# Patient Record
Sex: FEMALE | Race: WHITE | NOT HISPANIC OR LATINO | Employment: OTHER | URBAN - NONMETROPOLITAN AREA
[De-identification: names, ages, dates, MRNs, and addresses within clinical notes are randomized per-mention and may not be internally consistent; named-entity substitution may affect disease eponyms.]

---

## 2017-02-06 DIAGNOSIS — R52 PAIN: Primary | ICD-10-CM

## 2017-02-06 NOTE — TELEPHONE ENCOUNTER
Called advised pt she would have to make an appointment with Dr. Lange since it's been last April since she was seen. Patient said she would just try to get the RX Voltaren Gel from her PCP-lck

## 2017-06-29 ENCOUNTER — OFFICE VISIT (OUTPATIENT)
Dept: ORTHOPEDIC SURGERY | Facility: CLINIC | Age: 72
End: 2017-06-29

## 2017-06-29 VITALS — HEIGHT: 72 IN | WEIGHT: 180 LBS | BODY MASS INDEX: 24.38 KG/M2

## 2017-06-29 DIAGNOSIS — M65.311 TRIGGER THUMB, RIGHT THUMB: ICD-10-CM

## 2017-06-29 DIAGNOSIS — Z98.890 STATUS POST COMPLETE REPAIR OF ROTATOR CUFF: Primary | ICD-10-CM

## 2017-06-29 PROCEDURE — 99213 OFFICE O/P EST LOW 20 MIN: CPT | Performed by: ORTHOPAEDIC SURGERY

## 2017-10-12 ENCOUNTER — OFFICE VISIT (OUTPATIENT)
Dept: ORTHOPEDIC SURGERY | Facility: CLINIC | Age: 72
End: 2017-10-12

## 2017-10-12 VITALS — TEMPERATURE: 97 F | WEIGHT: 178 LBS | BODY MASS INDEX: 28.61 KG/M2 | HEIGHT: 66 IN

## 2017-10-12 DIAGNOSIS — Z96.651 H/O TOTAL KNEE REPLACEMENT, RIGHT: Primary | ICD-10-CM

## 2017-10-12 DIAGNOSIS — M65.311 TRIGGER THUMB, RIGHT THUMB: ICD-10-CM

## 2017-10-12 DIAGNOSIS — Z96.651 STATUS POST TOTAL KNEE REPLACEMENT, RIGHT: ICD-10-CM

## 2017-10-12 PROCEDURE — 73560 X-RAY EXAM OF KNEE 1 OR 2: CPT | Performed by: ORTHOPAEDIC SURGERY

## 2017-10-12 PROCEDURE — 99214 OFFICE O/P EST MOD 30 MIN: CPT | Performed by: ORTHOPAEDIC SURGERY

## 2017-10-12 RX ORDER — THYROID 30 MG/1
30 TABLET ORAL DAILY
COMMUNITY
Start: 2017-08-24

## 2017-10-12 RX ORDER — MELOXICAM 15 MG/1
15 TABLET ORAL DAILY
Status: ON HOLD | COMMUNITY
Start: 2017-08-09 | End: 2019-09-09

## 2017-10-12 RX ORDER — EPINEPHRINE 0.3 MG/.3ML
INJECTION INTRAMUSCULAR AS NEEDED
COMMUNITY
Start: 2017-10-04 | End: 2022-05-09 | Stop reason: SDUPTHER

## 2017-10-12 RX ORDER — BENZONATATE 100 MG/1
100 CAPSULE ORAL 2 TIMES DAILY PRN
COMMUNITY
Start: 2017-08-31 | End: 2021-12-22

## 2017-10-12 NOTE — PROGRESS NOTES
"Chief Complaint   Patient presents with   • Right Knee - Establish Care             HPI  Pleasant 71 year old female presents to establish orthopedic care for routine follow ups status post right total knee replacement performed Dr. Mack on May 24, 2005.  Dr. Mack is no longer at the [facility] and she reports no problems with her knee.  She states,\"it is doing great\".The total knee arthroplasty improved her quality of life very significantly.  She likes to get outside and walk for exercise and is able to do that in an unrestricted fashion.  She is pleased with her outcome.  I will be glad to follow her total knee arthroplasty on a yearly basis since her original orthopedic surgeon, Dr. Crow Mack is retired.  The patient is complaining of significant pain and discomfort of the right thumb.  She has difficulty with full extension of the thumb.  There is a significant clicking and popping on the base of the thumb.  This causes her to have a lot of discomfort.  It also interrupts her sleep patterns.  The patient would to have her trigger thumb addressed with a surgical correction.  We have discussed this option with the patient in the office at great detail.  Time spent in the office discussing the total knee arthroplasty surveillance and maintenance and making a surgical decision on the thumb is 30 minutes.          Allergies   Allergen Reactions   • Latex Hives   • Codeine Hives   • Contrast Dye Hives   • Cubicin [Daptomycin] Itching   • Darvon [Propoxyphene] Nausea And Vomiting     CONFUSION.    • Demerol [Meperidine] Nausea And Vomiting   • Ditropan [Oxybutynin] Itching   • Duricef [Cefadroxil] Itching   • Erythromycin Hives   • Hydrocodone Nausea And Vomiting   • Iodine Hives   • Minocycline Hives   • Morphine And Related Hives   • Penicillins Hives   • Solu-Medrol [Methylprednisolone Acetate] Other (See Comments)     UNKNOWN REACTION.    • Sulphadimidine [Sulfamethazine] Hives   • Talwin " [Pentazocine] Hives     AND TALACEN   • Ultram [Tramadol] Hives   • Valium [Diazepam] Hives     CONFUSION.    • Adhesive Tape Rash   • Betadine [Povidone Iodine] Rash   • Neosporin Af [Miconazole Nitrate] Rash         Social History     Social History   • Marital status:      Spouse name: N/A   • Number of children: N/A   • Years of education: N/A     Occupational History   • Not on file.     Social History Main Topics   • Smoking status: Never Smoker   • Smokeless tobacco: Not on file   • Alcohol use 1.2 oz/week     2 Shots of liquor per week      Comment: 2 drinks per week   • Drug use: No   • Sexual activity: Defer     Other Topics Concern   • Not on file     Social History Narrative       Family History   Problem Relation Age of Onset   • Hypertension Other    • Cancer Other      breast       Past Surgical History:   Procedure Laterality Date   • ABSCESS DRAINAGE      abscess removed from left jaw x2   • APPENDECTOMY  1965   • AVULSION TOENAIL PLATE      3 toenails removed   • BILATERAL BREAST REDUCTION Left 8/29/2016    Procedure: LT BREAST REDUCTION ;  Surgeon: Luis Zambrano MD;  Location: [facility];  Service:    • CHOLECYSTECTOMY  09/2000   • CYST REMOVAL  03/1984    eye cyst left lower lid   • CYST REMOVAL  01/2007    right upper thigh   • CYSTOSCOPY      x2  02/2013 & 04/2014   • HYSTERECTOMY  1971    LATER BSO   • KNEE ARTHROPLASTY Right 03/2005   • MASTECTOMY Right 03/2015   • MASTECTOMY PARTIAL / LUMPECTOMY Right 03/2009   • NIPPLE RECONSTRUCTION Right 8/29/2016    Procedure: RT NIPPLE RECONSTRUCTION;  Surgeon: Luis Zambrano MD;  Location: [facility];  Service:    • OTHER SURGICAL HISTORY      laparoscopy adhesions right side x2   • OTHER SURGICAL HISTORY      drain mammosite area   • OTHER SURGICAL HISTORY  01/2010    pressure washed prosthesis area left jaw   • OTHER SURGICAL HISTORY      drain mammosite area right breast 09/2011   • RECONSTRUCTION BREAST W/ TRAM FLAP Right 02/2016    • ROTATOR CUFF REPAIR Left 09/2014   • TEMPOROMANDIBULAR JOINT ARTHROPLASTY Bilateral 1993    MULT. REPLACEMENTS LATER   • TONSILLECTOMY  1952       Past Medical History:   Diagnosis Date   • Anemia     CHRONIC IRON DEFICIENT   • Asthma    • Hard to intubate     SEE ANESTHESIA CONSULT 8-24-16   • Headache     or migraines   • History of breast cancer     RIGHT   • Hypercholesteremia    • MRSA (methicillin resistant Staphylococcus aureus)     LEFT JAW @[facility] JOINT, MULT. ,LAST 2013, TREATED AT [facility], infection control notified 8/24/16. 1300   • Osteoarthritis    • PONV (postoperative nausea and vomiting)    • Sleep apnea     C PAP   • Spinal headache    • Vertigo    • Wound dehiscence     HX. STATES CLOSED           Vitals:    10/12/17 0905   Temp: 97 °F (36.1 °C)             Review of Systems   Constitutional: Negative.    HENT: Negative.    Eyes: Negative.    Respiratory: Negative.    Cardiovascular: Negative.    Gastrointestinal: Negative.    Endocrine: Negative.    Genitourinary: Negative.    Musculoskeletal: Negative for back pain and joint swelling.   Skin: Negative.    Allergic/Immunologic: Positive for environmental allergies and food allergies.   Neurological: Negative.    Hematological: Bruises/bleeds easily.   Psychiatric/Behavioral: Negative.            Physical Exam   Constitutional: She is oriented to person, place, and time. She appears well-nourished.   HENT:   Head: Atraumatic.   Eyes: EOM are normal.   Neck: Neck supple.   Cardiovascular: Intact distal pulses.    Pulmonary/Chest: Breath sounds normal.   Abdominal: Bowel sounds are normal.   Musculoskeletal: She exhibits edema and tenderness. She exhibits no deformity.   Neurological: She is alert and oriented to person, place, and time. She has normal reflexes.   Skin: Skin is dry.   Psychiatric: She has a normal mood and affect. Her behavior is normal. Judgment and thought content normal.   Vitals reviewed.              Joint/Body  Part Specific Exam:  right knee.The patient is status post total knee arthroplasty postoperative 12 year(s). Incision is clean. Calf is soft and nontender. Homans sign is negative. There is no clicking, popping or catching. Anterior and posterior drawer signs are negative.  There is no instability of the components. Appropriate amounts of swelling and bruising are noted. Dorsalis pedis and posterior tibial artery pulses are palpable. Common peroneal nerve function is well preserved. Range of motion is from 0- 125 degrees of flexion. Gait is cautious but otherwise fairly normal. There is no evidence of a deep seated joint infection.      right wrist. Trigger trigger thumb. Skin is swollen over volar aspect of MCP joint. Flexor and extensor tendon functions are both well maintained. Tender nodule over the volar aspect of MCP joint. Capillary refill is two seconds with a brisk return. Triggering is passively correctable. Median nerve function is normal. Radial artery pulse is palpable.    X-RAY Report:  right Knee X-Ray  Indication: Evaluation of implant position after total knee arthroplasty  AP, Lateral views  Findings: Well placed implants with a good cement mantle without any subsidence  no bony lesion  Soft tissues within normal limits  within normal limits joint spaces  Hardware appropriately positioned yes      no prior studies available for comparison.    X-RAY was ordered and reviewed by Dejan Lange MD          Diagnostics:            Valery was seen today for establish care.    Diagnoses and all orders for this visit:    H/O total knee replacement, right  -     XR Knee 1 or 2 View Right    Status post total knee replacement, right  -     External [facility] Surgical/Procedural Request  -     clindamycin (CLEOCIN) 900 mg in dextrose (D5W) 5 % 100 mL IVPB; Infuse 900 mg into a venous catheter 1 (One) Time.    Trigger thumb, right thumb    Other orders  -     Follow Anesthesia Guidelines / Standing  Orders; Future  -     Obtain informed consent  -     Follow Anesthesia Guidelines / Standing Orders; Standing            Procedures          I provided this patient with educational materials regarding bone health.        Plan:   Stretching and strengthening exercises of the knee including the quads and hamstrings.    Calcium and vitamin D for bone health.    Tablet ibuprofen 600 mg orally twice a day when necessary pain and discomfort.    , GI and dental procedure prophylaxis with antibiotics to prevent a metastatic infection to the total knee arthroplasty implants.    She has a locking right trigger thumb and would like to have that released.    Risks and benefits of trigger thumb release discussed with the patient in great detail.    Risks of surgical procedure, possibility of infection, DVT, continued pain, limited strength and range of motion, neurological deficit, scar tissue formation, recurrence of triggering and further surgical intervention discussed with the patient. Patient understands that surgery will benefit triggering.          CC To Tino Smith MD

## 2017-10-18 PROBLEM — M65.311 TRIGGER THUMB, RIGHT THUMB: Status: ACTIVE | Noted: 2017-10-18

## 2017-10-18 PROBLEM — Z96.651 H/O TOTAL KNEE REPLACEMENT, RIGHT: Status: ACTIVE | Noted: 2017-10-18

## 2017-10-18 PROBLEM — Z96.651 STATUS POST TOTAL KNEE REPLACEMENT, RIGHT: Status: ACTIVE | Noted: 2017-10-18

## 2017-11-03 ENCOUNTER — OUTSIDE FACILITY SERVICE (OUTPATIENT)
Dept: ORTHOPEDIC SURGERY | Facility: CLINIC | Age: 72
End: 2017-11-03

## 2017-11-03 PROCEDURE — 26055 INCISE FINGER TENDON SHEATH: CPT | Performed by: ORTHOPAEDIC SURGERY

## 2017-11-10 ENCOUNTER — OFFICE VISIT (OUTPATIENT)
Dept: ORTHOPEDIC SURGERY | Facility: CLINIC | Age: 72
End: 2017-11-10

## 2017-11-10 DIAGNOSIS — M65.311 TRIGGER THUMB, RIGHT THUMB: Primary | ICD-10-CM

## 2017-11-10 PROCEDURE — 99024 POSTOP FOLLOW-UP VISIT: CPT | Performed by: ORTHOPAEDIC SURGERY

## 2017-11-10 NOTE — PROGRESS NOTES
Chief Complaint   Patient presents with   • Right Hand - Follow-up   • Wound Check         HPI  The patient is here today for a follow up of her right trigger finger release that was done on 11/3/17.  Her incision remains closed and healing. She is doing extremely well at this point.  There are no fevers, rigors or chills.  There is an appropriate amount of swelling around the vicinity the incision.  She is very pleased with the fact that she does not have the pain from the locking and clicking of the thumb in flexion.  She is neurovascularly intact postoperatively.       There were no vitals filed for this visit.        Joint/Body Part Specific Exam:  right wrist. The patient is 1 week(s) status post trigger finger release of the A1 pulley of the right thumb. Flexor and extensor tendon functions are both well preserved. Neurovascular status is intact. There is more clicking, locking, or catching. Appropriate amounts of swelling and bruising are noted. Mild swelling is noted consistent with post-surgical status. No evidence of neurovascular compromise is noted. The patient is able to make a fist although it is still a little slow and somewhat painful. Mildly tender over the site of the release.      X-RAY REPORT:        Valery was seen today for wound check and follow-up.    Diagnoses and all orders for this visit:    Trigger thumb, right thumb          Procedures        Instructions:      Plan:Incision care.    Gentle active mobilization of the thumb.    Tablet ibuprofen 600 mg orally daily at bedtime prn Pain and discomfort.    Avoid repetitive loading of the thumb.    Follow-up in my office in 4 weeks for reevaluation

## 2017-12-01 ENCOUNTER — OFFICE VISIT (OUTPATIENT)
Dept: ORTHOPEDIC SURGERY | Facility: CLINIC | Age: 72
End: 2017-12-01

## 2017-12-01 DIAGNOSIS — M65.311 TRIGGER THUMB, RIGHT THUMB: Primary | ICD-10-CM

## 2017-12-01 PROCEDURE — 99024 POSTOP FOLLOW-UP VISIT: CPT | Performed by: ORTHOPAEDIC SURGERY

## 2017-12-01 NOTE — PROGRESS NOTES
"Chief Complaint   Patient presents with   • Right Hand - Follow-up         HPI the patient is here today for a follow up of her right trigger finger release that was done on 11/03/2017.  Patient is doing very well postoperatively.  There is no clicking or catching of the thumb tendon in the flexion.  Her pain has settled down nicely.  She has been able to play musical instruments without any issue.  In fact she tells me that she is gone back to playing the flute with her require.  She is pleased with her outcome.  There was one local area where a Vicryl suture was prominent and that is being removed in the office today.  \"Thank you for releasing the catching of my thumb, Dr. Lange\".        There were no vitals filed for this visit.        Joint/Body Part Specific Exam:  right wrist. The patient is 4 week(s) status post trigger finger release of the A1 pulley of the right thumb. Flexor and extensor tendon functions are both well preserved. Neurovascular status is intact. There is more clicking, locking, or catching. Appropriate amounts of swelling and bruising are noted. Mild swelling is noted consistent with post-surgical status. No evidence of neurovascular compromise is noted. The patient is able to make a fist although it is still a little slow and somewhat painful. Mildly tender over the site of the release.      X-RAY REPORT:        Valery was seen today for follow-up.    Diagnoses and all orders for this visit:    Trigger thumb, right thumb          Procedures        Instructions:      Plan:Incision care.    Gentle active mobilization of the thumb to allow tendon glides of the flexor and extensor tendons.    Avoid repetitive loading.    Okay to play the flute and other musical instruments with her require.    Tablet ibuprofen 600 mg orally daily at bedtime for pain and discomfort.    Follow-up in my office in 3 months for reevaluation.  "

## 2017-12-21 ENCOUNTER — TELEPHONE (OUTPATIENT)
Dept: ORTHOPEDIC SURGERY | Facility: CLINIC | Age: 72
End: 2017-12-21

## 2017-12-21 NOTE — TELEPHONE ENCOUNTER
PATIENT LEFT MESSAGE ON [employer] TODAY REGARDING ANOTHER STITCH THAT THE PATIENT SAYS NEEDS TO BE TAKEN OUT.     PATIENT WAS TOLD TO COME TODAY BY 4PM.

## 2018-03-13 ENCOUNTER — OFFICE VISIT (OUTPATIENT)
Dept: ORTHOPEDIC SURGERY | Facility: CLINIC | Age: 73
End: 2018-03-13

## 2018-03-13 DIAGNOSIS — M65.311 TRIGGER THUMB, RIGHT THUMB: Primary | ICD-10-CM

## 2018-03-13 DIAGNOSIS — Z96.651 STATUS POST TOTAL KNEE REPLACEMENT, RIGHT: ICD-10-CM

## 2018-03-13 PROCEDURE — 99213 OFFICE O/P EST LOW 20 MIN: CPT | Performed by: ORTHOPAEDIC SURGERY

## 2018-03-13 RX ORDER — PILOCARPINE HYDROCHLORIDE 5 MG/1
5 TABLET, FILM COATED ORAL 3 TIMES DAILY
COMMUNITY
End: 2021-07-08 | Stop reason: SDUPTHER

## 2018-03-13 RX ORDER — LANSOPRAZOLE 30 MG/1
30 CAPSULE, DELAYED RELEASE ORAL 2 TIMES DAILY
COMMUNITY

## 2018-03-13 RX ORDER — PYRIDOXINE HCL (VITAMIN B6) 100 MG
100 TABLET ORAL
COMMUNITY
End: 2019-04-08

## 2018-03-13 RX ORDER — CEPHALEXIN 500 MG/1
500 CAPSULE ORAL DAILY
COMMUNITY
End: 2021-06-28

## 2018-03-13 RX ORDER — LEVALBUTEROL INHALATION SOLUTION 0.31 MG/3ML
SOLUTION RESPIRATORY (INHALATION)
COMMUNITY
End: 2018-11-20

## 2018-03-13 RX ORDER — MULTIVITAMIN
1 TABLET ORAL
COMMUNITY
End: 2019-04-08

## 2018-03-13 RX ORDER — EPINEPHRINE 0.15 MG/.3ML
INJECTION INTRAMUSCULAR
COMMUNITY
End: 2018-11-20

## 2018-03-13 RX ORDER — ATORVASTATIN CALCIUM 20 MG/1
20 TABLET, FILM COATED ORAL NIGHTLY
COMMUNITY
End: 2022-03-07

## 2018-03-13 RX ORDER — LEVALBUTEROL TARTRATE 45 MCG
2 HFA AEROSOL WITH ADAPTER (GRAM) INHALATION EVERY 4 HOURS PRN
COMMUNITY
Start: 2018-01-09 | End: 2021-08-12

## 2018-03-13 RX ORDER — FEXOFENADINE HCL 180 MG/1
180 TABLET ORAL DAILY
COMMUNITY

## 2018-03-13 RX ORDER — MOMETASONE FUROATE 50 UG/1
2 SPRAY, METERED NASAL DAILY
COMMUNITY
End: 2021-12-22

## 2018-03-13 RX ORDER — ATENOLOL 25 MG/1
25 TABLET ORAL EVERY EVENING
COMMUNITY
End: 2022-03-07

## 2018-03-13 RX ORDER — MONTELUKAST SODIUM 10 MG/1
10 TABLET ORAL NIGHTLY
COMMUNITY

## 2018-03-13 RX ORDER — MELOXICAM 15 MG/1
15 TABLET ORAL
COMMUNITY
End: 2019-04-08

## 2018-03-13 RX ORDER — MECLIZINE HYDROCHLORIDE 25 MG/1
25 TABLET ORAL 3 TIMES DAILY PRN
COMMUNITY
Start: 2018-03-06 | End: 2022-02-28 | Stop reason: SDUPTHER

## 2018-03-13 RX ORDER — METRONIDAZOLE 7.5 MG/G
1 GEL TOPICAL 2 TIMES DAILY PRN
COMMUNITY

## 2018-03-13 NOTE — PROGRESS NOTES
Chief Complaint   Patient presents with   • Right Hand - Follow-up           HPI the patient is here today for a follow up of her right trigger finger release that was done on 11/03/17.  The patient states that her trigger thumb is functioning extremely well.  There is no locking or clicking of the joint at this point.  Her range of motion is improved significantly.  She is able to flex the joint at the IP joint without any inhibition or limitations.  There is one small area where there is possibility of a retained suture and some prominence on the radial side of the incision.  The patient states that she has never done well long-term with any form of sutures that have been placed.  I have explained to her that this is a dissolvable suture that will eventually settle down and be resolved completely.  There is no indication for surgical retrieval of that suture.  I have recommended that she continue to work hard with physical therapy on the total knee arthroplasty to improve the range of motion.  The patient states that her hand function has resorted to baseline at this point and she is gone back to normal day-to-day activities including playing her flute on a regular basis.        There were no vitals filed for this visit.        Review of Systems   Constitutional: Negative.    HENT: Negative.    Eyes: Negative.    Respiratory: Negative.    Cardiovascular: Negative.    Gastrointestinal: Negative.    Endocrine: Negative.    Genitourinary: Negative.    Musculoskeletal: Positive for joint swelling.   Skin: Negative.    Allergic/Immunologic: Negative.    Neurological: Negative.    Hematological: Negative.    Psychiatric/Behavioral: Negative.            Physical Exam   Constitutional: She appears well-nourished.   HENT:   Head: Atraumatic.   Eyes: EOM are normal.   Neck: Neck supple.   Cardiovascular: Regular rhythm and normal heart sounds.    Pulmonary/Chest: Effort normal and breath sounds normal.   Abdominal: Bowel  sounds are normal.   Musculoskeletal: Normal range of motion.   Neurological: She is alert. She has normal reflexes.   Skin: Skin is warm.   Psychiatric: She has a normal mood and affect. Her behavior is normal. Judgment normal.   Nursing note and vitals reviewed.          Joint/Body Part Specific Exam:  right wrist. The patient is 5 month(s) status post trigger finger release of the A1 pulley of the right thumb.  There is a slightly tender spot on the radial side of the incision.  There is no evidence of a localized infection.  There is some underlying scar tissue mostly likely related to a suture which was used to repair the subcutaneous tissues.  Flexor and extensor tendon functions are both well preserved. Neurovascular status is intact. There is more clicking, locking, or catching. Appropriate amounts of swelling and bruising are noted. Mild swelling is noted consistent with post-surgical status. No evidence of neurovascular compromise is noted. The patient is able to make a fist although it is still a little slow and somewhat painful. Mildly tender over the site of the release.    right knee.The patient is status post total knee arthroplasty postoperative 12 year(s).  This surgery was performed by Dr. Crow Mack Incision is clean. Calf is soft and nontender. Homans sign is negative. There is no clicking, popping or catching. Anterior and posterior drawer signs are negative.  There is no instability of the components. Appropriate amounts of swelling and bruising are noted. Dorsalis pedis and posterior tibial artery pulses are palpable. Common peroneal nerve function is well preserved. Range of motion is from 0- 120° degrees of flexion. Gait is cautious but otherwise fairly normal. There is no evidence of a deep seated joint infection.  X-RAY Repo    Diagnostics:        Valery was seen today for follow-up.    Diagnoses and all orders for this visit:    Trigger thumb, right thumb    Status post total knee  replacement, right            Procedures        Plan:  Stretching and strengthening exercises of the total knee arthroplasty.    , GI and dental procedure prophylaxis with antibiotics to prevent a metastatic infection of the knee arthroplasty implants.    Tablet ibuprofen 600 mg orally twice a day for pain swelling and discomfort.    Active range of motion with stretching exercises of the thumb to prevent loss of motion after trigger finger release.    Falls precaution.    Calcium and vitamin D for bone health.    Follow-up in my office in 1 year for yearly surveillance of the total knee arthroplasty.

## 2018-04-30 ENCOUNTER — OFFICE VISIT CONVERTED (OUTPATIENT)
Dept: FAMILY MEDICINE CLINIC | Age: 73
End: 2018-04-30
Attending: FAMILY MEDICINE

## 2018-05-09 ENCOUNTER — OFFICE VISIT (OUTPATIENT)
Dept: ORTHOPEDIC SURGERY | Facility: CLINIC | Age: 73
End: 2018-05-09

## 2018-05-09 DIAGNOSIS — Z96.651 HISTORY OF TOTAL KNEE ARTHROPLASTY, RIGHT: Primary | ICD-10-CM

## 2018-05-09 PROCEDURE — 73560 X-RAY EXAM OF KNEE 1 OR 2: CPT | Performed by: ORTHOPAEDIC SURGERY

## 2018-05-09 PROCEDURE — 99213 OFFICE O/P EST LOW 20 MIN: CPT | Performed by: ORTHOPAEDIC SURGERY

## 2018-05-09 NOTE — PROGRESS NOTES
Chief Complaint   Patient presents with   • Right Knee - Follow-up           HPI the patient is here today for a follow up of her right total knee arthroplasty that was done by Dr. Mack on 05/24/2005.Patient states that the knee replacement is starting to give her some trouble.  She denies any recent history of trauma.  Recently she has developed some episodes of pain.  She describes her pain as a hot poker type of pain over the anterior aspect of the joint.  There is no catching or buckling of the knee.  Since this knee replacement was performed 13 years ago there is definitely the possibility of wear of the polyethylene.  I have discussed that with the patient.  If she continues to have symptoms she might require a CT scan to make sure there is no deterioration of the periprosthetic bone.  Eventually, most likely she will need revision surgery and replacement of the poly-insert.  She does not have any fevers, rigors or chills and there is no evidence of any form of infective pathology        There were no vitals filed for this visit.        Review of Systems   Constitutional: Negative.    HENT: Negative.    Eyes: Negative.    Respiratory: Negative.    Cardiovascular: Negative.    Gastrointestinal: Negative.    Endocrine: Negative.    Genitourinary: Negative.    Musculoskeletal: Positive for gait problem and joint swelling.   Skin: Negative.    Allergic/Immunologic: Negative.    Hematological: Negative.    Psychiatric/Behavioral: Negative.            Physical Exam   Constitutional: She is oriented to person, place, and time. She appears well-nourished.   HENT:   Head: Atraumatic.   Eyes: EOM are normal.   Neck: Neck supple.   Cardiovascular: Normal heart sounds and intact distal pulses.    Pulmonary/Chest: Breath sounds normal.   Abdominal: Bowel sounds are normal.   Musculoskeletal: She exhibits edema and tenderness.   Neurological: She is alert and oriented to person, place, and time.   Skin: Skin is dry.  Capillary refill takes 2 to 3 seconds.   Psychiatric: She has a normal mood and affect. Her behavior is normal. Judgment and thought content normal.   Nursing note and vitals reviewed.          Joint/Body Part Specific Exam:  right knee.The patient is status post total knee arthroplasty postoperative 13 year(s). Incision is clean. Calf is soft and nontender. Homans sign is negative. There is no clicking, popping or catching. Anterior and posterior drawer signs are negative.  There is no instability of the components. Appropriate amounts of swelling and bruising are noted. Dorsalis pedis and posterior tibial artery pulses are palpable. Common peroneal nerve function is well preserved. Range of motion is from 0- 120 degrees of flexion. Gait is cautious but otherwise fairly normal. There is no evidence of a deep seated joint infection.      X-RAY Report:    right Knee X-Ray  Indication: Evaluation of implant position after long-standing total knee arthroplasty  AP, Lateral views  Findings: Good cement mantle without any subsidence of the implants:  no bony lesion  Soft tissues within normal limits  within normal limits joint spaces  Hardware appropriately positioned yes: There is some evidence of narrowing of the polyethylene insert possibly representing poly wear      yes prior studies available for comparison.    X-RAY was ordered and reviewed by Dejan Lange MD    Diagnostics:        Valery was seen today for follow-up.    Diagnoses and all orders for this visit:    History of total knee arthroplasty, right  -     XR Knee 1 or 2 View Right            Procedures        Plan:  Weightbearing as tolerated.    Stretching and strengthening exercises of the quads and the hamstrings.    Use a supportive brace on the knee to prevent it from buckling and giving out.    Tablet Mobic 7.5 mg tab 1 by mouth daily for pain swelling and discomfort.    , GI and dental procedure prophylaxis with antibiotics to prevent metastatic  infection of the knee arthroplasty implants.    She does demonstrate some signs of polyethylene wear and might require revision of the knee and a polyethylene exchange if her symptoms continue to bother her.    Follow-up in my office in 2-3 months for reevaluation.    I will be glad to obtain a CT scan to make sure that she does not have any indications for surgical intervention.

## 2018-05-24 ENCOUNTER — OFFICE VISIT CONVERTED (OUTPATIENT)
Dept: FAMILY MEDICINE CLINIC | Age: 73
End: 2018-05-24
Attending: NURSE PRACTITIONER

## 2018-05-30 ENCOUNTER — OFFICE VISIT CONVERTED (OUTPATIENT)
Dept: FAMILY MEDICINE CLINIC | Age: 73
End: 2018-05-30
Attending: NURSE PRACTITIONER

## 2018-07-19 ENCOUNTER — OFFICE VISIT (OUTPATIENT)
Dept: ORTHOPEDIC SURGERY | Facility: CLINIC | Age: 73
End: 2018-07-19

## 2018-07-19 DIAGNOSIS — M17.12 PRIMARY OSTEOARTHRITIS OF LEFT KNEE: ICD-10-CM

## 2018-07-19 DIAGNOSIS — Z98.890 STATUS POST COMPLETE REPAIR OF ROTATOR CUFF: Primary | ICD-10-CM

## 2018-07-19 PROCEDURE — 99213 OFFICE O/P EST LOW 20 MIN: CPT | Performed by: ORTHOPAEDIC SURGERY

## 2018-07-19 NOTE — PROGRESS NOTES
Chief Complaint   Patient presents with   • Right Knee - Follow-up           HPI the patient is here today for a follow up of her right knee arthroplasty that was done on 5/24/05 by Dr. Mack. She is now 13 years post total knee arthroplasty on the right side.  She's had previous injuries to the knee prior to the replacement of the knee.  She does have some instability of the right knee and states that she overcomes that with the use of a brace.  I have discussed about the possibility of polyethylene wear and the need for revision surgery on the right side with the patient.  She states that her left knee is becoming increasingly symptomatic.  She has difficulty in going up and down the steps.  She tries to exercise and the left knee bothers her because it clicks and pops.  She has tried an injection therapy to the left knee and that seems to be of very little benefit.  She would like to maintain and very active lifestyle as long as possible and the left knee is not allowing her to do so.  She has been thinking about a knee replacement surgery in the near future on the left knee.  She also has issues with chronic low back pain.  The pain radiates from the lumbar spine into the hip and buttock and thigh.  She states that she visits a chiropractor and that seems to be helping her symptoms to some degree.        There were no vitals filed for this visit.        Review of Systems   Constitutional: Negative.    HENT: Negative.    Eyes: Negative.    Respiratory: Negative.    Cardiovascular: Negative.    Gastrointestinal: Negative.    Endocrine: Negative.    Genitourinary: Negative.    Musculoskeletal: Positive for gait problem and joint swelling.   Skin: Negative.    Allergic/Immunologic: Negative.    Hematological: Negative.    Psychiatric/Behavioral: Negative.            Physical Exam   Constitutional: She appears well-nourished.   HENT:   Head: Atraumatic.   Eyes: EOM are normal.   Neck: Neck supple.   Cardiovascular:  Normal heart sounds and intact distal pulses.    Pulmonary/Chest: Effort normal and breath sounds normal.   Abdominal: Soft. Bowel sounds are normal.   Musculoskeletal: She exhibits edema, tenderness and deformity.   Neurological: She is alert.   Skin: Skin is warm. Capillary refill takes 2 to 3 seconds.   Psychiatric: She has a normal mood and affect. Judgment and thought content normal.   Vitals reviewed.          Joint/Body Part Specific Exam:  left knee. Patient has crepitus throughout range of motion. Positive patellar grind test. Mild effusion. Lachman is negative. Pivot shift is negative. Anterior and posterior drawer signs are negative. Significant joint line tenderness is noted on the medial aspect of the knee. Patient has a varus orientation of the knee. There is fullness and tenderness in the Popliteal fossa. Mild distention of a Popliteal cyst is noted in this location. Range of motion in flexion is from 0- 120° degrees. Neurovascular status is intact.  Dorsalis pedis and posterior tibial artery pulses are palpable. Common peroneal nerve function is well preserved. Patient's gait is cautious and antalgic. Skin and soft tissues are mildly swollen, consistent with synovitis and effusion. The patient has a significant limp with the first few steps after starting the gait cycle. Getting out of a chair takes a lot of effort due to pain on knee flexion.    right knee.The patient is status post total knee arthroplasty postoperative 13 year(s). Incision is clean. Calf is soft and nontender. Homans sign is negative. There is no clicking, popping or catching. Anterior and posterior drawer signs are negative.  There is no instability of the components. Appropriate amounts of swelling and bruising are noted. Dorsalis pedis and posterior tibial artery pulses are palpable. Common peroneal nerve function is well preserved. Range of motion is from 0- 120° degrees of flexion. Gait is cautious but otherwise fairly normal.  There is no evidence of a deep seated joint infection.  X-RAY Report:        Diagnostics:        Valery was seen today for follow-up.    Diagnoses and all orders for this visit:    Status post complete repair of rotator cuff    Primary osteoarthritis of left knee            Procedures        Plan:  Use a brace on both the knees to prevent them from buckling and giving out.    We have had an extensive discussion about possible revision surgery on the right side where she most likely has some evidence of polyethylene wear.    She is going to need a knee replacement surgery on her left knee as well.  She states that her symptoms are getting progressively worse and she will let me know when she is ready for the surgical intervention.    Intra-articular injection of steroid and Synvisc Visco supplementation discussed with the patient at length.    Tablet ibuprofen 600 mg orally twice a day for pain swelling and discomfort.    Calcium and vitamin D for bone health.    , GI and dental procedure prophylaxis with antibiotics to prevent metastatic infection of the right knee arthroplasty implants.    Follow-up in my office in 3 months for further decision making and obtaining x-rays of the left knee as a step towards preoperative planning for the left knee replacement surgery.

## 2018-08-28 ENCOUNTER — OFFICE VISIT CONVERTED (OUTPATIENT)
Dept: FAMILY MEDICINE CLINIC | Age: 73
End: 2018-08-28
Attending: FAMILY MEDICINE

## 2018-09-05 ENCOUNTER — OFFICE VISIT (OUTPATIENT)
Dept: ORTHOPEDIC SURGERY | Facility: CLINIC | Age: 73
End: 2018-09-05

## 2018-09-05 DIAGNOSIS — G89.29 CHRONIC PAIN OF LEFT KNEE: Primary | ICD-10-CM

## 2018-09-05 DIAGNOSIS — M25.562 CHRONIC PAIN OF LEFT KNEE: Primary | ICD-10-CM

## 2018-09-05 PROCEDURE — 73560 X-RAY EXAM OF KNEE 1 OR 2: CPT | Performed by: ORTHOPAEDIC SURGERY

## 2018-09-05 PROCEDURE — 99213 OFFICE O/P EST LOW 20 MIN: CPT | Performed by: ORTHOPAEDIC SURGERY

## 2018-09-05 NOTE — PROGRESS NOTES
Chief Complaint   Patient presents with   • Left Knee - Follow-up           HPI the patient is here today for left knee pain. The patient has medial sided pain and swelling. There is no history of trauma to the knee. She has swelling and a sense of tightness especially on knee flexion. She has had a right TKA about 14 years ago and that knee is showing some signs of wear and tear.She is not yet ready for a revision surgery on that side at this point.The left knee pain is a dull ache worse on squatting on the ground.The knee wants to buckle and give out on her.        There were no vitals filed for this visit.        Review of Systems   Constitutional: Negative.    HENT: Negative.    Eyes: Negative.    Respiratory: Negative.    Cardiovascular: Negative.    Gastrointestinal: Negative.    Endocrine: Negative.    Genitourinary: Negative.    Musculoskeletal: Positive for gait problem and joint swelling.   Allergic/Immunologic: Negative.    Hematological: Negative.    Psychiatric/Behavioral: Negative.            Physical Exam   Constitutional: She is oriented to person, place, and time. She appears well-nourished.   HENT:   Head: Atraumatic.   Eyes: EOM are normal.   Neck: Neck supple.   Cardiovascular: Normal heart sounds.    Pulmonary/Chest: Effort normal and breath sounds normal.   Abdominal: Bowel sounds are normal.   Musculoskeletal: She exhibits edema and tenderness.   Neurological: She is alert and oriented to person, place, and time.   Skin: Skin is dry. Capillary refill takes 2 to 3 seconds.   Psychiatric: She has a normal mood and affect. Her behavior is normal. Judgment and thought content normal.   Nursing note and vitals reviewed.          Joint/Body Part Specific Exam:  left knee. Patient has crepitus throughout range of motion. Positive patellar grind test. Mild effusion. Lachman is negative. Pivot shift is negative. Anterior and posterior drawer signs are negative. Significant joint line tenderness is  noted on the medial aspect of the knee. Patient has a varus orientation of the knee. There is fullness and tenderness in the Popliteal fossa. Mild distention of a Popliteal cyst is noted in this location. Range of motion in flexion is from 0- 110 degrees. Neurovascular status is intact.  Dorsalis pedis and posterior tibial artery pulses are palpable. Common peroneal nerve function is well preserved. Patient's gait is cautious and antalgic. Skin and soft tissues are mildly swollen, consistent with synovitis and effusion. The patient has a significant limp with the first few steps after starting the gait cycle. Getting out of a chair takes a lot of effort due to pain on knee flexion.      X-RAY Report:        Diagnostics:        Valery was seen today for follow-up.    Diagnoses and all orders for this visit:    Chronic pain of left knee  -     XR Knee 1 or 2 View Left            Procedures        Plan:Use a supportive brace on the left knee to prevent buckling of the knee.    Tablet Mobic 7.5 mg PO BID PRN pain and swelling.    Steroid injection offered to patient but she declines today.    She is not yet a candidate  For a TKA just yet.    Falls precautions.    ,GI and DEntal procedure prophylaxis with antibiotics to prevent an infection of the right knee implants.    Follow up in 3 months.

## 2018-09-06 PROBLEM — M25.562 CHRONIC PAIN OF LEFT KNEE: Status: ACTIVE | Noted: 2018-09-06

## 2018-09-06 PROBLEM — G89.29 CHRONIC PAIN OF LEFT KNEE: Status: ACTIVE | Noted: 2018-09-06

## 2018-10-18 ENCOUNTER — OFFICE VISIT (OUTPATIENT)
Dept: ORTHOPEDIC SURGERY | Facility: CLINIC | Age: 73
End: 2018-10-18

## 2018-10-18 DIAGNOSIS — M25.562 CHRONIC PAIN OF LEFT KNEE: ICD-10-CM

## 2018-10-18 DIAGNOSIS — G89.29 CHRONIC PAIN OF LEFT KNEE: ICD-10-CM

## 2018-10-18 DIAGNOSIS — Z96.651 HISTORY OF TOTAL KNEE ARTHROPLASTY, RIGHT: ICD-10-CM

## 2018-10-18 DIAGNOSIS — M43.10 SPONDYLOLISTHESIS, UNSPECIFIED SPINAL REGION: ICD-10-CM

## 2018-10-18 DIAGNOSIS — M25.562 PAIN IN BOTH KNEES, UNSPECIFIED CHRONICITY: Primary | ICD-10-CM

## 2018-10-18 DIAGNOSIS — M25.561 PAIN IN BOTH KNEES, UNSPECIFIED CHRONICITY: Primary | ICD-10-CM

## 2018-10-18 PROCEDURE — 73562 X-RAY EXAM OF KNEE 3: CPT | Performed by: ORTHOPAEDIC SURGERY

## 2018-10-18 PROCEDURE — 99213 OFFICE O/P EST LOW 20 MIN: CPT | Performed by: ORTHOPAEDIC SURGERY

## 2018-11-02 PROBLEM — M43.10 SPONDYLOLISTHESIS: Status: ACTIVE | Noted: 2018-11-02

## 2018-11-20 ENCOUNTER — CONSULT (OUTPATIENT)
Dept: ORTHOPEDIC SURGERY | Facility: CLINIC | Age: 73
End: 2018-11-20

## 2018-11-20 VITALS — HEIGHT: 66 IN | WEIGHT: 180 LBS | TEMPERATURE: 97.4 F | BODY MASS INDEX: 28.93 KG/M2

## 2018-11-20 DIAGNOSIS — M43.16 SPONDYLOLISTHESIS OF LUMBAR REGION: ICD-10-CM

## 2018-11-20 DIAGNOSIS — M54.50 SPINE PAIN, LUMBAR: Primary | ICD-10-CM

## 2018-11-20 DIAGNOSIS — M54.50 LUMBAR BACK PAIN: ICD-10-CM

## 2018-11-20 PROCEDURE — 72100 X-RAY EXAM L-S SPINE 2/3 VWS: CPT | Performed by: ORTHOPAEDIC SURGERY

## 2018-11-20 PROCEDURE — 99214 OFFICE O/P EST MOD 30 MIN: CPT | Performed by: ORTHOPAEDIC SURGERY

## 2018-11-20 NOTE — PROGRESS NOTES
New patient or new problem visit    Chief Complaint   Patient presents with   • Lumbar Spine - Pain, Establish Care       HPI: She is seen at request of Dr. Black for chronic low back pain.  States that she injured years ago and then re-broke her tailbone 5 years ago.  This causes sciatic or was associated somewhere at some point with the tailbone she feels like there is water running down her leg where she's known to have knee arthritis.  She's had no specific treatment for this pain is moderate.    PFSH: See chart- reviewed.  Multiple allergies.    Review of Systems   Constitutional: Negative for chills, fever and unexpected weight change.   HENT: Negative for trouble swallowing and voice change.    Eyes: Negative for visual disturbance.   Respiratory: Negative for cough and shortness of breath.    Cardiovascular: Negative for chest pain and leg swelling.   Gastrointestinal: Negative for abdominal pain, nausea and vomiting.   Endocrine: Negative for cold intolerance and heat intolerance.   Genitourinary: Negative for difficulty urinating, frequency and urgency.   Musculoskeletal: Positive for arthralgias, back pain and myalgias.   Skin: Negative for rash and wound.   Allergic/Immunologic: Negative for immunocompromised state.   Neurological: Positive for numbness. Negative for weakness.   Hematological: Does not bruise/bleed easily.   Psychiatric/Behavioral: Negative for dysphoric mood. The patient is not nervous/anxious.        PE: Constitutional: Vital signs above-noted.  Awake, alert and oriented    Psychiatric: Affect and insight do not appear grossly disturbed.    Pulmonary: Breathing is unlabored, color is good.    Skin: Warm, dry and normal turgor    Cardiac:  pedal pulses intact.  No edema.    Eyesight and hearing appear adequate for examination purposes      Musculoskeletal:  There is some tenderness to percussion and palpation of the spine. Motion appears undisturbed.  Posture is unremarkable to coronal  and sagittal inspection.    The skin about the area is intact.  There is no palpable or visible deformity.  There is no local spasm.       Neurologic:   Reflexes are absent in the patellae and achilles.   Motor function is undisturbed in quadriceps, EHL, and gastrocnemius      Sensation appears symmetrically intact to light touch   .  In the bilateral lower extremities there is no evidence of atrophy.   Clonus is absent..  Gait appears undisturbed.  SLR test negative      MEDICAL DECISION MAKING    XRAY: Plain film x-rays of the lumbar spine obtained today show slight anterolisthesis at L3-4 and otherwise are unremarkable and unchanged from 2015    Other: n/a    Impression:  lumbar back pain    Plan: Suggested cardiovascular exercise reassurance.   if pain recurs we can try physical therapy.  I will see her as needed.

## 2018-11-27 ENCOUNTER — OFFICE VISIT CONVERTED (OUTPATIENT)
Dept: FAMILY MEDICINE CLINIC | Age: 73
End: 2018-11-27
Attending: FAMILY MEDICINE

## 2019-01-29 ENCOUNTER — OFFICE VISIT (OUTPATIENT)
Dept: ORTHOPEDIC SURGERY | Facility: CLINIC | Age: 74
End: 2019-01-29

## 2019-01-29 VITALS — TEMPERATURE: 98.1 F | WEIGHT: 172 LBS | BODY MASS INDEX: 27.64 KG/M2 | HEIGHT: 66 IN

## 2019-01-29 DIAGNOSIS — M54.30 SCIATICA, UNSPECIFIED LATERALITY: Primary | ICD-10-CM

## 2019-01-29 PROCEDURE — 99213 OFFICE O/P EST LOW 20 MIN: CPT | Performed by: ORTHOPAEDIC SURGERY

## 2019-01-29 NOTE — PROGRESS NOTES
She has persistent back and lower extremity pain which is failed to improve with physical therapy and medication.  Good strength on exam but I think she has some stenosis.  She's got an L3 4 spondylosis and L5-S1 disc space narrowing.  I'm sending her for MRI for further evaluation I will call her with results, perhaps with an eye toward epidurals or even surgery.  She will be available for surgery until April if at all so we will need to keep that in our decision making.

## 2019-02-07 ENCOUNTER — HOSPITAL ENCOUNTER (OUTPATIENT)
Dept: MRI IMAGING | Facility: HOSPITAL | Age: 74
Discharge: HOME | End: 2019-02-07
Attending: ORTHOPAEDIC SURGERY | Admitting: ORTHOPAEDIC SURGERY

## 2019-02-07 DIAGNOSIS — M54.30 SCIATICA, UNSPECIFIED LATERALITY: ICD-10-CM

## 2019-02-07 PROCEDURE — 72148 MRI LUMBAR SPINE W/O DYE: CPT

## 2019-02-12 ENCOUNTER — TELEPHONE (OUTPATIENT)
Dept: ORTHOPEDIC SURGERY | Facility: CLINIC | Age: 74
End: 2019-02-12

## 2019-02-12 NOTE — TELEPHONE ENCOUNTER
----- Message from Vikas Couch MD sent at 2/11/2019  4:40 PM EST -----  Lumbar MRI shows severe stenosis at L3-4 and L4-5 where she has spondylolisthesis.  L5-S1 shows a disc bulge that may have to be addressed as well.  For now we will try the epidural injections as discussed, but she will eventually need surgery.

## 2019-02-12 NOTE — TELEPHONE ENCOUNTER
Patient informed of results.  She is currently headed out of town until April but will call back if she'd like to be scheduled for epidurals at a later date.

## 2019-03-07 ENCOUNTER — TELEPHONE (OUTPATIENT)
Dept: ORTHOPEDIC SURGERY | Facility: CLINIC | Age: 74
End: 2019-03-07

## 2019-03-07 DIAGNOSIS — M48.061 SPINAL STENOSIS OF LUMBAR REGION, UNSPECIFIED WHETHER NEUROGENIC CLAUDICATION PRESENT: Primary | ICD-10-CM

## 2019-03-08 NOTE — TELEPHONE ENCOUNTER
Call returned to the patient.  She has numerous questions regarding the lumbar epidurals.  She had a saddle block with 1 of her pregnancies years ago and had a hard time with her sensation returning was told never to have any kind of injection in her low back.  I have advised her that the epidural is not an anesthetic type injection and that her legs would not have any numbness or decreased sensation.  The purpose of the epidural was very similar to a cortisone injection with local anesthetic to help with the inflammation and irritation to the nerve causing her pain.  Patient states that her daughter has had them in the past with good relief although when she has had cortisone injections in other joints she has gotten no relief.  I advised her that there is no guarantee that the epidural would give her any relief but was an option to try to help alleviate her pain.  She is agreeable to proceed.  Will go ahead and place the order for the lumbar epidural.  I have also suggested that if she has any other questions specifically regarding the epidural injection of the type of medications use that I would recommend she contact the pain management center and speak with a nurse there for more details

## 2019-04-08 ENCOUNTER — ANESTHESIA (OUTPATIENT)
Dept: PAIN MEDICINE | Facility: HOSPITAL | Age: 74
End: 2019-04-08

## 2019-04-08 ENCOUNTER — HOSPITAL ENCOUNTER (OUTPATIENT)
Dept: GENERAL RADIOLOGY | Facility: HOSPITAL | Age: 74
Discharge: HOME | End: 2019-04-08

## 2019-04-08 ENCOUNTER — HOSPITAL ENCOUNTER (OUTPATIENT)
Dept: PAIN MEDICINE | Facility: HOSPITAL | Age: 74
Discharge: HOME | End: 2019-04-08
Admitting: ORTHOPAEDIC SURGERY

## 2019-04-08 ENCOUNTER — ANESTHESIA EVENT (OUTPATIENT)
Dept: PAIN MEDICINE | Facility: HOSPITAL | Age: 74
End: 2019-04-08

## 2019-04-08 VITALS
DIASTOLIC BLOOD PRESSURE: 62 MMHG | HEIGHT: 66 IN | BODY MASS INDEX: 27.64 KG/M2 | WEIGHT: 172 LBS | SYSTOLIC BLOOD PRESSURE: 117 MMHG | RESPIRATION RATE: 16 BRPM | OXYGEN SATURATION: 98 % | TEMPERATURE: 97.9 F | HEART RATE: 57 BPM

## 2019-04-08 DIAGNOSIS — M48.061 SPINAL STENOSIS OF LUMBAR REGION, UNSPECIFIED WHETHER NEUROGENIC CLAUDICATION PRESENT: ICD-10-CM

## 2019-04-08 DIAGNOSIS — R52 PAIN: ICD-10-CM

## 2019-04-08 PROCEDURE — 25010000002 METHYLPREDNISOLONE PER 80 MG: Performed by: ANESTHESIOLOGY

## 2019-04-08 PROCEDURE — 77003 FLUOROGUIDE FOR SPINE INJECT: CPT

## 2019-04-08 PROCEDURE — C1755 CATHETER, INTRASPINAL: HCPCS

## 2019-04-08 RX ORDER — MIDAZOLAM HYDROCHLORIDE 1 MG/ML
1 INJECTION INTRAMUSCULAR; INTRAVENOUS AS NEEDED
Status: DISCONTINUED | OUTPATIENT
Start: 2019-04-08 | End: 2019-04-09 | Stop reason: HOSPADM

## 2019-04-08 RX ORDER — METHYLPREDNISOLONE ACETATE 80 MG/ML
80 INJECTION, SUSPENSION INTRA-ARTICULAR; INTRALESIONAL; INTRAMUSCULAR; SOFT TISSUE ONCE
Status: COMPLETED | OUTPATIENT
Start: 2019-04-08 | End: 2019-04-08

## 2019-04-08 RX ORDER — LIDOCAINE HYDROCHLORIDE 10 MG/ML
1 INJECTION, SOLUTION INFILTRATION; PERINEURAL ONCE AS NEEDED
Status: DISCONTINUED | OUTPATIENT
Start: 2019-04-08 | End: 2019-04-09 | Stop reason: HOSPADM

## 2019-04-08 RX ORDER — FENTANYL CITRATE 50 UG/ML
50 INJECTION, SOLUTION INTRAMUSCULAR; INTRAVENOUS AS NEEDED
Status: DISCONTINUED | OUTPATIENT
Start: 2019-04-08 | End: 2019-04-09 | Stop reason: HOSPADM

## 2019-04-08 RX ORDER — PHENOL 1.4 %
1 AEROSOL, SPRAY (ML) MUCOUS MEMBRANE NIGHTLY
COMMUNITY

## 2019-04-08 RX ORDER — SODIUM CHLORIDE 0.9 % (FLUSH) 0.9 %
1-10 SYRINGE (ML) INJECTION AS NEEDED
Status: DISCONTINUED | OUTPATIENT
Start: 2019-04-08 | End: 2019-04-09 | Stop reason: HOSPADM

## 2019-04-08 RX ADMIN — METHYLPREDNISOLONE ACETATE 80 MG: 80 INJECTION, SUSPENSION INTRA-ARTICULAR; INTRALESIONAL; INTRAMUSCULAR; SOFT TISSUE at 10:45

## 2019-04-08 NOTE — H&P
[facility]    History and Physical    Patient Name: Valery Baez  :  1945  MRN:  [id number]  Date of Admission: 2019    Subjective     Patient is a 73 y.o. female presents with chief complaint of acute, moderate, severe low back and leg: left pain.  Onset of symptoms was gradual starting a few months ago.  Symptoms are associated/aggravated by activity, exercise, standing, straining or twisting. Symptoms improve with nothing.    She does not have a clear etiology for the symptoms and no prior hx of spinal pathology    The following portions of the patients history were reviewed and updated as appropriate: current medications, allergies, past medical history, past surgical history, past family history, past social history and problem list     MRI - reviewed - shows severe mulitlevel stenosis    Objective     Past Medical History:   Past Medical History:   Diagnosis Date   • Anemia     CHRONIC IRON DEFICIENT   • Asthma    • Cancer (CMS/HCC)     right breast cancer   • Chronic kidney disease    • Disease of thyroid gland    • GERD (gastroesophageal reflux disease)    • Hard to intubate     SEE ANESTHESIA CONSULT 16   • Headache     or migraines   • History of breast cancer     RIGHT   • Hypercholesteremia    • MRSA (methicillin resistant Staphylococcus aureus)     LEFT JAW @[facility] JOINT, MULT. ,LAST , TREATED AT [facility], infection control notified 16. 1300   • Osteoarthritis    • Osteoporosis    • PONV (postoperative nausea and vomiting)    • Sleep apnea     C PAP   • Spinal headache    • Vertigo    • Wound dehiscence     HX. STATES CLOSED     Past Surgical History:   Past Surgical History:   Procedure Laterality Date   • ABSCESS DRAINAGE      abscess removed from left jaw x2   • APPENDECTOMY  1965   • AVULSION TOENAIL PLATE      3 toenails removed   • BILATERAL BREAST REDUCTION Left 2016    Procedure: LT BREAST REDUCTION ;  Surgeon: Luis Zambrano MD;  Location: [facility]  "MAIN OR;  Service:    • CHOLECYSTECTOMY  09/2000   • CYST REMOVAL  03/1984    eye cyst left lower lid   • CYST REMOVAL  01/2007    right upper thigh   • CYSTOSCOPY      x2  02/2013 & 04/2014   • HYSTERECTOMY  1971    LATER BSO   • KNEE ARTHROPLASTY Right 03/2005   • MASTECTOMY Right 03/2015   • MASTECTOMY PARTIAL / LUMPECTOMY Right 03/2009   • NIPPLE RECONSTRUCTION Right 8/29/2016    Procedure: RT NIPPLE RECONSTRUCTION;  Surgeon: Luis Zambrano MD;  Location: [facility] MAIN OR;  Service:    • OTHER SURGICAL HISTORY      laparoscopy adhesions right side x2   • OTHER SURGICAL HISTORY      drain mammosite area   • OTHER SURGICAL HISTORY  01/2010    pressure washed prosthesis area left jaw   • OTHER SURGICAL HISTORY      drain mammosite area right breast 09/2011   • RECONSTRUCTION BREAST W/ TRAM FLAP Right 02/2016   • ROTATOR CUFF REPAIR Left 09/2014   • TEMPOROMANDIBULAR JOINT ARTHROPLASTY Bilateral 1993    MULT. REPLACEMENTS LATER   • TONSILLECTOMY  1952     Family History:   Family History   Problem Relation Age of Onset   • Hypertension Other    • Cancer Other         breast     Social History:   Social History     Tobacco Use   • Smoking status: Never Smoker   • Smokeless tobacco: Never Used   Substance Use Topics   • Alcohol use: Yes     Alcohol/week: 1.2 oz     Types: 2 Shots of liquor per week     Comment: 2 drinks per week   • Drug use: No       Vital Signs Range for the last 24 hours  Temperature: Temp:  [36.6 °C (97.9 °F)] 36.6 °C (97.9 °F)   Temp Source: Temp src: Oral   BP: BP: (103)/(95) 103/95   Pulse: Heart Rate:  [64] 64   Respirations: Resp:  [16] 16   SPO2: SpO2:  [98 %] 98 %   O2 Amount (l/min):     O2 Devices Device (Oxygen Therapy): room air   Weight: Weight:  [78 kg (172 lb)] 78 kg (172 lb)     Flowsheet Rows      First Filed Value   Admission Height  167.6 cm (66\") Documented at 04/08/2019 0952   Admission Weight  78 kg (172 lb) Documented at 04/08/2019 0952    "       --------------------------------------------------------------------------------    Current Outpatient Medications   Medication Sig Dispense Refill   • ARMOUR THYROID 30 MG tablet      • aspirin 81 MG tablet Take 81 mg by mouth.     • atenolol (TENORMIN) 25 MG tablet Take 25 mg by mouth.     • atorvastatin (LIPITOR) 20 MG tablet Take 20 mg by mouth.     • Calcium Citrate-Vitamin D (CALCIUM CITRATE + D PO) Take  by mouth Daily.     • cephalexin (KEFLEX) 500 MG capsule Take 500 mg by mouth.     • cholecalciferol (VITAMIN D3) 1000 UNITS tablet Take 1,000 Units by mouth 2 (two) times a day.     • Denosumab (PROLIA SC) Inject  under the skin into the appropriate area as directed. Every 6 months     • diphenhydrAMINE (BENADRYL ALLERGY CHILDRENS) 12.5 MG chewable tablet Chew 25 mg 4 (four) times a day as needed for allergies.     • EPIPEN 2-TOMMY 0.3 MG/0.3ML solution auto-injector injection      • fexofenadine (ALLEGRA) 180 MG tablet Take 180 mg by mouth.     • fluticasone (FLOVENT HFA) 110 MCG/ACT inhaler Inhale 1 puff 2 (two) times a day.     • lansoprazole (PREVACID) 30 MG capsule Take 30 mg by mouth.     • loteprednol (LOTEMAX) 0.2 % suspension 1 drop.     • Melatonin 10 MG tablet Take  by mouth Every Night.     • meloxicam (MOBIC) 15 MG tablet      • metroNIDAZOLE (METROGEL) 0.75 % gel Apply  topically.     • Mirabegron ER 25 MG tablet sustained-release 24 hour Take 25 mg by mouth daily.     • mometasone (ASMANEX TWISTHALER) inhaler 220 mcg/inhalation Inhale 2 puffs.     • mometasone (NASONEX) 50 MCG/ACT nasal spray 2 sprays into each nostril.     • montelukast (SINGULAIR) 10 MG tablet Take 10 mg by mouth.     • Multiple Vitamins-Minerals (MULTIVITAMIN ADULT PO) Take 1 tablet by mouth every night.     • pilocarpine (SALAGEN) 5 MG tablet Take 5 mg by mouth.     • POTASSIUM CHLORIDE PO Take 99 mg by mouth Daily.     • Probiotic Product (PROBIOTIC DAILY PO) Take 1 capsule by mouth 2 (two) times a day.     • vitamin  B-6 (PYRIDOXINE) 100 MG tablet Take 100 mg by mouth 2 (two) times a day.     • VOLTAREN 1 % gel gel APPLY 2 TO 3 GRAMS TO AFFECTED AREA UP TO TWICE A DAY AS NEEDED 5 g 2   • XOPENEX HFA 45 MCG/ACT inhaler      • benzonatate (TESSALON) 100 MG capsule      • meclizine (ANTIVERT) 25 MG tablet        No current facility-administered medications for this encounter.        --------------------------------------------------------------------------------  Assessment/Plan      Anesthesia Evaluation     Patient summary reviewed and Nursing notes reviewed   history of anesthetic complications: PONV difficult airway        Pain impairs ability to perform ADLs: Ambulation, Sleeping and Exercise/Activity  Modalities previously tried to control pain with limited effectiveness within the last 4-6 weeks: Rest, OTC medications and Physical therapy     Airway   Mallampati: II  TM distance: >3 FB  Neck ROM: limited  Anterior and Difficult intubation highly probable  Dental      Pulmonary     breath sounds clear to auscultation  (+) asthma, sleep apnea,   (-) shortness of breath, decreased breath sounds  Cardiovascular   Exercise tolerance: good (4-7 METS)    Rhythm: regular  Rate: normal    (+) hyperlipidemia,   (-) past MI, angina, CHF, orthopnea, PND, PERLA, PVD      Neuro/Psych  (+) headaches, dizziness/light headedness,   left straight leg raise test,     normal reflexes and symmetric  (-) seizures, neuromuscular disease, TIA, CVA, weakness, numbness, normal gait, right straight leg raise test  GI/Hepatic/Renal/Endo    (+)  GERD,    (-) liver disease, diabetes    Musculoskeletal   normal range of motion    (+) back pain, chronic pain, radiculopathy Left lower extremity  Abdominal     Abdomen: soft.   Substance History - negative use  (-) alcohol use, drug use     OB/GYN negative ob/gyn ROS         Other   (+) arthritis   history of cancer (Breast) remission               Diagnosis and Plan    Treatment Plan  ASA 3      Procedures:  Lumbar Epidural Steroid Injection(LESI), With fluoroscopy,       Anesthetic plan and risks discussed with patient.      Will attempt L4-5 level    Diagnosis     * Lumbar spinal stenosis [M48.061]     * Lumbar neuritis [M54.16]     Plan:  Lumbar epidural steroid injection under fluoroscopic guidance    I have encouraged them to continue:  1.  Physical therapy exercises at home as prescribed by physical therapy or from the pain clinic handout (given to the patient).  Continuation of these exercises every day, or multiple times per week, even when the patient has good pain relief, was stressed to the patient as a preventative measure to decrease the frequency and severity of future pain episodes.  2.  Continue pain medicines as already prescribed.  If patient not currently taking any, it is recommended to begin Acetaminophen 1000 mg po q 8 hours.  If other medicines containing Acetaminophen are currently prescribed, maintain daily dose at 3000mg.    3.  If they can tolerate NSAIDS, it is recommended to take Ibuprofen 600 mg po q 6 hours for 7 days during pain exacerbations.   Alternatively, they may substitute an NSAID of their choice (e.g. Aleve)  4.  Heat and ice to the affected area as tolerated for pain control.  It was discussed that heating pads can cause burns.  5.  Low impact exercise such as walking or water exercise was recommended to maintain overall health and aid in weight control.   6.  Follow up as needed for subsequent injections.  7.  Patient was counseled to abstain from tobacco products.

## 2019-04-08 NOTE — ANESTHESIA PROCEDURE NOTES
PAIN Epidural block    Pre-sedation assessment completed: 4/8/2019 10:39 AM    Patient reassessed immediately prior to procedure    Patient location during procedure: pain clinic  Start Time: 4/8/2019 10:41 AM  Stop Time: 4/8/2019 10:47 AM  Indication:procedure for pain  Performed By  Anesthesiologist: Manny Kearney MD  Preanesthetic Checklist  Completed: patient identified, surgical consent, pre-op evaluation, timeout performed, IV checked, risks and benefits discussed and monitors and equipment checked  Additional Notes  Diagnosis:  Post-Op Diagnosis Codes:     * Lumbar spinal stenosis (M48.061)     * Lumbar neuritis (M54.16)      Prep:  Pt Position:prone  Sterile Tech:gloves, mask and sterile barrier  Prep:chlorhexidine gluconate and isopropyl alcohol  Monitoring:blood pressure monitoring, continuous pulse oximetry and EKG  Procedure:Sedation: no     Approach:left paramedian  Guidance: fluoroscopy  Location:lumbar  Level:4-5  Needle Type:Tuohy  Needle Gauge:20  Aspiration:negative  Test Dose:negative  Medications:  Depomedrol:80 mg  Preservative Free Saline:3mL  Comments:No dye due to allergy  Post Assessment:  Dressing:occlusive dressing applied  Pt Tolerance:patient tolerated the procedure well with no apparent complications  Complications:no

## 2019-05-02 ENCOUNTER — OFFICE VISIT CONVERTED (OUTPATIENT)
Dept: FAMILY MEDICINE CLINIC | Age: 74
End: 2019-05-02
Attending: FAMILY MEDICINE

## 2019-05-29 ENCOUNTER — OFFICE VISIT (OUTPATIENT)
Dept: ORTHOPEDIC SURGERY | Facility: CLINIC | Age: 74
End: 2019-05-29

## 2019-05-29 VITALS — TEMPERATURE: 97.4 F | WEIGHT: 180 LBS | HEIGHT: 66 IN | BODY MASS INDEX: 28.93 KG/M2

## 2019-05-29 DIAGNOSIS — M43.16 SPONDYLOLISTHESIS OF LUMBAR REGION: ICD-10-CM

## 2019-05-29 DIAGNOSIS — M48.062 SPINAL STENOSIS OF LUMBAR REGION WITH NEUROGENIC CLAUDICATION: Primary | ICD-10-CM

## 2019-05-29 PROCEDURE — 99213 OFFICE O/P EST LOW 20 MIN: CPT | Performed by: ORTHOPAEDIC SURGERY

## 2019-05-29 RX ORDER — FLUTICASONE PROPIONATE 50 MCG
2 SPRAY, SUSPENSION (ML) NASAL NIGHTLY
COMMUNITY
Start: 2019-05-12

## 2019-05-29 NOTE — PROGRESS NOTES
She complains of continued back and leg pain predominantly the latter.  Epidural caused an anxiety and flushing reaction which she found rather unpleasant and does not want to repeat.  She is not ready for surgery but wants to this fall.  Reviewing her exam she has absent reflexes good sensation and strength and is wearing ankle fracture walker for heel cord tendinitis.  She saw the podiatrist who recommended this I suspect that this although it is tender it started possibly as a result of sciatica and some contracture.  I think this will improve with time.  I do think she is a candidate for surgery and based on her MRI of believe would be looking at L3-S1 laminectomy and fusion with instrumentation.  I discussed the risks and benefits of posterior spinal fusion, including where applicable, laminectomy.  Risks include adverse anesthetic events such as death, stroke and myocardial infarction.  More specific surgical risks include infection, nonunion, hardware failure, spinal fluid leakage, nerve root injury or paralysis, visceral or vascular injury, persistent pain, deep venous thrombosis, and pulmonary embolism among others. There is a 70 to 90 % chance of success.   Alternatives have been discussed.  After careful consideration she wants to wait until fall and I think this is reasonable.  She will call a month ahead of time to schedule

## 2019-07-23 ENCOUNTER — OFFICE VISIT CONVERTED (OUTPATIENT)
Dept: FAMILY MEDICINE CLINIC | Age: 74
End: 2019-07-23
Attending: FAMILY MEDICINE

## 2019-07-31 ENCOUNTER — OFFICE VISIT CONVERTED (OUTPATIENT)
Dept: FAMILY MEDICINE CLINIC | Age: 74
End: 2019-07-31
Attending: NURSE PRACTITIONER

## 2019-07-31 ENCOUNTER — OFFICE VISIT (OUTPATIENT)
Dept: ORTHOPEDIC SURGERY | Facility: CLINIC | Age: 74
End: 2019-07-31

## 2019-07-31 ENCOUNTER — HOSPITAL ENCOUNTER (OUTPATIENT)
Dept: OTHER | Facility: HOSPITAL | Age: 74
Discharge: HOME | End: 2019-07-31
Attending: NURSE PRACTITIONER

## 2019-07-31 VITALS — BODY MASS INDEX: 28.61 KG/M2 | HEIGHT: 66 IN | TEMPERATURE: 97.5 F | WEIGHT: 178 LBS

## 2019-07-31 DIAGNOSIS — M43.16 SPONDYLOLISTHESIS OF LUMBAR REGION: Primary | ICD-10-CM

## 2019-07-31 DIAGNOSIS — M48.062 SPINAL STENOSIS OF LUMBAR REGION WITH NEUROGENIC CLAUDICATION: ICD-10-CM

## 2019-07-31 PROCEDURE — 99213 OFFICE O/P EST LOW 20 MIN: CPT | Performed by: ORTHOPAEDIC SURGERY

## 2019-08-01 NOTE — PROGRESS NOTES
She is failed to improve with conservative care.  Good strength in the lower extremities on examination.  As per in May,  I am recommending L3-S1 laminectomy and fusion with instrumentation I discussed the risks and benefits of posterior spinal fusion, including where applicable, laminectomy.  Risks include adverse anesthetic events such as death, stroke and myocardial infarction.  More specific surgical risks include infection, nonunion, hardware failure, spinal fluid leakage, nerve root injury or paralysis, visceral or vascular injury, persistent pain, deep venous thrombosis, and pulmonary embolism among others. There is a 70 to 90 % chance of success.   Alternatives have been discussed.  After careful consideration the patient wishes to proceed with surgery.  15 minutes was spent in face-to-face consultation.

## 2019-08-09 PROBLEM — M43.16 SPONDYLOLISTHESIS OF LUMBAR REGION: Status: ACTIVE | Noted: 2019-08-09

## 2019-08-09 PROBLEM — M48.062 SPINAL STENOSIS OF LUMBAR REGION WITH NEUROGENIC CLAUDICATION: Status: ACTIVE | Noted: 2019-08-09

## 2019-08-22 ENCOUNTER — ANESTHESIA EVENT (OUTPATIENT)
Dept: PERIOP | Facility: HOSPITAL | Age: 74
End: 2019-08-22

## 2019-08-22 ENCOUNTER — APPOINTMENT (OUTPATIENT)
Dept: PREADMISSION TESTING | Facility: HOSPITAL | Age: 74
End: 2019-08-22

## 2019-08-22 VITALS
HEIGHT: 66 IN | OXYGEN SATURATION: 97 % | WEIGHT: 178.5 LBS | DIASTOLIC BLOOD PRESSURE: 71 MMHG | TEMPERATURE: 97.5 F | RESPIRATION RATE: 16 BRPM | BODY MASS INDEX: 28.69 KG/M2 | SYSTOLIC BLOOD PRESSURE: 145 MMHG | HEART RATE: 73 BPM

## 2019-08-22 LAB
ANION GAP SERPL CALCULATED.3IONS-SCNC: 11 MMOL/L (ref 5–15)
BUN BLD-MCNC: 17 MG/DL (ref 8–23)
BUN/CREAT SERPL: 17 (ref 7–25)
CALCIUM SPEC-SCNC: 9.7 MG/DL (ref 8.6–10.5)
CHLORIDE SERPL-SCNC: 106 MMOL/L (ref 98–107)
CO2 SERPL-SCNC: 25 MMOL/L (ref 22–29)
CREAT BLD-MCNC: 1 MG/DL (ref 0.57–1)
DEPRECATED RDW RBC AUTO: 48.2 FL (ref 37–54)
ERYTHROCYTE [DISTWIDTH] IN BLOOD BY AUTOMATED COUNT: 13.6 % (ref 12.3–15.4)
GFR SERPL CREATININE-BSD FRML MDRD: 55 ML/MIN/1.73
GLUCOSE BLD-MCNC: 162 MG/DL (ref 65–99)
HCT VFR BLD AUTO: 37.3 % (ref 34–46.6)
HGB BLD-MCNC: 11.5 G/DL (ref 12–15.9)
MCH RBC QN AUTO: 29.6 PG (ref 26.6–33)
MCHC RBC AUTO-ENTMCNC: 30.8 G/DL (ref 31.5–35.7)
MCV RBC AUTO: 96.1 FL (ref 79–97)
PLATELET # BLD AUTO: 228 10*3/MM3 (ref 140–450)
PMV BLD AUTO: 11.5 FL (ref 6–12)
POTASSIUM BLD-SCNC: 4 MMOL/L (ref 3.5–5.2)
RBC # BLD AUTO: 3.88 10*6/MM3 (ref 3.77–5.28)
SODIUM BLD-SCNC: 142 MMOL/L (ref 136–145)
WBC NRBC COR # BLD: 8.34 10*3/MM3 (ref 3.4–10.8)

## 2019-08-22 PROCEDURE — 85027 COMPLETE CBC AUTOMATED: CPT | Performed by: ORTHOPAEDIC SURGERY

## 2019-08-22 PROCEDURE — 93005 ELECTROCARDIOGRAM TRACING: CPT

## 2019-08-22 PROCEDURE — 36415 COLL VENOUS BLD VENIPUNCTURE: CPT

## 2019-08-22 PROCEDURE — 80048 BASIC METABOLIC PNL TOTAL CA: CPT | Performed by: ORTHOPAEDIC SURGERY

## 2019-08-22 PROCEDURE — 93010 ELECTROCARDIOGRAM REPORT: CPT | Performed by: INTERNAL MEDICINE

## 2019-08-22 NOTE — DISCHARGE INSTRUCTIONS
Take the following medications the morning of surgery with a small sip of water: INHALERS, LANSOPRAZOLE    Arrive to hospital on your day of surgery at 9:00 AM.    General Instructions:  • Do not eat solid food after midnight the night before surgery.  • You may drink clear liquids day of surgery but must stop at least one hour before your hospital arrival time.  • It is beneficial for you to have a clear drink that contains carbohydrates the day of surgery.  We suggest a 12 to 20 ounce bottle of Gatorade or Powerade for non-diabetic patients or a 12 to 20 ounce bottle of G2 or Powerade Zero for diabetic patients. (Pediatric patients, are not advised to drink a 12 to 20 ounce carbohydrate drink)    Clear liquids are liquids you can see through.  Nothing red in color.     Plain water                               Sports drinks  Sodas                                   Gelatin (Jell-O)  Fruit juices without pulp such as white grape juice and apple juice  Popsicles that contain no fruit or yogurt  Tea or coffee (no cream or milk added)  Gatorade / Powerade  G2 / Powerade Zero    • Infants may have breast milk up to four hours before surgery.  • Infants drinking formula may drink formula up to six hours before surgery.   • Patients who avoid smoking, chewing tobacco and alcohol for 4 weeks prior to surgery have a reduced risk of post-operative complications.  Quit smoking as many days before surgery as you can.  • Do not smoke, use chewing tobacco or drink alcohol the day of surgery.   • If applicable bring your C-PAP/ BI-PAP machine.  • Bring any papers given to you in the doctor’s office.  • Wear clean comfortable clothes and socks.  • Do not wear contact lenses, false eyelashes or make-up.  Bring a case for your glasses.   • Bring crutches or walker if applicable.  • Remove all piercings.  Leave jewelry and any other valuables at home.  • Hair extensions with metal clips must be removed prior to surgery.  • The  Pre-Admission Testing nurse will instruct you to bring medications if unable to obtain an accurate list in Pre-Admission Testing.            Preventing a Surgical Site Infection:  • For 2 to 3 days before surgery, avoid shaving with a razor because the razor can irritate skin and make it easier to develop an infection.    • Any areas of open skin can increase the risk of a post-operative wound infection by allowing bacteria to enter and travel throughout the body.  Notify your surgeon if you have any skin wounds / rashes even if it is not near the expected surgical site.  The area will need assessed to determine if surgery should be delayed until it is healed.  • The night prior to surgery sleep in a clean bed with clean clothing.  Do not allow pets to sleep with you.  • Shower on the morning of surgery using a fresh bar of anti-bacterial soap (such as Dial) and clean washcloth.  Dry with a clean towel and dress in clean clothing.  • Ask your surgeon if you will be receiving antibiotics prior to surgery.  • Make sure you, your family, and all healthcare providers clean their hands with soap and water or an alcohol based hand  before caring for you or your wound.    Day of surgery:  Upon arrival, a Pre-op nurse and Anesthesiologist will review your health history, obtain vital signs, and answer questions you may have.  The only belongings needed at this time will be a list of your home medications and if applicable your C-PAP/BI-PAP machine.  If you are staying overnight your family can leave the rest of your belongings in the car and bring them to your room later.  A Pre-op nurse will start an IV and you may receive medication in preparation for surgery, including something to help you relax.  Your family will be able to see you in the Pre-op area.  While you are in surgery your family should notify the waiting room  if they leave the waiting room area and provide a contact phone  number.    Please be aware that surgery does come with discomfort.  We want to make every effort to control your discomfort so please discuss any uncontrolled symptoms with your nurse.   Your doctor will most likely have prescribed pain medications.      If you are going home after surgery you will receive individualized written care instructions before being discharged.  A responsible adult must drive you to and from the hospital on the day of your surgery and stay with you for 24 hours.    If you are staying overnight following surgery, you will be transported to your hospital room following the recovery period.  [facility] has all private rooms.    You have received a list of surgical assistants for your reference.  If you have any questions please call Pre-Admission Testing at [phone].  Deductibles and co-payments are collected on the day of service. Please be prepared to pay the required co-pay, deductible or deposit on the day of service as defined by your plan.

## 2019-08-26 ENCOUNTER — APPOINTMENT (OUTPATIENT)
Dept: PREADMISSION TESTING | Facility: HOSPITAL | Age: 74
End: 2019-08-26

## 2019-08-26 DIAGNOSIS — M48.062 SPINAL STENOSIS OF LUMBAR REGION WITH NEUROGENIC CLAUDICATION: ICD-10-CM

## 2019-08-26 DIAGNOSIS — M43.16 SPONDYLOLISTHESIS OF LUMBAR REGION: Primary | ICD-10-CM

## 2019-08-29 ENCOUNTER — TELEPHONE (OUTPATIENT)
Dept: ORTHOPEDIC SURGERY | Facility: CLINIC | Age: 74
End: 2019-08-29

## 2019-08-30 NOTE — TELEPHONE ENCOUNTER
Left message for the patient that we will order her equipment while she is in the hospital however if she does prefer to get it preoperatively she will need to call me and let me know.  Have left it open for her to call if she has any other questions or concerns

## 2019-09-09 ENCOUNTER — ANESTHESIA (OUTPATIENT)
Dept: PERIOP | Facility: HOSPITAL | Age: 74
End: 2019-09-09

## 2019-09-09 ENCOUNTER — APPOINTMENT (OUTPATIENT)
Dept: GENERAL RADIOLOGY | Facility: HOSPITAL | Age: 74
End: 2019-09-09

## 2019-09-09 ENCOUNTER — HOSPITAL ENCOUNTER (INPATIENT)
Facility: HOSPITAL | Age: 74
LOS: 4 days | Discharge: HOME | End: 2019-09-13
Attending: ORTHOPAEDIC SURGERY | Admitting: ORTHOPAEDIC SURGERY

## 2019-09-09 DIAGNOSIS — M48.062 SPINAL STENOSIS OF LUMBAR REGION WITH NEUROGENIC CLAUDICATION: ICD-10-CM

## 2019-09-09 DIAGNOSIS — M43.16 SPONDYLOLISTHESIS OF LUMBAR REGION: ICD-10-CM

## 2019-09-09 LAB
ABO GROUP BLD: NORMAL
BLD GP AB SCN SERPL QL: NEGATIVE
HCT VFR BLD AUTO: 34.6 % (ref 34–46.6)
HGB BLD-MCNC: 10.6 G/DL (ref 12–15.9)
RH BLD: POSITIVE
T&S EXPIRATION DATE: NORMAL

## 2019-09-09 PROCEDURE — 22853 INSJ BIOMECHANICAL DEVICE: CPT | Performed by: ORTHOPAEDIC SURGERY

## 2019-09-09 PROCEDURE — 0SG1071 FUSION OF 2 OR MORE LUMBAR VERTEBRAL JOINTS WITH AUTOLOGOUS TISSUE SUBSTITUTE, POSTERIOR APPROACH, POSTERIOR COLUMN, OPEN APPROACH: ICD-10-PCS | Performed by: ORTHOPAEDIC SURGERY

## 2019-09-09 PROCEDURE — 25010000002 VANCOMYCIN 1 G RECONSTITUTED SOLUTION 1 EACH VIAL: Performed by: ORTHOPAEDIC SURGERY

## 2019-09-09 PROCEDURE — 20939 BONE MARROW ASPIR BONE GRFG: CPT | Performed by: ORTHOPAEDIC SURGERY

## 2019-09-09 PROCEDURE — 22842 INSERT SPINE FIXATION DEVICE: CPT | Performed by: ORTHOPAEDIC SURGERY

## 2019-09-09 PROCEDURE — 25010000002 PROPOFOL 10 MG/ML EMULSION: Performed by: NURSE ANESTHETIST, CERTIFIED REGISTERED

## 2019-09-09 PROCEDURE — 25010000002 VANCOMYCIN 10 G RECONSTITUTED SOLUTION: Performed by: ORTHOPAEDIC SURGERY

## 2019-09-09 PROCEDURE — 76000 FLUOROSCOPY <1 HR PHYS/QHP: CPT

## 2019-09-09 PROCEDURE — 25010000002 LIDOCAINE PER 10 MG: Performed by: NURSE ANESTHETIST, CERTIFIED REGISTERED

## 2019-09-09 PROCEDURE — 85018 HEMOGLOBIN: CPT | Performed by: ORTHOPAEDIC SURGERY

## 2019-09-09 PROCEDURE — 0SG3071 FUSION OF LUMBOSACRAL JOINT WITH AUTOLOGOUS TISSUE SUBSTITUTE, POSTERIOR APPROACH, POSTERIOR COLUMN, OPEN APPROACH: ICD-10-PCS | Performed by: ORTHOPAEDIC SURGERY

## 2019-09-09 PROCEDURE — 25010000002 DEXAMETHASONE PER 1 MG: Performed by: NURSE ANESTHETIST, CERTIFIED REGISTERED

## 2019-09-09 PROCEDURE — C1713 ANCHOR/SCREW BN/BN,TIS/BN: HCPCS | Performed by: ORTHOPAEDIC SURGERY

## 2019-09-09 PROCEDURE — 22630 ARTHRD PST TQ 1NTRSPC LUM: CPT | Performed by: ORTHOPAEDIC SURGERY

## 2019-09-09 PROCEDURE — 25010000002 CEFAZOLIN PER 500 MG: Performed by: ORTHOPAEDIC SURGERY

## 2019-09-09 PROCEDURE — 25010000002 NEOSTIGMINE PER 0.5 MG: Performed by: NURSE ANESTHETIST, CERTIFIED REGISTERED

## 2019-09-09 PROCEDURE — 22614 ARTHRD PST TQ 1NTRSPC EA ADD: CPT | Performed by: ORTHOPAEDIC SURGERY

## 2019-09-09 PROCEDURE — 25010000002 FENTANYL CITRATE (PF) 100 MCG/2ML SOLUTION: Performed by: NURSE ANESTHETIST, CERTIFIED REGISTERED

## 2019-09-09 PROCEDURE — 63048 LAM FACETEC &FORAMOT EA ADDL: CPT | Performed by: ORTHOPAEDIC SURGERY

## 2019-09-09 PROCEDURE — 85014 HEMATOCRIT: CPT | Performed by: ORTHOPAEDIC SURGERY

## 2019-09-09 PROCEDURE — 63047 LAM FACETEC & FORAMOT LUMBAR: CPT | Performed by: ORTHOPAEDIC SURGERY

## 2019-09-09 PROCEDURE — 72100 X-RAY EXAM L-S SPINE 2/3 VWS: CPT

## 2019-09-09 PROCEDURE — 86900 BLOOD TYPING SEROLOGIC ABO: CPT | Performed by: ORTHOPAEDIC SURGERY

## 2019-09-09 PROCEDURE — 86850 RBC ANTIBODY SCREEN: CPT | Performed by: ORTHOPAEDIC SURGERY

## 2019-09-09 PROCEDURE — 86901 BLOOD TYPING SEROLOGIC RH(D): CPT | Performed by: ORTHOPAEDIC SURGERY

## 2019-09-09 PROCEDURE — S0260 H&P FOR SURGERY: HCPCS | Performed by: ORTHOPAEDIC SURGERY

## 2019-09-09 PROCEDURE — 0SG30AJ FUSION OF LUMBOSACRAL JOINT WITH INTERBODY FUSION DEVICE, POSTERIOR APPROACH, ANTERIOR COLUMN, OPEN APPROACH: ICD-10-PCS | Performed by: ORTHOPAEDIC SURGERY

## 2019-09-09 PROCEDURE — 0ST40ZZ RESECTION OF LUMBOSACRAL DISC, OPEN APPROACH: ICD-10-PCS | Performed by: ORTHOPAEDIC SURGERY

## 2019-09-09 PROCEDURE — 25010000002 MAGNESIUM SULFATE PER 500 MG OF MAGNESIUM: Performed by: NURSE ANESTHETIST, CERTIFIED REGISTERED

## 2019-09-09 DEVICE — IMPLANTABLE DEVICE: Type: IMPLANTABLE DEVICE | Site: SPINE LUMBAR | Status: FUNCTIONAL

## 2019-09-09 DEVICE — SCRW EXPEDIUM PA TI 7X45MM: Type: IMPLANTABLE DEVICE | Site: SPINE LUMBAR | Status: FUNCTIONAL

## 2019-09-09 DEVICE — SCRW EXPEDIUM PA TI 7X35MM: Type: IMPLANTABLE DEVICE | Site: SPINE LUMBAR | Status: FUNCTIONAL

## 2019-09-09 DEVICE — SCRW EXPEDIUM PA TI 7X40MM: Type: IMPLANTABLE DEVICE | Site: SPINE LUMBAR | Status: FUNCTIONAL

## 2019-09-09 DEVICE — ROD PREBNT SPINE EXPEDIUM TI 5.5X95MM: Type: IMPLANTABLE DEVICE | Site: SPINE LUMBAR | Status: FUNCTIONAL

## 2019-09-09 DEVICE — SCRW INNR EXPEDIUM: Type: IMPLANTABLE DEVICE | Site: SPINE LUMBAR | Status: FUNCTIONAL

## 2019-09-09 DEVICE — MATRX STRIP PILAFX DBM 50X25X4MM: Type: IMPLANTABLE DEVICE | Site: SPINE LUMBAR | Status: FUNCTIONAL

## 2019-09-09 RX ORDER — FENTANYL CITRATE 50 UG/ML
50 INJECTION, SOLUTION INTRAMUSCULAR; INTRAVENOUS
Status: DISCONTINUED | OUTPATIENT
Start: 2019-09-09 | End: 2019-09-09 | Stop reason: HOSPADM

## 2019-09-09 RX ORDER — CEPHALEXIN 500 MG/1
500 CAPSULE ORAL DAILY
Status: DISCONTINUED | OUTPATIENT
Start: 2019-09-10 | End: 2019-09-13 | Stop reason: HOSPADM

## 2019-09-09 RX ORDER — ROCURONIUM BROMIDE 10 MG/ML
INJECTION, SOLUTION INTRAVENOUS AS NEEDED
Status: DISCONTINUED | OUTPATIENT
Start: 2019-09-09 | End: 2019-09-09 | Stop reason: SURG

## 2019-09-09 RX ORDER — MIDAZOLAM HYDROCHLORIDE 1 MG/ML
2 INJECTION INTRAMUSCULAR; INTRAVENOUS
Status: DISCONTINUED | OUTPATIENT
Start: 2019-09-09 | End: 2019-09-09 | Stop reason: HOSPADM

## 2019-09-09 RX ORDER — MAGNESIUM SULFATE HEPTAHYDRATE 500 MG/ML
INJECTION, SOLUTION INTRAMUSCULAR; INTRAVENOUS AS NEEDED
Status: DISCONTINUED | OUTPATIENT
Start: 2019-09-09 | End: 2019-09-09 | Stop reason: SURG

## 2019-09-09 RX ORDER — PROMETHAZINE HYDROCHLORIDE 25 MG/1
25 TABLET ORAL ONCE AS NEEDED
Status: DISCONTINUED | OUTPATIENT
Start: 2019-09-09 | End: 2019-09-09 | Stop reason: HOSPADM

## 2019-09-09 RX ORDER — FAMOTIDINE 10 MG/ML
20 INJECTION, SOLUTION INTRAVENOUS ONCE
Status: COMPLETED | OUTPATIENT
Start: 2019-09-09 | End: 2019-09-09

## 2019-09-09 RX ORDER — KETAMINE HYDROCHLORIDE 10 MG/ML
INJECTION INTRAMUSCULAR; INTRAVENOUS AS NEEDED
Status: DISCONTINUED | OUTPATIENT
Start: 2019-09-09 | End: 2019-09-09 | Stop reason: SURG

## 2019-09-09 RX ORDER — SODIUM CHLORIDE 0.9 % (FLUSH) 0.9 %
10 SYRINGE (ML) INJECTION AS NEEDED
Status: DISCONTINUED | OUTPATIENT
Start: 2019-09-09 | End: 2019-09-10

## 2019-09-09 RX ORDER — ACETAMINOPHEN 10 MG/ML
1000 INJECTION, SOLUTION INTRAVENOUS ONCE
Status: COMPLETED | OUTPATIENT
Start: 2019-09-09 | End: 2019-09-09

## 2019-09-09 RX ORDER — PROMETHAZINE HYDROCHLORIDE 25 MG/ML
6.25 INJECTION, SOLUTION INTRAMUSCULAR; INTRAVENOUS
Status: DISCONTINUED | OUTPATIENT
Start: 2019-09-09 | End: 2019-09-09 | Stop reason: HOSPADM

## 2019-09-09 RX ORDER — GLYCOPYRROLATE 0.2 MG/ML
INJECTION INTRAMUSCULAR; INTRAVENOUS AS NEEDED
Status: DISCONTINUED | OUTPATIENT
Start: 2019-09-09 | End: 2019-09-09 | Stop reason: SURG

## 2019-09-09 RX ORDER — SODIUM CHLORIDE 0.9 % (FLUSH) 0.9 %
3-10 SYRINGE (ML) INJECTION AS NEEDED
Status: DISCONTINUED | OUTPATIENT
Start: 2019-09-09 | End: 2019-09-09 | Stop reason: HOSPADM

## 2019-09-09 RX ORDER — ATORVASTATIN CALCIUM 20 MG/1
20 TABLET, FILM COATED ORAL NIGHTLY
Status: CANCELLED | OUTPATIENT
Start: 2019-09-09

## 2019-09-09 RX ORDER — ACETAMINOPHEN 325 MG/1
650 TABLET ORAL EVERY 4 HOURS PRN
Status: DISCONTINUED | OUTPATIENT
Start: 2019-09-09 | End: 2019-09-13 | Stop reason: HOSPADM

## 2019-09-09 RX ORDER — LABETALOL HYDROCHLORIDE 5 MG/ML
5 INJECTION, SOLUTION INTRAVENOUS
Status: DISCONTINUED | OUTPATIENT
Start: 2019-09-09 | End: 2019-09-09 | Stop reason: HOSPADM

## 2019-09-09 RX ORDER — SCOLOPAMINE TRANSDERMAL SYSTEM 1 MG/1
1 PATCH, EXTENDED RELEASE TRANSDERMAL
Status: DISCONTINUED | OUTPATIENT
Start: 2019-09-09 | End: 2019-09-13 | Stop reason: HOSPADM

## 2019-09-09 RX ORDER — PROMETHAZINE HYDROCHLORIDE 25 MG/ML
12.5 INJECTION, SOLUTION INTRAMUSCULAR; INTRAVENOUS ONCE AS NEEDED
Status: DISCONTINUED | OUTPATIENT
Start: 2019-09-09 | End: 2019-09-09 | Stop reason: HOSPADM

## 2019-09-09 RX ORDER — LIDOCAINE HYDROCHLORIDE 20 MG/ML
INJECTION, SOLUTION INFILTRATION; PERINEURAL AS NEEDED
Status: DISCONTINUED | OUTPATIENT
Start: 2019-09-09 | End: 2019-09-09 | Stop reason: SURG

## 2019-09-09 RX ORDER — ATENOLOL 25 MG/1
25 TABLET ORAL DAILY
Status: CANCELLED | OUTPATIENT
Start: 2019-09-09

## 2019-09-09 RX ORDER — MIDAZOLAM HYDROCHLORIDE 1 MG/ML
1 INJECTION INTRAMUSCULAR; INTRAVENOUS
Status: DISCONTINUED | OUTPATIENT
Start: 2019-09-09 | End: 2019-09-09 | Stop reason: HOSPADM

## 2019-09-09 RX ORDER — CEFAZOLIN SODIUM 2 G/100ML
2 INJECTION, SOLUTION INTRAVENOUS EVERY 8 HOURS
Status: COMPLETED | OUTPATIENT
Start: 2019-09-09 | End: 2019-09-10

## 2019-09-09 RX ORDER — EPHEDRINE SULFATE 50 MG/ML
INJECTION, SOLUTION INTRAVENOUS AS NEEDED
Status: DISCONTINUED | OUTPATIENT
Start: 2019-09-09 | End: 2019-09-09 | Stop reason: SURG

## 2019-09-09 RX ORDER — FENTANYL CITRATE 50 UG/ML
INJECTION, SOLUTION INTRAMUSCULAR; INTRAVENOUS AS NEEDED
Status: DISCONTINUED | OUTPATIENT
Start: 2019-09-09 | End: 2019-09-09 | Stop reason: SURG

## 2019-09-09 RX ORDER — SODIUM CHLORIDE, SODIUM LACTATE, POTASSIUM CHLORIDE, CALCIUM CHLORIDE 600; 310; 30; 20 MG/100ML; MG/100ML; MG/100ML; MG/100ML
9 INJECTION, SOLUTION INTRAVENOUS CONTINUOUS
Status: DISCONTINUED | OUTPATIENT
Start: 2019-09-09 | End: 2019-09-10

## 2019-09-09 RX ORDER — ONDANSETRON 2 MG/ML
4 INJECTION INTRAMUSCULAR; INTRAVENOUS ONCE AS NEEDED
Status: DISCONTINUED | OUTPATIENT
Start: 2019-09-09 | End: 2019-09-09 | Stop reason: HOSPADM

## 2019-09-09 RX ORDER — BUPIVACAINE HYDROCHLORIDE 5 MG/ML
INJECTION, SOLUTION PERINEURAL AS NEEDED
Status: DISCONTINUED | OUTPATIENT
Start: 2019-09-09 | End: 2019-09-09 | Stop reason: HOSPADM

## 2019-09-09 RX ORDER — PROMETHAZINE HYDROCHLORIDE 25 MG/1
25 SUPPOSITORY RECTAL ONCE AS NEEDED
Status: DISCONTINUED | OUTPATIENT
Start: 2019-09-09 | End: 2019-09-09 | Stop reason: HOSPADM

## 2019-09-09 RX ORDER — CEPHALEXIN 500 MG/1
500 CAPSULE ORAL DAILY
Status: CANCELLED | OUTPATIENT
Start: 2019-09-09

## 2019-09-09 RX ORDER — DEXAMETHASONE SODIUM PHOSPHATE 10 MG/ML
INJECTION INTRAMUSCULAR; INTRAVENOUS AS NEEDED
Status: DISCONTINUED | OUTPATIENT
Start: 2019-09-09 | End: 2019-09-09 | Stop reason: SURG

## 2019-09-09 RX ORDER — LEVOTHYROXINE AND LIOTHYRONINE 19; 4.5 UG/1; UG/1
30 TABLET ORAL
Status: DISCONTINUED | OUTPATIENT
Start: 2019-09-10 | End: 2019-09-13 | Stop reason: HOSPADM

## 2019-09-09 RX ORDER — PROPOFOL 10 MG/ML
VIAL (ML) INTRAVENOUS AS NEEDED
Status: DISCONTINUED | OUTPATIENT
Start: 2019-09-09 | End: 2019-09-09 | Stop reason: SURG

## 2019-09-09 RX ORDER — NALOXONE HCL 0.4 MG/ML
0.2 VIAL (ML) INJECTION AS NEEDED
Status: DISCONTINUED | OUTPATIENT
Start: 2019-09-09 | End: 2019-09-09 | Stop reason: HOSPADM

## 2019-09-09 RX ORDER — SODIUM CHLORIDE 450 MG/100ML
100 INJECTION, SOLUTION INTRAVENOUS CONTINUOUS
Status: DISCONTINUED | OUTPATIENT
Start: 2019-09-09 | End: 2019-09-10

## 2019-09-09 RX ORDER — SODIUM CHLORIDE 0.9 % (FLUSH) 0.9 %
3 SYRINGE (ML) INJECTION EVERY 12 HOURS SCHEDULED
Status: DISCONTINUED | OUTPATIENT
Start: 2019-09-09 | End: 2019-09-10

## 2019-09-09 RX ORDER — LEVOTHYROXINE AND LIOTHYRONINE 19; 4.5 UG/1; UG/1
30 TABLET ORAL
Status: CANCELLED | OUTPATIENT
Start: 2019-09-09

## 2019-09-09 RX ORDER — DIPHENHYDRAMINE HYDROCHLORIDE 50 MG/ML
12.5 INJECTION INTRAMUSCULAR; INTRAVENOUS
Status: DISCONTINUED | OUTPATIENT
Start: 2019-09-09 | End: 2019-09-09 | Stop reason: HOSPADM

## 2019-09-09 RX ORDER — BENZONATATE 100 MG/1
100 CAPSULE ORAL 2 TIMES DAILY PRN
Status: CANCELLED | OUTPATIENT
Start: 2019-09-09

## 2019-09-09 RX ORDER — LIDOCAINE HYDROCHLORIDE ANHYDROUS AND DEXTROSE MONOHYDRATE 5; 400 G/100ML; MG/100ML
INJECTION, SOLUTION INTRAVENOUS CONTINUOUS PRN
Status: DISCONTINUED | OUTPATIENT
Start: 2019-09-09 | End: 2019-09-09 | Stop reason: SURG

## 2019-09-09 RX ORDER — LIDOCAINE HYDROCHLORIDE 10 MG/ML
0.5 INJECTION, SOLUTION EPIDURAL; INFILTRATION; INTRACAUDAL; PERINEURAL ONCE AS NEEDED
Status: DISCONTINUED | OUTPATIENT
Start: 2019-09-09 | End: 2019-09-09 | Stop reason: HOSPADM

## 2019-09-09 RX ORDER — DIPHENHYDRAMINE HCL 25 MG
25 CAPSULE ORAL
Status: DISCONTINUED | OUTPATIENT
Start: 2019-09-09 | End: 2019-09-09 | Stop reason: HOSPADM

## 2019-09-09 RX ORDER — SODIUM CHLORIDE 0.9 % (FLUSH) 0.9 %
3 SYRINGE (ML) INJECTION EVERY 12 HOURS SCHEDULED
Status: DISCONTINUED | OUTPATIENT
Start: 2019-09-09 | End: 2019-09-09 | Stop reason: HOSPADM

## 2019-09-09 RX ORDER — EPHEDRINE SULFATE 50 MG/ML
5 INJECTION, SOLUTION INTRAVENOUS ONCE AS NEEDED
Status: DISCONTINUED | OUTPATIENT
Start: 2019-09-09 | End: 2019-09-09 | Stop reason: HOSPADM

## 2019-09-09 RX ORDER — CYCLOBENZAPRINE HCL 10 MG
10 TABLET ORAL 3 TIMES DAILY PRN
Status: DISCONTINUED | OUTPATIENT
Start: 2019-09-09 | End: 2019-09-13 | Stop reason: HOSPADM

## 2019-09-09 RX ADMIN — KETAMINE HYDROCHLORIDE 10 MG: 10 INJECTION INTRAMUSCULAR; INTRAVENOUS at 13:57

## 2019-09-09 RX ADMIN — LIDOCAINE HYDROCHLORIDE 100 MG: 20 INJECTION, SOLUTION INFILTRATION; PERINEURAL at 12:21

## 2019-09-09 RX ADMIN — MAGNESIUM SULFATE HEPTAHYDRATE 2 G: 500 INJECTION, SOLUTION INTRAMUSCULAR; INTRAVENOUS at 13:00

## 2019-09-09 RX ADMIN — PROPOFOL 150 MG: 10 INJECTION, EMULSION INTRAVENOUS at 12:21

## 2019-09-09 RX ADMIN — FENTANYL CITRATE 50 MCG: 50 INJECTION INTRAMUSCULAR; INTRAVENOUS at 16:31

## 2019-09-09 RX ADMIN — POLYETHYLENE GLYCOL 3350 17 G: 17 POWDER, FOR SOLUTION ORAL at 18:26

## 2019-09-09 RX ADMIN — LIDOCAINE HYDROCHLORIDE ANHYDROUS AND DEXTROSE MONOHYDRATE 2 MG/MIN: .4; 5 INJECTION, SOLUTION INTRAVENOUS at 12:45

## 2019-09-09 RX ADMIN — EPHEDRINE SULFATE 10 MG: 50 INJECTION INTRAMUSCULAR; INTRAVENOUS; SUBCUTANEOUS at 13:41

## 2019-09-09 RX ADMIN — ACETAMINOPHEN 650 MG: 325 TABLET ORAL at 21:18

## 2019-09-09 RX ADMIN — SODIUM CHLORIDE, POTASSIUM CHLORIDE, SODIUM LACTATE AND CALCIUM CHLORIDE: 600; 310; 30; 20 INJECTION, SOLUTION INTRAVENOUS at 14:15

## 2019-09-09 RX ADMIN — CEFAZOLIN SODIUM 2 G: 10 INJECTION, POWDER, FOR SOLUTION INTRAVENOUS at 21:18

## 2019-09-09 RX ADMIN — FENTANYL CITRATE 50 MCG: 50 INJECTION INTRAMUSCULAR; INTRAVENOUS at 12:21

## 2019-09-09 RX ADMIN — EPHEDRINE SULFATE 10 MG: 50 INJECTION INTRAMUSCULAR; INTRAVENOUS; SUBCUTANEOUS at 13:12

## 2019-09-09 RX ADMIN — EPHEDRINE SULFATE 10 MG: 50 INJECTION INTRAMUSCULAR; INTRAVENOUS; SUBCUTANEOUS at 13:13

## 2019-09-09 RX ADMIN — CYCLOBENZAPRINE 10 MG: 10 TABLET, FILM COATED ORAL at 19:04

## 2019-09-09 RX ADMIN — ROCURONIUM BROMIDE 10 MG: 10 INJECTION INTRAVENOUS at 13:58

## 2019-09-09 RX ADMIN — NEOSTIGMINE METHYLSULFATE 3.5 MG: 1 INJECTION INTRAMUSCULAR; INTRAVENOUS; SUBCUTANEOUS at 15:01

## 2019-09-09 RX ADMIN — ROCURONIUM BROMIDE 40 MG: 10 INJECTION INTRAVENOUS at 12:21

## 2019-09-09 RX ADMIN — SODIUM CHLORIDE, POTASSIUM CHLORIDE, SODIUM LACTATE AND CALCIUM CHLORIDE 9 ML/HR: 600; 310; 30; 20 INJECTION, SOLUTION INTRAVENOUS at 11:12

## 2019-09-09 RX ADMIN — SCOPALAMINE 1 PATCH: 1 PATCH, EXTENDED RELEASE TRANSDERMAL at 11:11

## 2019-09-09 RX ADMIN — FENTANYL CITRATE 50 MCG: 50 INJECTION INTRAMUSCULAR; INTRAVENOUS at 15:02

## 2019-09-09 RX ADMIN — DEXAMETHASONE SODIUM PHOSPHATE 8 MG: 10 INJECTION INTRAMUSCULAR; INTRAVENOUS at 12:35

## 2019-09-09 RX ADMIN — GLYCOPYRROLATE 0.5 MG: 0.2 INJECTION INTRAMUSCULAR; INTRAVENOUS at 15:01

## 2019-09-09 RX ADMIN — SODIUM CHLORIDE, PRESERVATIVE FREE 3 ML: 5 INJECTION INTRAVENOUS at 21:19

## 2019-09-09 RX ADMIN — SODIUM CHLORIDE 100 ML/HR: 4.5 INJECTION, SOLUTION INTRAVENOUS at 18:26

## 2019-09-09 RX ADMIN — EPHEDRINE SULFATE 10 MG: 50 INJECTION INTRAMUSCULAR; INTRAVENOUS; SUBCUTANEOUS at 14:26

## 2019-09-09 RX ADMIN — VANCOMYCIN HYDROCHLORIDE 1250 MG: 10 INJECTION, POWDER, LYOPHILIZED, FOR SOLUTION INTRAVENOUS at 11:52

## 2019-09-09 RX ADMIN — FAMOTIDINE 20 MG: 10 INJECTION, SOLUTION INTRAVENOUS at 11:11

## 2019-09-09 RX ADMIN — KETAMINE HYDROCHLORIDE 40 MG: 10 INJECTION INTRAMUSCULAR; INTRAVENOUS at 12:21

## 2019-09-09 RX ADMIN — ACETAMINOPHEN 1000 MG: 10 INJECTION, SOLUTION INTRAVENOUS at 12:30

## 2019-09-09 NOTE — ANESTHESIA PROCEDURE NOTES
Airway  Urgency: elective    Date/Time: 9/9/2019 12:24 PM  Difficult airway    General Information and Staff    Patient location during procedure: OR  CRNA: Francie Engel CRNA    Indications and Patient Condition  Indications for airway management: airway protection    Preoxygenated: yes  Mask difficulty assessment: 1 - vent by mask    Final Airway Details  Final airway type: endotracheal airway      Successful airway: ETT  Cuffed: yes   Successful intubation technique: direct laryngoscopy and video laryngoscopy  Facilitating devices/methods: Bougie and anterior pressure/BURP  Endotracheal tube insertion site: oral  Blade: CMAC  Blade size: D  ETT size (mm): 7.0  Cormack-Lehane Classification: grade III - view of epiglottis only  Placement verified by: chest auscultation and capnometry   Measured from: lips  ETT/EBT  to lips (cm): 21  Number of attempts at approach: 1    Additional Comments  Unable to view tonsilar pilars upon visualizing with patient awake.  Went straight to CMAC D blade, barely able to visualize epiglottis, sniffing position adjusted & BURP used to visualize arytenoids & a small part of the glottis.  eschmann was used with success.. Atraumatic except for a pinched upper right lip.   ett cuff up at MOP

## 2019-09-09 NOTE — OP NOTE
Operative note    Pre-op diagnosis: Lumbar spondylolisthesis lumbar spinal stenosis L3-S1    Postoperative diagnosis: Same    Procedure: L3-4, L4-5, L5-S1 laminectomy medial facetectomy foraminotomy and bilateral posterior lateral fusion with AcroMed expedient segmental instrumentation and right lumbar interbody fusion with Concorde bullet carbon fiber cage with local bone graft, right iliac marrow aspiration, and Pliafix  bone graft substitute.    Surgeon: Vikas Couch Jr, M.D.    Asst.: Nanette Childers    EBL: 300 cc    Anesthetic: Gen.    Condition: Satisfactory      Description of procedure: Patient was anesthetized and positioned prone.  Bony prominences were well padded.  The back was prepped and draped in sterile fashion.  Incision was made down to lumbodorsal fascia.  The muscle was stripped subperiosteally to the tips of transverse processes.  Curettes and rongeurs removed adherent soft tissue.  Packs were placed for hemostasis.  The graft was decorticated.  A Steffee probe was inserted at the intersection of the anatomic landmarks.  A flexible probe was inserted to check the integrity of the pedicle.  Screws were placed at L3, L4, L5 and S1 bilaterally. contoured rods were later added, nuts were tightened and torqued.  Biplanar image intensification showed appropriate screw position and anatomic level and satisfactory posture.  I performed a laminectomy for decompression at L2 3-4, L4-5 and L5-S1.   The interspinous ligament was excised.  The upper lamina was removed completely out to within 8-10 mm of the pars intra-articularis.  A small portion of the cephalad aspect of the caudal lamina was excised with a Kerrison rongeur.  Medial facetectomies were performed bilaterally using Kerrison rongeur and osteotomes.  A high-speed spencer was used as well.  Foraminotomies were accomplished with a foraminotomy rongeur.   The disc was determined to be not impinging and stable and was not excised except at L5-S1 were  large right-sided disc protrusion was present I removed multiple treated and loose fragments and then decided to place a cage..  This process was repeated at the adjacent levels in identical fashion.  Upon completion of the decompression I could pass a ball probe through the affected foramina, and easily retract  the nerve roots  toward the midline.  Bleeding was controlled with bipolar cautery,and Gelfoam with thrombin and/ or FloSeal hemostatic matrix.  No dural trauma was sustained, and no CSF leakage was noted.   A widened annulotomy was accomplished with a knife and pituitary rongeur after facetectomy at L5-S1 on the right.  Sequential  scrapers, along with curettage were used to remove disc debris down to bleeding subchondral and plate bone.  The passing root was protected medially with a bayonet nerve root retractor.  The exiting root was protected above.  A self-retaining distractor was placed and engaged.  Disc debris was adequately removed.  Now the appropriate-sized cage was selected, matching the final scraper size.  The sponge, which had been prepared at the back table, was packed into the Concorde bullet carbon fiber cage.  Additional sponge was placed in the anterior aspect of the disc space.  The cage was then tamped into position and finally seated.  The distraction was removed and the cage fit was excellent.  No neurologic structures were impinged or significantly stretched.  Biplanar images showed satisfactory position of the cage and of course appropriate anatomic level.  Bone marrow was obtained from the right iliac crest.  The marrow was applied to the Pliafix sponges.  Laminectomy bone, and bone from decortication of the graft bed was cleaned at the back table throughout the case and available for bone graft.  The morcellized bone was placed in the decorticated lateral gutter bilaterally.  Pliafix sponges were then placed.  Hemostasis was assured.  The wound was then closed with running and  interrupted #1 Vicryl for the dorsal fascia, 2-0 Vicryl subcutaneously, then 4-0 Monocryl and Dermabond for the skin.  A sterile dressing was applied.  The patient is about to be recovered.

## 2019-09-09 NOTE — H&P
[]Rigoberto for details  New patient or new problem visit         Chief Complaint   Patient presents with   • Lumbar Spine - Pain, Establish Care         HPI: She is seen at request of Dr. Black for chronic low back pain.  States that she injured years ago and then re-broke her tailbone 5 years ago.  This causes sciatic or was associated somewhere at some point with the tailbone she feels like there is water running down her leg where she's known to have knee arthritis.  She's had no specific treatment for this pain is moderate.     PFSH: See chart- reviewed.  Multiple allergies.     Review of Systems   Constitutional: Negative for chills, fever and unexpected weight change.   HENT: Negative for trouble swallowing and voice change.    Eyes: Negative for visual disturbance.   Respiratory: Negative for cough and shortness of breath.    Cardiovascular: Negative for chest pain and leg swelling.   Gastrointestinal: Negative for abdominal pain, nausea and vomiting.   Endocrine: Negative for cold intolerance and heat intolerance.   Genitourinary: Negative for difficulty urinating, frequency and urgency.   Musculoskeletal: Positive for arthralgias, back pain and myalgias.   Skin: Negative for rash and wound.   Allergic/Immunologic: Negative for immunocompromised state.   Neurological: Positive for numbness. Negative for weakness.   Hematological: Does not bruise/bleed easily.   Psychiatric/Behavioral: Negative for dysphoric mood. The patient is not nervous/anxious.          PE: Constitutional: Vital signs above-noted.  Awake, alert and oriented     Psychiatric: Affect and insight do not appear grossly disturbed.     Pulmonary: Breathing is unlabored, color is good.     Skin: Warm, dry and normal turgor     Cardiac:  pedal pulses intact.  No edema.     Eyesight and hearing appear adequate for examination purposes        Musculoskeletal:  There is some tenderness to percussion and palpation of the spine. Motion appears  undisturbed.  Posture is unremarkable to coronal and sagittal inspection.    The skin about the area is intact.  There is no palpable or visible deformity.  There is no local spasm.       Neurologic:   Reflexes are absent in the patellae and achilles.   Motor function is undisturbed in quadriceps, EHL, and gastrocnemius      Sensation appears symmetrically intact to light touch   .  In the bilateral lower extremities there is no evidence of atrophy.   Clonus is absent..  Gait appears undisturbed.  SLR test negative        MEDICAL DECISION MAKING     XRAY: Plain film x-rays of the lumbar spine obtained today show slight anterolisthesis at L3-4 and otherwise are unremarkable and unchanged from 2015IMPRESSION:  1. Multilevel degenerative disease involving the lumbar spine as  described in detail above with severe spinal stenosis at L3-L4 and  moderate-to-severe canal stenosis at L4-L5. This is secondary to facet  and ligamentum flavum hypertrophy and to a broad-based central disc  osteophyte complexes. At L3-L4, grade 1 anterolisthesis accentuates the  stenosis. At L5-S1, there is a central disc protrusion, minimally more  prominent to the left which approaches but does not appear to displace  the S1 nerve roots.  2. A 2 cm hemangioma at T11 and 1.6 cm hemangioma involving the L3  vertebral body to the right.       Other: n/a     Impression:  lumbar back pain     Plan: She is failed to improve with conservative care.  Good strength in the lower extremities on examination.  As per in May,  I am recommending L3-S1 laminectomy and fusion with instrumentation I discussed the risks and benefits of posterior spinal fusion, including where applicable, laminectomy.  Risks include adverse anesthetic events such as death, stroke and myocardial infarction.  More specific surgical risks include infection, nonunion, hardware failure, spinal fluid leakage, nerve root injury or paralysis, visceral or vascular injury, persistent pain,  deep venous thrombosis, and pulmonary embolism among others. There is a 70 to 90 % chance of success.   Alternatives have been discussed.  After careful consideration the patient wishes to proceed with surgery.  15 minutes was spent in face-to-face consultation.

## 2019-09-09 NOTE — ANESTHESIA POSTPROCEDURE EVALUATION
"Patient: Valery Baez    Procedure Summary     Date:  09/09/19 Room / Location:  [facility] OR [address] [place] [address] MAIN OR    Anesthesia Start:  1214 Anesthesia Stop:  1532    Procedure:  Lumbar 3 to sacral 1 laminectomy and fusion with instrumentation (N/A Spine Lumbar) Diagnosis:       Spondylolisthesis of lumbar region      Spinal stenosis of lumbar region with neurogenic claudication      (Spondylolisthesis of lumbar region [M43.16])      (Spinal stenosis of lumbar region with neurogenic claudication [M48.062])    Surgeon:  Vikas Couch MD Provider:  Anders Helton MD    Anesthesia Type:  general ASA Status:  3          Anesthesia Type: general  Last vitals  BP   98/67 (09/09/19 1635)   Temp   36.4 °C (97.6 °F) (09/09/19 1528)   Pulse   69 (09/09/19 1635)   Resp   20 (09/09/19 1635)     SpO2   100 % (09/09/19 1635)     Post Anesthesia Care and Evaluation    Patient location during evaluation: bedside  Patient participation: complete - patient participated  Level of consciousness: awake and alert  Pain management: adequate  Airway patency: patent  Anesthetic complications: No anesthetic complications    Cardiovascular status: acceptable  Respiratory status: acceptable  Hydration status: acceptable    Comments: BP 98/67   Pulse 69   Temp 36.4 °C (97.6 °F) (Oral)   Resp 20   Ht 168.9 cm (66.5\")   Wt 78.9 kg (174 lb)   SpO2 100%   BMI 27.66 kg/m²       "

## 2019-09-10 PROBLEM — D50.9 IRON DEFICIENCY ANEMIA: Status: ACTIVE | Noted: 2019-09-10

## 2019-09-10 PROBLEM — N18.30 STAGE 3 CHRONIC KIDNEY DISEASE: Status: ACTIVE | Noted: 2019-09-10

## 2019-09-10 PROBLEM — G47.30 SLEEP APNEA: Status: ACTIVE | Noted: 2019-09-10

## 2019-09-10 PROBLEM — J45.909 ASTHMA: Status: ACTIVE | Noted: 2019-09-10

## 2019-09-10 PROBLEM — M35.00 SJOGREN'S DISEASE (HCC): Status: ACTIVE | Noted: 2019-09-10

## 2019-09-10 LAB
HCT VFR BLD AUTO: 30.5 % (ref 34–46.6)
HGB BLD-MCNC: 9.8 G/DL (ref 12–15.9)

## 2019-09-10 PROCEDURE — 85014 HEMATOCRIT: CPT | Performed by: ORTHOPAEDIC SURGERY

## 2019-09-10 PROCEDURE — 97162 PT EVAL MOD COMPLEX 30 MIN: CPT

## 2019-09-10 PROCEDURE — 83540 ASSAY OF IRON: CPT | Performed by: NURSE PRACTITIONER

## 2019-09-10 PROCEDURE — 25010000002 ONDANSETRON PER 1 MG: Performed by: INTERNAL MEDICINE

## 2019-09-10 PROCEDURE — 85018 HEMOGLOBIN: CPT | Performed by: ORTHOPAEDIC SURGERY

## 2019-09-10 PROCEDURE — 97110 THERAPEUTIC EXERCISES: CPT

## 2019-09-10 PROCEDURE — 99024 POSTOP FOLLOW-UP VISIT: CPT | Performed by: ORTHOPAEDIC SURGERY

## 2019-09-10 PROCEDURE — 25010000002 CEFAZOLIN PER 500 MG: Performed by: ORTHOPAEDIC SURGERY

## 2019-09-10 PROCEDURE — 84466 ASSAY OF TRANSFERRIN: CPT | Performed by: NURSE PRACTITIONER

## 2019-09-10 RX ORDER — ATORVASTATIN CALCIUM 20 MG/1
20 TABLET, FILM COATED ORAL NIGHTLY
Status: DISCONTINUED | OUTPATIENT
Start: 2019-09-10 | End: 2019-09-13 | Stop reason: HOSPADM

## 2019-09-10 RX ORDER — PILOCARPINE HYDROCHLORIDE 5 MG/1
5 TABLET, FILM COATED ORAL 3 TIMES DAILY
Status: DISCONTINUED | OUTPATIENT
Start: 2019-09-10 | End: 2019-09-13 | Stop reason: HOSPADM

## 2019-09-10 RX ORDER — PANTOPRAZOLE SODIUM 40 MG/1
40 TABLET, DELAYED RELEASE ORAL
Status: DISCONTINUED | OUTPATIENT
Start: 2019-09-10 | End: 2019-09-11

## 2019-09-10 RX ORDER — FLUTICASONE PROPIONATE 110 UG/1
1 AEROSOL, METERED RESPIRATORY (INHALATION)
Status: DISCONTINUED | OUTPATIENT
Start: 2019-09-10 | End: 2019-09-13 | Stop reason: HOSPADM

## 2019-09-10 RX ORDER — IPRATROPIUM BROMIDE AND ALBUTEROL SULFATE 2.5; .5 MG/3ML; MG/3ML
3 SOLUTION RESPIRATORY (INHALATION) EVERY 4 HOURS PRN
Status: DISCONTINUED | OUTPATIENT
Start: 2019-09-10 | End: 2019-09-13 | Stop reason: HOSPADM

## 2019-09-10 RX ORDER — FLUTICASONE PROPIONATE 50 MCG
2 SPRAY, SUSPENSION (ML) NASAL 2 TIMES DAILY
Status: DISCONTINUED | OUTPATIENT
Start: 2019-09-10 | End: 2019-09-13 | Stop reason: HOSPADM

## 2019-09-10 RX ORDER — ALUMINA, MAGNESIA, AND SIMETHICONE 2400; 2400; 240 MG/30ML; MG/30ML; MG/30ML
15 SUSPENSION ORAL EVERY 6 HOURS PRN
Status: DISCONTINUED | OUTPATIENT
Start: 2019-09-10 | End: 2019-09-13 | Stop reason: HOSPADM

## 2019-09-10 RX ORDER — MONTELUKAST SODIUM 10 MG/1
10 TABLET ORAL NIGHTLY
Status: DISCONTINUED | OUTPATIENT
Start: 2019-09-10 | End: 2019-09-13 | Stop reason: HOSPADM

## 2019-09-10 RX ORDER — ONDANSETRON 2 MG/ML
4 INJECTION INTRAMUSCULAR; INTRAVENOUS EVERY 6 HOURS PRN
Status: DISCONTINUED | OUTPATIENT
Start: 2019-09-10 | End: 2019-09-13 | Stop reason: HOSPADM

## 2019-09-10 RX ADMIN — ONDANSETRON 4 MG: 2 INJECTION INTRAMUSCULAR; INTRAVENOUS at 18:17

## 2019-09-10 RX ADMIN — PILOCARPINE HYROCHLORIDE 5 MG: 5 TABLET, FILM COATED ORAL at 20:53

## 2019-09-10 RX ADMIN — PANTOPRAZOLE SODIUM 40 MG: 40 TABLET, DELAYED RELEASE ORAL at 16:31

## 2019-09-10 RX ADMIN — ALUMINUM HYDROXIDE, MAGNESIUM HYDROXIDE, AND DIMETHICONE 15 ML: 400; 400; 40 SUSPENSION ORAL at 14:54

## 2019-09-10 RX ADMIN — PILOCARPINE HYROCHLORIDE 5 MG: 5 TABLET, FILM COATED ORAL at 11:55

## 2019-09-10 RX ADMIN — ATORVASTATIN CALCIUM 20 MG: 20 TABLET, FILM COATED ORAL at 20:53

## 2019-09-10 RX ADMIN — POLYETHYLENE GLYCOL 3350 17 G: 17 POWDER, FOR SOLUTION ORAL at 08:43

## 2019-09-10 RX ADMIN — LEVOTHYROXINE, LIOTHYRONINE 30 MG: 19; 4.5 TABLET ORAL at 06:50

## 2019-09-10 RX ADMIN — CEFAZOLIN SODIUM 2 G: 10 INJECTION, POWDER, FOR SOLUTION INTRAVENOUS at 05:07

## 2019-09-10 RX ADMIN — ACETAMINOPHEN 650 MG: 325 TABLET ORAL at 21:07

## 2019-09-10 RX ADMIN — CYCLOBENZAPRINE 10 MG: 10 TABLET, FILM COATED ORAL at 21:07

## 2019-09-10 RX ADMIN — FLUTICASONE PROPIONATE 2 SPRAY: 50 SPRAY, METERED NASAL at 20:53

## 2019-09-10 RX ADMIN — CYCLOBENZAPRINE 10 MG: 10 TABLET, FILM COATED ORAL at 06:50

## 2019-09-10 RX ADMIN — PILOCARPINE HYROCHLORIDE 5 MG: 5 TABLET, FILM COATED ORAL at 16:16

## 2019-09-10 RX ADMIN — FLUTICASONE PROPIONATE 2 SPRAY: 50 SPRAY, METERED NASAL at 11:56

## 2019-09-10 RX ADMIN — ACETAMINOPHEN 650 MG: 325 TABLET ORAL at 11:55

## 2019-09-10 RX ADMIN — MONTELUKAST SODIUM 10 MG: 10 TABLET, FILM COATED ORAL at 20:53

## 2019-09-10 RX ADMIN — CYCLOBENZAPRINE 10 MG: 10 TABLET, FILM COATED ORAL at 14:54

## 2019-09-10 RX ADMIN — CEPHALEXIN 500 MG: 500 CAPSULE ORAL at 08:43

## 2019-09-10 NOTE — PLAN OF CARE
Problem: Patient Care Overview  Goal: Plan of Care Review  Outcome: Ongoing (interventions implemented as appropriate)      Problem: Pain, Chronic (Adult)  Goal: Identify Related Risk Factors and Signs and Symptoms  Outcome: Ongoing (interventions implemented as appropriate)    Goal: Acceptable Pain/Comfort Level and Functional Ability  Outcome: Ongoing (interventions implemented as appropriate)      Problem: Fall Risk (Adult)  Goal: Identify Related Risk Factors and Signs and Symptoms  Outcome: Ongoing (interventions implemented as appropriate)    Goal: Absence of Fall  Outcome: Ongoing (interventions implemented as appropriate)

## 2019-09-10 NOTE — PROGRESS NOTES
AVSS.  Complains of back pain, not unexpected.  Remarkable pain relief for Tylenol alone.  Leg pain is better.  Moves feet well.  Hemoglobin 9.8 plan PT, mobilize

## 2019-09-10 NOTE — THERAPY EVALUATION
Patient Name: Valery Baez  : 1945    MRN: [id number]                              Today's Date: 9/10/2019       Admit Date: 2019    Visit Dx:     ICD-10-CM ICD-9-CM   1. Spondylolisthesis of lumbar region M43.16 738.4   2. Spinal stenosis of lumbar region with neurogenic claudication M48.062 724.03     Patient Active Problem List   Diagnosis   • Status post complete repair of rotator cuff   • H/O total knee replacement, right   • History of total knee arthroplasty, right   • Trigger thumb, right thumb   • Chronic pain of left knee   • Spondylolisthesis   • Spondylolisthesis of lumbar region   • Spinal stenosis of lumbar region with neurogenic claudication   • Sleep apnea   • Stage 3 chronic kidney disease (CMS/HCC)   • Iron deficiency anemia   • Sjogren's disease (CMS/HCC)     Past Medical History:   Diagnosis Date   • Anemia     CHRONIC IRON DEFICIENT   • Asthma    • Cancer (CMS/HCC)     right breast cancer   • Chronic kidney disease    • DDD (degenerative disc disease), lumbar    • Disease of thyroid gland    • Dry eye    • GERD (gastroesophageal reflux disease)    • Hard to intubate     REQUESTING TO SEE AA 19; PT HAS COMPLETE BILATERAL JAW PROSTESIS   • Headache     or migraines   • Heart palpitations    • History of breast cancer     RIGHT   • History of transfusion    • Hx of renal calculi    • Hypercholesteremia    • MRSA (methicillin resistant Staphylococcus aureus)     LEFT JAW @[facility] JOINT, MULT. ,LAST , TREATED AT [facility], infection control notified 16. 1300   • Osteoarthritis    • Osteoporosis    • PONV (postoperative nausea and vomiting)    • Rosacea    • Sleep apnea     C PAP   • Spinal headache    • Spinal stenosis    • Vertigo    • Wound dehiscence     HX. STATES CLOSED     Past Surgical History:   Procedure Laterality Date   • ABSCESS DRAINAGE      abscess removed from left jaw x2   • APPENDECTOMY  1965   • AVULSION TOENAIL PLATE      6 REMOVED   •  BILATERAL BREAST REDUCTION Left 8/29/2016    Procedure: LT BREAST REDUCTION ;  Surgeon: Luis Zambrano MD;  Location: [facility] OR;  Service:    • CHOLECYSTECTOMY  09/2000   • CYST REMOVAL  03/1984    eye cyst left lower lid   • CYST REMOVAL  01/2007    right upper thigh   • CYSTOSCOPY      x2  02/2013 & 04/2014   • HYSTERECTOMY  1971    LATER BSO   • KNEE ARTHROPLASTY Right 03/2005   • LUMBAR DISCECTOMY FUSION INSTRUMENTATION N/A 9/9/2019    Procedure: Lumbar 3 to sacral 1 laminectomy and fusion with instrumentation;  Surgeon: Vikas Couch MD;  Location: [facility] OR;  Service: Orthopedic Spine   • MASTECTOMY Right 03/2015   • MASTECTOMY PARTIAL / LUMPECTOMY Right 03/2009   • NIPPLE RECONSTRUCTION Right 8/29/2016    Procedure: RT NIPPLE RECONSTRUCTION;  Surgeon: Luis Zambrano MD;  Location: [facility] OR;  Service:    • OTHER SURGICAL HISTORY      laparoscopy adhesions right side x2   • OTHER SURGICAL HISTORY      drain mammosite area   • OTHER SURGICAL HISTORY  01/2010    pressure washed prosthesis area left jaw   • OTHER SURGICAL HISTORY      drain mammosite area right breast 09/2011   • RECONSTRUCTION BREAST W/ TRAM FLAP Right 02/2016   • ROTATOR CUFF REPAIR Left 09/2014   • TEMPOROMANDIBULAR JOINT ARTHROPLASTY Bilateral 1993    MULT. REPLACEMENTS LATER   • TONSILLECTOMY  1952     General Information     Row Name 09/10/19 4189          PT Evaluation Time/Intention    Document Type  evaluation  -AR     Mode of Treatment  physical therapy  -AR     Row Name 09/10/19 0907          General Information    Patient Profile Reviewed?  yes  -AR     Prior Level of Function  independent:  -AR     Existing Precautions/Restrictions  brace worn when out of bed;fall;spinal  -AR     Barriers to Rehab  none identified  -AR     Row Name 09/10/19 0913          Relationship/Environment    Lives With  spouse  -AR     Row Name 09/10/19 0956          Resource/Environmental Concerns    Current Living Arrangements   home/apartment/condo  -AR     Row Name 09/10/19 0950          Home Main Entrance    Number of Stairs, Main Entrance  three  -AR     Stair Railings, Main Entrance  railings safe and in good condition  -AR     Row Name 09/10/19 0950          Stairs Within Home, Primary    Number of Stairs, Within Home, Primary  none  -AR     Row Name 09/10/19 0950          Cognitive Assessment/Intervention- PT/OT    Orientation Status (Cognition)  oriented x 4  -AR       User Key  (r) = Recorded By, (t) = Taken By, (c) = Cosigned By    Initials Name Provider Type    AR Chelsea Bower, PT Physical Therapist        Mobility     Row Name 09/10/19 0950          Bed Mobility Assessment/Treatment    Bed Mobility Assessment/Treatment  supine-sit;sit-supine  -AR     Supine-Sit Cavalier (Bed Mobility)  supervision  -AR     Sit-Supine Cavalier (Bed Mobility)  supervision  -AR     Assistive Device (Bed Mobility)  bed rails  -AR     Comment (Bed Mobility)  log rolling  -AR     Row Name 09/10/19 0950          Sit-Stand Transfer    Sit-Stand Cavalier (Transfers)  contact guard  -AR     Assistive Device (Sit-Stand Transfers)  walker, front-wheeled  -AR     Row Name 09/10/19 0950          Gait/Stairs Assessment/Training    Gait/Stairs Assessment/Training  gait/ambulation independence  -AR     Cavalier Level (Gait)  contact guard  -AR     Assistive Device (Gait)  walker, front-wheeled  -AR     Distance in Feet (Gait)  75  -AR     Pattern (Gait)  step-to  -AR     Deviations/Abnormal Patterns (Gait)  festinating/shuffling;jemal decreased  -AR     Bilateral Gait Deviations  forward flexed posture  -AR     Comment (Gait/Stairs)  limited by fatigue/pain  -AR       User Key  (r) = Recorded By, (t) = Taken By, (c) = Cosigned By    Initials Name Provider Type    AR Chelsea Bower, PT Physical Therapist        Obj/Interventions     Row Name 09/10/19 0951          General ROM    GENERAL ROM COMMENTS  B LE WFL  -AR     Row Name 09/10/19  0951          MMT (Manual Muscle Testing)    General MMT Comments  B LE grossly 4/5  -AR     Row Name 09/10/19 0951          Therapeutic Exercise    Comment (Therapeutic Exercise)  B AP, LAQ 10x   -AR     Row Name 09/10/19 0951          Static Sitting Balance    Level of [place] (Unsupported Sitting, Static Balance)  supervision  -AR     Sitting Position (Unsupported Sitting, Static Balance)  sitting on edge of bed  -AR     Time Able to Maintain Position (Unsupported Sitting, Static Balance)  2 to 3 minutes  -AR       User Key  (r) = Recorded By, (t) = Taken By, (c) = Cosigned By    Initials Name Provider Type    AR Chelsea Bower, PT Physical Therapist        Goals/Plan     Row Name 09/10/19 1059          Bed Mobility Goal 1 (PT)    Activity/Assistive Device (Bed Mobility Goal 1, PT)  bed mobility activities, all  -AR     [place] Level/Cues Needed (Bed Mobility Goal 1, PT)  conditional independence  -AR     Time Frame (Bed Mobility Goal 1, PT)  1 week  -AR     Row Name 09/10/19 1059          Transfer Goal 1 (PT)    Activity/Assistive Device (Transfer Goal 1, PT)  sit-to-stand/stand-to-sit;walker, rolling  -AR     [place] Level/Cues Needed (Transfer Goal 1, PT)  supervision required  -AR     Time Frame (Transfer Goal 1, PT)  1 week  -AR     Row Name 09/10/19 1059          Gait Training Goal 1 (PT)    Activity/Assistive Device (Gait Training Goal 1, PT)  gait (walking locomotion);walker, rolling  -AR     [place] Level (Gait Training Goal 1, PT)  supervision required  -AR     Distance (Gait Goal 1, PT)  150  -AR     Time Frame (Gait Training Goal 1, PT)  1 week  -AR       User Key  (r) = Recorded By, (t) = Taken By, (c) = Cosigned By    Initials Name Provider Type    AR Chelsea Bower, PT Physical Therapist        Clinical Impression     Row Name 09/10/19 0951          Pain Assessment    Additional Documentation  Pain Scale: Numbers Pre/Post-Treatment (Group)  -AR     Row Name 09/10/19 0965           Pain Scale: Numbers Pre/Post-Treatment    Pain Scale: Numbers, Pretreatment  3/10  -AR     Pain Scale: Numbers, Post-Treatment  4/10  -AR     Pain Location  back  -AR     Pain Intervention(s)  Medication (See MAR);Repositioned  -AR     Row Name 09/10/19 0951          Plan of Care Review    Plan of Care Reviewed With  patient  -AR     Row Name 09/10/19 0951          Physical Therapy Clinical Impression    Patient/Family Goals Statement (PT Clinical Impression)  DC home  -AR     Criteria for Skilled Interventions Met (PT Clinical Impression)  yes  -AR     Rehab Potential (PT Clinical Summary)  good, to achieve stated therapy goals  -AR     Row Name 09/10/19 0951          Vital Signs    O2 Delivery Pre Treatment  room air  -AR     Row Name 09/10/19 0951          Positioning and Restraints    Pre-Treatment Position  in bed  -AR     Post Treatment Position  bed  -AR     In Bed  supine;call light within reach;encouraged to call for assist;exit alarm on  -AR       User Key  (r) = Recorded By, (t) = Taken By, (c) = Cosigned By    Initials Name Provider Type    Chelsea Guerrero, PT Physical Therapist        Outcome Measures     Row Name 09/10/19 1059          How much help from another person do you currently need...    Turning from your back to your side while in flat bed without using bedrails?  4  -AR     Moving from lying on back to sitting on the side of a flat bed without bedrails?  3  -AR     Moving to and from a bed to a chair (including a wheelchair)?  3  -AR     Standing up from a chair using your arms (e.g., wheelchair, bedside chair)?  3  -AR     Climbing 3-5 steps with a railing?  2  -AR     To walk in hospital room?  3  -AR     AM-PAC 6 Clicks Score (PT)  18  -AR     Row Name 09/10/19 1059          Functional Assessment    Outcome Measure Options  AM-PAC 6 Clicks Basic Mobility (PT)  -AR       User Key  (r) = Recorded By, (t) = Taken By, (c) = Cosigned By    Initials Name Provider Type    KAVNO Bower  RUBENS Tran Physical Therapist        Physical Therapy Education     Title: PT OT SLP Therapies (In Progress)     Topic: Physical Therapy (In Progress)     Point: Mobility training (In Progress)     Learning Progress Summary           Patient Acceptance, E, NR by AR at 9/10/2019 11:01 AM                   Point: Home exercise program (In Progress)     Learning Progress Summary           Patient Acceptance, E, NR by AR at 9/10/2019 11:01 AM                   Point: Body mechanics (In Progress)     Learning Progress Summary           Patient Acceptance, E, NR by AR at 9/10/2019 11:01 AM                   Point: Precautions (In Progress)     Learning Progress Summary           Patient Acceptance, E, NR by AR at 9/10/2019 11:01 AM                               User Key     Initials Effective Dates Name Provider Type Discipline    AR 04/03/18 -  Chelsea Bower PT Physical Therapist PT              PT Recommendation and Plan  Planned Therapy Interventions (PT Eval): balance training, bed mobility training, gait training, home exercise program, strengthening, stair training, ROM (range of motion), patient/family education  Outcome Summary/Treatment Plan (PT)  Anticipated Discharge Disposition (PT): home with home health, home with assist(anticipating progress)  Plan of Care Reviewed With: patient  Outcome Summary: Pt admitted 9/9 s/p spinal surgery.  Pt reports independence prior to admission, no AD or falls.  Today pt demonstrates generalized weakness, impaired standing balance and gait pattern from baseline but able to ambulate 75' w/ contact assist using RW.  Educated on activity/walking recom, spinal precautions and PT POC.  Anticipate continued progress for DC to home w/ assist as needed.       Time Calculation:   PT Charges     Row Name 09/10/19 1100             Time Calculation    Start Time  0942  -AR      Stop Time  0959  -AR      Time Calculation (min)  17 min  -AR      PT Received On  09/10/19  -AR      PT -  Next Appointment  09/11/19  -AR      PT Goal Re-Cert Due Date  09/17/19  -AR        User Key  (r) = Recorded By, (t) = Taken By, (c) = Cosigned By    Initials Name Provider Type    Chelsea Guerrero PT Physical Therapist        Therapy Charges for Today     Code Description Service Date Service Provider Modifiers Qty    44655040711 HC PT EVAL MOD COMPLEXITY 2 9/10/2019 Chelsea Bower, PT GP 1    71929703147 HC PT THER PROC EA 15 MIN 9/10/2019 Chelsea Bower, PT GP 1          PT G-Codes  Outcome Measure Options: AM-PAC 6 Clicks Basic Mobility (PT)  AM-PAC 6 Clicks Score (PT): 18    Chelsea Bower PT  9/10/2019

## 2019-09-10 NOTE — PLAN OF CARE
Problem: Patient Care Overview  Goal: Plan of Care Review  Outcome: Ongoing (interventions implemented as appropriate)   09/10/19 1651   Coping/Psychosocial   Plan of Care Reviewed With patient   OTHER   Outcome Summary Pt pain controlled with tylenol and flexiril. Ambulated for first time this shift and multiple times in hallway. VSS will continue to monitor    Plan of Care Review   Progress improving       Problem: Pain, Chronic (Adult)  Goal: Identify Related Risk Factors and Signs and Symptoms  Outcome: Ongoing (interventions implemented as appropriate)   09/10/19 1651   Pain, Chronic (Adult)   Related Risk Factors (Chronic Pain) disease process;surgery   Signs and Symptoms (Chronic Pain) verbalization of pain/discomfort for a prolonged time period;verbalization of pain descriptors     Goal: Acceptable Pain/Comfort Level and Functional Ability  Outcome: Ongoing (interventions implemented as appropriate)   09/10/19 1651   Pain, Chronic (Adult)   Acceptable Pain/Comfort Level and Functional Ability making progress toward outcome       Problem: Fall Risk (Adult)  Goal: Identify Related Risk Factors and Signs and Symptoms  Outcome: Ongoing (interventions implemented as appropriate)   09/10/19 1651   Fall Risk (Adult)   Related Risk Factors (Fall Risk) history of falls;gait/mobility problems   Signs and Symptoms (Fall Risk) presence of risk factors     Goal: Absence of Fall  Outcome: Ongoing (interventions implemented as appropriate)   09/10/19 1651   Fall Risk (Adult)   Absence of Fall making progress toward outcome

## 2019-09-10 NOTE — PROGRESS NOTES
Patient Name:  Valery Baez  YOB: 1945  MRN:  [id number]  Date of Admission:  9/9/2019  Date of Consult:  9/10/2019  Patient Care Team:  Tino Smith MD as PCP - General  Tino Smith MD as PCP - Family Medicine  Tom West DPM as PCP - Claims Attributed    Inpatient Hospitalist Consult  Consult performed by: Karmen Finn APRN  Consult ordered by: Vikas Couch MD  Reason for consult: Evaluate status and make recommendations regarding treatment for HTN, sjogrens, and asthma           Subjective   History of Present Illness  Ms. Baez is a 73 y.o. female that has been admitted to [facility] following elective Lumbar 3 to sacral 1 laminectomy and fusion with instrumentation.  She has been admitted to an orthopedic floor following surgery and we were asked to see and assist with her medical problems, specifically relating to her HTN, asthma, iron deficiency anemia, ORAL and Sjogren's.  At the time of my visit she denies any chest pain, SOA, nausea, vomiting or diarrhea.  She has tolerated a diet.  She does complain of expected postoperative discomfort.  She reports being in a normal state of health leading up to surgery.  Patient is compliant with her CPAP and has it at the hospital.  She has asthma that is well controlled on current regimen and denies having to use her left albuterol inhaler within the last 6 months.  Iron deficiency anemia is currently controlled by primary care provider with oral replacement.  She denies overt bleeding. Many of her medications are not on hospital formulary and she will be allowed to use that her prescriptions from home.       Past Medical History:   Diagnosis Date   • Anemia     CHRONIC IRON DEFICIENT   • Asthma    • Cancer (CMS/HCC)     right breast cancer   • Chronic kidney disease    • DDD (degenerative disc disease), lumbar    • Disease of thyroid gland    • Dry eye    • GERD (gastroesophageal  reflux disease)    • Hard to intubate     REQUESTING TO SEE AA 8/22/19; PT HAS COMPLETE BILATERAL JAW PROSTESIS   • Headache     or migraines   • Heart palpitations    • History of breast cancer     RIGHT   • History of transfusion    • Hx of renal calculi    • Hypercholesteremia    • MRSA (methicillin resistant Staphylococcus aureus)     LEFT JAW @[facility] JOINT, MULT. ,LAST 2013, TREATED AT [facility], infection control notified 8/24/16. 1300   • Osteoarthritis    • Osteoporosis    • PONV (postoperative nausea and vomiting)    • Rosacea    • Sleep apnea     C PAP   • Spinal headache    • Spinal stenosis    • Vertigo    • Wound dehiscence     HX. STATES CLOSED     Past Surgical History:   Procedure Laterality Date   • ABSCESS DRAINAGE      abscess removed from left jaw x2   • APPENDECTOMY  1965   • AVULSION TOENAIL PLATE      6 REMOVED   • BILATERAL BREAST REDUCTION Left 8/29/2016    Procedure: LT BREAST REDUCTION ;  Surgeon: Luis Zambrano MD;  Location: [facility];  Service:    • CHOLECYSTECTOMY  09/2000   • CYST REMOVAL  03/1984    eye cyst left lower lid   • CYST REMOVAL  01/2007    right upper thigh   • CYSTOSCOPY      x2  02/2013 & 04/2014   • HYSTERECTOMY  1971    LATER BSO   • KNEE ARTHROPLASTY Right 03/2005   • LUMBAR DISCECTOMY FUSION INSTRUMENTATION N/A 9/9/2019    Procedure: Lumbar 3 to sacral 1 laminectomy and fusion with instrumentation;  Surgeon: Vikas Couch MD;  Location: [facility] OR;  Service: Orthopedic Spine   • MASTECTOMY Right 03/2015   • MASTECTOMY PARTIAL / LUMPECTOMY Right 03/2009   • NIPPLE RECONSTRUCTION Right 8/29/2016    Procedure: RT NIPPLE RECONSTRUCTION;  Surgeon: Luis Zambrano MD;  Location: [facility] OR;  Service:    • OTHER SURGICAL HISTORY      laparoscopy adhesions right side x2   • OTHER SURGICAL HISTORY      drain mammosite area   • OTHER SURGICAL HISTORY  01/2010    pressure washed prosthesis area left jaw   • OTHER SURGICAL HISTORY      drain mammosite  area right breast 09/2011   • RECONSTRUCTION BREAST W/ TRAM FLAP Right 02/2016   • ROTATOR CUFF REPAIR Left 09/2014   • TEMPOROMANDIBULAR JOINT ARTHROPLASTY Bilateral 1993    MULT. REPLACEMENTS LATER   • TONSILLECTOMY  1952     Family History   Problem Relation Age of Onset   • Hypertension Other    • Cancer Other         breast   • Malig Hyperthermia Neg Hx      Social History     Tobacco Use   • Smoking status: Never Smoker   • Smokeless tobacco: Never Used   Substance Use Topics   • Alcohol use: Yes     Alcohol/week: 1.2 oz     Types: 2 Shots of liquor per week     Comment: 2 drinks per week   • Drug use: No     Medications Prior to Admission   Medication Sig Dispense Refill Last Dose   • ARMOUR THYROID 30 MG tablet    9/8/2019 at 0515   • atorvastatin (LIPITOR) 20 MG tablet Take 20 mg by mouth Every Night.   9/8/2019 at 2300   • Calcium Citrate-Vitamin D (CALCIUM CITRATE + D PO) Take 1 tablet by mouth Every Night. ON HOLD FOR SX.   Past Week at Unknown time   • fexofenadine (ALLEGRA) 180 MG tablet Take 180 mg by mouth Daily.   9/8/2019 at 0800   • fluticasone (FLONASE) 50 MCG/ACT nasal spray 2 sprays into the nostril(s) as directed by provider 2 (Two) Times a Day.   9/9/2019 at 0730   • lansoprazole (PREVACID) 30 MG capsule Take 30 mg by mouth 2 (Two) Times a Day.   9/9/2019 at 0730   • loteprednol (LOTEMAX) 0.2 % suspension Administer 1 drop to both eyes 2 (Two) Times a Day.   9/9/2019 at 0730   • meclizine (ANTIVERT) 25 MG tablet Take 25 mg by mouth 3 (Three) Times a Day As Needed.   9/8/2019 at 1500   • Melatonin 10 MG tablet Take 1 tablet by mouth Every Night. ON HOLD FOR SX.   Past Week at Unknown time   • metroNIDAZOLE (METROGEL) 0.75 % gel Apply  topically to the appropriate area as directed 2 (Two) Times a Day As Needed.   9/8/2019 at Unknown time   • mometasone (NASONEX) 50 MCG/ACT nasal spray 2 sprays into the nostril(s) as directed by provider Daily.   9/9/2019 at 0730   • montelukast (SINGULAIR) 10  MG tablet Take 10 mg by mouth Every Night.   9/8/2019 at 0800   • Multiple Vitamins-Minerals (MULTIVITAMIN ADULT PO) Take 1 tablet by mouth Every Night. ON HOLD FOR SX.   Past Week at Unknown time   • pilocarpine (SALAGEN) 5 MG tablet Take 5 mg by mouth 3 (Three) Times a Day.   9/8/2019 at 2000   • POTASSIUM CHLORIDE PO Take 99 mg by mouth Every Night. ON HOLD FOR SX.   Past Week at Unknown time   • Probiotic Product (PROBIOTIC DAILY PO) Take 1 capsule by mouth 2 (Two) Times a Day. ON HOLD FOR SX.   Past Week at Unknown time   • vitamin B-6 (PYRIDOXINE) 100 MG tablet Take 100 mg by mouth 2 (Two) Times a Day. ON HOLD FOR SX.   9/8/2019 at Unknown time   • XOPENEX HFA 45 MCG/ACT inhaler Inhale 2 puffs Every 4 (Four) Hours As Needed.   9/9/2019 at 0730   • aspirin 81 MG tablet Take 81 mg by mouth. PT TO STOP PER MD INSTRUCTION   8/30/2019   • atenolol (TENORMIN) 25 MG tablet Take 25 mg by mouth Daily. TAKES AT 1500   9/8/2019 at 1500   • benzonatate (TESSALON) 100 MG capsule Take 100 mg by mouth 2 (Two) Times a Day As Needed.   More than a month at Unknown time   • cephalexin (KEFLEX) 500 MG capsule Take 500 mg by mouth Daily. MRSA SUPRESSION   9/8/2019 at 0800   • Denosumab (PROLIA SC) Inject  under the skin into the appropriate area as directed See Admin Instructions. Every 6 months   4/5/19 WAS LAST DOSE   More than a month at Unknown time   • diphenhydrAMINE (BENADRYL ALLERGY CHILDRENS) 12.5 MG chewable tablet Chew 25 mg 4 (Four) Times a Day As Needed for Allergies.   8/1/2018   • EPIPEN 2-TOMMY 0.3 MG/0.3ML solution auto-injector injection    Taking   • fluticasone (FLOVENT HFA) 110 MCG/ACT inhaler Inhale 1 puff 2 (two) times a day.   9/9/2019 at 0730   • mometasone (ASMANEX TWISTHALER) inhaler 220 mcg/inhalation Inhale 2 puffs 2 (Two) Times a Day As Needed.   Taking     Allergies:  Bee venom; Latex; Codeine; Cubicin [daptomycin]; Darvon [propoxyphene]; Demerol [meperidine]; Ditropan [oxybutynin]; Duricef  [cefadroxil]; Erythromycin; Hydrocodone; Iodine; Methylprednisolone; Minocycline; Morphine and related; Penicillins; Sulfa antibiotics; Sulphadimidine [sulfamethazine]; Talwin [pentazocine]; Tramadol hcl; Valium [diazepam]; Adhesive tape; Betadine [povidone iodine]; Contrast dye; Neomycin-polymyxin-gramicidin; and Neosporin af [miconazole nitrate]    Review of Systems   Constitutional: Negative for activity change, appetite change, chills, diaphoresis, fatigue, fever and unexpected weight change.   HENT: Negative for trouble swallowing.         Chronic dry eyes and dry mouth   Eyes: Negative for visual disturbance.   Respiratory: Negative for cough, choking, chest tightness, shortness of breath, wheezing and stridor.    Cardiovascular: Negative for chest pain, palpitations and leg swelling.   Gastrointestinal: Negative for abdominal distention, abdominal pain, blood in stool, constipation, diarrhea, nausea and vomiting.   Endocrine: Negative for polydipsia, polyphagia and polyuria.   Genitourinary: Negative for dysuria.   Musculoskeletal: Negative for back pain.        Expected postoperative pain   Skin: Negative for color change, pallor, rash and wound.   Allergic/Immunologic: Negative for immunocompromised state.   Neurological: Negative for dizziness, weakness, light-headedness and headaches.   Hematological: Does not bruise/bleed easily.   Psychiatric/Behavioral: Negative.  Negative for confusion.       Objective      Vital Signs  Temp:  [97.6 °F (36.4 °C)-98.3 °F (36.8 °C)] 97.9 °F (36.6 °C)  Heart Rate:  [] 71  Resp:  [17-20] 18  BP: ()/(55-78) 100/60  Body mass index is 27.66 kg/m².    Physical Exam   Constitutional: She is oriented to person, place, and time. She appears well-developed and well-nourished. No distress.   Well-appearing, no apparent distress   HENT:   Head: Normocephalic and atraumatic.   Dry oral mucosa   Eyes: Pupils are equal, round, and reactive to light. No scleral icterus.    Neck: Normal range of motion. No JVD present. No tracheal deviation present.   Cardiovascular: Normal rate, regular rhythm, normal heart sounds and intact distal pulses.   No murmur heard.  Pulmonary/Chest: Effort normal and breath sounds normal. No respiratory distress. She has no wheezes.   Abdominal: Soft. Bowel sounds are normal. She exhibits no shifting dullness, no distension, no pulsatile liver, no fluid wave, no abdominal bruit, no ascites, no pulsatile midline mass and no mass. There is no hepatosplenomegaly. There is no tenderness. There is no rigidity and no guarding. No hernia.   Musculoskeletal: She exhibits no edema.   Neurovascular status intact.  back pain as expected   Neurological: She is alert and oriented to person, place, and time. No cranial nerve deficit.   Skin: Skin is warm and dry. Capillary refill takes less than 2 seconds. No rash noted. No erythema. No pallor.   Psychiatric: She has a normal mood and affect. Her behavior is normal. Judgment and thought content normal.   Nursing note and vitals reviewed.      Results Review:   I reviewed the patient's new clinical results.  I reviewed the patient's new imaging results and agree with the interpretation.  I reviewed the patient's other test results and agree with the interpretation     Results from last 7 days   Lab Units 09/10/19  0529 09/09/19  1837   HEMOGLOBIN g/dL 9.8* 10.6*   HEMATOCRIT % 30.5* 34.6   Reviewed metabolic panel prior to admission             Assessment/Plan     Active Hospital Problems    Diagnosis POA   • **Spinal stenosis of lumbar region with neurogenic claudication [M48.062] Yes     Priority: Medium     Added automatically from request for surgery [id number]     • Spondylolisthesis of lumbar region [M43.16] Yes     Priority: High     Added automatically from request for surgery [id number]     • Sleep apnea [G47.30] Yes     C PAP     • Stage 3 chronic kidney disease (CMS/HCC) [N18.3] Yes   • Iron deficiency anemia  [D50.9] Yes   • Sjogren's disease (CMS/[facility]) [M35.00] Yes   • Asthma [J45.909] Yes       Ms. Baez is a 73 y.o. female who is s/p Lumbar 3 to sacral 1 laminectomy and fusion with instrumentation.    · She seems to be doing well thus far postoperatively.   · Will hold antihypertensive agents given BP low end of normal.  Anticipate fluctuations in BP due to blood loss, hypovolemia, anesthesia, narcotics etc.   · Continue IVFs as ordered.    · Monitor renal function tomorrow   · Iron deficiency anemia with postoperative decline in hemoglobin.  Monitor daily.  Check iron stores.  Transfuse PRBC if hemoglobin less than 8 or if acutely symptomatic  · PT therapy   · Continue CPAP from home  · Many of her medications for Sjogren's and asthma are not on formulary at hospital.  She will be allowed to continue them from home.  · She does not tolerate albuterol well.  If Xopenex is needed at this admission she is to notify the nurse/ MD  · SCDs have been ordered for DVT prophylaxis per surgery.  · She was encouraged to use incentive spirometer as instructed.      Thank you very much for asking A to be involved in this patient's care. We will follow along with you.      VIOLETTE Lacy  [place] [facility]  09/10/19  11:19 AM     Addendum: I have reviewed the history and plan as obtained by VIOLETTE Chan. I have personally examined the patient. I have reviewed available clinical results, imaging results, EKG/Telemetry results, and prior records. My exam confirms her physical findings and I agree with the plan as listed above, with the addition of the following:    Reporting some reflux symptoms. Will start some prn medication in addition to PPI. On exam heart RRR and lungs CTAB. BP stable currently. Ok to resume home medications as needed.    Hernesto Alvarado MD  [facility]  09/10/19  2:39 PM

## 2019-09-10 NOTE — PLAN OF CARE
Problem: Patient Care Overview  Goal: Plan of Care Review   09/10/19 1101   Coping/Psychosocial   Plan of Care Reviewed With patient   OTHER   Outcome Summary Pt admitted 9/9 s/p spinal surgery. Pt reports independence prior to admission, no AD or falls. Today pt demonstrates generalized weakness, impaired standing balance and gait pattern from baseline but able to ambulate 75' w/ contact assist using RW. Educated on activity/walking recom, spinal precautions and PT POC. Anticipate continued progress for DC to home w/ assist as needed.

## 2019-09-10 NOTE — PROGRESS NOTES
Discharge Planning Assessment  [facility]     Patient Name: Valery Baez  MRN: [id number]  Today's Date: 9/10/2019    Admit Date: 9/9/2019    Discharge Needs Assessment     Row Name 09/10/19 0935       Living Environment    Lives With  spouse    Name(s) of Who Lives With Patient  Jj Baez [phone]     Current Living Arrangements  home/apartment/condo    Potentially Unsafe Housing Conditions  other (see comments) no concerns     Primary Care Provided by  self    Provides Primary Care For  no one    Family Caregiver if Needed  spouse    Quality of Family Relationships  helpful;involved;supportive    Able to Return to Prior Arrangements  yes       Resource/Environmental Concerns    Resource/Environmental Concerns  none    Home Accessibility Concerns  stairs to enter home    Transportation Concerns  car, none       Transition Planning    Patient/Family Anticipates Transition to  home    Patient/Family Anticipated Services at Transition  none    Transportation Anticipated  family or friend will provide       Discharge Needs Assessment    Readmission Within the Last 30 Days  no previous admission in last 30 days    Concerns to be Addressed  no discharge needs identified;denies needs/concerns at this time    Equipment Currently Used at Home  commode;cane, straight;walker, rolling;crutches    Anticipated Changes Related to Illness  none    Equipment Needed After Discharge  none    Offered/Gave Vendor List  yes        Discharge Plan     Row Name 09/10/19 1436       Plan    Plan  Home; follow for PT/OT eval     Patient/Family in Agreement with Plan  yes    Plan Comments  CCP met with patient at bedside. CCP role explained and discharge planning discussed. Face sheet verified and IMM noted. Patient's PCP is DR. Smith. Patient lives with her spouse, with 5 steps to the entrance (handrail present). Patient's has no interior steps. Patient has crutches, cane, walker and raised commode. Patient states she plans on  using her walker when she returns home. Patient states she has ambulated to the bathroom and felt steady on her feet but sore. Patient has used [facility] home [facility] in the past and denies any SNF history. Patient's preferred pharmacy is [facility] for short term medications and [facility] for long term; patient denies having trouble obtaining her medications in the last 3 months. Patient does not anticipate needing HH/PT at discharge. CCP provided HH/SNF list. Patient states her spouse is able to assist her as needed. PT/OT eval ordered. CCP will follow for PT/OT eval recommendations and assist as needed. Claudinejamar Del Toro CSW         Destination      No service coordination in this encounter.      Durable Medical Equipment      No service coordination in this encounter.      Dialysis/Infusion      No service coordination in this encounter.      Home Medical Care      No service coordination in this encounter.      Therapy      No service coordination in this encounter.      Community Resources      No service coordination in this encounter.          Demographic Summary     Row Name 09/10/19 0934       General Information    Admission Type  inpatient    Arrived From  emergency department    Required Notices Provided  Important Message from Medicare    Referral Source  admission list    Reason for Consult  discharge planning    Preferred Language  English     Used During This Interaction  no        Functional Status     Row Name 09/10/19 0934       Functional Status    Usual Activity Tolerance  good    Current Activity Tolerance  good       Functional Status, IADL    Medications  assistive equipment    Meal Preparation  assistive equipment    Housekeeping  assistive equipment    Laundry  assistive equipment    Shopping  assistive equipment       Mental Status    General Appearance WDL  WDL       Mental Status Summary    Recent Changes in Mental Status/Cognitive Functioning  no changes        Psychosocial     No documentation.       Abuse/Neglect    No documentation.       Legal    No documentation.       Substance Abuse    No documentation.       Patient Forms     Row Name 09/10/19 3632       Patient Forms    Provider Choice List  Delivered    Delivered to  Patient    Method of delivery  In person            FAMILIA Henson

## 2019-09-11 PROBLEM — K21.9 GERD (GASTROESOPHAGEAL REFLUX DISEASE): Status: ACTIVE | Noted: 2019-09-11

## 2019-09-11 LAB
ANION GAP SERPL CALCULATED.3IONS-SCNC: 13 MMOL/L (ref 5–15)
BUN BLD-MCNC: 11 MG/DL (ref 8–23)
BUN/CREAT SERPL: 14.5 (ref 7–25)
CALCIUM SPEC-SCNC: 8.3 MG/DL (ref 8.6–10.5)
CHLORIDE SERPL-SCNC: 106 MMOL/L (ref 98–107)
CO2 SERPL-SCNC: 16 MMOL/L (ref 22–29)
CREAT BLD-MCNC: 0.76 MG/DL (ref 0.57–1)
DEPRECATED RDW RBC AUTO: 49.4 FL (ref 37–54)
ERYTHROCYTE [DISTWIDTH] IN BLOOD BY AUTOMATED COUNT: 14 % (ref 12.3–15.4)
FERRITIN SERPL-MCNC: 876 NG/ML (ref 13–150)
GFR SERPL CREATININE-BSD FRML MDRD: 75 ML/MIN/1.73
GLUCOSE BLD-MCNC: 141 MG/DL (ref 65–99)
HCT VFR BLD AUTO: 29.1 % (ref 34–46.6)
HGB BLD-MCNC: 9 G/DL (ref 12–15.9)
IRON 24H UR-MRATE: 18 MCG/DL (ref 37–145)
IRON SATN MFR SERPL: 8 % (ref 20–50)
MCH RBC QN AUTO: 29.7 PG (ref 26.6–33)
MCHC RBC AUTO-ENTMCNC: 30.9 G/DL (ref 31.5–35.7)
MCV RBC AUTO: 96 FL (ref 79–97)
PLATELET # BLD AUTO: 213 10*3/MM3 (ref 140–450)
PMV BLD AUTO: 11.9 FL (ref 6–12)
POTASSIUM BLD-SCNC: 4.4 MMOL/L (ref 3.5–5.2)
RBC # BLD AUTO: 3.03 10*6/MM3 (ref 3.77–5.28)
SODIUM BLD-SCNC: 135 MMOL/L (ref 136–145)
TIBC SERPL-MCNC: 232 MCG/DL (ref 298–536)
TRANSFERRIN SERPL-MCNC: 156 MG/DL (ref 200–360)
WBC NRBC COR # BLD: 17.26 10*3/MM3 (ref 3.4–10.8)

## 2019-09-11 PROCEDURE — 97110 THERAPEUTIC EXERCISES: CPT

## 2019-09-11 PROCEDURE — 25010000002 IRON SUCROSE PER 1 MG: Performed by: NURSE PRACTITIONER

## 2019-09-11 PROCEDURE — 82728 ASSAY OF FERRITIN: CPT | Performed by: NURSE PRACTITIONER

## 2019-09-11 PROCEDURE — 99024 POSTOP FOLLOW-UP VISIT: CPT | Performed by: ORTHOPAEDIC SURGERY

## 2019-09-11 PROCEDURE — 63710000001 DIPHENHYDRAMINE PER 50 MG: Performed by: NURSE PRACTITIONER

## 2019-09-11 PROCEDURE — 85027 COMPLETE CBC AUTOMATED: CPT | Performed by: NURSE PRACTITIONER

## 2019-09-11 PROCEDURE — 80048 BASIC METABOLIC PNL TOTAL CA: CPT | Performed by: NURSE PRACTITIONER

## 2019-09-11 RX ORDER — SENNA AND DOCUSATE SODIUM 50; 8.6 MG/1; MG/1
2 TABLET, FILM COATED ORAL NIGHTLY
Status: DISCONTINUED | OUTPATIENT
Start: 2019-09-11 | End: 2019-09-13 | Stop reason: HOSPADM

## 2019-09-11 RX ORDER — LANSOPRAZOLE 30 MG/1
30 CAPSULE, DELAYED RELEASE ORAL
Status: DISCONTINUED | OUTPATIENT
Start: 2019-09-11 | End: 2019-09-13 | Stop reason: HOSPADM

## 2019-09-11 RX ORDER — SUCRALFATE ORAL 1 G/10ML
1 SUSPENSION ORAL
Status: DISCONTINUED | OUTPATIENT
Start: 2019-09-11 | End: 2019-09-13 | Stop reason: HOSPADM

## 2019-09-11 RX ORDER — DIPHENHYDRAMINE HCL 50 MG
50 CAPSULE ORAL EVERY 24 HOURS
Status: COMPLETED | OUTPATIENT
Start: 2019-09-11 | End: 2019-09-12

## 2019-09-11 RX ORDER — BISACODYL 10 MG
10 SUPPOSITORY, RECTAL RECTAL ONCE
Status: COMPLETED | OUTPATIENT
Start: 2019-09-11 | End: 2019-09-11

## 2019-09-11 RX ORDER — ACETAMINOPHEN 325 MG/1
650 TABLET ORAL EVERY 24 HOURS
Status: DISPENSED | OUTPATIENT
Start: 2019-09-11 | End: 2019-09-13

## 2019-09-11 RX ADMIN — CYCLOBENZAPRINE 10 MG: 10 TABLET, FILM COATED ORAL at 21:06

## 2019-09-11 RX ADMIN — IRON SUCROSE 200 MG: 20 INJECTION, SOLUTION INTRAVENOUS at 21:05

## 2019-09-11 RX ADMIN — PANTOPRAZOLE SODIUM 40 MG: 40 TABLET, DELAYED RELEASE ORAL at 08:18

## 2019-09-11 RX ADMIN — CEPHALEXIN 500 MG: 500 CAPSULE ORAL at 08:18

## 2019-09-11 RX ADMIN — FLUTICASONE PROPIONATE 2 SPRAY: 50 SPRAY, METERED NASAL at 08:19

## 2019-09-11 RX ADMIN — ACETAMINOPHEN 650 MG: 325 TABLET ORAL at 03:00

## 2019-09-11 RX ADMIN — ACETAMINOPHEN 650 MG: 325 TABLET ORAL at 12:40

## 2019-09-11 RX ADMIN — ACETAMINOPHEN 650 MG: 325 TABLET ORAL at 08:19

## 2019-09-11 RX ADMIN — ACETAMINOPHEN 650 MG: 325 TABLET ORAL at 20:23

## 2019-09-11 RX ADMIN — BISACODYL 10 MG: 10 SUPPOSITORY RECTAL at 14:07

## 2019-09-11 RX ADMIN — PILOCARPINE HYROCHLORIDE 5 MG: 5 TABLET, FILM COATED ORAL at 08:18

## 2019-09-11 RX ADMIN — ATORVASTATIN CALCIUM 20 MG: 20 TABLET, FILM COATED ORAL at 21:05

## 2019-09-11 RX ADMIN — PILOCARPINE HYROCHLORIDE 5 MG: 5 TABLET, FILM COATED ORAL at 21:06

## 2019-09-11 RX ADMIN — POLYETHYLENE GLYCOL 3350 17 G: 17 POWDER, FOR SOLUTION ORAL at 08:19

## 2019-09-11 RX ADMIN — SENNOSIDES AND DOCUSATE SODIUM 2 TABLET: 8.6; 5 TABLET ORAL at 21:06

## 2019-09-11 RX ADMIN — CYCLOBENZAPRINE 10 MG: 10 TABLET, FILM COATED ORAL at 08:19

## 2019-09-11 RX ADMIN — DIPHENHYDRAMINE HYDROCHLORIDE 50 MG: 50 CAPSULE ORAL at 20:23

## 2019-09-11 RX ADMIN — FLUTICASONE PROPIONATE 2 SPRAY: 50 SPRAY, METERED NASAL at 21:05

## 2019-09-11 RX ADMIN — SUCRALFATE 1 G: 1 SUSPENSION ORAL at 21:06

## 2019-09-11 RX ADMIN — POLYETHYLENE GLYCOL 3350 17 G: 17 POWDER, FOR SOLUTION ORAL at 21:06

## 2019-09-11 RX ADMIN — LEVOTHYROXINE, LIOTHYRONINE 30 MG: 19; 4.5 TABLET ORAL at 08:18

## 2019-09-11 RX ADMIN — MONTELUKAST SODIUM 10 MG: 10 TABLET, FILM COATED ORAL at 21:06

## 2019-09-11 NOTE — THERAPY TREATMENT NOTE
Patient Name: Valery Baez  : 1945    MRN: [id number]                              Today's Date: 2019       Admit Date: 2019    Visit Dx:     ICD-10-CM ICD-9-CM   1. Spondylolisthesis of lumbar region M43.16 738.4   2. Spinal stenosis of lumbar region with neurogenic claudication M48.062 724.03     Patient Active Problem List   Diagnosis   • Status post complete repair of rotator cuff   • H/O total knee replacement, right   • History of total knee arthroplasty, right   • Trigger thumb, right thumb   • Chronic pain of left knee   • Spondylolisthesis   • Spondylolisthesis of lumbar region   • Spinal stenosis of lumbar region with neurogenic claudication   • Sleep apnea   • Stage 3 chronic kidney disease (CMS/HCC)   • Iron deficiency anemia   • Sjogren's disease (CMS/HCC)   • Asthma     Past Medical History:   Diagnosis Date   • Anemia     CHRONIC IRON DEFICIENT   • Asthma    • Cancer (CMS/HCC)     right breast cancer   • Chronic kidney disease    • DDD (degenerative disc disease), lumbar    • Disease of thyroid gland    • Dry eye    • GERD (gastroesophageal reflux disease)    • Hard to intubate     REQUESTING TO SEE AA 19; PT HAS COMPLETE BILATERAL JAW PROSTESIS   • Headache     or migraines   • Heart palpitations    • History of breast cancer     RIGHT   • History of transfusion    • Hx of renal calculi    • Hypercholesteremia    • MRSA (methicillin resistant Staphylococcus aureus)     LEFT JAW @[facility] JOINT, MULT. ,LAST , TREATED AT [facility], infection control notified 16. 1300   • Osteoarthritis    • Osteoporosis    • PONV (postoperative nausea and vomiting)    • Rosacea    • Sleep apnea     C PAP   • Spinal headache    • Spinal stenosis    • Vertigo    • Wound dehiscence     HX. STATES CLOSED     Past Surgical History:   Procedure Laterality Date   • ABSCESS DRAINAGE      abscess removed from left jaw x2   • APPENDECTOMY  1965   • AVULSION TOENAIL PLATE      6 REMOVED    • BILATERAL BREAST REDUCTION Left 8/29/2016    Procedure: LT BREAST REDUCTION ;  Surgeon: Luis Zambrano MD;  Location: [facility] OR;  Service:    • CHOLECYSTECTOMY  09/2000   • CYST REMOVAL  03/1984    eye cyst left lower lid   • CYST REMOVAL  01/2007    right upper thigh   • CYSTOSCOPY      x2  02/2013 & 04/2014   • HYSTERECTOMY  1971    LATER BSO   • KNEE ARTHROPLASTY Right 03/2005   • LUMBAR DISCECTOMY FUSION INSTRUMENTATION N/A 9/9/2019    Procedure: Lumbar 3 to sacral 1 laminectomy and fusion with instrumentation;  Surgeon: Vikas Couch MD;  Location: [facility] OR;  Service: Orthopedic Spine   • MASTECTOMY Right 03/2015   • MASTECTOMY PARTIAL / LUMPECTOMY Right 03/2009   • NIPPLE RECONSTRUCTION Right 8/29/2016    Procedure: RT NIPPLE RECONSTRUCTION;  Surgeon: Luis Zambrano MD;  Location: [facility] OR;  Service:    • OTHER SURGICAL HISTORY      laparoscopy adhesions right side x2   • OTHER SURGICAL HISTORY      drain mammosite area   • OTHER SURGICAL HISTORY  01/2010    pressure washed prosthesis area left jaw   • OTHER SURGICAL HISTORY      drain mammosite area right breast 09/2011   • RECONSTRUCTION BREAST W/ TRAM FLAP Right 02/2016   • ROTATOR CUFF REPAIR Left 09/2014   • TEMPOROMANDIBULAR JOINT ARTHROPLASTY Bilateral 1993    MULT. REPLACEMENTS LATER   • TONSILLECTOMY  1952     General Information     Row Name 09/11/19 1059          PT Evaluation Time/Intention    Document Type  therapy note (daily note)  -AR     Mode of Treatment  physical therapy  -AR     Row Name 09/11/19 1059          General Information    Patient Profile Reviewed?  yes  -AR     Existing Precautions/Restrictions  fall;spinal;brace worn when out of bed  -AR     Row Name 09/11/19 1053          Cognitive Assessment/Intervention- PT/OT    Orientation Status (Cognition)  oriented x 4  -AR       User Key  (r) = Recorded By, (t) = Taken By, (c) = Cosigned By    Initials Name Provider Type    AR Chelsea Bower PT Physical  Therapist        Mobility     Row Name 09/11/19 1103          Bed Mobility Assessment/Treatment    Supine-Sit Candler (Bed Mobility)  supervision  -AR     Sit-Supine Candler (Bed Mobility)  not tested  -AR     Assistive Device (Bed Mobility)  bed rails  -AR     Row Name 09/11/19 1103          Sit-Stand Transfer    Sit-Stand Candler (Transfers)  contact guard  -AR     Assistive Device (Sit-Stand Transfers)  walker, front-wheeled  -AR     Row Name 09/11/19 1103          Gait/Stairs Assessment/Training    Gait/Stairs Assessment/Training  gait/ambulation independence  -AR     Candler Level (Gait)  contact guard;stand by assist  -AR     Assistive Device (Gait)  walker, front-wheeled  -AR     Distance in Feet (Gait)  100  -AR     Pattern (Gait)  swing-through  -AR     Deviations/Abnormal Patterns (Gait)  jemal decreased;festinating/shuffling;antalgic  -AR     Bilateral Gait Deviations  heel strike decreased  -AR     Comment (Gait/Stairs)  limited by pain/fatigue.  Declined practicing steps  -AR       User Key  (r) = Recorded By, (t) = Taken By, (c) = Cosigned By    Initials Name Provider Type    AR Chelsea Bower, PT Physical Therapist        Obj/Interventions    No documentation.       Goals/Plan    No documentation.       Clinical Impression     Row Name 09/11/19 1104          Pain Assessment    Additional Documentation  Pain Scale: Numbers Pre/Post-Treatment (Group)  -AR     Row Name 09/11/19 1104          Pain Scale: Numbers Pre/Post-Treatment    Pain Scale: Numbers, Pretreatment  4/10  -AR     Pain Scale: Numbers, Post-Treatment  5/10  -AR     Pain Location  back  -AR     Pain Intervention(s)  Medication (See MAR);Repositioned  -AR     Row Name 09/11/19 1104          Plan of Care Review    Plan of Care Reviewed With  patient  -AR     Row Name 09/11/19 1104          Physical Therapy Clinical Impression    Criteria for Skilled Interventions Met (PT Clinical Impression)  yes  -AR     Rehab  Potential (PT Clinical Summary)  good, to achieve stated therapy goals  -AR     Row Name 09/11/19 1104          Vital Signs    O2 Delivery Pre Treatment  room air  -AR     Row Name 09/11/19 1104          Positioning and Restraints    Pre-Treatment Position  in bed  -AR     Post Treatment Position  chair  -AR     In Chair  notified nsg;reclined;sitting;encouraged to call for assist;exit alarm on;call light within reach  -AR       User Key  (r) = Recorded By, (t) = Taken By, (c) = Cosigned By    Initials Name Provider Type    Chelsea Guerrero, PT Physical Therapist        Outcome Measures     Row Name 09/11/19 1104          How much help from another person do you currently need...    Turning from your back to your side while in flat bed without using bedrails?  4  -AR     Moving from lying on back to sitting on the side of a flat bed without bedrails?  3  -AR     Moving to and from a bed to a chair (including a wheelchair)?  3  -AR     Standing up from a chair using your arms (e.g., wheelchair, bedside chair)?  3  -AR     Climbing 3-5 steps with a railing?  3  -AR     To walk in hospital room?  3  -AR     AM-PAC 6 Clicks Score (PT)  19  -AR       User Key  (r) = Recorded By, (t) = Taken By, (c) = Cosigned By    Initials Name Provider Type    Chelsea Guerrero, PT Physical Therapist        Physical Therapy Education     Title: PT OT SLP Therapies (In Progress)     Topic: Physical Therapy (In Progress)     Point: Mobility training (In Progress)     Learning Progress Summary           Patient Acceptance, E, NR by AR at 9/11/2019 11:05 AM    Acceptance, E, NR by AR at 9/10/2019 11:01 AM                   Point: Home exercise program (In Progress)     Learning Progress Summary           Patient Acceptance, E, NR by AR at 9/11/2019 11:05 AM    Acceptance, E, NR by AR at 9/10/2019 11:01 AM                   Point: Body mechanics (In Progress)     Learning Progress Summary           Patient Acceptance, E, NR by AR at  9/11/2019 11:05 AM    Acceptance, E, NR by AR at 9/10/2019 11:01 AM                   Point: Precautions (In Progress)     Learning Progress Summary           Patient Acceptance, E, NR by AR at 9/11/2019 11:05 AM    Acceptance, E, NR by AR at 9/10/2019 11:01 AM                               User Key     Initials Effective Dates Name Provider Type Discipline    AR 04/03/18 -  Chelsea Bower, PT Physical Therapist PT              PT Recommendation and Plan  Planned Therapy Interventions (PT Eval): balance training, bed mobility training, gait training, home exercise program, strengthening, stair training, ROM (range of motion), patient/family education  Outcome Summary/Treatment Plan (PT)  Anticipated Discharge Disposition (PT): home with assist, home with home health  Plan of Care Reviewed With: patient  Outcome Summary: Slowly improved activity tolerance and independence w/ mobility.  Able to ambulate 100' w/ contact assist using RW, declined practicing steps at this time.       Time Calculation:   PT Charges     Row Name 09/11/19 1104             Time Calculation    Start Time  1048  -AR      Stop Time  1105  -AR      Time Calculation (min)  17 min  -AR      PT Received On  09/11/19  -AR      PT - Next Appointment  09/12/19  -AR        User Key  (r) = Recorded By, (t) = Taken By, (c) = Cosigned By    Initials Name Provider Type    AR Chelsea Bower PT Physical Therapist        Therapy Charges for Today     Code Description Service Date Service Provider Modifiers Qty    53800125976 HC PT EVAL MOD COMPLEXITY 2 9/10/2019 Chelsea Bower, PT GP 1    05680914966 HC PT THER PROC EA 15 MIN 9/10/2019 Chelsea Bower PT GP 1    70525070247 HC PT THER PROC EA 15 MIN 9/11/2019 Chelsea Bower, PT GP 1          PT G-Codes  Outcome Measure Options: AM-PAC 6 Clicks Basic Mobility (PT)  AM-PAC 6 Clicks Score (PT): 19    Chelsea Bower PT  9/11/2019

## 2019-09-11 NOTE — PROGRESS NOTES
Orthopedic Progress Note      Patient: Valery Baez    YOB: 1945    Medical Record Number: [id number]    Attending Physician: Vikas Couch MD    Date of Admission: 9/9/2019  8:50 AM    Admitting Dx:  Spondylolisthesis of lumbar region [M43.16]  Spinal stenosis of lumbar region with neurogenic claudication [M48.062]  Spondylolisthesis of lumbar region [M43.16]    Status Post: Lumbar 3 to sacral 1 laminectomy and fusion with instrumentation    Post Operative Day Number: 2    Current Problem List:   Patient Active Problem List   Diagnosis   • Status post complete repair of rotator cuff   • H/O total knee replacement, right   • History of total knee arthroplasty, right   • Trigger thumb, right thumb   • Chronic pain of left knee   • Spondylolisthesis   • Spondylolisthesis of lumbar region   • Spinal stenosis of lumbar region with neurogenic claudication   • Sleep apnea   • Stage 3 chronic kidney disease (CMS/HCC)   • Iron deficiency anemia   • Sjogren's disease (CMS/HCC)   • Asthma         Past Medical History:   Diagnosis Date   • Anemia     CHRONIC IRON DEFICIENT   • Asthma    • Cancer (CMS/HCC)     right breast cancer   • Chronic kidney disease    • DDD (degenerative disc disease), lumbar    • Disease of thyroid gland    • Dry eye    • GERD (gastroesophageal reflux disease)    • Hard to intubate     REQUESTING TO SEE AA 8/22/19; PT HAS COMPLETE BILATERAL JAW PROSTESIS   • Headache     or migraines   • Heart palpitations    • History of breast cancer     RIGHT   • History of transfusion    • Hx of renal calculi    • Hypercholesteremia    • MRSA (methicillin resistant Staphylococcus aureus)     LEFT JAW @[facility] JOINT, MULT. ,LAST 2013, TREATED AT [facility], infection control notified 8/24/16. 1300   • Osteoarthritis    • Osteoporosis    • PONV (postoperative nausea and vomiting)    • Rosacea    • Sleep apnea     C PAP   • Spinal headache    • Spinal stenosis    • Vertigo    • Wound  dehiscence     HX. STATES CLOSED       Current Medications:  Scheduled Meds:  atorvastatin 20 mg Oral Nightly   bisacodyl 10 mg Rectal Once   cephalexin 500 mg Oral Daily   fluticasone 2 spray Nasal BID   fluticasone 1 puff Inhalation BID - RT   loteprednol 1 drop Both Eyes BID   montelukast 10 mg Oral Nightly   pantoprazole 40 mg Oral BID AC   pilocarpine 5 mg Oral TID   polyethylene glycol 17 g Oral Daily   Scopolamine 1 patch Transdermal Q72H   Thyroid 30 mg Oral QAM AC     PRN Meds:.•  acetaminophen  •  aluminum-magnesium hydroxide-simethicone  •  cyclobenzaprine  •  ipratropium-albuterol  •  ondansetron    SUBJECTIVE: 73 y.o.  female.  Awake and alert.  Complaints of incisional pain as expected.      OBJECTIVE:   Vitals:    09/10/19 1745 09/10/19 2014 09/11/19 0556 09/11/19 0912   BP: 120/59 112/66 104/62 118/65   BP Location: Left arm Left arm Left arm Left arm   Patient Position: Lying Lying Lying Lying   Pulse:  88 93 86   Resp:  18 17 18   Temp: 97.5 °F (36.4 °C) 99.5 °F (37.5 °C) 98.7 °F (37.1 °C) 98.7 °F (37.1 °C)   TempSrc:  Oral Oral Oral   SpO2:  94% 95% 97%   Weight:       Height:         I/O last 3 completed shifts:  In: -   Out: 3200 [Urine:3200]    Diagnostic Tests:   Lab Results (last 24 hours)     Procedure Component Value Units Date/Time    Ferritin [[id number]]  (Abnormal) Collected:  09/11/19 0347    Specimen:  Blood Updated:  09/11/19 0503     Ferritin 876.00 ng/mL     Basic Metabolic Panel [[id number]]  (Abnormal) Collected:  09/11/19 0347    Specimen:  Blood Updated:  09/11/19 0459     Glucose 141 mg/dL      BUN 11 mg/dL      Creatinine 0.76 mg/dL      Sodium 135 mmol/L      Potassium 4.4 mmol/L      Chloride 106 mmol/L      CO2 16.0 mmol/L      Calcium 8.3 mg/dL      eGFR Non African Amer 75 mL/min/1.73      BUN/Creatinine Ratio 14.5     Anion Gap 13.0 mmol/L     Narrative:       GFR Normal >60  Chronic Kidney Disease <60  Kidney Failure <15    CBC (No Diff) [[id number]]  (Abnormal)  Collected:  09/11/19 0347    Specimen:  Blood Updated:  09/11/19 0424     WBC 17.26 10*3/mm3      RBC 3.03 10*6/mm3      Hemoglobin 9.0 g/dL      Hematocrit 29.1 %      MCV 96.0 fL      MCH 29.7 pg      MCHC 30.9 g/dL      RDW 14.0 %      RDW-SD 49.4 fl      MPV 11.9 fL      Platelets 213 10*3/mm3     Iron Profile [[id number]]  (Abnormal) Collected:  09/10/19 2341    Specimen:  Blood from Arm, Left Updated:  09/11/19 0043     Iron 18 mcg/dL      Iron Saturation 8 %      Transferrin 156 mg/dL      TIBC 232 mcg/dL           PHYSICAL EXAM:  Back dressing coming loose.  Incision intact without drainage or erythema.  Moving legs well.  Denies leg pain.  Taking Tylenol and Flexeril for pain.  Unable to tolerate narcotics.  ABD soft with BS.  Passing gas, but no BM.  Had 1 episode of nausea last PM, but now resolved.  Voiding well since dubon removed.  Hg 9.0.   VSS.  History of Chronic anemia     ASSESSMENT & PLAN:    Continue to mobilize patient as tolerated.    Dulcolax supp today for complaints of constipation  Continue to monitor CBC.    Acute on chronic postop anemia    Home possibly tomorrow    Date: 9/11/2019    SYEDA Arguelles MD

## 2019-09-11 NOTE — PROGRESS NOTES
Name: Valery Baez ADMIT: 2019   : 1945  PCP: Tino Smith MD    MRN: [id number] LOS: 2 days   AGE/SEX: 73 y.o. female  ROOM: [facility]     Subjective   Subjective   Complains of continued heartburn and indigestion.  No BM.  Denies shortness of breath, chest pain, emesis, fever, chills     Objective   Objective   Vital Signs  Temp:  [97.5 °F (36.4 °C)-99.5 °F (37.5 °C)] 98.2 °F (36.8 °C)  Heart Rate:  [86-96] 96  Resp:  [17-18] 18  BP: (101-120)/(48-66) 101/48  SpO2:  [94 %-99 %] 95 %  on   ;   Device (Oxygen Therapy): room air  Body mass index is 27.66 kg/m².  Physical Exam   Constitutional: She is oriented to person, place, and time. She appears well-developed and well-nourished. No distress.   HENT:   Head: Normocephalic and atraumatic.   Eyes: Conjunctivae and EOM are normal.   Neck: Normal range of motion.   Cardiovascular: Normal rate, regular rhythm, normal heart sounds and intact distal pulses.   Pulmonary/Chest: Effort normal and breath sounds normal. No respiratory distress.   Abdominal: Soft. Bowel sounds are normal. She exhibits no distension. There is no tenderness.   Very mild right lower quadrant tenderness.  Monitor for now   Musculoskeletal: She exhibits no edema.   Neurological: She is alert and oriented to person, place, and time. No cranial nerve deficit.   Skin: Skin is warm and dry.   Psychiatric: She has a normal mood and affect. Her behavior is normal.   Nursing note and vitals reviewed.      Results Review:       I reviewed the patient's new clinical results.  Results from last 7 days   Lab Units 19  0347 09/10/19  0529 19  1837   WBC 10*3/mm3 17.26*  --   --    HEMOGLOBIN g/dL 9.0* 9.8* 10.6*   PLATELETS 10*3/mm3 213  --   --      Results from last 7 days   Lab Units 19  0347   SODIUM mmol/L 135*   POTASSIUM mmol/L 4.4   CHLORIDE mmol/L 106   CO2 mmol/L 16.0*   BUN mg/dL 11   CREATININE mg/dL 0.76   GLUCOSE mg/dL 141*   Estimated Creatinine  Clearance: 68.1 mL/min (by C-G formula based on SCr of 0.76 mg/dL).    Results from last 7 days   Lab Units 09/11/19  0347   CALCIUM mg/dL 8.3*       No results found for: HGBA1C, POCGLU      acetaminophen 650 mg Oral Q24H   And      diphenhydrAMINE 50 mg Oral Q24H   And      iron sucrose 200 mg Intravenous Q24H   atorvastatin 20 mg Oral Nightly   cephalexin 500 mg Oral Daily   fluticasone 2 spray Nasal BID   fluticasone 1 puff Inhalation BID - RT   loteprednol 1 drop Both Eyes BID   montelukast 10 mg Oral Nightly   pantoprazole 40 mg Oral BID AC   PATIENT SUPPLIED MEDICATION 30 mg Oral BID AC   pilocarpine 5 mg Oral TID   polyethylene glycol 17 g Oral Daily   Scopolamine 1 patch Transdermal Q72H   sucralfate 1 g Oral 4x Daily AC & at Bedtime   Thyroid 30 mg Oral QAM AC       Pharmacy Consult    Diet Regular       Assessment/Plan     Active Hospital Problems    Diagnosis  POA   • **Spinal stenosis of lumbar region with neurogenic claudication [M48.062]  Yes     Priority: Medium   • Spondylolisthesis of lumbar region [M43.16]  Yes     Priority: High   • GERD (gastroesophageal reflux disease) [K21.9]  Yes   • Sleep apnea [G47.30]  Yes   • Stage 3 chronic kidney disease (CMS/HCC) [N18.3]  Yes   • Iron deficiency anemia [D50.9]  Yes   • Sjogren's disease (CMS/HCC) [M35.00]  Yes   • Asthma [J45.909]  Yes      Resolved Hospital Problems   No resolved problems to display.     ·  hold antihypertensive agents given BP low end of normal.  Anticipate fluctuations in BP due to blood loss, hypovolemia, anesthesia, narcotics etc.   · Continue IVFs as ordered.    · Monitor renal function tomorrow   · Iron deficiency anemia with postoperative decline in hemoglobin to 9.    Low iron stores with history of IV iron infusion.  Start venofer x2.  Transfuse PRBC if hemoglobin less than 8 or if acutely symptomatic  · GERD despite PPI twice daily.  Added Carafate and okay for her to take her home Prevacid  · Increased bowel regimen given  no BM  · PT therapy   · Continue CPAP from home  · Continue home medications for Sjogren's and asthma are not on formulary at hospital.    · SCDs have been ordered for DVT prophylaxis per surgery.  She was encouraged to use incentive spirometer as instructed.          VIOLETTE Lacy  [facility]  09/11/19  3:43 PM

## 2019-09-11 NOTE — PLAN OF CARE
Problem: Patient Care Overview  Goal: Plan of Care Review   09/11/19 1105   Coping/Psychosocial   Plan of Care Reviewed With patient   OTHER   Outcome Summary Slowly improved activity tolerance and independence w/ mobility. Able to ambulate 100' w/ contact assist using RW, declined practicing steps at this time.

## 2019-09-11 NOTE — THERAPY TREATMENT NOTE
Acute Care - Physical Therapy Treatment Note  [facility]     Patient Name: Valery Baez  : 1945  MRN: [id number]  Today's Date: 2019             Admit Date: 2019    Visit Dx:    ICD-10-CM ICD-9-CM   1. Spondylolisthesis of lumbar region M43.16 738.4   2. Spinal stenosis of lumbar region with neurogenic claudication M48.062 724.03     Patient Active Problem List   Diagnosis   • Status post complete repair of rotator cuff   • H/O total knee replacement, right   • History of total knee arthroplasty, right   • Trigger thumb, right thumb   • Chronic pain of left knee   • Spondylolisthesis   • Spondylolisthesis of lumbar region   • Spinal stenosis of lumbar region with neurogenic claudication   • Sleep apnea   • Stage 3 chronic kidney disease (CMS/HCC)   • Iron deficiency anemia   • Sjogren's disease (CMS/HCC)   • Asthma   • GERD (gastroesophageal reflux disease)       Therapy Treatment    Rehabilitation Treatment Summary     Row Name 19 1600             Treatment Time/Intention    Discipline  physical therapy assistant  -      Document Type  therapy note (daily note)  -      Subjective Information  complains of;fatigue;pain  -      Mode of Treatment  individual therapy;physical therapy  -      Care Plan Review  patient/other agree to care plan  -      Existing Precautions/Restrictions  fall;spinal;brace worn when out of bed  -      Recorded by [CONNIE] Amelia Finn PTA 19 1626      Row Name 19 1600             Bed Mobility Assessment/Treatment    Supine-Sit [place] (Bed Mobility)  minimum assist (75% patient effort);moderate assist (50% patient effort)  -      Bed Mobility, Safety Issues  decreased use of arms for pushing/pulling;decreased use of legs for bridging/pushing;impaired trunk control for bed mobility  -      Assistive Device (Bed Mobility)  bed rails  -      Comment (Bed Mobility)  incr pain limiting-pt reports will not be sleeping in bed-has  recliner she will use  -JM      Recorded by [JM] Amelia Finn, PTA 09/11/19 1626      Row Name 09/11/19 1600             Sit-Stand Transfer    Sit-Stand [place] (Transfers)  minimum assist (75% patient effort);contact guard;verbal cues  -      Assistive Device (Sit-Stand Transfers)  walker, front-wheeled hers  -JM      Recorded by [JM] Amelia Finn, [facility] 09/11/19 1626      Row Name 09/11/19 1600             Toilet Transfer    Type (Toilet Transfer)  stand-sit;sit-stand  -JM      [place] Level (Toilet Transfer)  minimum assist (75% patient effort);contact guard  -      Assistive Device (Toilet Transfer)  walker, front-wheeled  -JM      Recorded by [JM] Amelia Finn, [facility] 09/11/19 1626      Row Name 09/11/19 1600             Gait/Stairs Assessment/Training    [place] Level (Gait)  contact guard  -      Assistive Device (Gait)  walker, front-wheeled  -      Distance in Feet (Gait)  120  -JM      Deviations/Abnormal Patterns (Gait)  antalgic;jemal decreased;stride length decreased  -      Number of Steps (Stairs)  will trial 5 steps as pn allows next session  -JM      Comment (Gait/Stairs)  improved posture w/o cues, cues to roll rwx , not   -JM      Recorded by [JM] Amelia Finn, [facility] 09/11/19 1626      Row Name 09/11/19 1600             Positioning and Restraints    Pre-Treatment Position  in bed  -JM      In Bed  supine;call light within reach;encouraged to call for assist;exit alarm on  -      Recorded by [JM] Amelia Finn PTA 09/11/19 1626      Row Name 09/11/19 1600             Pain Scale: Numbers Pre/Post-Treatment    Pain Scale: Numbers, Post-Treatment  6/10  -JM      Pain Location  back  -      Pain Intervention(s)  Medication (See MAR);Repositioned;Ambulation/increased activity  -      Recorded by [] Amelia Finn, [facility] 09/11/19 1626      Row Name                Wound 09/09/19 1507 Other (See comments) back Incision    Wound - Properties Group Date  first assessed: 09/09/19 [LD] Time first assessed: 1507 [LD] Side: Other (See comments) [LD] Location: back [LD] Primary Wound Type: Incision [LD] Recorded by:  [LD] Zhanna Lockwood RN 09/09/19 1507      User Key  (r) = Recorded By, (t) = Taken By, (c) = Cosigned By    Initials Name Effective Dates Discipline    Amelia Rae PTA 03/07/18 -  PT    LD Zhanna Lockwood RN 06/16/16 -  Nurse          Wound 09/09/19 1507 Other (See comments) back Incision (Active)   Dressing Appearance dry;intact 9/11/2019  8:51 AM   Closure TIFFANY 9/11/2019  8:51 AM   Base dressing in place, unable to visualize 9/11/2019  8:51 AM   Drainage Amount none 9/11/2019  8:51 AM           Physical Therapy Education     Title: PT OT SLP Therapies (In Progress)     Topic: Physical Therapy (In Progress)     Point: Mobility training (In Progress)     Learning Progress Summary           Patient Acceptance, E, NR by AR at 9/11/2019 11:05 AM    Acceptance, E, NR by AR at 9/10/2019 11:01 AM                   Point: Home exercise program (In Progress)     Learning Progress Summary           Patient Acceptance, E, NR by AR at 9/11/2019 11:05 AM    Acceptance, E, NR by AR at 9/10/2019 11:01 AM                   Point: Body mechanics (In Progress)     Learning Progress Summary           Patient Acceptance, E, NR by AR at 9/11/2019 11:05 AM    Acceptance, E, NR by AR at 9/10/2019 11:01 AM                   Point: Precautions (In Progress)     Learning Progress Summary           Patient Acceptance, E, NR by AR at 9/11/2019 11:05 AM    Acceptance, E, NR by AR at 9/10/2019 11:01 AM                               User Key     Initials Effective Dates Name Provider Type Discipline    AR 04/03/18 -  Chelsea Bower, PT Physical Therapist PT                PT Recommendation and Plan     Plan of Care Reviewed With: patient  Progress: improving  Outcome Summary: incr amb dist , plans home at [place], will trial stairs as able     Time Calculation:   PT Charges     Row  Name 09/11/19 1618 09/11/19 1107 09/11/19 1104       Time Calculation    Start Time  1507  -CONNIE  --  1048  -AR    Stop Time  1530  -CONNIE  --  1105  -AR    Time Calculation (min)  23 min  -CONNIE  --  17 min  -AR    PT Received On  09/11/19  -CONNIE  --  09/11/19  -AR    PT - Next Appointment  09/12/19  -CONNIE  09/11/19  -AR  09/12/19  -AR      User Key  (r) = Recorded By, (t) = Taken By, (c) = Cosigned By    Initials Name Provider Type    Amelia Rae PTA Physical Therapy Assistant    Chelsea Guerrero, PT Physical Therapist        Therapy Charges for Today     Code Description Service Date Service Provider Modifiers Qty    31073284771 HC PT THER PROC EA 15 MIN 9/11/2019 Amelia Finn PTA GP 2          PT G-Codes  Outcome Measure Options: AM-PAC 6 Clicks Basic Mobility (PT)  AM-PAC 6 Clicks Score (PT): 19    Amelia Finn PTA  9/11/2019

## 2019-09-11 NOTE — PLAN OF CARE
Problem: Patient Care Overview  Goal: Plan of Care Review  Outcome: Ongoing (interventions implemented as appropriate)   09/11/19 3714   Coping/Psychosocial   Plan of Care Reviewed With patient   OTHER   Outcome Summary VSS. Dressing changed. Pain well controlled. Suppository given. Ambulating well. CTM.   Plan of Care Review   Progress improving       Problem: Pain, Chronic (Adult)  Goal: Acceptable Pain/Comfort Level and Functional Ability  Outcome: Ongoing (interventions implemented as appropriate)      Problem: Fall Risk (Adult)  Goal: Absence of Fall  Outcome: Ongoing (interventions implemented as appropriate)

## 2019-09-12 LAB
ANION GAP SERPL CALCULATED.3IONS-SCNC: 12 MMOL/L (ref 5–15)
BUN BLD-MCNC: 10 MG/DL (ref 8–23)
BUN/CREAT SERPL: 11.9 (ref 7–25)
CALCIUM SPEC-SCNC: 8.7 MG/DL (ref 8.6–10.5)
CHLORIDE SERPL-SCNC: 105 MMOL/L (ref 98–107)
CO2 SERPL-SCNC: 22 MMOL/L (ref 22–29)
CREAT BLD-MCNC: 0.84 MG/DL (ref 0.57–1)
DEPRECATED RDW RBC AUTO: 49.2 FL (ref 37–54)
ERYTHROCYTE [DISTWIDTH] IN BLOOD BY AUTOMATED COUNT: 13.8 % (ref 12.3–15.4)
GFR SERPL CREATININE-BSD FRML MDRD: 67 ML/MIN/1.73
GLUCOSE BLD-MCNC: 128 MG/DL (ref 65–99)
GLUCOSE BLDC GLUCOMTR-MCNC: 130 MG/DL (ref 70–130)
HCT VFR BLD AUTO: 29.4 % (ref 34–46.6)
HGB BLD-MCNC: 9.1 G/DL (ref 12–15.9)
MCH RBC QN AUTO: 29.8 PG (ref 26.6–33)
MCHC RBC AUTO-ENTMCNC: 31 G/DL (ref 31.5–35.7)
MCV RBC AUTO: 96.4 FL (ref 79–97)
PLATELET # BLD AUTO: 199 10*3/MM3 (ref 140–450)
PMV BLD AUTO: 11.4 FL (ref 6–12)
POTASSIUM BLD-SCNC: 3.6 MMOL/L (ref 3.5–5.2)
RBC # BLD AUTO: 3.05 10*6/MM3 (ref 3.77–5.28)
SODIUM BLD-SCNC: 139 MMOL/L (ref 136–145)
WBC NRBC COR # BLD: 14.73 10*3/MM3 (ref 3.4–10.8)

## 2019-09-12 PROCEDURE — 97110 THERAPEUTIC EXERCISES: CPT

## 2019-09-12 PROCEDURE — 63710000001 DIPHENHYDRAMINE PER 50 MG: Performed by: NURSE PRACTITIONER

## 2019-09-12 PROCEDURE — 82962 GLUCOSE BLOOD TEST: CPT

## 2019-09-12 PROCEDURE — 85027 COMPLETE CBC AUTOMATED: CPT | Performed by: NURSE PRACTITIONER

## 2019-09-12 PROCEDURE — 80048 BASIC METABOLIC PNL TOTAL CA: CPT | Performed by: NURSE PRACTITIONER

## 2019-09-12 PROCEDURE — 25010000002 IRON SUCROSE PER 1 MG: Performed by: NURSE PRACTITIONER

## 2019-09-12 RX ORDER — LACTULOSE 10 G/15ML
30 SOLUTION ORAL ONCE
Status: COMPLETED | OUTPATIENT
Start: 2019-09-12 | End: 2019-09-12

## 2019-09-12 RX ORDER — PROMETHAZINE HYDROCHLORIDE 25 MG/ML
12.5 INJECTION, SOLUTION INTRAMUSCULAR; INTRAVENOUS EVERY 6 HOURS PRN
Status: DISCONTINUED | OUTPATIENT
Start: 2019-09-12 | End: 2019-09-13 | Stop reason: HOSPADM

## 2019-09-12 RX ORDER — SALIVA SUBSTITUTE COMBO NO.2
30 SOLUTION, ORAL MUCOUS MEMBRANE AS NEEDED
Status: DISCONTINUED | OUTPATIENT
Start: 2019-09-12 | End: 2019-09-13 | Stop reason: HOSPADM

## 2019-09-12 RX ADMIN — PILOCARPINE HYROCHLORIDE 5 MG: 5 TABLET, FILM COATED ORAL at 20:30

## 2019-09-12 RX ADMIN — ACETAMINOPHEN 650 MG: 325 TABLET ORAL at 01:37

## 2019-09-12 RX ADMIN — FLUTICASONE PROPIONATE 2 SPRAY: 50 SPRAY, METERED NASAL at 09:39

## 2019-09-12 RX ADMIN — CEPHALEXIN 500 MG: 500 CAPSULE ORAL at 09:38

## 2019-09-12 RX ADMIN — CYCLOBENZAPRINE 10 MG: 10 TABLET, FILM COATED ORAL at 05:02

## 2019-09-12 RX ADMIN — DIPHENHYDRAMINE HYDROCHLORIDE 50 MG: 50 CAPSULE ORAL at 15:45

## 2019-09-12 RX ADMIN — POLYETHYLENE GLYCOL 3350 17 G: 17 POWDER, FOR SOLUTION ORAL at 09:36

## 2019-09-12 RX ADMIN — MONTELUKAST SODIUM 10 MG: 10 TABLET, FILM COATED ORAL at 20:30

## 2019-09-12 RX ADMIN — ACETAMINOPHEN 650 MG: 325 TABLET ORAL at 10:00

## 2019-09-12 RX ADMIN — ACETAMINOPHEN 650 MG: 325 TABLET ORAL at 15:25

## 2019-09-12 RX ADMIN — SENNOSIDES AND DOCUSATE SODIUM 2 TABLET: 8.6; 5 TABLET ORAL at 20:30

## 2019-09-12 RX ADMIN — Medication 30 ML: at 12:33

## 2019-09-12 RX ADMIN — ATORVASTATIN CALCIUM 20 MG: 20 TABLET, FILM COATED ORAL at 20:30

## 2019-09-12 RX ADMIN — LACTULOSE 30 G: 10 SOLUTION ORAL at 20:31

## 2019-09-12 RX ADMIN — CYCLOBENZAPRINE 10 MG: 10 TABLET, FILM COATED ORAL at 20:38

## 2019-09-12 RX ADMIN — IRON SUCROSE 200 MG: 20 INJECTION, SOLUTION INTRAVENOUS at 15:45

## 2019-09-12 RX ADMIN — LANSOPRAZOLE 30 MG: 30 CAPSULE, DELAYED RELEASE ORAL at 10:01

## 2019-09-12 RX ADMIN — FLUTICASONE PROPIONATE 2 SPRAY: 50 SPRAY, METERED NASAL at 20:32

## 2019-09-12 RX ADMIN — LANSOPRAZOLE 30 MG: 30 CAPSULE, DELAYED RELEASE ORAL at 20:31

## 2019-09-12 RX ADMIN — ACETAMINOPHEN 650 MG: 325 TABLET ORAL at 20:30

## 2019-09-12 RX ADMIN — SCOPALAMINE 1 PATCH: 1 PATCH, EXTENDED RELEASE TRANSDERMAL at 09:44

## 2019-09-12 RX ADMIN — SUCRALFATE 1 G: 1 SUSPENSION ORAL at 06:37

## 2019-09-12 RX ADMIN — ACETAMINOPHEN 650 MG: 325 TABLET ORAL at 05:09

## 2019-09-12 RX ADMIN — PILOCARPINE HYROCHLORIDE 5 MG: 5 TABLET, FILM COATED ORAL at 09:38

## 2019-09-12 RX ADMIN — PILOCARPINE HYROCHLORIDE 5 MG: 5 TABLET, FILM COATED ORAL at 15:45

## 2019-09-12 RX ADMIN — LEVOTHYROXINE, LIOTHYRONINE 30 MG: 19; 4.5 TABLET ORAL at 06:37

## 2019-09-12 NOTE — PLAN OF CARE
Problem: Patient Care Overview  Goal: Plan of Care Review  Outcome: Ongoing (interventions implemented as appropriate)   09/12/19 1770   Coping/Psychosocial   Plan of Care Reviewed With patient;spouse   OTHER   Outcome Summary Pt AOx4, assist x1 with walker, DC home is the plan. Pain is well managed with tylenol.        Problem: Pain, Chronic (Adult)  Goal: Acceptable Pain/Comfort Level and Functional Ability  Outcome: Ongoing (interventions implemented as appropriate)      Problem: Fall Risk (Adult)  Goal: Absence of Fall  Outcome: Ongoing (interventions implemented as appropriate)

## 2019-09-12 NOTE — PROGRESS NOTES
PPI consult per Karmen OAKES  Ok for patient to take home prevacid when started please discontinue Protonix    Protonix has been dced and home prevacid profiled.     Thanks,   Mark Fiore [facility].

## 2019-09-12 NOTE — PLAN OF CARE
Problem: Patient Care Overview  Goal: Plan of Care Review  Outcome: Ongoing (interventions implemented as appropriate)   09/12/19 8305   Coping/Psychosocial   Plan of Care Reviewed With patient;spouse   OTHER   Outcome Summary incr ashley hooks, lee stair training; encouraged ashley w/chelsy staff; plans home at [place], notified CCP about rwx for home   Plan of Care Review   Progress improving

## 2019-09-12 NOTE — PROGRESS NOTES
Name: Valery Baez ADMIT: 2019   : 1945  PCP: Tino Smith MD    MRN: [id number] LOS: 3 days   AGE/SEX: 73 y.o. female  ROOM: [facility]     Subjective   Subjective   Nausea and GERD slightly better today.  She has scopolamine patch but states Phenergan may help as well.  BM yesterday after suppository still feels constipated.  Denies shortness of breath, chest pain, emesis, fever, chills     Objective   Objective   Vital Signs  Temp:  [98.5 °F (36.9 °C)-100.1 °F (37.8 °C)] 98.6 °F (37 °C)  Heart Rate:  [91-98] 93  Resp:  [16-19] 18  BP: (105-122)/(66-71) 112/71  SpO2:  [93 %-95 %] 95 %  on   ;   Device (Oxygen Therapy): room air  Body mass index is 27.66 kg/m².  Physical Exam   Constitutional: She is oriented to person, place, and time. She appears well-developed and well-nourished. No distress.   HENT:   Head: Normocephalic and atraumatic.   Eyes: Conjunctivae and EOM are normal.   Neck: Normal range of motion.   Cardiovascular: Normal rate, regular rhythm, normal heart sounds and intact distal pulses.   Pulmonary/Chest: Effort normal and breath sounds normal. No respiratory distress.   Abdominal: Soft. Bowel sounds are normal. She exhibits no distension. There is no tenderness.       Musculoskeletal: She exhibits no edema.   Neurological: She is alert and oriented to person, place, and time. No cranial nerve deficit.   Skin: Skin is warm and dry.   Psychiatric: She has a normal mood and affect. Her behavior is normal.   Nursing note and vitals reviewed.      Results Review:       I reviewed the patient's new clinical results.  Results from last 7 days   Lab Units 19  1555 19  0347 09/10/19  0529 19  1837   WBC 10*3/mm3 14.73* 17.26*  --   --    HEMOGLOBIN g/dL 9.1* 9.0* 9.8* 10.6*   PLATELETS 10*3/mm3 199 213  --   --      Results from last 7 days   Lab Units 19  0347   SODIUM mmol/L 135*   POTASSIUM mmol/L 4.4   CHLORIDE mmol/L 106   CO2 mmol/L 16.0*   BUN  mg/dL 11   CREATININE mg/dL 0.76   GLUCOSE mg/dL 141*   Estimated Creatinine Clearance: 68.1 mL/min (by C-G formula based on SCr of 0.76 mg/dL).    Results from last 7 days   Lab Units 09/11/19  0347   CALCIUM mg/dL 8.3*       Glucose   Date/Time Value Ref Range Status   09/12/2019 0726 130 70 - 130 mg/dL Final         acetaminophen 650 mg Oral Q24H   atorvastatin 20 mg Oral Nightly   cephalexin 500 mg Oral Daily   fluticasone 2 spray Nasal BID   fluticasone 1 puff Inhalation BID - RT   lansoprazole 30 mg Oral BID AC   loteprednol 1 drop Both Eyes BID   montelukast 10 mg Oral Nightly   pilocarpine 5 mg Oral TID   polyethylene glycol 17 g Oral BID   Scopolamine 1 patch Transdermal Q72H   sennosides-docusate sodium 2 tablet Oral Nightly   sucralfate 1 g Oral 4x Daily AC & at Bedtime   Thyroid 30 mg Oral QAM AC       Pharmacy Consult    Diet Regular       Assessment/Plan     Active Hospital Problems    Diagnosis  POA   • **Spinal stenosis of lumbar region with neurogenic claudication [M48.062]  Yes     Priority: Medium   • Spondylolisthesis of lumbar region [M43.16]  Yes     Priority: High   • GERD (gastroesophageal reflux disease) [K21.9]  Yes   • Sleep apnea [G47.30]  Yes   • Stage 3 chronic kidney disease (CMS/HCC) [N18.3]  Yes   • Iron deficiency anemia [D50.9]  Yes   • Sjogren's disease (CMS/HCC) [M35.00]  Yes   • Asthma [J45.909]  Yes      Resolved Hospital Problems   No resolved problems to display.     · hold antihypertensive agents given BP low end of normal.  Anticipate fluctuations in BP due to blood loss, hypovolemia, anesthesia, narcotics etc.   · Continue IVFs as ordered.    · Monitor renal function   · Iron deficiency anemia with postoperative decline in hemoglobin to 9.  Low iron stores with history of IV iron infusion.   venofer x2.  Transfuse PRBC if hemoglobin less than 8 or if acutely symptomatic  · GERD despite PPI twice daily.   Carafate   · Continue bowel regimen given no BM  · PT therapy     · Continue CPAP from home  · Continue home medications for Sjogren's and asthma are not on formulary at hospital.    · SCDs have been ordered for DVT prophylaxis per surgery.  She was encouraged to use incentive spirometer as instructed.      VIOLETTE Lacy  [facility]  09/12/19  4:44 PM

## 2019-09-12 NOTE — THERAPY TREATMENT NOTE
Acute Care - Physical Therapy Treatment Note  [facility]     Patient Name: Valery Baez  : 1945  MRN: [id number]  Today's Date: 2019             Admit Date: 2019    Visit Dx:    ICD-10-CM ICD-9-CM   1. Spondylolisthesis of lumbar region M43.16 738.4   2. Spinal stenosis of lumbar region with neurogenic claudication M48.062 724.03     Patient Active Problem List   Diagnosis   • Status post complete repair of rotator cuff   • H/O total knee replacement, right   • History of total knee arthroplasty, right   • Trigger thumb, right thumb   • Chronic pain of left knee   • Spondylolisthesis   • Spondylolisthesis of lumbar region   • Spinal stenosis of lumbar region with neurogenic claudication   • Sleep apnea   • Stage 3 chronic kidney disease (CMS/HCC)   • Iron deficiency anemia   • Sjogren's disease (CMS/HCC)   • Asthma   • GERD (gastroesophageal reflux disease)       Therapy Treatment    Rehabilitation Treatment Summary     Row Name 19 1138             Treatment Time/Intention    Discipline  physical therapy assistant  -      Document Type  therapy note (daily note)  -      Subjective Information  complains of;fatigue;pain;nausea/vomiting nausea improved w/patch  -      Mode of Treatment  individual therapy;physical therapy  -      Care Plan Review  patient/other agree to care plan  -      Care Plan Review, Other Participant(s)  spouse  -      Therapy Frequency (PT Clinical Impression)  daily  -2      Existing Precautions/Restrictions  fall;spinal;brace worn when out of bed  -      Recorded by [JM] Amelia Finn, OZZIE 19 1139  [JM2] Amelia Finn, \[facility] [place] [facility]\"" 19 1341      Row Name 19 1138             Bed Mobility Assessment/Treatment    Supine-Sit [place] (Bed Mobility)  minimum assist (75% patient effort);moderate assist (50% patient effort)  -      Bed Mobility, Safety Issues  decreased use of arms for pushing/pulling;decreased use of legs for  bridging/pushing;impaired trunk control for bed mobility  -      Assistive Device (Bed Mobility)  bed rails  -      Recorded by [] Amelia Finn, [facility] 09/12/19 1139      Row Name 09/12/19 1138             Sit-Stand Transfer    Sit-Stand [place] (Transfers)  minimum assist (75% patient effort);contact guard;verbal cues  -      Assistive Device (Sit-Stand Transfers)  walker, front-wheeled hers  -JM      Recorded by [JM] Amelia Finn, [facility] 09/12/19 1139      Row Name 09/12/19 1138             Toilet Transfer    Type (Toilet Transfer)  stand-sit;sit-stand  -      [place] Level (Toilet Transfer)  minimum assist (75% patient effort);contact guard  -      Assistive Device (Toilet Transfer)  walker, front-wheeled  -JM      Recorded by [] Amelia Finn, [facility] 09/12/19 1139      Row Name 09/12/19 1138             Gait/Stairs Assessment/Training    [place] Level (Gait)  contact guard  -      Assistive Device (Gait)  walker, front-wheeled  -      Distance in Feet (Gait)  150  -JM      Deviations/Abnormal Patterns (Gait)  antalgic;jemal decreased;stride length decreased  -      [place] Level (Stairs)  contact guard;verbal cues  -JM2      Handrail Location (Stairs)  right side (ascending)  -JM2      Number of Steps (Stairs)  5  -JM2      Ascending Technique (Stairs)  step-to-step  -JM2      Descending Technique (Stairs)  step-to-step  -JM2      Comment (Gait/Stairs)   present for educ  -JM2      Recorded by [JM] Amelia Finn, [facility] 09/12/19 1139  [JM2] Amelia Finn, [facility] 09/12/19 1344      Row Name 09/12/19 1138             Positioning and Restraints    Pre-Treatment Position  in bed  -      In Bed  supine;call light within reach;encouraged to call for assist;with family/caregiver no alarm upon entry  -      Recorded by [JM] Amelia Finn, [facility] 09/12/19 1344      Row Name 09/12/19 1138             Pain Scale: Numbers Pre/Post-Treatment    Pain Scale: Numbers,  Pretreatment  4/10  -      Pain Scale: Numbers, Post-Treatment  --  -      Pain Location  back  -JM2      Pain Intervention(s)  Medication (See MAR);Repositioned;Aromatherapy  -      Recorded by [JM] Amelia Finn, OZZIE 09/12/19 1344  [JM2] Amelia Finn, PTA 09/12/19 1139      Row Name                Wound 09/09/19 1507 Other (See comments) back Incision    Wound - Properties Group Date first assessed: 09/09/19 [LD] Time first assessed: 1507 [LD] Side: Other (See comments) [LD] Location: back [LD] Primary Wound Type: Incision [LD] Recorded by:  [LD] Zhanna Lockwood RN 09/09/19 1507      User Key  (r) = Recorded By, (t) = Taken By, (c) = Cosigned By    Initials Name Effective Dates Discipline     Amelia Finn, OZZIE 03/07/18 -  PT    LD Zhanna Lockwood RN 06/16/16 -  Nurse          Wound 09/09/19 1507 Other (See comments) back Incision (Active)   Dressing Appearance dry;intact;no drainage 9/12/2019 10:00 AM   Closure TIFFANY 9/12/2019 10:00 AM   Base dressing in place, unable to visualize 9/12/2019 10:00 AM   Drainage Amount none 9/12/2019 10:00 AM   Dressing Care, Wound low-adherent;border dressing 9/11/2019  8:57 PM           Physical Therapy Education     Title: PT OT SLP Therapies (Done)     Topic: Physical Therapy (Done)     Point: Mobility training (Done)     Learning Progress Summary           Patient Acceptance, E,TB,D, VU by CONNIE at 9/12/2019  1:46 PM    Acceptance, E, NR by AR at 9/11/2019 11:05 AM    Acceptance, E, NR by AR at 9/10/2019 11:01 AM   Family Acceptance, E,TB,D, VU by CONNIE at 9/12/2019  1:46 PM                   Point: Home exercise program (Done)     Learning Progress Summary           Patient Acceptance, E,TB,D, VU by CONNIE at 9/12/2019  1:46 PM    Acceptance, E, NR by AR at 9/11/2019 11:05 AM    Acceptance, E, NR by AR at 9/10/2019 11:01 AM   Family Acceptance, E,TB,D, VU by CONNIE at 9/12/2019  1:46 PM                   Point: Body mechanics (Done)     Learning Progress Summary            Patient Acceptance, E,TB,D, VU by CONNIE at 9/12/2019  1:46 PM    Acceptance, E, NR by AR at 9/11/2019 11:05 AM    Acceptance, E, NR by AR at 9/10/2019 11:01 AM   Family Acceptance, E,TB,D, VU by CONNIE at 9/12/2019  1:46 PM                   Point: Precautions (Done)     Learning Progress Summary           Patient Acceptance, E,TB,D, VU by CONNIE at 9/12/2019  1:46 PM    Acceptance, E, NR by AR at 9/11/2019 11:05 AM    Acceptance, E, NR by AR at 9/10/2019 11:01 AM   Family Acceptance, E,TB,D, VU by CONNIE at 9/12/2019  1:46 PM                               User Key     Initials Effective Dates Name Provider Type Discipline    CONNIE 03/07/18 -  Amelia Finn PTA Physical Therapy Assistant PT    AR 04/03/18 -  Chelsea Bower, PT Physical Therapist PT                PT Recommendation and Plan  Therapy Frequency (PT Clinical Impression): daily  Plan of Care Reviewed With: patient, spouse  Progress: improving  Outcome Summary: incr amb dist, lee stair training; encouraged amb w/nsg staff; plans home at [place], notified CCP about rwx for home  Outcome Measures     Row Name 09/12/19 1300             How much help from another person do you currently need...    Turning from your back to your side while in flat bed without using bedrails?  4  -JM      Moving from lying on back to sitting on the side of a flat bed without bedrails?  3  -JM      Moving to and from a bed to a chair (including a wheelchair)?  3  -JM      Standing up from a chair using your arms (e.g., wheelchair, bedside chair)?  3  -JM      Climbing 3-5 steps with a railing?  3  -JM      To walk in hospital room?  3  -JM      AM-PAC 6 Clicks Score (PT)  19  -JM        User Key  (r) = Recorded By, (t) = Taken By, (c) = Cosigned By    Initials Name Provider Type    Amelia Rae PTA Physical Therapy Assistant         Time Calculation:   PT Charges     Row Name 09/12/19 1137             Time Calculation    Start Time  1125  -      Stop Time  1137  -JM      Time  Calculation (min)  12 min  -CONNIE      PT Received On  09/12/19  -CONNIE      PT - Next Appointment  09/13/19  -CONNIE        User Key  (r) = Recorded By, (t) = Taken By, (c) = Cosigned By    Initials Name Provider Type    Amelia Rae PTA Physical Therapy Assistant        Therapy Charges for Today     Code Description Service Date Service Provider Modifiers Qty    66309861053 HC PT THER PROC EA 15 MIN 9/11/2019 Amelia Fnin PTA GP 2    35612541580 HC PT THER PROC EA 15 MIN 9/12/2019 Amelia Finn PTA GP 1          PT G-Codes  Outcome Measure Options: AM-PAC 6 Clicks Basic Mobility (PT)  AM-PAC 6 Clicks Score (PT): 19    Amelia Finn PTA  9/12/2019

## 2019-09-13 VITALS
SYSTOLIC BLOOD PRESSURE: 117 MMHG | TEMPERATURE: 97.8 F | WEIGHT: 174 LBS | RESPIRATION RATE: 18 BRPM | OXYGEN SATURATION: 97 % | BODY MASS INDEX: 27.31 KG/M2 | HEIGHT: 67 IN | DIASTOLIC BLOOD PRESSURE: 78 MMHG | HEART RATE: 107 BPM

## 2019-09-13 LAB
ANION GAP SERPL CALCULATED.3IONS-SCNC: 13.2 MMOL/L (ref 5–15)
BILIRUB UR QL STRIP: NEGATIVE
BUN BLD-MCNC: 9 MG/DL (ref 8–23)
BUN/CREAT SERPL: 12.3 (ref 7–25)
CALCIUM SPEC-SCNC: 8.7 MG/DL (ref 8.6–10.5)
CHLORIDE SERPL-SCNC: 103 MMOL/L (ref 98–107)
CLARITY UR: CLEAR
CO2 SERPL-SCNC: 20.8 MMOL/L (ref 22–29)
COLOR UR: YELLOW
CREAT BLD-MCNC: 0.73 MG/DL (ref 0.57–1)
DEPRECATED RDW RBC AUTO: 47.7 FL (ref 37–54)
ERYTHROCYTE [DISTWIDTH] IN BLOOD BY AUTOMATED COUNT: 13.6 % (ref 12.3–15.4)
GFR SERPL CREATININE-BSD FRML MDRD: 78 ML/MIN/1.73
GLUCOSE BLD-MCNC: 115 MG/DL (ref 65–99)
GLUCOSE UR STRIP-MCNC: NEGATIVE MG/DL
HCT VFR BLD AUTO: 30.3 % (ref 34–46.6)
HGB BLD-MCNC: 9.5 G/DL (ref 12–15.9)
HGB UR QL STRIP.AUTO: NEGATIVE
KETONES UR QL STRIP: NEGATIVE
LEUKOCYTE ESTERASE UR QL STRIP.AUTO: NEGATIVE
MCH RBC QN AUTO: 30 PG (ref 26.6–33)
MCHC RBC AUTO-ENTMCNC: 31.4 G/DL (ref 31.5–35.7)
MCV RBC AUTO: 95.6 FL (ref 79–97)
NITRITE UR QL STRIP: NEGATIVE
PH UR STRIP.AUTO: 6.5 [PH] (ref 5–8)
PLATELET # BLD AUTO: 217 10*3/MM3 (ref 140–450)
PMV BLD AUTO: 11.6 FL (ref 6–12)
POTASSIUM BLD-SCNC: 3.9 MMOL/L (ref 3.5–5.2)
PROT UR QL STRIP: NEGATIVE
RBC # BLD AUTO: 3.17 10*6/MM3 (ref 3.77–5.28)
SODIUM BLD-SCNC: 137 MMOL/L (ref 136–145)
SP GR UR STRIP: 1.01 (ref 1–1.03)
UROBILINOGEN UR QL STRIP: NORMAL
WBC NRBC COR # BLD: 16.05 10*3/MM3 (ref 3.4–10.8)

## 2019-09-13 PROCEDURE — 97110 THERAPEUTIC EXERCISES: CPT

## 2019-09-13 PROCEDURE — 99024 POSTOP FOLLOW-UP VISIT: CPT | Performed by: ORTHOPAEDIC SURGERY

## 2019-09-13 PROCEDURE — 85027 COMPLETE CBC AUTOMATED: CPT | Performed by: NURSE PRACTITIONER

## 2019-09-13 PROCEDURE — 80048 BASIC METABOLIC PNL TOTAL CA: CPT | Performed by: NURSE PRACTITIONER

## 2019-09-13 PROCEDURE — 81003 URINALYSIS AUTO W/O SCOPE: CPT | Performed by: ORTHOPAEDIC SURGERY

## 2019-09-13 RX ORDER — CYCLOBENZAPRINE HCL 10 MG
10 TABLET ORAL 3 TIMES DAILY PRN
Qty: 30 TABLET | Refills: 0 | Status: SHIPPED | OUTPATIENT
Start: 2019-09-13 | End: 2021-06-08

## 2019-09-13 RX ORDER — ACETAMINOPHEN 325 MG/1
650 TABLET ORAL EVERY 4 HOURS PRN
Start: 2019-09-13

## 2019-09-13 RX ORDER — SENNA AND DOCUSATE SODIUM 50; 8.6 MG/1; MG/1
2 TABLET, FILM COATED ORAL 2 TIMES DAILY
Qty: 60 TABLET | Refills: 0 | Status: SHIPPED | OUTPATIENT
Start: 2019-09-13 | End: 2021-06-08

## 2019-09-13 RX ADMIN — FLUTICASONE PROPIONATE 2 SPRAY: 50 SPRAY, METERED NASAL at 08:18

## 2019-09-13 RX ADMIN — CEPHALEXIN 500 MG: 500 CAPSULE ORAL at 08:18

## 2019-09-13 RX ADMIN — ACETAMINOPHEN 650 MG: 325 TABLET ORAL at 01:59

## 2019-09-13 RX ADMIN — CYCLOBENZAPRINE 10 MG: 10 TABLET, FILM COATED ORAL at 16:00

## 2019-09-13 RX ADMIN — SUCRALFATE 1 G: 1 SUSPENSION ORAL at 08:18

## 2019-09-13 RX ADMIN — LEVOTHYROXINE, LIOTHYRONINE 30 MG: 19; 4.5 TABLET ORAL at 08:18

## 2019-09-13 RX ADMIN — LANSOPRAZOLE 30 MG: 30 CAPSULE, DELAYED RELEASE ORAL at 08:18

## 2019-09-13 RX ADMIN — PILOCARPINE HYROCHLORIDE 5 MG: 5 TABLET, FILM COATED ORAL at 08:18

## 2019-09-13 RX ADMIN — PILOCARPINE HYROCHLORIDE 5 MG: 5 TABLET, FILM COATED ORAL at 16:00

## 2019-09-13 RX ADMIN — SUCRALFATE 1 G: 1 SUSPENSION ORAL at 11:09

## 2019-09-13 RX ADMIN — CYCLOBENZAPRINE 10 MG: 10 TABLET, FILM COATED ORAL at 08:18

## 2019-09-13 NOTE — PROGRESS NOTES
"DAILY PROGRESS NOTE  [facility]    Patient Identification:  Name: Valery Baez  Age: 73 y.o.  Sex: female  :  1945  MRN: [id number]         Primary Care Physician: Tino Smith MD    Subjective:  Interval History:She complains of back pain.    Objective:    Scheduled Meds:  acetaminophen 650 mg Oral Q24H   atorvastatin 20 mg Oral Nightly   cephalexin 500 mg Oral Daily   fluticasone 2 spray Nasal BID   fluticasone 1 puff Inhalation BID - RT   lansoprazole 30 mg Oral BID AC   loteprednol 1 drop Both Eyes BID   montelukast 10 mg Oral Nightly   pilocarpine 5 mg Oral TID   polyethylene glycol 17 g Oral BID   Scopolamine 1 patch Transdermal Q72H   sennosides-docusate sodium 2 tablet Oral Nightly   sucralfate 1 g Oral 4x Daily AC & at Bedtime   Thyroid 30 mg Oral QAM AC     Continuous Infusions:     Vital signs in last 24 hours:  Temp:  [98.5 °F (36.9 °C)-99.4 °F (37.4 °C)] 98.5 °F (36.9 °C)  Heart Rate:  [] 108  Resp:  [16-18] 18  BP: (112-116)/(68-77) 115/77    Intake/Output:    Intake/Output Summary (Last 24 hours) at 2019 1115  Last data filed at 2019 0553  Gross per 24 hour   Intake 360 ml   Output --   Net 360 ml       Exam:  /77 (BP Location: Left arm, Patient Position: Lying)   Pulse 108   Temp 98.5 °F (36.9 °C) (Oral)   Resp 18   Ht 168.9 cm (66.5\")   Wt 78.9 kg (174 lb)   SpO2 96%   BMI 27.66 kg/m²     General Appearance:    Alert, cooperative, no distress   Head:    Normocephalic, without obvious abnormality, atraumatic   Eyes:       Throat:   Lips, tongue, gums normal   Neck:   Supple, symmetrical, trachea midline, no JVD   Lungs:     Clear to auscultation bilaterally, respirations unlabored   Chest Wall:    No tenderness or deformity    Heart:    Regular rate and rhythm, S1 and S2 normal, no murmur,no  Rub or gallop   Abdomen:     Soft, non-tender, bowel sounds active, no masses, no organomegaly    Extremities:   Extremities normal, " atraumatic, no cyanosis or edema   Pulses:      Skin:   Skin is warm and dry,  no rashes or palpable lesions   Neurologic:   no focal deficits noted      Lab Results (last 72 hours)     Procedure Component Value Units Date/Time    Basic Metabolic Panel [[id number]]  (Abnormal) Collected:  09/13/19 0515    Specimen:  Blood Updated:  09/13/19 0606     Glucose 115 mg/dL      BUN 9 mg/dL      Creatinine 0.73 mg/dL      Sodium 137 mmol/L      Potassium 3.9 mmol/L      Chloride 103 mmol/L      CO2 20.8 mmol/L      Calcium 8.7 mg/dL      eGFR Non African Amer 78 mL/min/1.73      BUN/Creatinine Ratio 12.3     Anion Gap 13.2 mmol/L     Narrative:       GFR Normal >60  Chronic Kidney Disease <60  Kidney Failure <15    CBC (No Diff) [[id number]]  (Abnormal) Collected:  09/13/19 0515    Specimen:  Blood Updated:  09/13/19 0543     WBC 16.05 10*3/mm3      RBC 3.17 10*6/mm3      Hemoglobin 9.5 g/dL      Hematocrit 30.3 %      MCV 95.6 fL      MCH 30.0 pg      MCHC 31.4 g/dL      RDW 13.6 %      RDW-SD 47.7 fl      MPV 11.6 fL      Platelets 217 10*3/mm3     Basic Metabolic Panel [[id number]]  (Abnormal) Collected:  09/12/19 1555    Specimen:  Blood Updated:  09/12/19 1648     Glucose 128 mg/dL      BUN 10 mg/dL      Creatinine 0.84 mg/dL      Sodium 139 mmol/L      Potassium 3.6 mmol/L      Chloride 105 mmol/L      CO2 22.0 mmol/L      Calcium 8.7 mg/dL      eGFR Non African Amer 67 mL/min/1.73      BUN/Creatinine Ratio 11.9     Anion Gap 12.0 mmol/L     Narrative:       GFR Normal >60  Chronic Kidney Disease <60  Kidney Failure <15    CBC (No Diff) [[id number]]  (Abnormal) Collected:  09/12/19 1555    Specimen:  Blood Updated:  09/12/19 1627     WBC 14.73 10*3/mm3      RBC 3.05 10*6/mm3      Hemoglobin 9.1 g/dL      Hematocrit 29.4 %      MCV 96.4 fL      MCH 29.8 pg      MCHC 31.0 g/dL      RDW 13.8 %      RDW-SD 49.2 fl      MPV 11.4 fL      Platelets 199 10*3/mm3     POC Glucose Once [[id number]]  (Normal) Collected:  09/12/19  0726    Specimen:  Blood Updated:  09/12/19 0734     Glucose 130 mg/dL     Ferritin [[id number]]  (Abnormal) Collected:  09/11/19 0347    Specimen:  Blood Updated:  09/11/19 0503     Ferritin 876.00 ng/mL     Basic Metabolic Panel [[id number]]  (Abnormal) Collected:  09/11/19 0347    Specimen:  Blood Updated:  09/11/19 0459     Glucose 141 mg/dL      BUN 11 mg/dL      Creatinine 0.76 mg/dL      Sodium 135 mmol/L      Potassium 4.4 mmol/L      Chloride 106 mmol/L      CO2 16.0 mmol/L      Calcium 8.3 mg/dL      eGFR Non African Amer 75 mL/min/1.73      BUN/Creatinine Ratio 14.5     Anion Gap 13.0 mmol/L     Narrative:       GFR Normal >60  Chronic Kidney Disease <60  Kidney Failure <15    CBC (No Diff) [[id number]]  (Abnormal) Collected:  09/11/19 0347    Specimen:  Blood Updated:  09/11/19 0424     WBC 17.26 10*3/mm3      RBC 3.03 10*6/mm3      Hemoglobin 9.0 g/dL      Hematocrit 29.1 %      MCV 96.0 fL      MCH 29.7 pg      MCHC 30.9 g/dL      RDW 14.0 %      RDW-SD 49.4 fl      MPV 11.9 fL      Platelets 213 10*3/mm3     Iron Profile [[id number]]  (Abnormal) Collected:  09/10/19 2341    Specimen:  Blood from Arm, Left Updated:  09/11/19 0043     Iron 18 mcg/dL      Iron Saturation 8 %      Transferrin 156 mg/dL      TIBC 232 mcg/dL         Data Review:  Results from last 7 days   Lab Units 09/13/19  0515 09/12/19  1555 09/11/19 0347   SODIUM mmol/L 137 139 135*   POTASSIUM mmol/L 3.9 3.6 4.4   CHLORIDE mmol/L 103 105 106   CO2 mmol/L 20.8* 22.0 16.0*   BUN mg/dL 9 10 11   CREATININE mg/dL 0.73 0.84 0.76   GLUCOSE mg/dL 115* 128* 141*   CALCIUM mg/dL 8.7 8.7 8.3*     Results from last 7 days   Lab Units 09/13/19  0515 09/12/19  1555 09/11/19 0347   WBC 10*3/mm3 16.05* 14.73* 17.26*   HEMOGLOBIN g/dL 9.5* 9.1* 9.0*   HEMATOCRIT % 30.3* 29.4* 29.1*   PLATELETS 10*3/mm3 217 199 213             No results found for: TROPONINT            Invalid input(s): PROT, LABALBU          Glucose   Date/Time Value Ref Range Status    09/12/2019 0726 130 70 - 130 mg/dL Final           Past Medical History:   Diagnosis Date   • Anemia     CHRONIC IRON DEFICIENT   • Asthma    • Cancer (CMS/HCC)     right breast cancer   • Chronic kidney disease    • DDD (degenerative disc disease), lumbar    • Disease of thyroid gland    • Dry eye    • GERD (gastroesophageal reflux disease)    • Hard to intubate     REQUESTING TO SEE AA 8/22/19; PT HAS COMPLETE BILATERAL JAW PROSTESIS   • Headache     or migraines   • Heart palpitations    • History of breast cancer     RIGHT   • History of transfusion    • Hx of renal calculi    • Hypercholesteremia    • MRSA (methicillin resistant Staphylococcus aureus)     LEFT JAW @[facility] JO[facility], MULT. ,LAST 2013, TREATED AT [facility], infection control notified 8/24/16. 1300   • Osteoarthritis    • Osteoporosis    • PONV (postoperative nausea and vomiting)    • Rosacea    • Sleep apnea     C PAP   • Spinal headache    • Spinal stenosis    • Vertigo    • Wound dehiscence     HX. STATES CLOSED       Assessment:  Active Hospital Problems    Diagnosis  POA   • **Spinal stenosis of lumbar region with neurogenic claudication [M48.062]  Yes   • GERD (gastroesophageal reflux disease) [K21.9]  Yes   • Sleep apnea [G47.30]  Yes   • Stage 3 chronic kidney disease (CMS/HCC) [N18.3]  Yes   • Iron deficiency anemia [D50.9]  Yes   • Sjogren's disease (CMS/HCC) [M35.00]  Yes   • Asthma [J45.909]  Yes   • Spondylolisthesis of lumbar region [M43.16]  Yes      Resolved Hospital Problems   No resolved problems to display.       Plan:  Continue with current RX. Follow up lab if still here. Await UA. Maybe home later today. May need Antibiotics for UTI.    Forrest Baum MD  9/13/2019  11:15 AM

## 2019-09-13 NOTE — DISCHARGE SUMMARY
" Orthopedic Discharge Summary      Patient: Valery Baez  YOB: 1945  Medical Record Number: [id number]    Attending Physician: Vikas Couch MD  Consulting Physician(s):   Consults     Date and Time Order Name Status Description    9/9/2019 1608 Inpatient Hospitalist Consult Completed           Date of Admission: 9/9/2019  8:50 AM  Date of Discharge:9/13/2019    Discharge Diagnosis: Lumbar 3 to sacral 1 laminectomy and fusion with instrumentation,   Acute Blood Loss Anemia      Presenting Problem/History of Present Illness: Spondylolisthesis of lumbar region [M43.16]  Spinal stenosis of lumbar region with neurogenic claudication [M48.062]  Spondylolisthesis of lumbar region [M43.16]      Allergies:   Allergies   Allergen Reactions   • Bee Venom Anaphylaxis   • Latex Hives   • Codeine Hives     And Derivatives   • Cubicin [Daptomycin] Itching   • Darvon [Propoxyphene] Nausea And Vomiting     CONFUSION.    • Demerol [Meperidine] Nausea And Vomiting   • Ditropan [Oxybutynin] Itching   • Duricef [Cefadroxil] Itching and Nausea Only   • Erythromycin Hives     Also allergic to Duricef   • Hydrocodone Nausea And Vomiting     /Apaps    • Iodine Hives     /Iodine scrubbing solutions /IVP Contrast Dyes   • Methylprednisolone Unknown (See Comments)     CAUSES ITCHING AND FACIAL FLUSHING BUT PT \"CAN TAKE LOWER DOSES\"   • Minocycline Hives     Larger dose than 100mg 2X per chart    • Morphine And Related Hives and GI Intolerance     /Derivatives   • Penicillins Hives and Swelling   • Sulfa Antibiotics Hives and Nausea And Vomiting   • Sulphadimidine [Sulfamethazine] Hives   • Talwin [Pentazocine] Hives   • Tramadol Hcl Hives and Nausea And Vomiting   • Valium [Diazepam] Hives and Hallucinations     CONFUSION.    • Adhesive Tape Rash   • Betadine [Povidone Iodine] Rash   • Contrast Dye Hives and Palpitations   • Neomycin-Polymyxin-Gramicidin Rash   • Neosporin Af [Miconazole Nitrate] Rash       Discharge " Medications     Discharge Medications      New Medications      Instructions Start Date   acetaminophen 325 MG tablet  Commonly known as:  TYLENOL   650 mg, Oral, Every 4 Hours PRN      cyclobenzaprine 10 MG tablet  Commonly known as:  FLEXERIL   10 mg, Oral, 3 Times Daily PRN      polyethylene glycol pack packet  Commonly known as:  MIRALAX   17 g, Oral, 2 Times Daily      sennosides-docusate sodium 8.6-50 MG tablet  Commonly known as:  SENOKOT-S   2 tablets, Oral, 2 Times Daily         Continue These Medications      Instructions Start Date   ARMOUR THYROID 30 MG tablet  Generic drug:  Thyroid   No dose, route, or frequency recorded.      aspirin 81 MG tablet   81 mg, Oral, PT TO STOP PER MD INSTRUCTION      atenolol 25 MG tablet  Commonly known as:  TENORMIN   25 mg, Oral, Daily, TAKES AT 1500      atorvastatin 20 MG tablet  Commonly known as:  LIPITOR   20 mg, Oral, Nightly      BENADRYL ALLERGY CHILDRENS 12.5 MG chewable tablet  Generic drug:  diphenhydrAMINE   25 mg, Oral, 4 Times Daily PRN      benzonatate 100 MG capsule  Commonly known as:  TESSALON   100 mg, Oral, 2 Times Daily PRN      CALCIUM CITRATE + D PO   1 tablet, Oral, Nightly, ON HOLD FOR SX.      cephalexin 500 MG capsule  Commonly known as:  KEFLEX   500 mg, Oral, Daily, MRSA SUPRESSION      EPIPEN 2-TOMMY 0.3 MG/0.3ML solution auto-injector injection  Generic drug:  EPINEPHrine   No dose, route, or frequency recorded.      fexofenadine 180 MG tablet  Commonly known as:  ALLEGRA   180 mg, Oral, Daily      fluticasone 110 MCG/ACT inhaler  Commonly known as:  FLOVENT HFA   1 puff, Inhalation, 2 Times Daily - RT      fluticasone 50 MCG/ACT nasal spray  Commonly known as:  FLONASE   2 sprays, Nasal, 2 Times Daily      lansoprazole 30 MG capsule  Commonly known as:  PREVACID   30 mg, Oral, 2 Times Daily      loteprednol 0.2 % suspension  Commonly known as:  LOTEMAX   1 drop, Both Eyes, 2 Times Daily      meclizine 25 MG tablet  Commonly known as:   ANTIVERT   25 mg, Oral, 3 Times Daily PRN      Melatonin 10 MG tablet   1 tablet, Oral, Nightly, ON HOLD FOR SX.      metroNIDAZOLE 0.75 % gel  Commonly known as:  METROGEL   Topical, 2 Times Daily PRN      mometasone 220 MCG/INH inhaler  Commonly known as:  ASMANEX   2 puffs, Inhalation, 2 Times Daily PRN      mometasone 50 MCG/ACT nasal spray  Commonly known as:  NASONEX   2 sprays, Nasal, Daily      montelukast 10 MG tablet  Commonly known as:  SINGULAIR   10 mg, Oral, Nightly      MULTIVITAMIN ADULT PO   1 tablet, Oral, Nightly, ON HOLD FOR SX.      pilocarpine 5 MG tablet  Commonly known as:  SALAGEN   5 mg, Oral, 3 Times Daily      POTASSIUM CHLORIDE PO   99 mg, Oral, Nightly, ON HOLD FOR SX.      PROBIOTIC DAILY PO   1 capsule, Oral, 2 Times Daily, ON HOLD FOR SX.      PROLIA SC   Subcutaneous, See Admin Instructions, Every 6 months   4/5/19 WAS LAST DOSE      vitamin B-6 100 MG tablet  Commonly known as:  PYRIDOXINE   100 mg, Oral, 2 Times Daily, ON HOLD FOR SX.      XOPENEX HFA 45 MCG/ACT inhaler  Generic drug:  levalbuterol   2 puffs, Inhalation, Every 4 Hours PRN               Past Medical History:   Diagnosis Date   • Anemia     CHRONIC IRON DEFICIENT   • Asthma    • Cancer (CMS/HCC)     right breast cancer   • Chronic kidney disease    • DDD (degenerative disc disease), lumbar    • Disease of thyroid gland    • Dry eye    • GERD (gastroesophageal reflux disease)    • Hard to intubate     REQUESTING TO SEE AA 8/22/19; PT HAS COMPLETE BILATERAL JAW PROSTESIS   • Headache     or migraines   • Heart palpitations    • History of breast cancer     RIGHT   • History of transfusion    • Hx of renal calculi    • Hypercholesteremia    • MRSA (methicillin resistant Staphylococcus aureus)     LEFT JAW @[facility], MULT. ,LAST 2013, TREATED AT [facility], infection control notified 8/24/16. 1300   • Osteoarthritis    • Osteoporosis    • PONV (postoperative nausea and vomiting)    • Rosacea    • Sleep apnea      C PAP   • Spinal headache    • Spinal stenosis    • Vertigo    • Wound dehiscence     HX. STATES CLOSED        Past Surgical History:   Procedure Laterality Date   • ABSCESS DRAINAGE      abscess removed from left jaw x2   • APPENDECTOMY  1965   • AVULSION TOENAIL PLATE      6 REMOVED   • BILATERAL BREAST REDUCTION Left 8/29/2016    Procedure: LT BREAST REDUCTION ;  Surgeon: Luis Zambrano MD;  Location: [facility];  Service:    • CHOLECYSTECTOMY  09/2000   • CYST REMOVAL  03/1984    eye cyst left lower lid   • CYST REMOVAL  01/2007    right upper thigh   • CYSTOSCOPY      x2  02/2013 & 04/2014   • HYSTERECTOMY  1971    LATER BSO   • KNEE ARTHROPLASTY Right 03/2005   • LUMBAR DISCECTOMY FUSION INSTRUMENTATION N/A 9/9/2019    Procedure: Lumbar 3 to sacral 1 laminectomy and fusion with instrumentation;  Surgeon: Vikas Couch MD;  Location: [facility];  Service: Orthopedic Spine   • MASTECTOMY Right 03/2015   • MASTECTOMY PARTIAL / LUMPECTOMY Right 03/2009   • NIPPLE RECONSTRUCTION Right 8/29/2016    Procedure: RT NIPPLE RECONSTRUCTION;  Surgeon: Luis Zambrano MD;  Location: [facility];  Service:    • OTHER SURGICAL HISTORY      laparoscopy adhesions right side x2   • OTHER SURGICAL HISTORY      drain mammosite area   • OTHER SURGICAL HISTORY  01/2010    pressure washed prosthesis area left jaw   • OTHER SURGICAL HISTORY      drain mammosite area right breast 09/2011   • RECONSTRUCTION BREAST W/ TRAM FLAP Right 02/2016   • ROTATOR CUFF REPAIR Left 09/2014   • TEMPOROMANDIBULAR JOINT ARTHROPLASTY Bilateral 1993    MULT. REPLACEMENTS LATER   • TONSILLECTOMY  1952        Social History     Occupational History   • Not on file   Tobacco Use   • Smoking status: Never Smoker   • Smokeless tobacco: Never Used   Substance and Sexual Activity   • Alcohol use: Yes     Alcohol/week: 1.2 oz     Types: 2 Shots of liquor per week     Comment: 2 drinks per week   • Drug use: No   • Sexual activity: Defer       Social History     Social History Narrative   • Not on file        Family History   Problem Relation Age of Onset   • Hypertension Other    • Cancer Other         breast   • Malig Hyperthermia Neg Hx          Physical Exam: 73 y.o. female  General Appearance:    Alert, cooperative, in no acute distress                      Vitals:    09/12/19 1937 09/13/19 0443 09/13/19 0904 09/13/19 1255   BP: 114/76 116/72 115/77 117/78   BP Location: Left arm Left arm Left arm Left arm   Patient Position: Lying Lying Lying Lying   Pulse: 95 88 108 107   Resp: 16 18 18 18   Temp: 99.4 °F (37.4 °C) 98.7 °F (37.1 °C) 98.5 °F (36.9 °C) 97.8 °F (36.6 °C)   TempSrc: Oral Oral Oral Oral   SpO2:   96% 97%   Weight:       Height:            DIAGNOSTIC TESTS:   Admission on 09/09/2019   Component Date Value Ref Range Status   • ABO Type 09/09/2019 A   Final   • RH type 09/09/2019 Positive   Final   • Antibody Screen 09/09/2019 Negative   Final   • T&S Expiration Date 09/09/2019 9/12/2019 11:59:59 PM   Final   • Hemoglobin 09/09/2019 10.6* 12.0 - 15.9 g/dL Final   • Hematocrit 09/09/2019 34.6  34.0 - 46.6 % Final   • Hemoglobin 09/10/2019 9.8* 12.0 - 15.9 g/dL Final   • Hematocrit 09/10/2019 30.5* 34.0 - 46.6 % Final   • Iron 09/10/2019 18* 37 - 145 mcg/dL Final   • Iron Saturation 09/10/2019 8* 20 - 50 % Final   • Transferrin 09/10/2019 156* 200 - 360 mg/dL Final   • TIBC 09/10/2019 232* 298 - 536 mcg/dL Final   • Ferritin 09/11/2019 876.00* 13.00 - 150.00 ng/mL Final   • WBC 09/11/2019 17.26* 3.40 - 10.80 10*3/mm3 Final   • RBC 09/11/2019 3.03* 3.77 - 5.28 10*6/mm3 Final   • Hemoglobin 09/11/2019 9.0* 12.0 - 15.9 g/dL Final   • Hematocrit 09/11/2019 29.1* 34.0 - 46.6 % Final   • MCV 09/11/2019 96.0  79.0 - 97.0 fL Final   • MCH 09/11/2019 29.7  26.6 - 33.0 pg Final   • MCHC 09/11/2019 30.9* 31.5 - 35.7 g/dL Final   • RDW 09/11/2019 14.0  12.3 - 15.4 % Final   • RDW-SD 09/11/2019 49.4  37.0 - 54.0 fl Final   • MPV 09/11/2019 11.9  6.0 -  12.0 fL Final   • Platelets 09/11/2019 213  140 - 450 10*3/mm3 Final   • Glucose 09/11/2019 141* 65 - 99 mg/dL Final   • BUN 09/11/2019 11  8 - 23 mg/dL Final   • Creatinine 09/11/2019 0.76  0.57 - 1.00 mg/dL Final   • Sodium 09/11/2019 135* 136 - 145 mmol/L Final   • Potassium 09/11/2019 4.4  3.5 - 5.2 mmol/L Final   • Chloride 09/11/2019 106  98 - 107 mmol/L Final   • CO2 09/11/2019 16.0* 22.0 - 29.0 mmol/L Final   • Calcium 09/11/2019 8.3* 8.6 - 10.5 mg/dL Final   • eGFR Non African Amer 09/11/2019 75  >60 mL/min/1.73 Final   • BUN/Creatinine Ratio 09/11/2019 14.5  7.0 - 25.0 Final   • Anion Gap 09/11/2019 13.0  5.0 - 15.0 mmol/L Final   • Glucose 09/12/2019 130  70 - 130 mg/dL Final   • WBC 09/12/2019 14.73* 3.40 - 10.80 10*3/mm3 Final   • RBC 09/12/2019 3.05* 3.77 - 5.28 10*6/mm3 Final   • Hemoglobin 09/12/2019 9.1* 12.0 - 15.9 g/dL Final   • Hematocrit 09/12/2019 29.4* 34.0 - 46.6 % Final   • MCV 09/12/2019 96.4  79.0 - 97.0 fL Final   • MCH 09/12/2019 29.8  26.6 - 33.0 pg Final   • MCHC 09/12/2019 31.0* 31.5 - 35.7 g/dL Final   • RDW 09/12/2019 13.8  12.3 - 15.4 % Final   • RDW-SD 09/12/2019 49.2  37.0 - 54.0 fl Final   • MPV 09/12/2019 11.4  6.0 - 12.0 fL Final   • Platelets 09/12/2019 199  140 - 450 10*3/mm3 Final   • Glucose 09/12/2019 128* 65 - 99 mg/dL Final   • BUN 09/12/2019 10  8 - 23 mg/dL Final   • Creatinine 09/12/2019 0.84  0.57 - 1.00 mg/dL Final   • Sodium 09/12/2019 139  136 - 145 mmol/L Final   • Potassium 09/12/2019 3.6  3.5 - 5.2 mmol/L Final   • Chloride 09/12/2019 105  98 - 107 mmol/L Final   • CO2 09/12/2019 22.0  22.0 - 29.0 mmol/L Final   • Calcium 09/12/2019 8.7  8.6 - 10.5 mg/dL Final   • eGFR Non  Amer 09/12/2019 67  >60 mL/min/1.73 Final   • BUN/Creatinine Ratio 09/12/2019 11.9  7.0 - 25.0 Final   • Anion Gap 09/12/2019 12.0  5.0 - 15.0 mmol/L Final   • WBC 09/13/2019 16.05* 3.40 - 10.80 10*3/mm3 Final   • RBC 09/13/2019 3.17* 3.77 - 5.28 10*6/mm3 Final   • Hemoglobin  09/13/2019 9.5* 12.0 - 15.9 g/dL Final   • Hematocrit 09/13/2019 30.3* 34.0 - 46.6 % Final   • MCV 09/13/2019 95.6  79.0 - 97.0 fL Final   • MCH 09/13/2019 30.0  26.6 - 33.0 pg Final   • MCHC 09/13/2019 31.4* 31.5 - 35.7 g/dL Final   • RDW 09/13/2019 13.6  12.3 - 15.4 % Final   • RDW-SD 09/13/2019 47.7  37.0 - 54.0 fl Final   • MPV 09/13/2019 11.6  6.0 - 12.0 fL Final   • Platelets 09/13/2019 217  140 - 450 10*3/mm3 Final   • Glucose 09/13/2019 115* 65 - 99 mg/dL Final   • BUN 09/13/2019 9  8 - 23 mg/dL Final   • Creatinine 09/13/2019 0.73  0.57 - 1.00 mg/dL Final   • Sodium 09/13/2019 137  136 - 145 mmol/L Final   • Potassium 09/13/2019 3.9  3.5 - 5.2 mmol/L Final   • Chloride 09/13/2019 103  98 - 107 mmol/L Final   • CO2 09/13/2019 20.8* 22.0 - 29.0 mmol/L Final   • Calcium 09/13/2019 8.7  8.6 - 10.5 mg/dL Final   • eGFR Non African Amer 09/13/2019 78  >60 mL/min/1.73 Final   • BUN/Creatinine Ratio 09/13/2019 12.3  7.0 - 25.0 Final   • Anion Gap 09/13/2019 13.2  5.0 - 15.0 mmol/L Final   • Color, UA 09/13/2019 Yellow  Yellow, Straw Final   • Appearance, UA 09/13/2019 Clear  Clear Final   • pH, UA 09/13/2019 6.5  5.0 - 8.0 Final   • Specific Gravity, UA 09/13/2019 1.007  1.005 - 1.030 Final   • Glucose, UA 09/13/2019 Negative  Negative Final   • Ketones, UA 09/13/2019 Negative  Negative Final   • Bilirubin, UA 09/13/2019 Negative  Negative Final   • Blood, UA 09/13/2019 Negative  Negative Final   • Protein, UA 09/13/2019 Negative  Negative Final   • Leuk Esterase, UA 09/13/2019 Negative  Negative Final   • Nitrite, UA 09/13/2019 Negative  Negative Final   • Urobilinogen, UA 09/13/2019 0.2 E.U./dL  0.2 - 1.0 E.U./dL Final       No results found.    Hospital Course:  73 y.o. female admitted to [facility] to services of Vikas Couch MD with Spondylolisthesis of lumbar region [M43.16]  Spinal stenosis of lumbar region with neurogenic claudication [M48.062]  Spondylolisthesis of lumbar region [M43.16] on  9/9/2019 and underwent Lumbar 3 to sacral 1 laminectomy and fusion with instrumentation  Per Vikas Couch MD. Antibiotic and VTE prophylaxis were per [facility]IP protocols.  The patient was admitted to the floor where IV and/or oral pain medications were administered for postoperative pain.  At discharge the incisional pain was tolerable and preop neurologic function was intact.  The dressing was dry and the wound was clean.    Condition on Discharge:  Stable    Discharge Instructions:   1.  Patient may weight bear as tolerated unless otherwise specified.   2.  May use ice to back incision as needed.   3.  Patient also instructed on incentive spirometer during hospitalization and encouraged to continue to use at home regularly.   4.  Dressing is waterproof and should remain on and intact until seen in the office at 1st PO visit.  Patient has been instructed to contact the office if dressing becomes loose or saturated.   5.  Patient may shower on POD #3 if and when all wound drainage has stopped.    6.  Back brace should be worn when up and about.  It need not be worn to the bathroom and certainly not in the shower.   7.  Patient is to avoid forward bending and twisting at the waist.  No lifting greater than 5 pounds.         A detailed list of instructions specific to the operation was given to the patient at the time of discharge.    Follow up Instructions:  Follow up in the office with Dr. Vikas Couch Jr. in 2-3 weeks - patient to call the office at [phone] to schedule. Prescriptions were given for pain medication.    Follow-up Appointments  Future Appointments   Date Time Provider Department Center   9/25/2019 10:50 AM Vikas Couch MD [facility] [address] None     Additional Instructions for the Follow-ups that You Need to Schedule     Discharge Follow-up with Specialty: Orthopedics; 2 Weeks   As directed      Specialty:  Orthopedics    Follow Up:  2 Weeks    Follow Up Details:  Return to the office to see   Vikas Couch               Discharge Disposition Plan:today to home    Date: 9/13/2019    Ameena Sy RN  09/13/19  2:40 PM    Vikas Couch MD

## 2019-09-13 NOTE — PROGRESS NOTES
Postop day 4 AVSS awake alert oriented.  She is concerned about her temperature of 99.4.  She does have some urinary retention for which repeat catheterization was required.  Back pain is the same and expected especially since she cannot take a significant pain medication.  No leg pain and she moves the legs well.  She wants to go home as opposed to rehab and I think she can still do so later today.  We will check a urinalysis first.

## 2019-09-13 NOTE — THERAPY TREATMENT NOTE
Acute Care - Physical Therapy Treatment Note  [facility]     Patient Name: Valery Baez  : 1945  MRN: [id number]  Today's Date: 2019             Admit Date: 2019    Visit Dx:    ICD-10-CM ICD-9-CM   1. Spondylolisthesis of lumbar region M43.16 738.4   2. Spinal stenosis of lumbar region with neurogenic claudication M48.062 724.03     Patient Active Problem List   Diagnosis   • Status post complete repair of rotator cuff   • H/O total knee replacement, right   • History of total knee arthroplasty, right   • Trigger thumb, right thumb   • Chronic pain of left knee   • Spondylolisthesis   • Spondylolisthesis of lumbar region   • Spinal stenosis of lumbar region with neurogenic claudication   • Sleep apnea   • Stage 3 chronic kidney disease (CMS/HCC)   • Iron deficiency anemia   • Sjogren's disease (CMS/HCC)   • Asthma   • GERD (gastroesophageal reflux disease)       Therapy Treatment    Rehabilitation Treatment Summary     Row Name 19 1300             Treatment Time/Intention    Discipline  physical therapy assistant  -      Document Type  therapy note (daily note);discharge treatment  -      Subjective Information  complains of;fatigue;pain  -      Mode of Treatment  individual therapy;physical therapy  -      Care Plan Review  patient/other agree to care plan  -      Therapy Frequency (PT Clinical Impression)  daily  -      Existing Precautions/Restrictions  fall;spinal;brace worn when out of bed  -      Recorded by [] Amelia Finn PTA 19 [id number]      Row Name 19 1300             Sit-Stand Transfer    Sit-Stand [place] (Transfers)  contact guard;verbal cues  -      Assistive Device (Sit-Stand Transfers)  walker, front-wheeled hers  -      Recorded by [JM] Amelia Finn PTA 19 [id number]      Row Name 19 1300             Toilet Transfer    Type (Toilet Transfer)  stand-sit;sit-stand  -      [place] Level (Toilet Transfer)  minimum  assist (75% patient effort);contact guard  -      Assistive Device (Toilet Transfer)  walker, front-wheeled  -      Recorded by [] Amelia Finn, [facility] 09/13/19 1753      Row Name 09/13/19 1300             Gait/Stairs Assessment/Training    [place] Level (Gait)  contact guard;stand by assist  -      Assistive Device (Gait)  walker, front-wheeled  -      Distance in Feet (Gait)  200  -JM      Deviations/Abnormal Patterns (Gait)  antalgic;jemal decreased;stride length decreased  -      Recorded by [] Amelia Finn, [facility] 09/13/19 1753      Row Name 09/13/19 1300             Positioning and Restraints    Pre-Treatment Position  sitting in chair/recliner  -      In Chair  sitting;call light within reach;encouraged to call for assist;exit alarm on;notified nsg  -      Recorded by [] Amelia Finn, [facility] 09/13/19 1753      Row Name 09/13/19 1300             Pain Scale: Numbers Pre/Post-Treatment    Pain Scale: Numbers, Pretreatment  5/10  -JM      Pain Location  back  -      Pain Intervention(s)  Medication (See MAR);Repositioned;Ambulation/increased activity  -      Recorded by [] Amelia Finn, [facility] 09/13/19 1753      Row Name                [REMOVED] Wound 09/09/19 1507 Other (See comments) back Incision    Wound - Properties Group Date first assessed: 09/09/19 [LD] Time first assessed: 1507 [LD] Side: Other (See comments) [LD] Location: back [LD] Primary Wound Type: Incision [LD] Resolution Date: 09/13/19 [RO] Resolution Time: 1549 [RO] Recorded by:  [LD] Zhanna Lockwood RN 09/09/19 1507 [RO] Deanne Moreland RN 09/13/19 1549      User Key  (r) = Recorded By, (t) = Taken By, (c) = Cosigned By    Initials Name Effective Dates Discipline    Amelia Rae, OZZIE 03/07/18 -  PT    Zhanna North RN 06/16/16 -  Nurse    RO Deanne Moreland RN 11/09/17 -  Nurse                   Physical Therapy Education     Title: PT OT SLP Therapies (Done)     Topic: Physical Therapy  (Done)     Point: Mobility training (Done)     Learning Progress Summary           Patient Acceptance, E,TB,D, VU by CONNIE at 9/12/2019  1:46 PM    Acceptance, E, NR by AR at 9/11/2019 11:05 AM    Acceptance, E, NR by AR at 9/10/2019 11:01 AM   Family Acceptance, E,TB,D, VU by CONNIE at 9/12/2019  1:46 PM                   Point: Home exercise program (Done)     Learning Progress Summary           Patient Acceptance, E,TB,D, VU by CONNIE at 9/12/2019  1:46 PM    Acceptance, E, NR by AR at 9/11/2019 11:05 AM    Acceptance, E, NR by AR at 9/10/2019 11:01 AM   Family Acceptance, E,TB,D, VU by CONNIE at 9/12/2019  1:46 PM                   Point: Body mechanics (Done)     Learning Progress Summary           Patient Acceptance, E,TB,D, VU by CONNIE at 9/12/2019  1:46 PM    Acceptance, E, NR by AR at 9/11/2019 11:05 AM    Acceptance, E, NR by AR at 9/10/2019 11:01 AM   Family Acceptance, E,TB,D, VU by CONNIE at 9/12/2019  1:46 PM                   Point: Precautions (Done)     Learning Progress Summary           Patient Acceptance, E,TB,D, VU by CONNIE at 9/12/2019  1:46 PM    Acceptance, E, NR by AR at 9/11/2019 11:05 AM    Acceptance, E, NR by AR at 9/10/2019 11:01 AM   Family Acceptance, E,TB,D, VU by CONNIE at 9/12/2019  1:46 PM                               User Key     Initials Effective Dates Name Provider Type Discipline     03/07/18 -  Amelia Finn, OZZIE Physical Therapy Assistant PT    AR 04/03/18 -  Chelsea Bower PT Physical Therapist PT                PT Recommendation and Plan  Therapy Frequency (PT Clinical Impression): daily  Plan of Care Reviewed With: patient, spouse  Progress: improving  Outcome Summary: incr amb dist, lee stair training; encouraged amb w/nsg staff; plans home at [place], notified CCP about rwx for home  Outcome Measures     Row Name 09/12/19 1300             How much help from another person do you currently need...    Turning from your back to your side while in flat bed without using bedrails?  4  -CONNIE       Moving from lying on back to sitting on the side of a flat bed without bedrails?  3  -JM      Moving to and from a bed to a chair (including a wheelchair)?  3  -JM      Standing up from a chair using your arms (e.g., wheelchair, bedside chair)?  3  -JM      Climbing 3-5 steps with a railing?  3  -JM      To walk in hospital room?  3  -JM      AM-PAC 6 Clicks Score (PT)  19  -        User Key  (r) = Recorded By, (t) = Taken By, (c) = Cosigned By    Initials Name Provider Type    Amelia Rae PTA Physical Therapy Assistant         Time Calculation:   PT Charges     Row Name 09/13/19 1753             Time Calculation    Start Time  1300  -      Stop Time  1310  -      Time Calculation (min)  10 min  -      PT Received On  09/13/19  -CONNIE        User Key  (r) = Recorded By, (t) = Taken By, (c) = Cosigned By    Initials Name Provider Type    Amelia Rae PTA Physical Therapy Assistant        Therapy Charges for Today     Code Description Service Date Service Provider Modifiers Qty    10533764629 HC PT THER PROC EA 15 MIN 9/12/2019 Amelia Finn PTA GP 1    27865166261 HC PT THER PROC EA 15 MIN 9/13/2019 Amelia Finn PTA GP 1          PT G-Codes  Outcome Measure Options: AM-PAC 6 Clicks Basic Mobility (PT)  AM-PAC 6 Clicks Score (PT): 19    Amelia Finn PTA  9/13/2019

## 2019-09-13 NOTE — PROGRESS NOTES
Continued Stay Note  [facility]     Patient Name: Valery Baez  MRN: [id number]  Today's Date: 9/13/2019    Admit Date: 9/9/2019    Discharge Plan     Row Name 09/13/19 1508       Plan    Plan  Home    Patient/Family in Agreement with Plan  yes    Plan Comments  Walker order noted and faxed to [facility] at 11:45. Martina with [facility] present with walker. Denies any additional need/equipment.        Discharge Codes    No documentation.       Expected Discharge Date and Time     Expected Discharge Date Expected Discharge Time    Sep 13, 2019             Mariann Kingston RN

## 2019-09-14 ENCOUNTER — READMISSION MANAGEMENT (OUTPATIENT)
Dept: CALL CENTER | Facility: HOSPITAL | Age: 74
End: 2019-09-14

## 2019-09-14 NOTE — OUTREACH NOTE
Prep Survey      Responses   Facility patient discharged from?  [facility]   Is patient eligible?  Yes   Discharge diagnosis  spinal stenosis of lumbar region    Does the patient have one of the following disease processes/diagnoses(primary or secondary)?  Other   Does the patient have Home health ordered?  No   Is there a DME ordered?  Yes   What DME was ordered?  [employer] for walker   Prep survey completed?  Yes          Janna Alcala RN

## 2019-09-16 ENCOUNTER — READMISSION MANAGEMENT (OUTPATIENT)
Dept: CALL CENTER | Facility: HOSPITAL | Age: 74
End: 2019-09-16

## 2019-09-16 NOTE — OUTREACH NOTE
Medical Week 1 Survey      Responses   Facility patient discharged from?  [facility]   Does the patient have one of the following disease processes/diagnoses(primary or secondary)?  Other   Is there a successful TCM telephone encounter documented?  No   Week 1 attempt successful?  Yes   Call start time  1415   Call end time  1427   Discharge diagnosis  spinal stenosis of lumbar region    Meds reviewed with patient/caregiver?  Yes   Is the patient having any side effects they believe may be caused by any medication additions or changes?  Yes   Side effects comments   Muscle relaxer causes loss of appetite, excessive fatigue, dry mouth, no energy.    Does the patient have all medications ordered at discharge?  Yes   Is the patient taking all medications as directed (includes completed medication regime)?  No   What is preventing the patient from taking all medications as directed?  Side effects   Nursing Interventions  Nurse provided patient education, Advised patient to call provider   Medication comments  Patient stopped Flexeril due to side effects. She is feeling much better already.    Comments regarding appointments  Has followup with Dr Couch on Sept 25 2019   Does the patient have a primary care provider?   Yes   Does the patient have an appointment with their PCP within 7 days of discharge?  Greater than 7 days   Comments regarding PCP  Dr Smith/ PCP   Nursing Interventions  Verified appointment date/time/provider   Has the patient kept scheduled appointments due by today?  N/A   Has home health visited the patient within 72 hours of discharge?  N/A   What DME was ordered?  [employer] for walker   Has all DME been delivered?  Yes   Psychosocial issues?  No   Did the patient receive a copy of their discharge instructions?  Yes   Nursing interventions  Reviewed instructions with patient   What is the patient's perception of their health status since discharge?  Improving   Is the patient/caregiver able to teach  back signs and symptoms related to disease process for when to call PCP?  Yes   Is the patient/caregiver able to teach back signs and symptoms related to disease process for when to call 911?  Yes   Is the patient/caregiver able to teach back the hierarchy of who to call/visit for symptoms/problems? PCP, Specialist, Home health nurse, Urgent Care, ED, 911  Yes   Week 1 call completed?  Yes          Ean Lin RN

## 2019-09-16 NOTE — DISCHARGE SUMMARY
" Orthopedic Discharge Summary      Patient: Valery Baez  YOB: 1945  Medical Record Number: [id number]    Attending Physician: No att. providers found  Consulting Physician(s):   Consults     Date and Time Order Name Status Description    9/9/2019 1608 Inpatient Hospitalist Consult Completed           Date of Admission: 9/9/2019  8:50 AM  Date of Discharge:9/16/2019    Discharge Diagnosis: Lumbar 3 to sacral 1 laminectomy and fusion with instrumentation,   Acute Blood Loss Anemia      Presenting Problem/History of Present Illness: Spondylolisthesis of lumbar region [M43.16]  Spinal stenosis of lumbar region with neurogenic claudication [M48.062]  Spondylolisthesis of lumbar region [M43.16]      Allergies:   Allergies   Allergen Reactions   • Bee Venom Anaphylaxis   • Latex Hives   • Codeine Hives     And Derivatives   • Cubicin [Daptomycin] Itching   • Darvon [Propoxyphene] Nausea And Vomiting     CONFUSION.    • Demerol [Meperidine] Nausea And Vomiting   • Ditropan [Oxybutynin] Itching   • Duricef [Cefadroxil] Itching and Nausea Only   • Erythromycin Hives     Also allergic to Duricef   • Hydrocodone Nausea And Vomiting     /Apaps    • Iodine Hives     /Iodine scrubbing solutions /IVP Contrast Dyes   • Methylprednisolone Unknown (See Comments)     CAUSES ITCHING AND FACIAL FLUSHING BUT PT \"CAN TAKE LOWER DOSES\"   • Minocycline Hives     Larger dose than 100mg 2X per chart    • Morphine And Related Hives and GI Intolerance     /Derivatives   • Penicillins Hives and Swelling   • Sulfa Antibiotics Hives and Nausea And Vomiting   • Sulphadimidine [Sulfamethazine] Hives   • Talwin [Pentazocine] Hives   • Tramadol Hcl Hives and Nausea And Vomiting   • Valium [Diazepam] Hives and Hallucinations     CONFUSION.    • Adhesive Tape Rash   • Betadine [Povidone Iodine] Rash   • Contrast Dye Hives and Palpitations   • Neomycin-Polymyxin-Gramicidin Rash   • Neosporin Af [Miconazole Nitrate] Rash "       Discharge Medications     Discharge Medications      New Medications      Instructions Start Date   acetaminophen 325 MG tablet  Commonly known as:  TYLENOL   650 mg, Oral, Every 4 Hours PRN      cyclobenzaprine 10 MG tablet  Commonly known as:  FLEXERIL  Notes to patient:  Last @ 4PM   10 mg, Oral, 3 Times Daily PRN      polyethylene glycol pack packet  Commonly known as:  MIRALAX   17 g, Oral, 2 Times Daily      sennosides-docusate sodium 8.6-50 MG tablet  Commonly known as:  SENOKOT-S   2 tablets, Oral, 2 Times Daily         Continue These Medications      Instructions Start Date   ARMOUR THYROID 30 MG tablet  Generic drug:  Thyroid   No dose, route, or frequency recorded.      aspirin 81 MG tablet   81 mg, Oral, PT TO STOP PER MD INSTRUCTION      atenolol 25 MG tablet  Commonly known as:  TENORMIN   25 mg, Oral, Daily, TAKES AT 1500      atorvastatin 20 MG tablet  Commonly known as:  LIPITOR   20 mg, Oral, Nightly      BENADRYL ALLERGY CHILDRENS 12.5 MG chewable tablet  Generic drug:  diphenhydrAMINE   25 mg, Oral, 4 Times Daily PRN      benzonatate 100 MG capsule  Commonly known as:  TESSALON   100 mg, Oral, 2 Times Daily PRN      CALCIUM CITRATE + D PO   1 tablet, Oral, Nightly, ON HOLD FOR SX.      cephalexin 500 MG capsule  Commonly known as:  KEFLEX   500 mg, Oral, Daily, MRSA SUPRESSION      EPIPEN 2-TOMMY 0.3 MG/0.3ML solution auto-injector injection  Generic drug:  EPINEPHrine   No dose, route, or frequency recorded.      fexofenadine 180 MG tablet  Commonly known as:  ALLEGRA   180 mg, Oral, Daily      fluticasone 110 MCG/ACT inhaler  Commonly known as:  FLOVENT HFA   1 puff, Inhalation, 2 Times Daily - RT      fluticasone 50 MCG/ACT nasal spray  Commonly known as:  FLONASE   2 sprays, Nasal, 2 Times Daily      lansoprazole 30 MG capsule  Commonly known as:  PREVACID   30 mg, Oral, 2 Times Daily      loteprednol 0.2 % suspension  Commonly known as:  LOTEMAX   1 drop, Both Eyes, 2 Times Daily       meclizine 25 MG tablet  Commonly known as:  ANTIVERT   25 mg, Oral, 3 Times Daily PRN      Melatonin 10 MG tablet   1 tablet, Oral, Nightly, ON HOLD FOR SX.      metroNIDAZOLE 0.75 % gel  Commonly known as:  METROGEL   Topical, 2 Times Daily PRN      mometasone 220 MCG/INH inhaler  Commonly known as:  ASMANEX   2 puffs, Inhalation, 2 Times Daily PRN      mometasone 50 MCG/ACT nasal spray  Commonly known as:  NASONEX   2 sprays, Nasal, Daily      montelukast 10 MG tablet  Commonly known as:  SINGULAIR   10 mg, Oral, Nightly      MULTIVITAMIN ADULT PO   1 tablet, Oral, Nightly, ON HOLD FOR SX.      pilocarpine 5 MG tablet  Commonly known as:  SALAGEN   5 mg, Oral, 3 Times Daily      POTASSIUM CHLORIDE PO   99 mg, Oral, Nightly, ON HOLD FOR SX.      PROBIOTIC DAILY PO   1 capsule, Oral, 2 Times Daily, ON HOLD FOR SX.      PROLIA SC   Subcutaneous, See Admin Instructions, Every 6 months   4/5/19 WAS LAST DOSE      vitamin B-6 100 MG tablet  Commonly known as:  PYRIDOXINE   100 mg, Oral, 2 Times Daily, ON HOLD FOR SX.      XOPENEX HFA 45 MCG/ACT inhaler  Generic drug:  levalbuterol   2 puffs, Inhalation, Every 4 Hours PRN               Past Medical History:   Diagnosis Date   • Anemia     CHRONIC IRON DEFICIENT   • Asthma    • Cancer (CMS/HCC)     right breast cancer   • Chronic kidney disease    • DDD (degenerative disc disease), lumbar    • Disease of thyroid gland    • Dry eye    • GERD (gastroesophageal reflux disease)    • Hard to intubate     REQUESTING TO SEE [facility] 8/22/19; PT HAS COMPLETE BILATERAL JAW PROSTESIS   • Headache     or migraines   • Heart palpitations    • History of breast cancer     RIGHT   • History of transfusion    • Hx of renal calculi    • Hypercholesteremia    • MRSA (methicillin resistant Staphylococcus aureus)     LEFT JAW @[facility] JOINT, MULT. ,LAST 2013, TREATED AT [facility], infection control notified 8/24/16. 1300   • Osteoarthritis    • Osteoporosis    • PONV (postoperative nausea  and vomiting)    • Rosacea    • Sleep apnea     C PAP   • Spinal headache    • Spinal stenosis    • Vertigo    • Wound dehiscence     HX. STATES CLOSED        Past Surgical History:   Procedure Laterality Date   • ABSCESS DRAINAGE      abscess removed from left jaw x2   • APPENDECTOMY  1965   • AVULSION TOENAIL PLATE      6 REMOVED   • BILATERAL BREAST REDUCTION Left 8/29/2016    Procedure: LT BREAST REDUCTION ;  Surgeon: Luis Zambrano MD;  Location: [facility];  Service:    • CHOLECYSTECTOMY  09/2000   • CYST REMOVAL  03/1984    eye cyst left lower lid   • CYST REMOVAL  01/2007    right upper thigh   • CYSTOSCOPY      x2  02/2013 & 04/2014   • HYSTERECTOMY  1971    LATER BSO   • KNEE ARTHROPLASTY Right 03/2005   • LUMBAR DISCECTOMY FUSION INSTRUMENTATION N/A 9/9/2019    Procedure: Lumbar 3 to sacral 1 laminectomy and fusion with instrumentation;  Surgeon: Vikas Couch MD;  Location: [facility];  Service: Orthopedic Spine   • MASTECTOMY Right 03/2015   • MASTECTOMY PARTIAL / LUMPECTOMY Right 03/2009   • NIPPLE RECONSTRUCTION Right 8/29/2016    Procedure: RT NIPPLE RECONSTRUCTION;  Surgeon: Luis Zambrano MD;  Location: [facility];  Service:    • OTHER SURGICAL HISTORY      laparoscopy adhesions right side x2   • OTHER SURGICAL HISTORY      drain mammosite area   • OTHER SURGICAL HISTORY  01/2010    pressure washed prosthesis area left jaw   • OTHER SURGICAL HISTORY      drain mammosite area right breast 09/2011   • RECONSTRUCTION BREAST W/ TRAM FLAP Right 02/2016   • ROTATOR CUFF REPAIR Left 09/2014   • TEMPOROMANDIBULAR JOINT ARTHROPLASTY Bilateral 1993    MULT. REPLACEMENTS LATER   • TONSILLECTOMY  1952        Social History     Occupational History   • Not on file   Tobacco Use   • Smoking status: Never Smoker   • Smokeless tobacco: Never Used   Substance and Sexual Activity   • Alcohol use: Yes     Alcohol/week: 1.2 oz     Types: 2 Shots of liquor per week     Comment: 2 drinks per week   •  Drug use: No   • Sexual activity: Defer      Social History     Social History Narrative   • Not on file        Family History   Problem Relation Age of Onset   • Hypertension Other    • Cancer Other         breast   • Malig Hyperthermia Neg Hx          Physical Exam: 73 y.o. female  General Appearance:    Alert, cooperative, in no acute distress                      Vitals:    09/12/19 1937 09/13/19 0443 09/13/19 0904 09/13/19 1255   BP: 114/76 116/72 115/77 117/78   BP Location: Left arm Left arm Left arm Left arm   Patient Position: Lying Lying Lying Lying   Pulse: 95 88 108 107   Resp: 16 18 18 18   Temp: 99.4 °F (37.4 °C) 98.7 °F (37.1 °C) 98.5 °F (36.9 °C) 97.8 °F (36.6 °C)   TempSrc: Oral Oral Oral Oral   SpO2:   96% 97%   Weight:       Height:            DIAGNOSTIC TESTS:   Admission on 09/09/2019, Discharged on 09/13/2019   Component Date Value Ref Range Status   • ABO Type 09/09/2019 A   Final   • RH type 09/09/2019 Positive   Final   • Antibody Screen 09/09/2019 Negative   Final   • T&S Expiration Date 09/09/2019 9/12/2019 11:59:59 PM   Final   • Hemoglobin 09/09/2019 10.6* 12.0 - 15.9 g/dL Final   • Hematocrit 09/09/2019 34.6  34.0 - 46.6 % Final   • Hemoglobin 09/10/2019 9.8* 12.0 - 15.9 g/dL Final   • Hematocrit 09/10/2019 30.5* 34.0 - 46.6 % Final   • Iron 09/10/2019 18* 37 - 145 mcg/dL Final   • Iron Saturation 09/10/2019 8* 20 - 50 % Final   • Transferrin 09/10/2019 156* 200 - 360 mg/dL Final   • TIBC 09/10/2019 232* 298 - 536 mcg/dL Final   • Ferritin 09/11/2019 876.00* 13.00 - 150.00 ng/mL Final   • WBC 09/11/2019 17.26* 3.40 - 10.80 10*3/mm3 Final   • RBC 09/11/2019 3.03* 3.77 - 5.28 10*6/mm3 Final   • Hemoglobin 09/11/2019 9.0* 12.0 - 15.9 g/dL Final   • Hematocrit 09/11/2019 29.1* 34.0 - 46.6 % Final   • MCV 09/11/2019 96.0  79.0 - 97.0 fL Final   • MCH 09/11/2019 29.7  26.6 - 33.0 pg Final   • MCHC 09/11/2019 30.9* 31.5 - 35.7 g/dL Final   • RDW 09/11/2019 14.0  12.3 - 15.4 % Final   • RDW-SD  09/11/2019 49.4  37.0 - 54.0 fl Final   • MPV 09/11/2019 11.9  6.0 - 12.0 fL Final   • Platelets 09/11/2019 213  140 - 450 10*3/mm3 Final   • Glucose 09/11/2019 141* 65 - 99 mg/dL Final   • BUN 09/11/2019 11  8 - 23 mg/dL Final   • Creatinine 09/11/2019 0.76  0.57 - 1.00 mg/dL Final   • Sodium 09/11/2019 135* 136 - 145 mmol/L Final   • Potassium 09/11/2019 4.4  3.5 - 5.2 mmol/L Final   • Chloride 09/11/2019 106  98 - 107 mmol/L Final   • CO2 09/11/2019 16.0* 22.0 - 29.0 mmol/L Final   • Calcium 09/11/2019 8.3* 8.6 - 10.5 mg/dL Final   • eGFR Non African Amer 09/11/2019 75  >60 mL/min/1.73 Final   • BUN/Creatinine Ratio 09/11/2019 14.5  7.0 - 25.0 Final   • Anion Gap 09/11/2019 13.0  5.0 - 15.0 mmol/L Final   • Glucose 09/12/2019 130  70 - 130 mg/dL Final   • WBC 09/12/2019 14.73* 3.40 - 10.80 10*3/mm3 Final   • RBC 09/12/2019 3.05* 3.77 - 5.28 10*6/mm3 Final   • Hemoglobin 09/12/2019 9.1* 12.0 - 15.9 g/dL Final   • Hematocrit 09/12/2019 29.4* 34.0 - 46.6 % Final   • MCV 09/12/2019 96.4  79.0 - 97.0 fL Final   • MCH 09/12/2019 29.8  26.6 - 33.0 pg Final   • MCHC 09/12/2019 31.0* 31.5 - 35.7 g/dL Final   • RDW 09/12/2019 13.8  12.3 - 15.4 % Final   • RDW-SD 09/12/2019 49.2  37.0 - 54.0 fl Final   • MPV 09/12/2019 11.4  6.0 - 12.0 fL Final   • Platelets 09/12/2019 199  140 - 450 10*3/mm3 Final   • Glucose 09/12/2019 128* 65 - 99 mg/dL Final   • BUN 09/12/2019 10  8 - 23 mg/dL Final   • Creatinine 09/12/2019 0.84  0.57 - 1.00 mg/dL Final   • Sodium 09/12/2019 139  136 - 145 mmol/L Final   • Potassium 09/12/2019 3.6  3.5 - 5.2 mmol/L Final   • Chloride 09/12/2019 105  98 - 107 mmol/L Final   • CO2 09/12/2019 22.0  22.0 - 29.0 mmol/L Final   • Calcium 09/12/2019 8.7  8.6 - 10.5 mg/dL Final   • eGFR Non  Amer 09/12/2019 67  >60 mL/min/1.73 Final   • BUN/Creatinine Ratio 09/12/2019 11.9  7.0 - 25.0 Final   • Anion Gap 09/12/2019 12.0  5.0 - 15.0 mmol/L Final   • WBC 09/13/2019 16.05* 3.40 - 10.80 10*3/mm3 Final   •  RBC 09/13/2019 3.17* 3.77 - 5.28 10*6/mm3 Final   • Hemoglobin 09/13/2019 9.5* 12.0 - 15.9 g/dL Final   • Hematocrit 09/13/2019 30.3* 34.0 - 46.6 % Final   • MCV 09/13/2019 95.6  79.0 - 97.0 fL Final   • MCH 09/13/2019 30.0  26.6 - 33.0 pg Final   • MCHC 09/13/2019 31.4* 31.5 - 35.7 g/dL Final   • RDW 09/13/2019 13.6  12.3 - 15.4 % Final   • RDW-SD 09/13/2019 47.7  37.0 - 54.0 fl Final   • MPV 09/13/2019 11.6  6.0 - 12.0 fL Final   • Platelets 09/13/2019 217  140 - 450 10*3/mm3 Final   • Glucose 09/13/2019 115* 65 - 99 mg/dL Final   • BUN 09/13/2019 9  8 - 23 mg/dL Final   • Creatinine 09/13/2019 0.73  0.57 - 1.00 mg/dL Final   • Sodium 09/13/2019 137  136 - 145 mmol/L Final   • Potassium 09/13/2019 3.9  3.5 - 5.2 mmol/L Final   • Chloride 09/13/2019 103  98 - 107 mmol/L Final   • CO2 09/13/2019 20.8* 22.0 - 29.0 mmol/L Final   • Calcium 09/13/2019 8.7  8.6 - 10.5 mg/dL Final   • eGFR Non African Amer 09/13/2019 78  >60 mL/min/1.73 Final   • BUN/Creatinine Ratio 09/13/2019 12.3  7.0 - 25.0 Final   • Anion Gap 09/13/2019 13.2  5.0 - 15.0 mmol/L Final   • Color, UA 09/13/2019 Yellow  Yellow, Straw Final   • Appearance, UA 09/13/2019 Clear  Clear Final   • pH, UA 09/13/2019 6.5  5.0 - 8.0 Final   • Specific Gravity, UA 09/13/2019 1.007  1.005 - 1.030 Final   • Glucose, UA 09/13/2019 Negative  Negative Final   • Ketones, UA 09/13/2019 Negative  Negative Final   • Bilirubin, UA 09/13/2019 Negative  Negative Final   • Blood, UA 09/13/2019 Negative  Negative Final   • Protein, UA 09/13/2019 Negative  Negative Final   • Leuk Esterase, UA 09/13/2019 Negative  Negative Final   • Nitrite, UA 09/13/2019 Negative  Negative Final   • Urobilinogen, UA 09/13/2019 0.2 E.U./dL  0.2 - 1.0 E.U./dL Final       No results found.    Hospital Course:  73 y.o. female admitted to [facility] to services of No att. providers found with Spondylolisthesis of lumbar region [M43.16]  Spinal stenosis of lumbar region with neurogenic  claudication [M48.062]  Spondylolisthesis of lumbar region [M43.16] on 9/9/2019 and underwent Lumbar 3 to sacral 1 laminectomy and fusion with instrumentation  Per No att. providers found. Antibiotic and VTE prophylaxis were per SCIP protocols.  The patient was admitted to the floor where IV and/or oral pain medications were administered for postoperative pain.  At discharge the incisional pain was tolerable and preop neurologic function was intact.  The dressing was dry and the wound was clean.    Condition on Discharge:  Stable    Discharge Instructions:   1.  Patient may weight bear as tolerated unless otherwise specified.   2.  May use ice to back incision as needed.   3.  Patient also instructed on incentive spirometer during hospitalization and encouraged to continue to use at home regularly.   4.  Dressing is waterproof and should remain on and intact until seen in the office at 1st PO visit.  Patient has been instructed to contact the office if dressing becomes loose or saturated.   5.  Patient may shower on POD #3 if and when all wound drainage has stopped.    6.  Back brace should be worn when up and about.  It need not be worn to the bathroom and certainly not in the shower.   7.  Patient is to avoid forward bending and twisting at the waist.  No lifting greater than 5 pounds.         A detailed list of instructions specific to the operation was given to the patient at the time of discharge.    Follow up Instructions:  Follow up in the office with Dr. Vikas Couch Jr. in 2-3 weeks - patient to call the office at [phone] to schedule. Prescriptions were given for pain medication.    Follow-up Appointments  Future Appointments   Date Time Provider Department Center   9/25/2019 10:50 AM Vikas Couch MD [facility] [address] None     Additional Instructions for the Follow-ups that You Need to Schedule     Discharge Follow-up with Specialty: Orthopedics; 2 Weeks   As directed      Specialty:  Orthopedics     Follow Up:  2 Weeks    Follow Up Details:  Return to the office to see Dr. Vikas Couch               Discharge Disposition Plan:today to home    Date: 9/16/2019    Vikas Couch MD  09/16/19  9:37 AM

## 2019-09-18 ENCOUNTER — HOSPITAL ENCOUNTER (OUTPATIENT)
Dept: OTHER | Facility: HOSPITAL | Age: 74
Discharge: HOME | End: 2019-09-18
Attending: FAMILY MEDICINE

## 2019-09-18 ENCOUNTER — TELEPHONE (OUTPATIENT)
Dept: ORTHOPEDIC SURGERY | Facility: CLINIC | Age: 74
End: 2019-09-18

## 2019-09-18 ENCOUNTER — OFFICE VISIT CONVERTED (OUTPATIENT)
Dept: FAMILY MEDICINE CLINIC | Age: 74
End: 2019-09-18
Attending: FAMILY MEDICINE

## 2019-09-18 DIAGNOSIS — M48.062 SPINAL STENOSIS OF LUMBAR REGION WITH NEUROGENIC CLAUDICATION: ICD-10-CM

## 2019-09-18 DIAGNOSIS — M43.16 SPONDYLOLISTHESIS OF LUMBAR REGION: Primary | ICD-10-CM

## 2019-09-18 LAB
C DIFF TOX B STL QL CT TISS CULT: NEGATIVE
CONV 027 TOXIN: NEGATIVE

## 2019-09-18 NOTE — DISCHARGE PLACEMENT REQUEST
"Tacos Baez (73 y.o. Female)     Date of Birth Social Security Number Address Home Phone MRN    1945  [address] [place] [address] [phone] [id number]    Restorationist Marital Status          Mormonism        Admission Date Admission Type Admitting Provider Attending Provider Department, Room/Bed    9/9/19 Elective Vikas Couch MD  [address], P597/1    Discharge Date Discharge Disposition Discharge Destination        9/13/2019 Home or Self Care              Attending Provider:  (none)   Allergies:  Bee Venom, Latex, Codeine, Cubicin [Daptomycin], Darvon [Propoxyphene], Demerol [Meperidine], Ditropan [Oxybutynin], Duricef [Cefadroxil], Erythromycin, Hydrocodone, Iodine, Methylprednisolone, Minocycline, Morphine And Related, Penicillins, Sulfa Antibiotics, Sulphadimidine [Sulfamethazine], Talwin [Pentazocine], Tramadol Hcl, Valium [Diazepam], Adhesive Tape, Betadine [Povidone Iodine], Contrast Dye, Neomycin-polymyxin-gramicidin, Neosporin Af [Miconazole Nitrate]    Isolation:  None   Infection:  MRSA/History Only (09/09/19)   Code Status:  Prior    Ht:  168.9 cm (66.5\")   Wt:  78.9 kg (174 lb)    Admission Cmt:  None   Principal Problem:  Spinal stenosis of lumbar region with neurogenic claudication [M48.062] More...                 Active Insurance as of 9/9/2019     Primary Coverage     Payor Plan Insurance Group Employer/Plan Group    MEDICARE MEDICARE A & B      Payor Plan Address Payor Plan Phone Number Payor Plan Fax Number Effective Dates    [address] [phone]  12/1/2011 - None Entered    [facility] [id number]       Subscriber Name Subscriber Birth Date Member ID       TACOS BAEZ 1945 [id number]           Secondary Coverage     Payor Plan Insurance Group Employer/Plan Group     FOR LIFE  FOR LIFE  SUPP      Payor Plan Address Payor Plan Phone Number Payor Plan Fax Number Effective Dates    [address] " [phone]  2/19/2016 - None Entered    [facility] [address]       Subscriber Name Subscriber Birth Date Member ID       VERNELL SANDERS 1945 [id number]                 Emergency Contacts      (Rel.) Home Phone Work Phone Mobile Phone    SashaJj (Spouse) -- -- [phone]

## 2019-09-20 LAB — BACTERIA SPEC AEROBE CULT: NORMAL

## 2019-09-25 ENCOUNTER — READMISSION MANAGEMENT (OUTPATIENT)
Dept: CALL CENTER | Facility: HOSPITAL | Age: 74
End: 2019-09-25

## 2019-09-25 ENCOUNTER — OFFICE VISIT (OUTPATIENT)
Dept: ORTHOPEDIC SURGERY | Facility: CLINIC | Age: 74
End: 2019-09-25

## 2019-09-25 VITALS — BODY MASS INDEX: 27.64 KG/M2 | HEIGHT: 66 IN | WEIGHT: 172 LBS | TEMPERATURE: 97.3 F

## 2019-09-25 DIAGNOSIS — M54.50 LUMBAR BACK PAIN: Primary | ICD-10-CM

## 2019-09-25 PROCEDURE — 99024 POSTOP FOLLOW-UP VISIT: CPT | Performed by: ORTHOPAEDIC SURGERY

## 2019-09-25 PROCEDURE — 72100 X-RAY EXAM L-S SPINE 2/3 VWS: CPT | Performed by: ORTHOPAEDIC SURGERY

## 2019-09-25 RX ORDER — MIRABEGRON 25 MG/1
25 TABLET, FILM COATED, EXTENDED RELEASE ORAL DAILY
COMMUNITY
Start: 2019-09-19 | End: 2022-05-18 | Stop reason: SDUPTHER

## 2019-09-25 RX ORDER — DIPHENOXYLATE HYDROCHLORIDE AND ATROPINE SULFATE 2.5; .025 MG/1; MG/1
1 TABLET ORAL DAILY
Refills: 1 | COMMUNITY
Start: 2019-09-18 | End: 2022-05-18 | Stop reason: SDUPTHER

## 2019-09-25 NOTE — OUTREACH NOTE
Medical Week 2 Survey      Responses   Facility patient discharged from?  [facility]   Does the patient have one of the following disease processes/diagnoses(primary or secondary)?  Other   Week 2 attempt successful?  Yes   Call start time  1203   Discharge diagnosis  spinal stenosis of lumbar region    Call end time  1205   Meds reviewed with patient/caregiver?  Yes   Is the patient taking all medications as directed (includes completed medication regime)?  Yes   Does the patient have a primary care provider?   Yes   Has the patient kept scheduled appointments due by today?  Yes   What is the patient's perception of their health status since discharge?  Improving   Is the patient/caregiver able to teach back the hierarchy of who to call/visit for symptoms/problems? PCP, Specialist, Home health nurse, Urgent Care, ED, 911  Yes   Additional teach back comments  Pt says she is doing really well, she just had her 2 week follow up with Dr. Couch, no questions or concerns at this time.   Week 2 Call Completed?  Yes   Revoked  No further contact(revokes)-requires comment   Graduated/Revoked comments  Pt says she is doing so well and does not think follow up calls are needed.          Rachana Nichole RN

## 2019-10-09 ENCOUNTER — TELEPHONE (OUTPATIENT)
Dept: ORTHOPEDIC SURGERY | Facility: CLINIC | Age: 74
End: 2019-10-09

## 2019-10-09 DIAGNOSIS — Z98.1 S/P LUMBAR SPINAL FUSION: Primary | ICD-10-CM

## 2019-11-04 ENCOUNTER — OFFICE VISIT CONVERTED (OUTPATIENT)
Dept: FAMILY MEDICINE CLINIC | Age: 74
End: 2019-11-04
Attending: FAMILY MEDICINE

## 2019-11-06 ENCOUNTER — OFFICE VISIT (OUTPATIENT)
Dept: ORTHOPEDIC SURGERY | Facility: CLINIC | Age: 74
End: 2019-11-06

## 2019-11-06 VITALS — HEIGHT: 66 IN | BODY MASS INDEX: 27 KG/M2 | TEMPERATURE: 97.4 F | WEIGHT: 168 LBS

## 2019-11-06 DIAGNOSIS — Z98.1 S/P LUMBAR FUSION: Primary | ICD-10-CM

## 2019-11-06 PROCEDURE — 99024 POSTOP FOLLOW-UP VISIT: CPT | Performed by: ORTHOPAEDIC SURGERY

## 2019-11-06 PROCEDURE — 72100 X-RAY EXAM L-S SPINE 2/3 VWS: CPT | Performed by: ORTHOPAEDIC SURGERY

## 2019-11-06 RX ORDER — HYOSCYAMINE SULFATE EXTENDED-RELEASE 0.38 MG/1
TABLET ORAL
Refills: 11 | COMMUNITY
Start: 2019-10-24 | End: 2021-06-08

## 2020-01-07 ENCOUNTER — OFFICE VISIT (OUTPATIENT)
Dept: ORTHOPEDIC SURGERY | Facility: CLINIC | Age: 75
End: 2020-01-07

## 2020-01-07 VITALS — HEIGHT: 66 IN | TEMPERATURE: 97.6 F | BODY MASS INDEX: 26.36 KG/M2 | WEIGHT: 164 LBS

## 2020-01-07 DIAGNOSIS — Z98.1 S/P LUMBAR FUSION: Primary | ICD-10-CM

## 2020-01-07 PROCEDURE — 99213 OFFICE O/P EST LOW 20 MIN: CPT | Performed by: ORTHOPAEDIC SURGERY

## 2020-01-07 PROCEDURE — 72100 X-RAY EXAM L-S SPINE 2/3 VWS: CPT | Performed by: ORTHOPAEDIC SURGERY

## 2020-01-07 RX ORDER — MELOXICAM 15 MG/1
TABLET ORAL
COMMUNITY
Start: 2020-01-06 | End: 2021-06-08 | Stop reason: SDUPTHER

## 2020-01-07 NOTE — PROGRESS NOTES
4 months out and doing very well.  No new complaints no leg pain minimal back pain doing therapy.  Good strength on examination.  Posture is excellent.  2 view x-rays lumbar spine suggest healing fusion compared to prior films.  Doing well instructions given follow-up PRN

## 2020-04-16 ENCOUNTER — OFFICE VISIT CONVERTED (OUTPATIENT)
Dept: FAMILY MEDICINE CLINIC | Age: 75
End: 2020-04-16
Attending: FAMILY MEDICINE

## 2020-05-04 ENCOUNTER — OFFICE VISIT CONVERTED (OUTPATIENT)
Dept: FAMILY MEDICINE CLINIC | Age: 75
End: 2020-05-04
Attending: FAMILY MEDICINE

## 2020-05-19 ENCOUNTER — CONVERSION ENCOUNTER (OUTPATIENT)
Dept: FAMILY MEDICINE CLINIC | Age: 75
End: 2020-05-19

## 2020-05-19 ENCOUNTER — HOSPITAL ENCOUNTER (OUTPATIENT)
Dept: OTHER | Facility: HOSPITAL | Age: 75
Discharge: HOME | End: 2020-05-19
Attending: FAMILY MEDICINE

## 2020-05-22 LAB — SARS-COV-2 RNA SPEC QL NAA+PROBE: NOT DETECTED

## 2020-06-02 ENCOUNTER — OFFICE VISIT CONVERTED (OUTPATIENT)
Dept: FAMILY MEDICINE CLINIC | Age: 75
End: 2020-06-02
Attending: FAMILY MEDICINE

## 2020-06-02 ENCOUNTER — OFFICE VISIT (OUTPATIENT)
Dept: ORTHOPEDIC SURGERY | Facility: CLINIC | Age: 75
End: 2020-06-02

## 2020-06-02 VITALS — HEIGHT: 66 IN | TEMPERATURE: 98.1 F | BODY MASS INDEX: 26.68 KG/M2 | WEIGHT: 166 LBS

## 2020-06-02 DIAGNOSIS — Z98.1 S/P LUMBAR FUSION: Primary | ICD-10-CM

## 2020-06-02 PROCEDURE — 72100 X-RAY EXAM L-S SPINE 2/3 VWS: CPT | Performed by: ORTHOPAEDIC SURGERY

## 2020-06-02 PROCEDURE — 99213 OFFICE O/P EST LOW 20 MIN: CPT | Performed by: ORTHOPAEDIC SURGERY

## 2020-06-02 NOTE — PROGRESS NOTES
She is 9 months out and doing very well.  Some back pain but no leg pain.  Good strength on examination.  Slight stoop to her posture.  2 view x-rays suggest a solid fusion at this time with further right remodeling compared to prior films..  I told her I think she no longer needs to use the stimulator, which she has been using religiously.  She may follow-up as needed.

## 2020-06-03 ENCOUNTER — HOSPITAL ENCOUNTER (OUTPATIENT)
Dept: OTHER | Facility: HOSPITAL | Age: 75
Discharge: HOME | End: 2020-06-03
Attending: FAMILY MEDICINE

## 2020-06-03 LAB
ALBUMIN SERPL-MCNC: 4.1 G/DL (ref 3.5–5)
ALBUMIN/GLOB SERPL: 1.5 {RATIO} (ref 1.4–2.6)
ALP SERPL-CCNC: 71 U/L (ref 43–160)
ALT SERPL-CCNC: 22 U/L (ref 10–40)
ANION GAP SERPL CALC-SCNC: 16 MMOL/L (ref 8–19)
APPEARANCE UR: CLEAR
AST SERPL-CCNC: 23 U/L (ref 15–50)
BACTERIA UR QL AUTO: NORMAL
BASOPHILS # BLD MANUAL: 0.07 10*3/UL (ref 0–0.2)
BASOPHILS NFR BLD MANUAL: 0.7 % (ref 0–3)
BILIRUB SERPL-MCNC: 0.28 MG/DL (ref 0.2–1.3)
BILIRUB UR QL: NEGATIVE
BUN SERPL-MCNC: 19 MG/DL (ref 5–25)
BUN/CREAT SERPL: 19 {RATIO} (ref 6–20)
CALCIUM SERPL-MCNC: 9 MG/DL (ref 8.7–10.4)
CASTS URNS QL MICRO: NORMAL /[LPF]
CHLORIDE SERPL-SCNC: 107 MMOL/L (ref 99–111)
COLOR UR: YELLOW
CONV CO2: 21 MMOL/L (ref 22–32)
CONV LEUKOCYTE ESTERASE: NEGATIVE
CONV TOTAL PROTEIN: 6.8 G/DL (ref 6.3–8.2)
CONV UROBILINOGEN IN URINE BY AUTOMATED TEST STRIP: 0.2 {EHRLICHU}/DL (ref 0.1–1)
CREAT UR-MCNC: 1.02 MG/DL (ref 0.5–0.9)
DEPRECATED RDW RBC AUTO: 44.4 FL
EOSINOPHIL # BLD MANUAL: 0.34 10*3/UL (ref 0–0.7)
EOSINOPHIL NFR BLD MANUAL: 3.6 % (ref 0–7)
EPI CELLS #/AREA URNS HPF: NORMAL /[HPF]
ERYTHROCYTE [DISTWIDTH] IN BLOOD BY AUTOMATED COUNT: 12.8 % (ref 11.5–14.5)
GFR SERPLBLD BASED ON 1.73 SQ M-ARVRAT: 54 ML/MIN/{1.73_M2}
GLOBULIN UR ELPH-MCNC: 2.7 G/DL (ref 2–3.5)
GLUCOSE 24H UR-MCNC: NEGATIVE MG/DL
GLUCOSE SERPL-MCNC: 99 MG/DL (ref 65–99)
GRANS (ABSOLUTE): 5.55 10*3/UL (ref 2–8)
GRANS: 58.2 % (ref 30–85)
HBA1C MFR BLD: 11.4 G/DL (ref 12–16)
HCT VFR BLD AUTO: 35.2 % (ref 37–47)
HGB UR QL STRIP: NEGATIVE
IMM GRANULOCYTES # BLD: 0.03 10*3/UL (ref 0–0.54)
IMM GRANULOCYTES NFR BLD: 0.3 % (ref 0–0.43)
KETONES UR QL STRIP: NEGATIVE MG/DL
LYMPHOCYTES # BLD MANUAL: 2.86 10*3/UL (ref 1–5)
LYMPHOCYTES NFR BLD MANUAL: 7.2 % (ref 3–10)
MCH RBC QN AUTO: 30.6 PG (ref 27–31)
MCHC RBC AUTO-ENTMCNC: 32.4 G/DL (ref 33–37)
MCV RBC AUTO: 94.6 FL (ref 81–99)
MONOCYTES # BLD AUTO: 0.69 10*3/UL (ref 0.2–1.2)
MUCOUS THREADS URNS QL MICRO: NORMAL
NITRITE UR-MCNC: NEGATIVE MG/ML
OSMOLALITY SERPL CALC.SUM OF ELEC: 290 MOSM/KG (ref 273–304)
PH UR STRIP.AUTO: 6 [PH] (ref 5–8)
PLATELET # BLD AUTO: 256 10*3/UL (ref 130–400)
PMV BLD AUTO: 10.6 FL (ref 7.4–10.4)
POTASSIUM SERPL-SCNC: 4.8 MMOL/L (ref 3.5–5.3)
PROT UR-MCNC: NEGATIVE MG/DL
RBC # BLD AUTO: 3.72 10*6/UL (ref 4.2–5.4)
RBC # BLD AUTO: NORMAL /[HPF]
SODIUM SERPL-SCNC: 139 MMOL/L (ref 135–147)
SP GR UR STRIP: 1.01 (ref 1–1.03)
SPECIMEN SOURCE: NORMAL
T4 FREE SERPL-MCNC: 0.9 NG/DL (ref 0.9–1.8)
TSH SERPL-ACNC: 4.66 M[IU]/L (ref 0.27–4.2)
UNIDENT CRYS URNS QL MICRO: NORMAL /[HPF]
VARIANT LYMPHS NFR BLD MANUAL: 30 % (ref 20–45)
WBC # BLD AUTO: 9.54 10*3/UL (ref 4.8–10.8)
WBC #/AREA URNS HPF: NORMAL /[HPF]

## 2020-06-04 LAB
ASO AB SERPL-ACNC: 47 [IU]/ML (ref 0–200)
CONV RHEUMATOID FACTOR IGM: <10 [IU]/ML (ref 0–14)
CRP SERPL-MCNC: 9.1 MG/L (ref 0–5)
DSDNA AB SER-ACNC: NEGATIVE [IU]/ML
ENA AB SER IA-ACNC: NEGATIVE {RATIO}
ERYTHROCYTE [SEDIMENTATION RATE] IN BLOOD: 22 MM/H (ref 0–30)
PHOSPHATE SERPL-MCNC: 3 MG/DL (ref 2.4–4.5)
URATE SERPL-MCNC: 6.1 MG/DL (ref 2.5–7.5)

## 2020-06-08 LAB
BACTERIA BLD CULT: NORMAL
BACTERIA BLD CULT: NORMAL

## 2020-06-23 ENCOUNTER — HOSPITAL ENCOUNTER (OUTPATIENT)
Dept: OTHER | Facility: HOSPITAL | Age: 75
Discharge: HOME | End: 2020-06-23
Attending: FAMILY MEDICINE

## 2020-06-26 LAB
CONV EBV EARLY ANTIGEN: >150 U/ML (ref 0–10.9)
CONV EBV NUCLEAR ANTIGEN: 449 U/ML (ref 0–21.9)
CONV EPSTEIN BARR VIRAL CAPSID IGM: <10 U/ML (ref 0–43.9)
CONV EPSTEIN BARR VIRUS ANTIBODY TO VIRAL CAPSID IGG: 647 U/ML (ref 0–21.9)

## 2020-07-28 ENCOUNTER — OFFICE VISIT CONVERTED (OUTPATIENT)
Dept: FAMILY MEDICINE CLINIC | Age: 75
End: 2020-07-28
Attending: FAMILY MEDICINE

## 2020-07-29 ENCOUNTER — OFFICE VISIT CONVERTED (OUTPATIENT)
Dept: FAMILY MEDICINE CLINIC | Age: 75
End: 2020-07-29
Attending: FAMILY MEDICINE

## 2020-08-26 ENCOUNTER — HOSPITAL ENCOUNTER (OUTPATIENT)
Dept: OTHER | Facility: HOSPITAL | Age: 75
Discharge: HOME | End: 2020-08-26
Attending: FAMILY MEDICINE

## 2020-08-26 ENCOUNTER — OFFICE VISIT CONVERTED (OUTPATIENT)
Dept: FAMILY MEDICINE CLINIC | Age: 75
End: 2020-08-26
Attending: FAMILY MEDICINE

## 2020-09-02 ENCOUNTER — HOSPITAL ENCOUNTER (OUTPATIENT)
Dept: OTHER | Facility: HOSPITAL | Age: 75
Discharge: HOME | End: 2020-09-02
Attending: ORTHOPAEDIC SURGERY

## 2020-09-02 DIAGNOSIS — M25.562 CHRONIC PAIN OF LEFT KNEE: Primary | ICD-10-CM

## 2020-09-02 DIAGNOSIS — G89.29 CHRONIC PAIN OF LEFT KNEE: Primary | ICD-10-CM

## 2020-09-10 ENCOUNTER — OFFICE VISIT (OUTPATIENT)
Dept: ORTHOPEDIC SURGERY | Facility: CLINIC | Age: 75
End: 2020-09-10

## 2020-09-10 VITALS — WEIGHT: 174 LBS | HEIGHT: 66 IN | TEMPERATURE: 99.1 F | BODY MASS INDEX: 27.97 KG/M2

## 2020-09-10 DIAGNOSIS — Z96.651 HISTORY OF TOTAL KNEE ARTHROPLASTY, RIGHT: ICD-10-CM

## 2020-09-10 DIAGNOSIS — M25.562 CHRONIC PAIN OF LEFT KNEE: Primary | ICD-10-CM

## 2020-09-10 DIAGNOSIS — G89.29 CHRONIC PAIN OF LEFT KNEE: Primary | ICD-10-CM

## 2020-09-10 PROCEDURE — 99213 OFFICE O/P EST LOW 20 MIN: CPT | Performed by: ORTHOPAEDIC SURGERY

## 2020-09-10 NOTE — PROGRESS NOTES
FOLLOW UP VISIT    Patient: Valery Baez  ?  YOB: 1945    MRN: [id number]  ?  Chief Complaint   Patient presents with   • Left Knee - Follow-up   • Right Knee - Follow-up, Pain      ?  HPI:   Valery De Oliveira went to my office after she sustained an injury to the anterior aspect of both her knees.  She was walking a large dog on a leash that took off on her.  This brought her down onto both her knees.  She has significant swelling and bruising over the anterior aspect of both the legs.  The center point of this injury was the patellofemoral articulation.  The patient has had a right total knee arthroplasty 15 years ago.  Her left knee has moderate amount of osteoarthritis and she is deferring knee replacement surgery for as long as she possibly can at this point.      Pain Location: BILATERAL knee  Radiation: none  Quality: dull, aching  Intensity/Severity: mild   Duration: 4 weeks  Onset quality: sudden  Timing: intermittent  Aggravating Factors: kneeling, rising after sitting  Alleviating Factors: NSAIDs  Previous Episodes: none mentioned  Associated Symptoms: pain, swelling  ADLs Affected: ambulating, recreational activities/sports  Previous Treatment: Total knee arthroplasty 15 years ago on the right knee.    This patient is an established patient.  This problem is not new to this examiner.      Allergies:   Allergies   Allergen Reactions   • Bee Venom Anaphylaxis   • Latex Hives   • Codeine Hives     And Derivatives   • Cubicin [Daptomycin] Itching   • Darvon [Propoxyphene] Nausea And Vomiting     CONFUSION.    • Demerol [Meperidine] Nausea And Vomiting   • Ditropan [Oxybutynin] Itching   • Duricef [Cefadroxil] Itching and Nausea Only   • Erythromycin Hives     Also allergic to Duricef   • Flexeril [Cyclobenzaprine] Other (See Comments)     Experienced side effects - blurred vision, dry mouth, constipation   • Hydrocodone Nausea And Vomiting     /Apaps    • Iodine Hives     /Iodine  "scrubbing solutions /IVP Contrast Dyes   • Methylprednisolone Unknown (See Comments)     CAUSES ITCHING AND FACIAL FLUSHING BUT PT \"CAN TAKE LOWER DOSES\"   • Minocycline Hives     Larger dose than 100mg 2X per chart    • Morphine And Related Hives and GI Intolerance     /Derivatives   • Penicillins Hives and Swelling   • Sulfa Antibiotics Hives and Nausea And Vomiting   • Sulphadimidine [Sulfamethazine] Hives   • Talwin [Pentazocine] Hives   • Tramadol Hcl Hives and Nausea And Vomiting   • Valium [Diazepam] Hives and Hallucinations     CONFUSION.    • Adhesive Tape Rash   • Betadine [Povidone Iodine] Rash   • Contrast Dye Hives and Palpitations   • Neomycin-Polymyxin-Gramicidin Rash   • Neosporin Af [Miconazole Nitrate] Rash       Medications:   Home Medications:  Current Outpatient Medications on File Prior to Visit   Medication Sig   • acetaminophen (TYLENOL) 325 MG tablet Take 2 tablets by mouth Every 4 (Four) Hours As Needed for Mild Pain .   • ARMOUR THYROID 30 MG tablet    • aspirin 81 MG tablet Take 81 mg by mouth. PT TO STOP PER MD INSTRUCTION   • atenolol (TENORMIN) 25 MG tablet Take 25 mg by mouth Daily. TAKES AT 1500   • atorvastatin (LIPITOR) 20 MG tablet Take 20 mg by mouth Every Night.   • benzonatate (TESSALON) 100 MG capsule Take 100 mg by mouth 2 (Two) Times a Day As Needed.   • Calcium Citrate-Vitamin D (CALCIUM CITRATE + D PO) Take 1 tablet by mouth Every Night. ON HOLD FOR SX.   • cephalexin (KEFLEX) 500 MG capsule Take 500 mg by mouth Daily. MRSA SUPRESSION   • cyclobenzaprine (FLEXERIL) 10 MG tablet Take 1 tablet by mouth 3 (Three) Times a Day As Needed for Muscle Spasms.   • Denosumab (PROLIA SC) Inject  under the skin into the appropriate area as directed See Admin Instructions. Every 6 months   4/5/19 WAS LAST DOSE   • diphenhydrAMINE (BENADRYL ALLERGY CHILDRENS) 12.5 MG chewable tablet Chew 25 mg 4 (Four) Times a Day As Needed for Allergies.   • diphenoxylate-atropine (LOMOTIL) 2.5-0.025 MG " per tablet Take 1 tablet by mouth 4 (Four) Times a Day As Needed.   • EPIPEN 2-TOMMY 0.3 MG/0.3ML solution auto-injector injection    • fexofenadine (ALLEGRA) 180 MG tablet Take 180 mg by mouth Daily.   • fluticasone (FLONASE) 50 MCG/ACT nasal spray 2 sprays into the nostril(s) as directed by provider 2 (Two) Times a Day.   • fluticasone (FLOVENT HFA) 110 MCG/ACT inhaler Inhale 1 puff 2 (two) times a day.   • hyoscyamine (LEVBID) 0.375 MG 12 hr tablet TAKE 1 TABLET TWICE DAILY BY MOUTH   • lansoprazole (PREVACID) 30 MG capsule Take 30 mg by mouth 2 (Two) Times a Day.   • loteprednol (LOTEMAX) 0.2 % suspension Administer 1 drop to both eyes 2 (Two) Times a Day.   • meclizine (ANTIVERT) 25 MG tablet Take 25 mg by mouth 3 (Three) Times a Day As Needed.   • Melatonin 10 MG tablet Take 1 tablet by mouth Every Night. ON HOLD FOR SX.   • meloxicam (MOBIC) 15 MG tablet    • metroNIDAZOLE (METROGEL) 0.75 % gel Apply  topically to the appropriate area as directed 2 (Two) Times a Day As Needed.   • mometasone (ASMANEX TWISTHALER) inhaler 220 mcg/inhalation Inhale 2 puffs 2 (Two) Times a Day As Needed.   • mometasone (NASONEX) 50 MCG/ACT nasal spray 2 sprays into the nostril(s) as directed by provider Daily.   • montelukast (SINGULAIR) 10 MG tablet Take 10 mg by mouth Every Night.   • Multiple Vitamins-Minerals (MULTIVITAMIN ADULT PO) Take 1 tablet by mouth Every Night. ON HOLD FOR SX.   • MYRBETRIQ 25 MG tablet sustained-release 24 hour 24 hr tablet    • pilocarpine (SALAGEN) 5 MG tablet Take 5 mg by mouth 3 (Three) Times a Day.   • polyethylene glycol (MIRALAX) pack packet Take 17 g by mouth 2 (Two) Times a Day.   • POTASSIUM CHLORIDE PO Take 99 mg by mouth Every Night. ON HOLD FOR SX.   • Probiotic Product (PROBIOTIC DAILY PO) Take 1 capsule by mouth 2 (Two) Times a Day. ON HOLD FOR SX.   • sennosides-docusate sodium (SENOKOT-S) 8.6-50 MG tablet Take 2 tablets by mouth 2 (Two) Times a Day.   • vitamin B-6 (PYRIDOXINE) 100 MG  tablet Take 100 mg by mouth 2 (Two) Times a Day. ON HOLD FOR SX.   • XOPENEX HFA 45 MCG/ACT inhaler Inhale 2 puffs Every 4 (Four) Hours As Needed.     No current facility-administered medications on file prior to visit.      Current Medications:  Scheduled Meds:  PRN Meds:.    I have reviewed the patient's medical history in detail and updated the computerized patient record.  Review and summarization of old records include:    Past Medical History:   Diagnosis Date   • Anemia     CHRONIC IRON DEFICIENT   • Asthma    • Cancer (CMS/HCC)     right breast cancer   • Chronic kidney disease    • DDD (degenerative disc disease), lumbar    • Disease of thyroid gland    • Dry eye    • GERD (gastroesophageal reflux disease)    • Hard to intubate     REQUESTING TO SEE [facility] 8/22/19; PT HAS COMPLETE BILATERAL JAW PROSTESIS   • Headache     or migraines   • Heart palpitations    • History of breast cancer     RIGHT   • History of transfusion    • Hx of renal calculi    • Hypercholesteremia    • MRSA (methicillin resistant Staphylococcus aureus)     LEFT JAW @[facility] JOINT, MULT. ,LAST 2013, TREATED AT [facility], infection control notified 8/24/16. 1300   • Osteoarthritis    • Osteoporosis    • PONV (postoperative nausea and vomiting)    • Rosacea    • Sleep apnea     C PAP   • Spinal headache    • Spinal stenosis    • Vertigo    • Wound dehiscence     HX. STATES CLOSED     Past Surgical History:   Procedure Laterality Date   • ABSCESS DRAINAGE      abscess removed from left jaw x2   • APPENDECTOMY  1965   • AVULSION TOENAIL PLATE      6 REMOVED   • BILATERAL BREAST REDUCTION Left 8/29/2016    Procedure: LT BREAST REDUCTION ;  Surgeon: Luis Zambrano MD;  Location: [facility] OR;  Service:    • CHOLECYSTECTOMY  09/2000   • CYST REMOVAL  03/1984    eye cyst left lower lid   • CYST REMOVAL  01/2007    right upper thigh   • CYSTOSCOPY      x2  02/2013 & 04/2014   • HYSTERECTOMY  1971    LATER BSO   • KNEE ARTHROPLASTY Right  03/2005   • LUMBAR DISCECTOMY FUSION INSTRUMENTATION N/A 9/9/2019    Procedure: Lumbar 3 to sacral 1 laminectomy and fusion with instrumentation;  Surgeon: Vikas Couch MD;  Location: [facility];  Service: Orthopedic Spine   • MASTECTOMY Right 03/2015   • MASTECTOMY PARTIAL / LUMPECTOMY Right 03/2009   • NIPPLE RECONSTRUCTION Right 8/29/2016    Procedure: RT NIPPLE RECONSTRUCTION;  Surgeon: Luis Zambrano MD;  Location: [facility];  Service:    • OTHER SURGICAL HISTORY      laparoscopy adhesions right side x2   • OTHER SURGICAL HISTORY      drain mammosite area   • OTHER SURGICAL HISTORY  01/2010    pressure washed prosthesis area left jaw   • OTHER SURGICAL HISTORY      drain mammosite area right breast 09/2011   • RECONSTRUCTION BREAST W/ TRAM FLAP Right 02/2016   • ROTATOR CUFF REPAIR Left 09/2014   • TEMPOROMANDIBULAR JOINT ARTHROPLASTY Bilateral 1993    MULT. REPLACEMENTS LATER   • TONSILLECTOMY  1952     Social History     Occupational History   • Not on file   Tobacco Use   • Smoking status: Never Smoker   • Smokeless tobacco: Never Used   Substance and Sexual Activity   • Alcohol use: Yes     Alcohol/week: 2.0 standard drinks     Types: 2 Shots of liquor per week     Comment: 2 drinks per week   • Drug use: No   • Sexual activity: Defer      Family History   Problem Relation Age of Onset   • Hypertension Other    • Cancer Other         breast   • Malig Hyperthermia Neg Hx          Review of Systems   Constitutional: Negative.  Negative for fever.   Eyes: Negative.    Respiratory: Negative.    Cardiovascular: Negative.    Endocrine: Negative.    Musculoskeletal: Positive for arthralgias, gait problem and joint swelling.   Skin: Negative.  Negative for rash and wound.   Allergic/Immunologic: Negative.    Neurological: Negative for numbness.   Hematological: Negative.    Psychiatric/Behavioral: Negative.           Wt Readings from Last 3 Encounters:   09/10/20 78.9 kg (174 lb)   06/02/20 75.3 kg  "(166 lb)   01/07/20 74.4 kg (164 lb)     Ht Readings from Last 3 Encounters:   09/10/20 167.6 cm (66\")   06/02/20 167.6 cm (66\")   01/07/20 167.6 cm (66\")     Body mass index is 28.08 kg/m².  Facility age limit for growth percentiles is 20 years.  Vitals:    09/10/20 1511   Temp: 99.1 °F (37.3 °C)         Physical Exam  Constitutional: Patient is oriented to person, place, and time. Appears well-developed and well-nourished.   HENT:   Head: Normocephalic and atraumatic.   Eyes: Conjunctivae and EOM are normal. Pupils are equal, round, and reactive to light.   Cardiovascular: Normal rate, regular rhythm, normal heart sounds and intact distal pulses.   Pulmonary/Chest: Effort normal and breath sounds normal.   Musculoskeletal:   See detailed exam below   Neurological: Alert and oriented to person, place, and time. No sensory deficit. Coordination normal.   Skin: Skin is warm and dry. Capillary refill takes less than 2 seconds. No rash noted. No erythema.   Psychiatric: Patient has a normal mood and affect. Her behavior is normal. Judgment and thought content normal.   Nursing note and vitals reviewed.      Ortho Exam:   Left knee (varus). Patient has crepitus throughout range of motion. Positive patellar grind test. Mild effusion. Lachman is negative. Pivot shift is negative. Anterior and posterior drawer signs are negative. Significant joint line tenderness is noted on the medial aspect of the knee. Patient has a varus orientation of the knee. There is fullness and tenderness in the Popliteal fossa. Mild distention of a Popliteal cyst is noted in this location. Range of motion in flexion is from 0-110 degrees. Neurovascular status is intact.  Dorsalis pedis and posterior tibial artery pulses are palpable. Common peroneal nerve function is well preserved. Patient's gait is cautious and antalgic. Skin and soft tissues are mildly swollen, consistent with synovitis and effusion. The patient has a significant limp with " the first few steps after starting the gait cycle. Getting out of a chair takes a lot of effort due to pain on knee flexion.     Right knee.The patient is status post total knee arthroplasty postoperative 14 year(s). Incision is clean. Calf is soft and nontender. Homans sign is negative. There is no clicking, popping or catching. Anterior and posterior drawer signs are negative.  There is no instability of the components. Appropriate amounts of swelling and bruising are noted. Dorsalis pedis and posterior tibial artery pulses are palpable. Common peroneal nerve function is well preserved. Range of motion is from 0-120 degrees of flexion. Gait is cautious but otherwise fairly normal. There is no evidence of a deep seated joint infection.         Diagnostics:  Bilateral knee xrays ap/lateral views were ordered by Dejan Lange MD and performed at [facility]. These images were independently viewed and interpreted by myself, my impression as follows:    bilateral Knee X-Ray  Indication: Pain and discomfort after an injury to the anterior aspect of both knees.  AP, Lateral views  Findings: Well-placed implants on the right knee without any subsidence.  no bony lesion  Soft tissues within normal limits  within normal limits joint spaces  Hardware appropriately positioned yes      yes prior studies available for comparison.    This patient's x-ray report was graded according to the Kellgren and Sharad classification.  This took into account the joint space narrowing, osteophyte formation, sclerosis of the distal femur/proximal tibia along with deformity of those bones.  The findings were indicative of K L grade 2 on the left knee.    X-RAY was ordered and reviewed by Dejan Lange MD      Assessment:  Valery was seen today for follow-up, follow-up and pain.    Diagnoses and all orders for this visit:    Chronic pain of left knee    History of total knee arthroplasty,  right          Procedures  ?    Plan    · Compression/brace to the knee to prevent her from buckling and giving her.  · Calcium and vitamin D for bone health.  · Falls precautions discussed with patient.  · , GI and dental procedure prophylaxis with antibiotics to prevent metastatic infection to the knee arthroplasty implants.  · Rest, ice, compression, and elevation (RICE) therapy  · Stretching and strengthening exercises of the quads and the hamstrings.  · Alternate Ibuprofen and Tylenol as needed  · Follow up as needed.     Date of encounter: 09/10/2020   Dejan Lange MD

## 2021-01-13 ENCOUNTER — HOSPITAL ENCOUNTER (OUTPATIENT)
Dept: OTHER | Facility: HOSPITAL | Age: 76
Discharge: HOME | End: 2021-01-13
Attending: INTERNAL MEDICINE

## 2021-01-29 ENCOUNTER — OFFICE VISIT CONVERTED (OUTPATIENT)
Dept: FAMILY MEDICINE CLINIC | Age: 76
End: 2021-01-29
Attending: FAMILY MEDICINE

## 2021-02-09 ENCOUNTER — HOSPITAL ENCOUNTER (OUTPATIENT)
Dept: VACCINE CLINIC | Facility: HOSPITAL | Age: 76
Discharge: HOME | End: 2021-02-09
Attending: INTERNAL MEDICINE

## 2021-04-27 ENCOUNTER — OFFICE VISIT (OUTPATIENT)
Dept: ORTHOPEDIC SURGERY | Facility: CLINIC | Age: 76
End: 2021-04-27

## 2021-04-27 VITALS — BODY MASS INDEX: 28.93 KG/M2 | WEIGHT: 180 LBS | HEIGHT: 66 IN | TEMPERATURE: 96.9 F

## 2021-04-27 DIAGNOSIS — M54.50 LOW BACK PAIN, UNSPECIFIED BACK PAIN LATERALITY, UNSPECIFIED CHRONICITY, UNSPECIFIED WHETHER SCIATICA PRESENT: ICD-10-CM

## 2021-04-27 DIAGNOSIS — Z98.1 S/P LUMBAR SPINAL FUSION: ICD-10-CM

## 2021-04-27 PROCEDURE — 99213 OFFICE O/P EST LOW 20 MIN: CPT | Performed by: ORTHOPAEDIC SURGERY

## 2021-04-27 PROCEDURE — 72100 X-RAY EXAM L-S SPINE 2/3 VWS: CPT | Performed by: ORTHOPAEDIC SURGERY

## 2021-04-29 NOTE — PROGRESS NOTES
She is a year and a half out from lumbar laminectomy and fusion was doing pretty well until recently.  She was walking the dogs and they pulled on her causing some back pain 9 out of 10 in intensity.  She has some tenderness to palpation but no palpable deformity in the back.  Strength in the legs is good.  Plain film x-rays obtained today suggest that the fusion from L3 to the sacrum is rocksolid.  Still there is just a little bit of lucency at the lumbosacral junction that could conceivably represent nonunion cleft  I am going to CT scan of the lumbar spine for closer evaluation of this possibility, possibility of sacral fracture or simply confirmation of probable solid fusion in which case there will be much else I can do.  We will call her with results and recommendations.

## 2021-05-04 ENCOUNTER — TELEPHONE (OUTPATIENT)
Dept: ORTHOPEDIC SURGERY | Facility: CLINIC | Age: 76
End: 2021-05-04

## 2021-05-04 NOTE — TELEPHONE ENCOUNTER
Caller: TACOS SANDERS    Relationship: SELF    Best call back number: [phone]    What orders are you requesting (i.e. lab or imaging): CT SCAN    In what timeframe would the patient need to come in: NEED REFERRAL SENT IN ASAP    Where will you receive your lab/imaging services: [facility] IN [place]    Additional notes: PT SAID THAT SHE IS SCHEDULED FOR A CT SCAN AT [facility] ON 5/27 BUT CAN GET IN SOONER AT [facility] IN [place], BUT THEY WON'T BE CONNECTED UNTIL June. SHE SAID THAT IF DR THIBODEAUX'S OFFICE CAN FAX THE ORDER TO THEM ASAP THEY COULD GET HER CT SCAN DONE. THEIR FAX NUMBER [phone]. PT ALSO DOES NOT WANT TO CANCEL APPT AT [facility] UNTIL SHE IS SURE OF REFERRAL GOING THROUGH AND HER APPT AT [facility] IS SCHEDULED. PT WOULD LIKE A CALL BACK ONCE REFERRAL IS SENT IN.

## 2021-05-04 NOTE — TELEPHONE ENCOUNTER
Caller: PATIENT    Relationship to patient: SELF    Best call back number: [phone]    Patient is needing: PATIENT WAS CALLING TO CANCEL HER CT SCAN ON 05/25/21 AT 2:00 PM. PATIENT WAS ABLE TO GET HER CT SCAN SCHEDULED FOR A SOONER DATE WITH [facility] IN [place] AND WANTS TO CANCEL HER CT SCAN SCHEDULED AT [facility] FOR 05/25/21. I TRIED TO WARM TRANSFER THE CALL TO THE FRONT OFFICE OR GET THE CORRECT PHONE NUMBER FOR PATIENT TO CANCEL HER CT SCAN BUT RECEIVED NO ANSWER. PLEASE ADVISE.

## 2021-05-07 ENCOUNTER — HOSPITAL ENCOUNTER (OUTPATIENT)
Dept: OTHER | Facility: HOSPITAL | Age: 76
Discharge: HOME | End: 2021-05-07
Attending: ORTHOPAEDIC SURGERY

## 2021-05-12 ENCOUNTER — TELEPHONE (OUTPATIENT)
Dept: ORTHOPEDIC SURGERY | Facility: CLINIC | Age: 76
End: 2021-05-12

## 2021-05-12 NOTE — TELEPHONE ENCOUNTER
Provider: DR. CASSI THIBODEAUX    Caller: TACOS SANDERS  Relationship to Patient: SELF    Phone Number: [phone]    Reason for Call:  PATIENT CALLING FOR LUMBAR CT RESULTS. SCANNED IN MEDIA.

## 2021-05-12 NOTE — TELEPHONE ENCOUNTER
Results were already dictated in a result note and sent out.  Maybe Lyudmila has these I do not know

## 2021-05-17 ENCOUNTER — TELEPHONE (OUTPATIENT)
Dept: ORTHOPEDICS | Facility: OTHER | Age: 76
End: 2021-05-17

## 2021-05-18 NOTE — PROGRESS NOTES
"Valery Baez  1945     Office/Outpatient Visit    Visit Date:  03:28 pm    Provider: Tino Smith MD (Assistant: Spurling, Sarah C, MA)    Location: [facility]        Electronically signed by Tino Smith MD on  2020 08:09:31 PM                             Subjective:        CC: Mrs. Baez is a 74 year old White female.  Per pt she has had a low grade fever for 2-2.5 months. She said that her low grade fever is anything over 97. She has not went over 98.9. [employer] [phone];         HPI:           Complaint of fever, unspecified..  The symptom began 3 months ago.  The severity is of mild intensity.  LOW-GRADE FEVER FOR 2-3 MONTHS.  LITTLE CHANGE WITH ABX.      ROS:     CONSTITUTIONAL:  Positive for fatigue.   Negative for chills.      EYES:  Positive for eye pain ( bilateral; FEELS LIKE EYES ARE \"BURNING\" ).   Negative for eye drainage.      E/N/T:  Negative for ear pain, nasal congestion and sore throat.      RESPIRATORY:  Negative for recent cough and dyspnea.      GASTROINTESTINAL:  Negative for abdominal pain, anorexia, nausea and vomiting.      MUSCULOSKELETAL:  Negative for myalgias.      NEUROLOGICAL:  Negative for headaches.          Past Medical History / Family History / Social History:         Last Reviewed on 2020 06:06 PM by Tino Smith    Past Medical History:                 PAST MEDICAL HISTORY         Breast cancer: dx'd in 3-;  s/p XRT; right breast;         GYNECOLOGICAL HISTORY:             CURRENT MEDICAL PROVIDERS:    Allergist    Dermatologist    E/N/T    Oncologist    Orthopedist    Urologist    GENERAL SURGEON FOR BREAST EXAMS    Infectious Disease specialist;;    Speech pathologist             ADVANCE DIRECTIVES: Living will         PREVENTIVE HEALTH MAINTENANCE             BONE DENSITY: was last done  with the following abnormality noted-- Osteoporosis     COLORECTAL CANCER SCREENING: " Up to date (colonoscopy q10y; sigmoidoscopy q5y; Cologuard q3y) was last done 2019, Results are in chart; colonoscopy with normal results     DENTAL CLEANING: was last done 2019     EYE EXAM: was last done 2019     Hepatitis C Medicare Screening: was last done 2017     MAMMOGRAM: was last done 2018 with normal results     PAP SMEAR: No longer indicated due to age and history         Surgical History:         Biopsy of breast; L/R several     Appendectomy    Cholecystectomy    Hysterectomy    Tonsillectomy/Adenoidectomy    BSO;    right breast lumpectomy 3-2009;    RIGHT MASTECTOMY;;;     Joint Replacement: R knee;     Laminectomy: thoracic region; lumbar region;     L eye surgery;    L hand surgery;    L/R TMJ replacement;    R ankle and L foot surgery; right trigger finger release nov 2017         Family History:     Father: Coronary Artery Disease; Hypertension     Mother: Sudden Cardiac Death ( 83 )     Brother(s): Sudden Cardiac Death ( 52 )     Paternal Grandmother: Breast Cancer     Maternal Grandmother: Breast Cancer         Social History:     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          Substance Abuse History:     Last Reviewed on 5/17/2020 12:19 PM by Tino Smith        Mental Health History:     Last Reviewed on 7/23/2019 06:25 PM by Junaid Amaya        Communicable Diseases (eg STDs):     Last Reviewed on 7/23/2019 06:25 PM by Junaid Amaya        Current Problems:     Last Reviewed on 6/02/2020 06:06 PM by Tino Smith    Rosacea    Sjogren's disease    Pure hypercholesterolemia, unspecified    Gastro-esophageal reflux disease without esophagitis    Allergies    History of breast cancer    Diarrhea (mild, revurrent)    Urge incontinence    MRSA colonization    Other specified hypothyroidism     Benign paroxysmal vertigo, mild-recurrent    Moderate persistent asthma, uncomplicated    Osteoporosis    Basal cell carcinoma of skin, (history of)    Deficiency of vitamin B-12    Iron deficiency    Obstructive sleep apnea (adult) (pediatric)    Fever, unspecified        Immunizations:     Prevnar 13 (Pneumococcal PCV 13) 10/27/2015    zzFluzone pf-quadrivalent 3 and up 10/27/2014    Flulaval 9/30/2010    Fluzone (3 + years dose) 9/24/2009    Fluzone (3 + years dose) 10/25/2011    Fluzone (3 + years dose) 10/25/2012    Fluzone pf (3+ years dose) 9/30/2013    Influenza A (H1N1), IM (3+ years) Monovalent 11/11/2009    Pneomovax 9/23/2008    Fluzone High-Dose pf (>=65 yr) 10/27/2015    Fluzone High-Dose pf (>=65 yr) 10/17/2016    Fluzone High-Dose pf (>=65 yr) 10/2/2017    Fluzone High-Dose pf (>=65 yr) 10/10/2018    Fluzone High-Dose pf (>=65 yr) 10/28/2019    PNEUMOVAX 23 (Pneumococcal PPV23) 0/0/2003    Adacel (Tdap) 11/2/2012    Adacel (Tdap) 8/6/2015    Zostavax (Zoster live) 9/30/2009        Allergies:     Last Reviewed on 6/02/2020 06:06 PM by Tino Smith    Prednisone:   (Adverse Reaction)    tape:      Albuterol: Shakiness  (Adverse Reaction)    Flexeril:      Cyclobenzaprine HCl:      hyoscyamine sulfate:   (Adverse Reaction)    Penicillins:      Iodine:      Morphine Sulfate:      Sulfas:      IVP dye:      Demerol HCl:      Solu-Medrol:      Hydrocodone/Aspirin:      Erythromycin Base:      Darvon-N:      Codeine:      Talwin:      Valium:      Talacen:      Duricef:      Minocycline HCl:      Cubicin:      Ultram:      Latex:          Current Medications:     Last Reviewed on 6/02/2020 06:06 PM by Tino Smith    Multivitamin/Mineral Supplement  Caplet [Take 1 tablet(s) by mouth daily]    Vitamin B-6 100 mg oral tablet [one tablet twice daily]    Prevacid 30 mg oral capsule,delayed release (enteric coated) [BID]    Alrex 0.2 % ophthalmic (eye) Drops, Suspension [INSTILL 1 DROP  BOTH EYES DAILY]    MetroGel 1% Topical Gel [Apply thin film to affected area BID and rub in.]    Keflex 500 mg oral capsule [1 capsule daily]    Mobic 15 mg oral tablet [TAKE 1 TABLET DAILY]    Salagen (pilocarpine) 5 mg oral tablet [Take 1 tablet(s) by mouth QID]    atorvastatin 20 mg oral tablet [TAKE 1 TABLET DAILY]    Meclizine HCl 25mg Tablet [Take 1 tablet(s) by mouth tid PRN DIZZINESS]    Lomotil 2.5mg/0.025mg Tablet [One Po Q 6 hours]    Xopenex HFA 45 mcg/actuation Inhalation HFA Aerosol Inhaler [2 puffs QID PRN]    Flonase 50 mcg/actuation Intranasal Spray, Suspension [2 spray(s) in each nostril daily]    Singulair  10 mg oral tablet [1 TAB DAILY]    atenolol 25 mg oral tablet [TAKE 1 TABLET EVERY AFTERNOON]    probiotic 2 capsules daily      CPAP      Myrbetriq 25 mg oral Tablet, Extended Release 24 hr [Take 1 tablet(s) by mouth daily]    Allegra Allergy 180 mg oral tablet [1 tab daily]    Potassium Gluconate 595 mg (99 mg) oral tablet [1 capsule daily]    melatonin 10 mg at night     [place] Thyroid 30 mg oral tablet [Take 1 tablet(s) by mouth daily]    Epinephrine Auto-Injector 0.15mg Solution For Injection [Inject as needed]    Prolia         Objective:        Exams:     PHYSICAL EXAM:     GENERAL: vital signs recorded - well developed, well nourished;  no apparent distress;     RESPIRATORY: normal respiratory rate and pattern with no distress;     PSYCHIATRIC:  appropriate affect and demeanor; normal speech pattern; grossly normal memory;         Assessment:         R50.9   Fever, unspecified           ORDERS:         Lab Orders:       FUTURE  Future order to be done at patients convenience  (Send-Out)            95079  RAPII - [facility] Arthritis Profile  (Send-Out)            07249  [facility] CBC with 3 part diff  (Send-Out)            91869  COMP - [facility] Comp. Metabolic Panel  (Send-Out)            11768  [facility] Blood Culture Routine  (Send-Out)            21179  TSH - [facility] TSH  (Send-Out)             51200  [facility] Urinalysis, automated, with micro  (Send-Out)                      Plan:         Fever, unspecified    Telehealth: Verbal consent obtained for visit to occur via televideo conferencing     FOLLOW-UP TESTING #1: FOLLOW-UP LABORATORY:  Labs to be scheduled in the future include Arthritis Panel, CBC, CMP,  blood culture, TSH, and urinalysis.      FOLLOW-UP: Schedule follow-up appointments on a p.r.n. basis.  Observe for now Consider further workup           Orders:       FUTURE  Future order to be done at patients convenience  (Send-Out)            74001  [facility] Arthritis Profile  (Send-Out)            53180  [facility] CBC with 3 part diff  (Send-Out)            93727  COMP - [facility] Comp. Metabolic Panel  (Send-Out)            85824  [facility] Blood Culture Routine  (Send-Out)            88733  TSH - [facility] TSH  (Send-Out)            95285  [facility] Urinalysis, automated, with micro  (Send-Out)                  Patient Recommendations:        For  Fever, unspecified:            The following laboratory testing has been ordered: CBC metabolic panel, comprehensive blood culture TSH urinalysis Schedule follow-up appointments as needed.              Charge Capture:         Primary Diagnosis:     R50.9  Fever, unspecified           Orders:      39521  Office/outpatient visit; established patient, level 2  (In-House)

## 2021-05-18 NOTE — PROGRESS NOTES
"Valery Baez  1945     Office/Outpatient Visit    Visit Date:  10:23 am    Provider: Tino Smith MD (Assistant: Mary Watts LPN)    Location: [facility]        Electronically signed by Tino Smith MD on  2020 12:48:02 PM                             Subjective:        CC: Mrs. Baez is a 74 year old White female.  This is a follow-up visit.  Battling low grade fever for 4 months, needs referral;         HPI:           Complaint of fever, unspecified..  The symptom began 4 months ago.  The severity is of mild intensity.  LOW-GRADE FEVER FOR SEVERAL MONTHS.  LITTLE CHANGE WITH ABX.  NEG LABS.  HX MRSA COLONIZATION.  UNABLE TO GET IIN TO SEE HER PREVIIOUS I.D. PROVIDER.      ROS:     CONSTITUTIONAL:  Positive for fatigue.   Negative for chills.      EYES:  Positive for eye pain ( bilateral; FEELS LIKE EYES ARE \"BURNING\" ).   Negative for eye drainage.      E/N/T:  Negative for ear pain, nasal congestion and sore throat.      RESPIRATORY:  Negative for recent cough and dyspnea.      GASTROINTESTINAL:  Negative for abdominal pain, anorexia, nausea and vomiting.      MUSCULOSKELETAL:  Negative for myalgias.      NEUROLOGICAL:  Positive for headaches ( tension ), paresthesia ( LEFT FACE, OFF AND ON. ) and weakness ( generalized ).   Negative for dizziness.          Past Medical History / Family History / Social History:         Last Reviewed on 2020 10:33 AM by Tino Smith    Past Medical History:                 PAST MEDICAL HISTORY         Breast cancer: dx'd in 3-;  s/p XRT; right breast;         GYNECOLOGICAL HISTORY:             CURRENT MEDICAL PROVIDERS:    Allergist    Dermatologist    E/N/T    Oncologist    Orthopedist    Urologist    GENERAL SURGEON FOR BREAST EXAMS    Infectious Disease specialist;;    Speech pathologist             ADVANCE DIRECTIVES: Living will         PREVENTIVE HEALTH MAINTENANCE            "  BONE DENSITY: was last done 2019 with the following abnormality noted-- Osteoporosis     COLORECTAL CANCER SCREENING: Up to date (colonoscopy q10y; sigmoidoscopy q5y; Cologuard q3y) was last done 2019, Results are in chart; colonoscopy with normal results     DENTAL CLEANING: was last done 2019     EYE EXAM: was last done 2019     Hepatitis C Medicare Screening: was last done 2017     MAMMOGRAM: was last done 2018 with normal results     PAP SMEAR: No longer indicated due to age and history         Surgical History:         Biopsy of breast; L/R several     Appendectomy    Cholecystectomy    Hysterectomy    Tonsillectomy/Adenoidectomy    BSO;    right breast lumpectomy 3-2009;    RIGHT MASTECTOMY;;;     Joint Replacement: R knee;     Laminectomy: thoracic region; lumbar region;     L eye surgery;    L hand surgery;    L/R TMJ replacement;    R ankle and L foot surgery; right trigger finger release nov 2017         Family History:     Father: Coronary Artery Disease; Hypertension     Mother: Sudden Cardiac Death ( 83 )     Brother(s): Sudden Cardiac Death ( 52 )     Paternal Grandmother: Breast Cancer     Maternal Grandmother: Breast Cancer         Social History:     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          Substance Abuse History:     Last Reviewed on 7/28/2020 07:09 PM by Junaid Amaya        Mental Health History:     Last Reviewed on 7/28/2020 07:09 PM by Junaid Amaya        Communicable Diseases (eg STDs):     Last Reviewed on 7/28/2020 07:09 PM by Junaid Amaya        Current Problems:     Last Reviewed on 7/29/2020 10:33 AM by Tino Smith    Pure hypercholesterolemia, unspecified    Gastro-esophageal reflux disease without esophagitis    Benign paroxysmal vertigo, mild-recurrent    Other specified  hypothyroidism    Moderate persistent asthma, uncomplicated    Basal cell carcinoma of skin, (history of)    Fever, unspecified    Deficiency of vitamin B-12    Iron deficiency    Obstructive sleep apnea (adult) (pediatric)    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        Immunizations:     Prevnar 13 (Pneumococcal PCV 13) 10/27/2015    zzFluzone pf-quadrivalent 3 and up 10/27/2014    Flulaval 9/30/2010    Fluzone (3 + years dose) 9/24/2009    Fluzone (3 + years dose) 10/25/2011    Fluzone (3 + years dose) 10/25/2012    Fluzone pf (3+ years dose) 9/30/2013    Influenza A (H1N1), IM (3+ years) Monovalent 11/11/2009    Pneomovax 9/23/2008    Fluzone High-Dose pf (>=65 yr) 10/27/2015    Fluzone High-Dose pf (>=65 yr) 10/17/2016    Fluzone High-Dose pf (>=65 yr) 10/2/2017    Fluzone High-Dose pf (>=65 yr) 10/10/2018    Fluzone High-Dose pf (>=65 yr) 10/28/2019    PNEUMOVAX 23 (Pneumococcal PPV23) 0/0/2003    Adacel (Tdap) 11/2/2012    Adacel (Tdap) 8/6/2015    Zostavax (Zoster live) 9/30/2009        Allergies:     Last Reviewed on 7/29/2020 10:33 AM by Tino Smith    Prednisone:   (Adverse Reaction)    tape:      Albuterol: Shakiness  (Adverse Reaction)    Flexeril:      Cyclobenzaprine HCl:      hyoscyamine sulfate:   (Adverse Reaction)    Penicillins:      Iodine:      Morphine Sulfate:      Sulfas:      IVP dye:      Demerol HCl:      Solu-Medrol:      Hydrocodone/Aspirin:      Erythromycin Base:      Darvon-N:      Codeine:      Talwin:      Valium:      Talacen:      Duricef:      Minocycline HCl:      Cubicin:      Ultram:      Latex:          Current Medications:     Last Reviewed on 7/29/2020 10:33 AM by Tino Smith    Multivitamin/Mineral Supplement  Caplet [Take 1 tablet(s) by mouth daily]    Vitamin B-6 100 mg oral tablet [one tablet twice daily]    Prevacid 30 mg oral capsule,delayed release (enteric coated) [BID]    Alrex 0.2 % ophthalmic (eye)  Drops, Suspension [INSTILL 1 DROP BOTH EYES DAILY]    MetroGel 1% Topical Gel [Apply thin film to affected area BID and rub in.]    Mobic 15 mg oral tablet [TAKE 1 TABLET DAILY]    Salagen (pilocarpine) 5 mg oral tablet [Take 1 tablet(s) by mouth QID]    Keflex 500 mg oral capsule [1 capsule daily]    atorvastatin 20 mg oral tablet [TAKE 1 TABLET DAILY]    Meclizine HCl 25mg Tablet [Take 1 tablet(s) by mouth tid PRN DIZZINESS]    Lomotil 2.5mg/0.025mg Tablet [One Po Q 6 hours]    Xopenex HFA 45 mcg/actuation Inhalation HFA Aerosol Inhaler [2 puffs QID PRN]    Flonase 50 mcg/actuation Intranasal Spray, Suspension [2 spray(s) in each nostril daily]    Singulair  10 mg oral tablet [1 TAB DAILY]    atenolol 25 mg oral tablet [TAKE 1 TABLET EVERY AFTERNOON]    probiotic 2 capsules daily      CPAP      Myrbetriq 25 mg oral Tablet, Extended Release 24 hr [Take 1 tablet(s) by mouth daily]    Allegra Allergy 180 mg oral tablet [1 tab daily]    Potassium Gluconate 595 mg (99 mg) oral tablet [1 capsule daily]    melatonin 10 mg at night     [place] Thyroid 30 mg oral tablet [Take 1 tablet(s) by mouth daily]    Epinephrine Auto-Injector 0.15mg Solution For Injection [Inject as needed]    Prolia         Objective:        Vitals:         Current: 7/29/2020 10:33:26 AM    Ht:  5 ft, 5.75 in;  Wt: 176 lbs;  BMI: 28.6T: 97.1 F (oral);  BP: 117/62 mm Hg (left arm, sitting);  P: 76 bpm (left arm (BP Cuff), sitting);  sCr: 1.02 mg/dL;  GFR: 53.57        Exams:     PHYSICAL EXAM:     GENERAL: vital signs recorded - well developed, well nourished;  no apparent distress;     RESPIRATORY: normal respiratory rate and pattern with no distress;     PSYCHIATRIC:  appropriate affect and demeanor; normal speech pattern; grossly normal memory;         Assessment:         R50.9   Fever, unspecified           Plan:         Fever, unspecified        FOLLOW-UP: Schedule follow-up appointments on a p.r.n. basis.      WILL TRY A DIFF I.D. SPECIALIST.   SHE WILL ALSO SEE HER ORAL SURGEON AND HER DERMATOLOGIST.              Patient Recommendations:        For  Fever, unspecified:    Schedule follow-up appointments as needed.  I also recommend WILL TRY A DIFF I.D. SPECIALIST.  SHE WILL ALSO SEE HER ORAL SURGEON AND HER DERMATOLOGIST..              Charge Capture:         Primary Diagnosis:     R50.9  Fever, unspecified           Orders:      [id number]  Office/outpatient visit; established patient, level 2  (In-House)

## 2021-05-18 NOTE — PROGRESS NOTES
Valery Baez 1945     Office/Outpatient Visit    Visit Date: Wed, Sep 18, 2019 03:49 pm    Provider: Tino Smith MD (Assistant: Lola Myers MA)    Location: [facility]        Electronically signed by Tino Smith MD on  09/18/2019 05:17:38 PM                             SUBJECTIVE:        CC:     Mrs. Baez is a 73 year old White female.  She is here today following a transition of care from an inpatient hospital: [facility]. The patient was admitted on 9/9/19 - 9/13/19 for Lumbar 3 to sacral 1 laminectomy with fusion. Acute blood loss anemia. Our office called the patient within 48 hours of discharge and scheduled the follow-up appointment.. During the patient's hospital stay the patient was treated by Dr. Couch.  She presents with MUCOUS DIARRHEA.          HPI:         Mrs. Baez presents in follow up from hospital admission She was diagnosed with SPINAL STENOSIS.  The following radiology tests were done: lumbar spine MRI.  The following procedures were done: LAMINECTOMY The patient received IV medications of which the patient cannot recall.  The patient's course has improved.  It is of severe intensity.  DOING WELL POST-OP, HOME HEALTH WILL BE FOLLOWING.  NOT TAKING ANY PAIN MEDS.          Concerning diarrhea-recurrent, this has been a problem for the past 5 days.  She estimates the stool frequency at SEVERAL per day.  Stools are described as mucoid and watery.  She denies associated symptoms, including abdominal bloating, abdominal cramping, nausea and vomiting.  She has recently had a course of antibiotic therapy and recent laxative use.  Prior work has included [employer] IN 2013.  LONG HISTORY OF INTERMITTENT DIARRHEA BUT NOW HAS HAD DIARRHEA FOR THE PAST FIVE DAYS.  GOT I.V. ABX DURING THE PERIOPERATIVE PERIOD AND WAS ALSO STARTED ON A STOOL SOFTNER WHICH SHE HAS STOPPED TAKING.      ROS:     CONSTITUTIONAL:  Negative for chills and fever.      E/N/T:  Negative for ear  pain, nasal congestion and sore throat.      CARDIOVASCULAR:  Negative for chest pain, palpitations and pedal edema.      RESPIRATORY:  Negative for recent cough and dyspnea.      GASTROINTESTINAL:  Negative for acid reflux symptoms, anorexia and hematochezia.      GENITOURINARY:  Negative for dysuria and hematuria.      MUSCULOSKELETAL:  Negative for limb pain and myalgias.      NEUROLOGICAL:  Positive for dizziness ( OFF AND ON , BEG W/U, PRN MECLIZINE HELPS ).   Negative for headaches, paresthesias or weakness.          PMH/FMH/SH:     Last Reviewed on 2019 04:03 PM by Tino Smith    Past Medical History:                 PAST MEDICAL HISTORY         Breast cancer: dx'd in 3-2009;  s/p XRT; right breast;     chronic osteomyelitis on supressive therapy (keflex)         GYNECOLOGICAL HISTORY:             CURRENT MEDICAL PROVIDERS:    Allergist    Dermatologist    E/N/T    Oncologist    Orthopedist    Urologist    GENERAL SURGEON FOR BREAST EXAMS    Infectious Disease specialist;;    speech pathologist             ADVANCE DIRECTIVES: Living will         PREVENTIVE HEALTH MAINTENANCE             BONE DENSITY: was last done  with the following abnormality noted-- Osteoporosis     COLORECTAL CANCER SCREENING: Up to date (colonoscopy q10y; sigmoidoscopy q5y; Cologuard q3y) was last done 2013, Results are in chart; colonoscopy with normal results     DENTAL CLEANING: was last done      EYE EXAM: was last done      Hepatitis C Medicare Screening: was last done      MAMMOGRAM: was last done 2018 with normal results     PAP SMEAR: No longer indicated due to age and history         Surgical History:         Biopsy of breast; L/R several      Appendectomy    Cholecystectomy    Hysterectomy    Tonsillectomy/Adenoidectomy    BSO;    right breast lumpectomy 3-2009;    RIGHT MASTECTOMY;;;      Joint Replacement: R knee;     Laminectomy: thoracic region; lumbar region;     L eye surgery;     L hand surgery;    L/R TMJ replacement;    R ankle and L foot surgery; right trigger finger release nov 2017         Family History:     Father: Coronary Artery Disease; Hypertension     Mother: Sudden Cardiac Death ( 83 )     Brother(s): Sudden Cardiac Death ( 52 )     Paternal Grandmother: Breast Cancer     Maternal Grandmother: Breast Cancer         Social History:     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          Substance Abuse History:     Last Reviewed on 7/23/2019 06:25 PM by Junaid Amaya        Mental Health History:     Last Reviewed on 7/23/2019 06:25 PM by Junaid Amaya        Communicable Diseases (eg STDs):     Last Reviewed on 7/23/2019 06:25 PM by Junaid Amaya            Current Problems:     Last Reviewed on 9/18/2019 04:06 PM by Tino Smith    Osteoporosis     Asthma     Benign positional vertigo-mild, recurrent     Acquired hypothyroidism     Diarrhea-recurrent     MRSA colonization     Iron deficiency anemia     Urge incontinence     Pernicious anemia     Palpitations (occ, neg w/u)     Obstructive sleep apnea     History of breast cancer     High cholesterol     Surgical menopause     GERD     Decreased hearing     Sjogren's disease     Osteoarthritis of knee     Rosacea     Allergies     Follow-up examination         Immunizations:     Prevnar 13 (Pneumococcal PCV 13) 10/27/2015     zzFluzone pf-quadrivalent 3 and up 10/27/2014     Flulaval 9/30/2010     Fluzone (3 + years dose) 9/24/2009     Fluzone (3 + years dose) 10/25/2011     Fluzone (3 + years dose) 10/25/2012     Fluzone pf (3+ years dose) 9/30/2013     Influenza A (H1N1), IM (3+ years) Monovalent 11/11/2009     Pneomovax 9/23/2008     Fluzone High-Dose pf (>=65 yr) 10/27/2015     Fluzone High-Dose pf (>=65 yr) 10/17/2016     Fluzone  High-Dose pf (>=65 yr) 10/2/2017     Fluzone High-Dose pf (>=65 yr) 10/10/2018     PNEUMOVAX 23 (Pneumococcal PPV23) 0/0/2003     Adacel (Tdap) 11/2/2012     Adacel (Tdap) 8/6/2015     Zostavax (Zoster live) 9/30/2009         Allergies:     Last Reviewed on 9/18/2019 04:00 PM by Tino Smith    Prednisone: (Adverse Reaction)    Albuterol: Shakiness (Adverse Reaction)    Penicillins:    Iodine:    Morphine Sulfate:    Sulfas:    Demerol HCl:    Solu-Medrol:    Hydrocodone/Aspirin:    Erythromycin Base:    Darvon-N:    Codeine:    Talwin:    Valium:    Talacen:    Duricef:    Minocycline HCl:    Cubicin:    Ultram:    Flexeril:    Cyclobenzaprine HCl:    tape:    IVP dye:    Latex:        Current Medications:     Last Reviewed on 9/18/2019 04:13 PM by Tino Smith    Tessalon Perles 100mg Capsules One PO Q 8 hours PRN cough     Atenolol 25mg Tablet 1 every afternoon.     Lipitor 20mg Tablet Take 1 tablet(s) by mouth daily     Mobic 15mg Tablet Take 1 tablet(s) by mouth daily     Epinephrine Auto-Injector 0.15mg Solution For Injection Inject as needed     Keflex 500mg Capsules 1 capsule daily     Salagen 5mg Tablet Take 1 tablet(s) by mouth QID     MetroGel 1% Topical Gel Apply thin film to affected area BID and rub in.     Meclizine HCl 25mg Tablet Take 1 tablet(s) by mouth tid PRN DIZZINESS     Lomotil 2.5mg/0.025mg Tablet One Po Q 6 hours     Prolia     Singulair  10mg Tablet 1 TAB DAILY     Allegra Allergy 24 Hour 180mg Tablet 1 tab daily     [place] Thyroid 30mg Tablet Take 1 tablet(s) by mouth daily     Aspirin (ASA) 81mg Tablets, Enteric Coated 1 tab daily     Flovent HFA 110mcg/1actuation Oral Inhaler 2 puffs bid     Myrbetriq 25mg Tablets, Extended Release Take 1 tablet(s) by mouth daily     CPAP     Alrex 0.2% Ophthalmic Suspension INSTILL 1 DROP BOTH EYES DAILY     Flonase 50mcg/1actuation Nasal Spray 2 spray(s) in each nostril daily     Xopenex HFA 45mcg/1actuation Oral Inhaler 2 puffs  QID PRN     Prevacid 30mg Capsules, Delayed Release BID     melatonin 10 mg at night     Potassium Gluconate 595mg Tablet 1 capsule daily     probiotic 2 capsules daily     Multivitamin/Mineral Supplement Caplet Take 1 tablet(s) by mouth daily     Vitamin B6 100mg Tablet one tablet twice daily         OBJECTIVE:        Vitals:         Current: 9/18/2019 4:03:48 PM    Ht:  5 ft, 5.75 in;  Wt: 174.8 lbs;  BMI: 28.4    T: 97.7 F (oral);  BP: 101/67 mm Hg (left arm, sitting);  P: 95 bpm (left arm (BP Cuff), sitting);  sCr: 0.97 mg/dL;  GFR: 56.98        Exams:     PHYSICAL EXAM:     GENERAL: vital signs recorded - well developed, well nourished;  no apparent distress;     EYES: conjunctiva and cornea are normal;     NECK: trachea is midline; thyroid is non-palpable;     RESPIRATORY: normal respiratory rate and pattern with no distress; normal breath sounds with no rales, rhonchi, wheezes or rubs;     CARDIOVASCULAR: normal rate; rhythm is regular;  no systolic murmur; no edema;     GASTROINTESTINAL: nontender; normal bowel sounds; no organomegaly;     LYMPHATIC: no enlargement of cervical or facial nodes; no supraclavicular nodes;     MUSCULOSKELETAL: gait: uses a walker;  normal overall tone     NEUROLOGIC: GROSSLY INTACT     PSYCHIATRIC:  appropriate affect and demeanor; normal speech pattern; grossly normal memory;         ASSESSMENT           724.2   M54.5  Low back pain (Improving)              DDx:     Diarrhea    787.91   K52.89  Diarrhea-recurrent POSS RELATED TO MEDS, VIRAL ISSUE OR PHYSICAL STRESS.               DDx:     V67.9   Z09  Follow-up examination              DDx:         ORDERS:         Meds Prescribed:       Lomotil (Diphenoxylate/Atropine) Tablet ONE QID PRN  #20 (Twenty) tablet(s) Refills: 1         Lab Orders:       [id number]  Clostridium difficile toxin(s) antigen detection by enyzme immunoassay  (Send-Out)         [id number]  Culture for bacteria, feces w/ isolation and exam, Salmonella and Shigella  species  (Send-Out)           Procedures Ordered:       REFER  Referral to Specialist or Other Facility  (Send-Out)                   PLAN:          Low back pain         MEDICATIONS: (no change to current medication regimen)     RECOMMENDATIONS given include: F/U WITH SURGEON AS PLANNED.      FOLLOW-UP: Schedule follow-up appointments on a p.r.n. basis.      HOME HEALTH TO FOLLOW          Diarrhea-recurrent         LABORATORY:  Lab studies ordered today include stool sample for C. difficile toxin and culture.      MEDICATIONS: I have prescribed an antidiarrheal agent.      RECOMMENDATIONS given include: begin oral fluid replacement with a glucose and electrolyte containing solution, GO TO ER IF WORSE, and Rest.      REFERRALS:  Referral initiated to a general surgeon ( Dr. Heri Travis ) and PATIENT PREFERENCE.            Prescriptions:       Lomotil (Diphenoxylate/Atropine) Tablet ONE QID PRN  #20 (Twenty) tablet(s) Refills: 1           Orders:       [id number]  Clostridium difficile toxin(s) antigen detection by enyzme immunoassay  (Send-Out)         [id number]  Culture for bacteria, feces w/ isolation and exam, Salmonella and Shigella species  (Send-Out)         REFER  Referral to Specialist or Other Facility  (Send-Out)            Follow-up examination     HOSPITAL RECORDS REVIEWED             Patient Recommendations:        For  Low back pain:     I also recommend F/U WITH SURGEON AS PLANNED.  Schedule follow-up appointments as needed.          For  Diarrhea-recurrent:     You should replace fluid losses by drinking frequent, small amounts of a glucose and electrolyte containing solution. There are several commercially available products.              CHARGE CAPTURE           **Please note: ICD descriptions below are intended for billing purposes only and may not represent clinical diagnoses**        Primary Diagnosis:         724.2 Low back pain            M54.5    Low back pain              Orders:          [id number]    Transitional care manage service 14 day discharge  (In-House)           Diarrhea    787.91 Diarrhea-recurrent            K52.89    Other specified noninfective gastroenteritis and colitis    V67.9 Follow-up examination            Z09    Encounter for follow-up examination after completed treatment for conditions other than malignant neoplasm        ADDENDUMS:      ____________________________________    Addendum: 09/24/2019 03:34 PM - Tino Smith        ADD [id number]; REMOVE [id number]

## 2021-05-18 NOTE — PROGRESS NOTES
Valery Baez  1945     Office/Outpatient Visit    Visit Date: Fri, Jan 29, 2021 10:24 am    Provider: Tino Smith MD (Assistant: Mary Watts LPN)    Location: [facility]        Electronically signed by Tino Smith MD on  01/29/2021 01:26:36 PM                             Subjective:        CC: Mrs. Baez is a 75 year old White female.  This is a follow-up visit.  6 months;         HPI:           Patient presents with pure hypercholesterolemia, unspecified.  Current treatment includes a lipid lowering agent.  Compliance with treatment has been good.  Most recent lab tests include Total Cholesterol:  155 (mg/dL) (04/30/2018), HDL:  57 (mg/dL) (04/30/2018), Triglycerides:  155 (mg/dL) (04/30/2018), LDL:  67 (mg/dL) (04/30/2018).            Additionally, she presents with history of gastro-esophageal reflux disease without esophagitis.  the location of the discomfort is primarily substernal and epigastric.  She describes the pain as burning.  Symptoms are improved with a proton-pump inhibitor.            Additionally, she presents with history of benign paroxysmal vertigo, mild-recurrent.  this first began several years ago.  She describes the sensation as spinning.  This is fairly mild in severity.  Pertinent medical history includes recent ear infection.  STILL HAS IT OCCASIONALLY           Moderate persistent asthma, uncomplicated details; the frequency of daytime attacks averages once per month.  She is currently at baseline, and not having an exacerbation.  Her current asthma medication includes albuterol and a steroid MDI.  Currently, no asthma symptoms are reported.  STABLE ON CURRENT MEDS     ROS:     CONSTITUTIONAL:  Positive for fatigue.   Negative for chills.      E/N/T:  Negative for ear pain, nasal congestion and sore throat.      CARDIOVASCULAR:  Positive for chest pain ( A FEW DAYS AGO, WILL HAVE A STRESS TEST SOON ).   Negative for palpitations or  pedal edema.      RESPIRATORY:  Negative for recent cough and dyspnea.      GASTROINTESTINAL:  Positive for dysphagia ( RECENTLY, WILL HAVE EGD SOON ).   Negative for abdominal pain, anorexia, nausea or vomiting.      MUSCULOSKELETAL:  Negative for myalgias.      NEUROLOGICAL:  Positive for HAS FALLEN THREE TIMES IN THE PAST SIX MONTHS.   Negative for headaches.          Past Medical History / Family History / Social History:         Last Reviewed on 2021 10:44 AM by Tino Smith    Past Medical History:                 PAST MEDICAL HISTORY         Breast cancer: dx'd in 3-2009;  s/p XRT; right breast;         GYNECOLOGICAL HISTORY:             CURRENT MEDICAL PROVIDERS:    Allergist    Dermatologist    E/N/T    Oncologist    Orthopedist    Urologist    GENERAL SURGEON FOR BREAST EXAMS    Infectious Disease specialist;;    Speech pathologist             ADVANCE DIRECTIVES: Living will         PREVENTIVE HEALTH MAINTENANCE             BONE DENSITY: was last done  with the following abnormality noted-- Osteoporosis     COLORECTAL CANCER SCREENING: Up to date (colonoscopy q10y; sigmoidoscopy q5y; Cologuard q3y) was last done , Results are in chart; colonoscopy with normal results     DENTAL CLEANING: was last done      EYE EXAM: was last done      Hepatitis C Medicare Screening: was last done      MAMMOGRAM: was last done 2018 with normal results     PAP SMEAR: No longer indicated due to age and history         Surgical History:         Biopsy of breast; L/R several     Appendectomy    Cholecystectomy    Hysterectomy    Tonsillectomy/Adenoidectomy    BSO;    right breast lumpectomy 3-2009;    RIGHT MASTECTOMY;;;     Joint Replacement: R knee;     Laminectomy: thoracic region; lumbar region;     L eye surgery;    L hand surgery;    L/R TMJ replacement;    R ankle and L foot surgery; right trigger finger release 2017         Family History:     Father: Coronary Artery Disease;  Hypertension     Mother: Sudden Cardiac Death ( 83 )     Brother(s): Sudden Cardiac Death ( 52 )     Paternal Grandmother: Breast Cancer     Maternal Grandmother: Breast Cancer         Social History:     Occupation: Print shop owner     Marital Status:      Children: 2 children         Tobacco/Alcohol/Supplements:     Last Reviewed on 1/29/2021 10:44 AM by Tino Smith    Tobacco: She has never smoked.          Substance Abuse History:     Last Reviewed on 7/28/2020 07:09 PM by Junaid Amaya        Mental Health History:     Last Reviewed on 7/28/2020 07:09 PM by Junaid Amaya        Communicable Diseases (eg STDs):     Last Reviewed on 7/28/2020 07:09 PM by Junaid Amaya        Current Problems:     Last Reviewed on 1/29/2021 10:44 AM by Tino Smith    Pure hypercholesterolemia, unspecified    Gastro-esophageal reflux disease without esophagitis    Osteoarthritis of knee    Diarrhea, mild-recurrent    Other specified hypothyroidism    Benign paroxysmal vertigo, mild-recurrent    Moderate persistent asthma, uncomplicated    Basal cell carcinoma of skin, (history of)    Deficiency of vitamin B-12    Iron deficiency    Obstructive sleep apnea (adult) (pediatric)    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        Immunizations:     influenza, high-dose, quadrivalent (FLUZONE HIGH-DOSE QUAD 2020-21) 9/28/2020    Prevnar 13 (Pneumococcal PCV 13) 10/27/2015    zzFluzone pf-quadrivalent 3 and up 10/27/2014    Flulaval 9/30/2010    Fluzone (3 + years dose) 9/24/2009    Fluzone (3 + years dose) 10/25/2011    Fluzone (3 + years dose) 10/25/2012    Fluzone pf (3+ years dose) 9/30/2013    Influenza A (H1N1), IM (3+ years) Monovalent 11/11/2009    Pneomovax 9/23/2008    Fluzone High-Dose pf (>=65 yr) 10/27/2015    Fluzone High-Dose pf (>=65 yr) 10/17/2016    Fluzone High-Dose pf (>=65 yr) 10/2/2017    Fluzone High-Dose pf (>=65 yr) 10/10/2018    Fluzone  High-Dose pf (>=65 yr) 10/28/2019    PNEUMOVAX 23 (Pneumococcal PPV23) 0/0/2003    Adacel (Tdap) 11/2/2012    Adacel (Tdap) 8/6/2015    Zostavax (Zoster live) 9/30/2009    COVID-19, mRNA, LNP-S, PF, 100 mcg/0.5 mL dose 1/13/2021        Allergies:     Last Reviewed on 1/29/2021 10:44 AM by Tino Smith    Prednisone:   (Adverse Reaction)    tape:      Albuterol: Shakiness  (Adverse Reaction)    Flexeril:      Cyclobenzaprine HCl:      hyoscyamine sulfate:   (Adverse Reaction)    Penicillins:      Iodine:      Morphine Sulfate:      Sulfas:      IVP dye:      Demerol HCl:      Solu-Medrol:      Hydrocodone/Aspirin:      Erythromycin Base:      Darvon-N:      Codeine:      Talwin:      Valium:      Talacen:      Duricef:      Minocycline HCl:      Cubicin:      Ultram:      Latex:          Current Medications:     Last Reviewed on 1/29/2021 10:44 AM by Tino Smith    Alrex 0.2 % ophthalmic (eye) Drops, Suspension [INSTILL 1 DROP BOTH EYES DAILY]    Multivitamin/Mineral Supplement  Caplet [Take 1 tablet(s) by mouth daily]    Vitamin B-6 100 mg oral tablet [one tablet twice daily]    Prevacid 30 mg oral capsule,delayed release (enteric coated) [BID]    MetroGel 1% Topical Gel [Apply thin film to affected area BID and rub in.]    Salagen (pilocarpine) 5 mg oral tablet [Take 1 tablet(s) by mouth QID]    Mobic 15 mg oral tablet [TAKE 1 TABLET DAILY]    Keflex 500 mg oral capsule [TAKE 1 CAPSULE DAILY]    atorvastatin 20 mg oral tablet [TAKE 1 TABLET DAILY]    Lomotil 2.5-0.025 mg oral tablet [One Po Q 6 hours]    Meclizine HCl 25mg Tablet [Take 1 tablet(s) by mouth tid PRN DIZZINESS]    Xopenex HFA 45 mcg/actuation Inhalation HFA Aerosol Inhaler [2 puffs QID PRN]    Flonase 50 mcg/actuation Intranasal Spray, Suspension [2 spray(s) in each nostril daily]    Singulair  10 mg oral tablet [1 TAB DAILY]    atenolol 25 mg oral tablet [TAKE 1 TABLET EVERY AFTERNOON]    probiotic 2 capsules daily      CPAP       Myrbetriq 25 mg oral Tablet, Extended Release 24 hr [Take 1 tablet(s) by mouth daily]    Allegra Allergy 180 mg oral tablet [1 tab daily]    Potassium Gluconate 595 mg (99 mg) oral tablet [1 capsule daily]    melatonin 10 mg at night     [place] Thyroid 30 mg oral tablet [Take 1 tablet(s) by mouth daily]    Epinephrine Auto-Injector 0.15mg Solution For Injection [Inject as needed]    Prolia         Objective:        Vitals:         Current: 1/29/2021 10:33:01 AM    Ht:  5 ft, 5.75 in;  Wt: 189.2 lbs;  BMI: 30.8T: 96 F (temporal);  BP: 123/70 mm Hg (left arm, sitting);  P: 69 bpm (left arm (BP Cuff), sitting);  sCr: 1.02 mg/dL;  GFR: 54.45        Exams:     PHYSICAL EXAM:     GENERAL: vital signs recorded - well developed, well nourished;  no apparent distress;     NECK: trachea is midline; thyroid is non-palpable;     RESPIRATORY: normal respiratory rate and pattern with no distress; normal breath sounds with no rales, rhonchi, wheezes or rubs;     CARDIOVASCULAR: normal rate; rhythm is regular;  no systolic murmur; no edema;     LYMPHATIC: no enlargement of cervical or facial nodes;     NEUROLOGIC: GROSSLY INTACT     PSYCHIATRIC:  appropriate affect and demeanor; normal speech pattern; grossly normal memory;         Assessment:         R07.89   Other chest pain       R13.19   Other dysphagia       E78.00   Pure hypercholesterolemia, unspecified       K21.9   Gastro-esophageal reflux disease without esophagitis       H81.13   Benign paroxysmal vertigo, mild-recurrent       J45.40   Moderate persistent asthma, uncomplicated           ORDERS:         Meds Prescribed:       [Refilled] EPINEPHrine 0.15 mg/0.15 mL injection Auto-Injector [Inject as needed], #1 (one) kit, Refills: 1 (one)       [Refilled] meclizine 25 mg oral tablet [Take 1 tablet(s) by mouth tid PRN DIZZINESS], #90 (ninety) tablets, Refills: 1 (one)         Radiology/Test Orders:       3017F  Colorectal CA screen results documented and reviewed (PV)   (In-House)              Other Orders:       1100F  Pt screen for fall risk; document 2+ falls in the past yr or any fall w/injury in past year (KELLI)  (In-House)                      Plan:         Other chest pain        RECOMMENDATIONS given include: PROCEED WITH STRESS TEST AS PLANNED.      FOLLOW-UP: Schedule a follow-up visit in 6 months. MCW Observe for now Go to ER if worse.  Old records reviewed MIPS Has fallen 2+ times in the past year or had one fall with an injury in the past year Vaccines Flu and Pneumonia updated in Shot record Colorectal Cancer Screening is up to date and the results are in the chart           Orders:       1100F  Pt screen for fall risk; document 2+ falls in the past yr or any fall w/injury in past year (KELLI)  (In-House)            3017F  Colorectal CA screen results documented and reviewed (PV)  (In-House)              Other dysphagia        RECOMMENDATIONS given include: PROCEED WITH EGD AS PLANNED.          Pure hypercholesterolemia, unspecified        MEDICATIONS: (no change to current medication regimen)         Gastro-esophageal reflux disease without esophagitis        MEDICATIONS: (no change to current medication regimen)         Benign paroxysmal vertigo, mild-recurrent          Prescriptions:       [Refilled] meclizine 25 mg oral tablet [Take 1 tablet(s) by mouth tid PRN DIZZINESS], #90 (ninety) tablets, Refills: 1 (one)         Moderate persistent asthma, uncomplicated        MEDICATIONS: (no change to current medication regimen)           Prescriptions:       [Refilled] EPINEPHrine 0.15 mg/0.15 mL injection Auto-Injector [Inject as needed], #1 (one) kit, Refills: 1 (one)             Patient Recommendations:        For  Other chest pain:    Schedule a follow-up visit in 6 months.              Charge Capture:         Primary Diagnosis:     R07.89  Other chest pain           Orders:      10280  Office/outpatient visit; established patient, level 4  (In-House)            1100F   Pt screen for fall risk; document 2+ falls in the past yr or any fall w/injury in past year (KELLI)  (In-House)            3017F  Colorectal CA screen results documented and reviewed (PV)  (In-House)              R13.19  Other dysphagia     E78.00  Pure hypercholesterolemia, unspecified     K21.9  Gastro-esophageal reflux disease without esophagitis     H81.13  Benign paroxysmal vertigo, mild-recurrent     J45.40  Moderate persistent asthma, uncomplicated

## 2021-05-18 NOTE — PROGRESS NOTES
Valery Baez 1945     Office/Outpatient Visit    Visit Date: Wed, May 30, 2018 02:13 pm    Provider: Tracey Jiménez N.P. (Assistant: Afia Fregoso MA)    Location: [facility]        Electronically signed by Tracey Jiménez N.P. on  2018 02:41:34 PM                             SUBJECTIVE:        CC:     Ms. Baez is a 72 year old White female.  patient is here for foloow up on labs and congestion;         HPI: Valery is her for follow up on upper respiratory illness including sore throat, hoarseness, shortness of breath, subjective fever. She has already tried to relieve the symptoms with Levaquin 500 mg once daily for 10 days, Prednisone 50 mg X 4 days, prescribed by allergist and Also taking Xoponex, Tessalon perles. Notes that she is unable to tolerate Albuterol due to heart racing. Throat culture was positive for yeast. She has not started treatment yet for this. History of allergies and asthma. Sees Dr. Garcia.         ROS:     CONSTITUTIONAL:  Negative for chills and fever.      EYES:  Negative for eye drainage and eye pain.      E/N/T:  Positive for sore throat.   Negative for ear pain.      CARDIOVASCULAR:  Negative for chest pain and palpitations.      RESPIRATORY:  Positive for recent cough and dyspnea.          [facility]/:     Last Reviewed on 2018 10:10 AM by Tino Smith    Past Medical History:                 PAST MEDICAL HISTORY         Breast cancer: dx'd in 3-;  s/p XRT; right breast;     chronic osteomyelitis on supressive therapy (keflex)         GYNECOLOGICAL HISTORY:        Last Pap was          CURRENT MEDICAL PROVIDERS:    Allergist    Dermatologist    E/N/T    Oncologist    Orthopedist    Urologist    GENERAL SURGEON FOR BREAST EXAMS    Infectious Disease specialist;;             ADVANCE DIRECTIVES: Living will         Surgical History:         Biopsy of breast; L/R several      Appendectomy    Cholecystectomy    Hysterectomy     Tonsillectomy/Adenoidectomy    BSO;    right breast lumpectomy 3-2009;      Joint Replacement: R knee;     L eye surgery;    L hand surgery;    L/R TMJ replacement;    R ankle and L foot surgery; Procedures: colonoscopy 2003, 2013 NEG;; Treadmill stress test 2007, NEG ECGO 2001 Holter 2001 right trigger finger release nov 2017         Family History:     Father: Coronary Artery Disease; Hypertension     Mother: Sudden Cardiac Death ( 83 )     Brother(s): Sudden Cardiac Death ( 52 )     Paternal Grandmother: Breast Cancer     Maternal Grandmother: Breast Cancer         Social History:     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          Substance Abuse History:     Last Reviewed on 4/18/2017 04:29 PM by Krystal Tavares        Mental Health History:     Last Reviewed on 4/18/2017 04:29 PM by Krystal Tavares        Communicable Diseases (eg STDs):     Last Reviewed on 4/18/2017 04:29 PM by Krystal Tavares            Current Problems:     Last Reviewed on 4/30/2018 10:16 AM by Tino Smith    Benign positional vertigo-mild, recurrent     Acquired hypothyroidism     Diarrhea-recurrent     MRSA colonization     Iron deficiency anemia     Urge incontinence     Pernicious anemia     Palpitations (occ, neg w/u)     Obstructive sleep apnea     History of breast cancer     High cholesterol     Osteopenia     Surgical menopause     GERD     Asthma     Decreased hearing     Sjogren's disease     Osteoarthritis of knee     Rosacea     Allergies     Acute bronchitis     Screening for depression         Immunizations:     Prevnar 13 (Pneumococcal PCV 13) 10/27/2015     zzFluzone pf-quadrivalent 3 and up 10/27/2014     Flulaval 9/30/2010     Fluzone (3 + years dose) 9/24/2009     Fluzone (3 + years dose) 10/25/2011     Fluzone (3 + years dose)  10/25/2012     Fluzone pf (3+ years dose) 9/30/2013     Influenza A (H1N1), IM (3+ years) Monovalent 11/11/2009     Pneomovax 9/23/2008     Fluzone High-Dose pf (>=65 yr) 10/27/2015     Fluzone High-Dose pf (>=65 yr) 10/17/2016     Fluzone High-Dose pf (>=65 yr) 10/2/2017     PNEUMOVAX 23 (Pneumococcal PPV23) 0/0/2003     Adacel (Tdap) 11/2/2012     Adacel (Tdap) 8/6/2015     Zostavax (Zoster) 9/30/2009         Allergies:     Last Reviewed on 5/24/2018 03:44 PM by Anju Lemus    Prednisone: (Adverse Reaction)    Albuterol: Shakiness (Adverse Reaction)    Penicillins:    Iodine:    Morphine Sulfate:    Sulfas:    Demerol HCl:    Solu-Medrol:    Hydrocodone/Aspirin:    Erythromycin Base:    Darvon-N:    Codeine:    Talwin:    Valium:    Talacen:    Duricef:    Minocycline HCl:    Cubicin:    Ultram:    tape:    IVP dye:    Latex:        Current Medications:     Last Reviewed on 5/24/2018 03:46 PM by Anju Lemus    Atenolol 25mg Tablet 1 every afternoon.     Lipitor 20mg Tablet Take 1 tablet(s) by mouth daily     Mobic 15mg Tablet Take 1 tablet(s) by mouth daily     Salagen 5mg Tablet Take 1 tablet(s) by mouth QID     Keflex 500mg Capsules 1 capsule daily     Lomotil 2.5mg/0.025mg Tablet One Po Q 6 hours     MetroGel 1% Topical Gel Apply thin film to affected area BID and rub in.     EpiPen Auto-Injector 1:1,000 Solution For Injection As Directed     Singulair  10mg Tablet 1 TAB DAILY     Allegra Allergy 24 Hour 180mg Tablet 1 tab daily     [place] Thyroid 30mg Tablet Take 1 tablet(s) by mouth daily     Aspirin (ASA) 81mg Tablets, Enteric Coated 1 tab daily     Flovent HFA 110mcg/1actuation Oral Inhaler 2 puffs bid     Myrbetriq 25mg Tablets, Extended Release Take 1 tablet(s) by mouth daily     CPAP     Alrex 0.2% Ophthalmic Suspension INSTILL 1 DROP BOTH EYES DAILY     Flonase 50mcg/1actuation Nasal Spray 2 spray(s) in each nostril daily     Xopenex HFA 45mcg/1actuation Oral Inhaler 2 puffs QID PRN     Prevacid  30mg Capsules, Delayed Release Take 1 capsule(s) by mouth daily     Diflucan 150mg Tablet take 1 tab PO now, may repeat in 1 week if symptoms persist     Prednisone 10mg Tablet take 6 pills today, 5 pills tomorrow, 4 pills day 3, 3 pills day 4, 2 pills day 5 and 1 pill day 6     Tessalon Perles 100mg Capsules One PO Q 8 hours PRN cough     Meclizine HCl 25mg Tablet Take 1 tablet(s) by mouth tid PRN DIZZINESS     Tessalon Perles     melatonin 10 mg at night     Potassium Gluconate 595mg Tablet 1 capsule daily     probiotic 2 capsules daily     iron     Multivitamin/Mineral Supplement Caplet Take 1 tablet(s) by mouth daily     Vitamin B6 100mg Tablet one tablet twice daily         OBJECTIVE:        Vitals:         Current: 5/30/2018 2:15:59 PM    Ht:  5 ft, 5.75 in;  Wt: 179.9 lbs;  BMI: 29.3    T: 98.6 F (oral);  BP: 133/75 mm Hg (left arm, sitting);  P: 66 bpm (finger clip, sitting);  sCr: 0.97 mg/dL;  GFR: 58.50    O2 Sat: 96 % (room air)        Exams:     PHYSICAL EXAM:     GENERAL: well developed, well nourished;  no apparent distress;     EYES: PERRL, EOMI     E/N/T: EARS: external auditory canal normal;  both TMs are dull;  NOSE: normal turbinates; no sinus tenderness; OROPHARYNX: oral mucosa reveals thrush;  tongue coated with thrush;  posterior pharynx shows no exudate;     NECK: range of motion is normal; trachea is midline;     RESPIRATORY: normal respiratory rate and pattern with no distress; normal breath sounds with no rales, rhonchi, wheezes or rubs;     CARDIOVASCULAR: normal rate; rhythm is regular;     NEUROLOGIC: mental status: alert and oriented x 3; GROSSLY INTACT     PSYCHIATRIC: appropriate affect and demeanor;         ASSESSMENT           493.00   J45.40  Asthma              DDx:     112.0   B37.0  Thrush              DDx:         ORDERS:         Meds Prescribed:       Nystatin 100,000U/1ml Oral Suspension 1 teaspoon QID, swish and swallow for 14 days  #480 ([place]) ml Refills:  0                 PLAN:          Asthma Continue allergy and asthma medications. Advised that she may do Flovent 4 puffs twice daily instead of 2 puffs twice daily. Discussed no further antibiotics or steroids at this time, due to thrush. Will treat thrush as below. Continue follow up with allergist. Advised staying indoors due air quality alerts and avoiding allergens.         FOLLOW-UP: Schedule follow-up appointments on a p.r.n. basis. Chronic visit follow up          Thrush           Prescriptions:       Nystatin 100,000U/1ml Oral Suspension 1 teaspoon QID, swish and swallow for 14 days  #480 ([place] [address] [place]) ml Refills: 0           Patient Education Handouts:       Thrush     PTC              Patient Recommendations:        For  Asthma:     Schedule follow-up appointments as needed.              CHARGE CAPTURE           **Please note: ICD descriptions below are intended for billing purposes only and may not represent clinical diagnoses**        Primary Diagnosis:         493.00 Asthma            J45.40    Moderate persistent asthma, uncomplicated              Orders:          01702   Office/outpatient visit; established patient, level 3  (In-House)           112.0 Thrush            B37.0    Candidal stomatitis

## 2021-05-18 NOTE — PROGRESS NOTES
Valery Baez 1945     Office/Outpatient Visit    Visit Date: Mon, Apr 30, 2018 08:56 am    Provider: Tino Smith MD (Assistant: Anju Lemus MA)    Location: [facility]        Electronically signed by Tino Smith MD on  04/30/2018 01:23:45 PM                             SUBJECTIVE:        CC:     Ms. Baez is a 72 year old White female.  Med refill;         HPI:         Ms. Baez is here for a Medicare wellness visit.  Individual and family medical history was reviewed and updated A list of current providers and suppliers reviewed and updated A current height, weight, BMI, blood pressure, and pulse were recorded in the vitals section of the note and have been reviewed A review of cognitive impairment was performed and was negative.  A review of functional ability and level of safety was performed and was negative In regard to hearing, she reports having trouble hearing the TV/radio when others do not, having to strain to hear or understand conversations and wearing hearing aid(s).  Concerning home safety, She denies her home having throw rugs, poor lighting and a slippery bath or shower.   She has grab bars in the bath, handrails on stairs and functioning smoke alarms.  Has not had any falls or only one fall without injury in the past year.  Advance Care Directive updated today in [facility] Tobacco: She has never smoked.          Concerning high cholesterol, current treatment includes a lipid lowering agent.  Compliance with treatment has been good.  Most recent lab tests include Total Cholesterol:  161 (mg/dL) (05/23/2017), HDL:  66 (mg/dL) (05/23/2017), Triglycerides:  109 (mg/dL) (05/23/2017), LDL:  73 (mg/dL) (05/23/2017).          In regard to the acquired hypothyroidism, she is currently taking Synthroid.  TSH was last checked more than 6 months ago.  The result was reported as normal.          She denies dissatisfaction with her life, getting bored often, feeling helpless,  preferring to stay at home rather than going out and doing new things and feeling worthless the way she is now.  Total score is 0     ROS:     CONSTITUTIONAL:  Negative for chills and fever.      EYES:  Negative for blurred vision and eye drainage.      E/N/T:  Negative for ear pain, nasal congestion and sore throat.      CARDIOVASCULAR:  Negative for chest pain, palpitations and pedal edema.      RESPIRATORY:  Negative for recent cough and dyspnea.      GASTROINTESTINAL:  Negative for abdominal pain, anorexia, nausea and vomiting.      GENITOURINARY:  Negative for dysuria and hematuria.      MUSCULOSKELETAL:  Negative for myalgias.      NEUROLOGICAL:  Positive for dizziness ( OFF AND ON , BEG W/U, PRN MECLIZINE HELPS ).   Negative for headaches.          PMH/FMH/SH:     Last Reviewed on 2018 10:10 AM by Tino Smith    Past Medical History:                 PAST MEDICAL HISTORY         Breast cancer: dx'd in 3-2009;  s/p XRT; right breast;     chronic osteomyelitis on supressive therapy (keflex)         GYNECOLOGICAL HISTORY:        Last Pap was          CURRENT MEDICAL PROVIDERS:    Allergist    Dermatologist    E/N/T    Oncologist    Orthopedist    Urologist    GENERAL SURGEON FOR BREAST EXAMS    Infectious Disease specialist;;             ADVANCE DIRECTIVES: Living will         Surgical History:         Biopsy of breast; L/R several      Appendectomy    Cholecystectomy    Hysterectomy    Tonsillectomy/Adenoidectomy    BSO;    right breast lumpectomy 3-2009;      Joint Replacement: R knee;     L eye surgery;    L hand surgery;    L/R TMJ replacement;    R ankle and L foot surgery; Procedures: colonoscopy ,  NEG;; Treadmill stress test , NEG ECGO  Holter  right trigger finger release 2017         Family History:     Father: Coronary Artery Disease; Hypertension     Mother: Sudden Cardiac Death ( 83 )     Brother(s): Sudden Cardiac Death ( 52 )     Paternal  Grandmother: Breast Cancer     Maternal Grandmother: Breast Cancer         Social History:     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          Substance Abuse History:     Last Reviewed on 4/18/2017 04:29 PM by Krystal Tavares        Mental Health History:     Last Reviewed on 4/18/2017 04:29 PM by Krystal Tavares        Communicable Diseases (eg STDs):     Last Reviewed on 4/18/2017 04:29 PM by Krystal Tavares            Current Problems:     Last Reviewed on 4/30/2018 10:16 AM by Tino Smith    Benign positional vertigo-mild, recurrent     Acquired hypothyroidism     Diarrhea-recurrent     MRSA colonization     Iron deficiency anemia     Urge incontinence     Pernicious anemia     Palpitations (occ, neg w/u)     Obstructive sleep apnea     History of breast cancer     High cholesterol     Osteopenia     Surgical menopause     GERD     Asthma     Decreased hearing     Sjogren's disease     Osteoarthritis of knee     Rosacea     Allergies         Immunizations:     Prevnar 13 (Pneumococcal PCV 13) 10/27/2015     zzFluzone pf-quadrivalent 3 and up 10/27/2014     Flulaval 9/30/2010     Fluzone (3 + years dose) 9/24/2009     Fluzone (3 + years dose) 10/25/2011     Fluzone (3 + years dose) 10/25/2012     Fluzone pf (3+ years dose) 9/30/2013     Influenza A (H1N1), IM (3+ years) Monovalent 11/11/2009     Pneomovax 9/23/2008     Fluzone High-Dose pf (>=65 yr) 10/27/2015     Fluzone High-Dose pf (>=65 yr) 10/17/2016     Fluzone High-Dose pf (>=65 yr) 10/2/2017     PNEUMOVAX 23 (Pneumococcal PPV23) 0/0/2003     Adacel (Tdap) 11/2/2012     Adacel (Tdap) 8/6/2015     Zostavax (Zoster) 9/30/2009         Allergies:     Last Reviewed on 4/30/2018 10:09 AM by Tino Smith    Prednisone: (Adverse Reaction)    Albuterol:  Shakiness (Adverse Reaction)    Penicillins:    Iodine:    Morphine Sulfate:    Sulfas:    Demerol HCl:    Solu-Medrol:    Hydrocodone/Aspirin:    Erythromycin Base:    Darvon-N:    Codeine:    Talwin:    Valium:    Talacen:    Duricef:    Minocycline HCl:    Cubicin:    Ultram:    tape:    IVP dye:    Latex:        Current Medications:     Last Reviewed on 12/22/2017 01:16 PM by Emelina Lazo    Atenolol 25mg Tablet 1 every afternoon.     Lipitor 20mg Tablet Take 1 tablet(s) by mouth daily     Mobic 15mg Tablet Take 1 tablet(s) by mouth daily     Salagen 5mg Tablet Take 1 tablet(s) by mouth QID     Keflex 500mg Capsules 1 capsule daily     Lomotil 2.5mg/0.025mg Tablet One Po Q 6 hours     MetroGel 1% Topical Gel Apply thin film to affected area BID and rub in.     EpiPen Auto-Injector 1:1,000 Solution For Injection As Directed     Allegra Allergy 24 Hour 180mg Tablet 1 tab daily     [place] Thyroid 30mg Tablet Take 1 tablet(s) by mouth daily     Aspirin (ASA) 81mg Tablets, Enteric Coated 1 tab daily     Flovent HFA 110mcg/1actuation Oral Inhaler 2 puffs bid     Myrbetriq 25mg Tablets, Extended Release Take 1 tablet(s) by mouth daily     CPAP     Alrex 0.2% Ophthalmic Suspension INSTILL 1 DROP BOTH EYES DAILY     Flonase 50mcg/1actuation Nasal Spray 2 spray(s) in each nostril daily     Xopenex HFA 45mcg/1actuation Oral Inhaler 2 puffs QID PRN     Prevacid 30mg Capsules, Delayed Release Take 1 capsule(s) by mouth daily     Tessalon Perles     Singulair  10mg Tablet 1 TAB DAILY     melatonin 10 mg at night     Potassium Gluconate 595mg Tablet 1 capsule daily     probiotic 2 capsules daily     iron     Multivitamin/Mineral Supplement Caplet Take 1 tablet(s) by mouth daily     Vitamin B6 100mg Tablet one tablet twice daily         OBJECTIVE:        Vitals:         Current: 4/30/2018 9:00:28 AM    Ht:  5 ft, 5.75 in;  Wt: 180.9 lbs;  BMI: 29.4    T: 97.7 F (oral);  BP: 108/68 mm Hg (left arm, sitting);  P: 78 bpm (finger  clip, sitting);  sCr: 0.97 mg/dL;  GFR: 58.64        Exams:     PHYSICAL EXAM:     GENERAL: vital signs recorded - well developed, well nourished;  no apparent distress;     EYES: conjunctiva and cornea are normal;     E/N/T:  normal EACs, TMs, nasal/oral mucosa, teeth, gingiva, and oropharynx;     NECK: trachea is midline; thyroid is non-palpable; carotid exam reveals no bruits;     RESPIRATORY: normal respiratory rate and pattern with no distress; normal breath sounds with no rales, rhonchi, wheezes or rubs;     CARDIOVASCULAR: normal rate; rhythm is regular;  no systolic murmur; no edema;     GASTROINTESTINAL: nontender;     LYMPHATIC: no enlargement of cervical or facial nodes;     MUSCULOSKELETAL: normal gait; normal overall tone     NEUROLOGIC: GROSSLY INTACT     PSYCHIATRIC:  appropriate affect and demeanor; normal speech pattern; grossly normal memory;         ASSESSMENT           V70.0   Z00.00  Annual exam              DDx:     272.0   E78.00  High cholesterol              DDx:     244.8   E03.8  Acquired hypothyroidism              DDx:     386.11   H81.13  Benign positional vertigo-mild, recurrent              DDx:     V79.0   Z13.89  Screening for depression              DDx:         ORDERS:         Meds Prescribed:       Meclizine HCl 25mg Tablet Take 1 tablet(s) by mouth tid PRN DIZZINESS  #90 (Ninety) tablet(s) Refills: 1         Lab Orders:       [id number]  LPDP - [facility] Lipid Panel  (Send-Out)         [id number]  TSH - [facility] TSH  (Send-Out)           Procedures Ordered:         Annual wellness visit, includes a PPPS, subsequent visit  (In-House)           Other Orders:         Depression screen negative  (In-House)                   PLAN:          Annual exam         COUNSELING provided on: fall prevention, healthy eating habits, Medicare Preventative Services Guide given to patient., regular exercise, use of seat belts, and ADVISED TO SEE AN EYE DOCTOR AND DENTIST REGULARLY.      FOLLOW-UP: Schedule a  follow-up visit in 6 months.  MIPS Negative Depression Screen           Orders:         Annual wellness visit, includes a PPPS, subsequent visit  (In-House)           Depression screen negative  (In-House)            High cholesterol     LABORATORY:  Labs ordered to be performed today include lipid panel.            Orders:       [id number]  DP - [facility] Lipid Panel  (Send-Out)            Acquired hypothyroidism     LABORATORY:  Labs ordered to be performed today include TSH.            Orders:       [id number]  TSH - [facility] TSH  (Send-Out)            Benign positional vertigo-mild, recurrent           Prescriptions:       Meclizine HCl 25mg Tablet Take 1 tablet(s) by mouth tid PRN DIZZINESS  #90 (Ninety) tablet(s) Refills: 1           Patient Education Handouts:       Benign Paroxysmal Positional Vertigo (Bppv)              Patient Recommendations:        For  Annual exam:         Regularly exercise within recommended guidelines, especially to maintain balance. Remove obstacles in walkways at home.  Use non-skid material for bathtub safety.  Remove loose throw rugs from floor. Use nightlights in bedrooms, hallways, and bathrooms.    Limit dietary intake of fat (especially saturated fat) and cholesterol.  Eat a variety of foods, including plenty of fruits, vegetables, and grain containg fiber, limit fat intake to 30% of total calories. Balance caloric intake with energy expended.    Maintaining regular physical activity is advised to help prevent heart disease, hypertension, diabetes, and obesity.    Always use shoulder/lap restraints when driving or riding in a vehicle, even those equipped with air bags.  Schedule a follow-up visit in 6 months.              CHARGE CAPTURE           **Please note: ICD descriptions below are intended for billing purposes only and may not represent clinical diagnoses**        Primary Diagnosis:         V70.0 Annual exam            Z00.00    Encounter for general adult medical examination  without abnormal findings              Orders:             Annual wellness visit, includes a PPPS, subsequent visit  (In-House)                Depression screen negative  (In-House)           272.0 High cholesterol            E78.00    Pure hypercholesterolemia, unspecified              Orders:          [id number] -25  Office/outpatient visit; established patient, level 4  (In-House)           244.8 Acquired hypothyroidism            E03.8    Other specified hypothyroidism    386.11 Benign positional vertigo-mild, recurrent            H81.13    Benign paroxysmal vertigo, bilateral    V79.0 Screening for depression            Z13.89    Encounter for screening for other disorder
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
## 2021-05-18 NOTE — PROGRESS NOTES
Valery Baez 1945     Office/Outpatient Visit    Visit Date: Thu, May 24, 2018 03:38 pm    Provider: Tracey Jiménez N.P. (Assistant: Anju Lemus MA)    Location: [facility]        Electronically signed by Tracey Jiménez N.P. on  2018 05:01:25 PM                             SUBJECTIVE:        CC:     Ms. Baez is a 72 year old White female.  Upper respiratory illness isnt getting any better;         HPI:         Patient to be evaluated for upper respiratory illness.  These have been present for the past 10 weeks.  The symptoms include subjective fever,  hoarseness and sore throat.  She has already tried to relieve the symptoms with Levaquin 500 mg once daily for 10 days, Prednisone 50 mg X 4 days, prescribed by allergist and Also taking Xoponex, Tessalon perles.      ROS:     CONSTITUTIONAL:  Positive for fever ( subjective ).   Negative for chills.      EYES:  Negative for eye drainage and eye pain.      E/N/T:  Positive for hoarseness and sore throat.      CARDIOVASCULAR:  Negative for chest pain and palpitations.      RESPIRATORY:  Negative for dyspnea and frequent wheezing.      GASTROINTESTINAL:  Negative for abdominal pain and vomiting.          [facility]/SH:     Last Reviewed on 2018 10:10 AM by Tino Smith    Past Medical History:                 PAST MEDICAL HISTORY         Breast cancer: dx'd in 3-2009;  s/p XRT; right breast;     chronic osteomyelitis on supressive therapy (keflex)         GYNECOLOGICAL HISTORY:        Last Pap was          CURRENT MEDICAL PROVIDERS:    Allergist    Dermatologist    E/N/T    Oncologist    Orthopedist    Urologist    GENERAL SURGEON FOR BREAST EXAMS    Infectious Disease specialist;;             ADVANCE DIRECTIVES: Living will         Surgical History:         Biopsy of breast; L/R several      Appendectomy    Cholecystectomy    Hysterectomy    Tonsillectomy/Adenoidectomy    BSO;    right breast lumpectomy 3-;       Joint Replacement: R knee;     L eye surgery;    L hand surgery;    L/R TMJ replacement;    R ankle and L foot surgery; Procedures: colonoscopy 2003, 2013 NEG;; Treadmill stress test 2007, NEG ECGO 2001 Holter 2001 right trigger finger release nov 2017         Family History:     Father: Coronary Artery Disease; Hypertension     Mother: Sudden Cardiac Death ( 83 )     Brother(s): Sudden Cardiac Death ( 52 )     Paternal Grandmother: Breast Cancer     Maternal Grandmother: Breast Cancer         Social History:     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          Substance Abuse History:     Last Reviewed on 4/18/2017 04:29 PM by Krystal Tavares        Mental Health History:     Last Reviewed on 4/18/2017 04:29 PM by Krystal Tavares        Communicable Diseases (eg STDs):     Last Reviewed on 4/18/2017 04:29 PM by Krystal Tavares            Current Problems:     Last Reviewed on 4/30/2018 10:16 AM by Tino Smith    Benign positional vertigo-mild, recurrent     Acquired hypothyroidism     Diarrhea-recurrent     MRSA colonization     Iron deficiency anemia     Urge incontinence     Pernicious anemia     Palpitations (occ, neg w/u)     Obstructive sleep apnea     History of breast cancer     High cholesterol     Osteopenia     Surgical menopause     GERD     Asthma     Decreased hearing     Sjogren's disease     Osteoarthritis of knee     Rosacea     Allergies     Screening for depression         Immunizations:     Prevnar 13 (Pneumococcal PCV 13) 10/27/2015     zzFluzone pf-quadrivalent 3 and up 10/27/2014     Flulaval 9/30/2010     Fluzone (3 + years dose) 9/24/2009     Fluzone (3 + years dose) 10/25/2011     Fluzone (3 + years dose) 10/25/2012     Fluzone pf (3+ years dose) 9/30/2013     Influenza A (H1N1), IM (3+  years) Monovalent 11/11/2009     Pneomovax 9/23/2008     Fluzone High-Dose pf (>=65 yr) 10/27/2015     Fluzone High-Dose pf (>=65 yr) 10/17/2016     Fluzone High-Dose pf (>=65 yr) 10/2/2017     PNEUMOVAX 23 (Pneumococcal PPV23) 0/0/2003     Adacel (Tdap) 11/2/2012     Adacel (Tdap) 8/6/2015     Zostavax (Zoster) 9/30/2009         Allergies:     Last Reviewed on 4/30/2018 10:09 AM by Tino mSith    Prednisone: (Adverse Reaction)    Albuterol: Shakiness (Adverse Reaction)    Penicillins:    Iodine:    Morphine Sulfate:    Sulfas:    Demerol HCl:    Solu-Medrol:    Hydrocodone/Aspirin:    Erythromycin Base:    Darvon-N:    Codeine:    Talwin:    Valium:    Talacen:    Duricef:    Minocycline HCl:    Cubicin:    Ultram:    tape:    IVP dye:    Latex:        Current Medications:     Last Reviewed on 4/30/2018 10:14 AM by Tino Smith    Atenolol 25mg Tablet 1 every afternoon.     Lipitor 20mg Tablet Take 1 tablet(s) by mouth daily     Mobic 15mg Tablet Take 1 tablet(s) by mouth daily     Salagen 5mg Tablet Take 1 tablet(s) by mouth QID     Keflex 500mg Capsules 1 capsule daily     Lomotil 2.5mg/0.025mg Tablet One Po Q 6 hours     MetroGel 1% Topical Gel Apply thin film to affected area BID and rub in.     EpiPen Auto-Injector 1:1,000 Solution For Injection As Directed     Singulair  10mg Tablet 1 TAB DAILY     Allegra Allergy 24 Hour 180mg Tablet 1 tab daily     [place] Thyroid 30mg Tablet Take 1 tablet(s) by mouth daily     Aspirin (ASA) 81mg Tablets, Enteric Coated 1 tab daily     Flovent HFA 110mcg/1actuation Oral Inhaler 2 puffs bid     Myrbetriq 25mg Tablets, Extended Release Take 1 tablet(s) by mouth daily     CPAP     Alrex 0.2% Ophthalmic Suspension INSTILL 1 DROP BOTH EYES DAILY     Flonase 50mcg/1actuation Nasal Spray 2 spray(s) in each nostril daily     Xopenex HFA 45mcg/1actuation Oral Inhaler 2 puffs QID PRN     Prevacid 30mg Capsules, Delayed Release Take 1 capsule(s) by mouth daily      Meclizine HCl 25mg Tablet Take 1 tablet(s) by mouth tid PRN DIZZINESS     Tessalon Perles     melatonin 10 mg at night     Potassium Gluconate 595mg Tablet 1 capsule daily     probiotic 2 capsules daily     iron     Multivitamin/Mineral Supplement Caplet Take 1 tablet(s) by mouth daily     Vitamin B6 100mg Tablet one tablet twice daily         OBJECTIVE:        Vitals:         Current: 5/24/2018 3:44:16 PM    Ht:  5 ft, 5.75 in;  Wt: 182.3 lbs;  BMI: 29.6    T: 98.6 F (oral);  BP: 102/60 mm Hg (left arm, sitting);  P: 65 bpm (finger clip, sitting);  sCr: 0.97 mg/dL;  GFR: 58.83        Exams:     PHYSICAL EXAM:     GENERAL: well developed, well nourished;  no apparent distress;     EYES: PERRL, EOMI     E/N/T: EARS: external auditory canal normal;  both TMs are dull;  NOSE: normal turbinates; no sinus tenderness; OROPHARYNX: oral mucosa is normal; posterior pharynx shows a posterior pharyngeal exudate and erythema;     NECK: range of motion is normal; trachea is midline;     RESPIRATORY: normal respiratory rate and pattern with no distress; normal breath sounds with no rales, rhonchi, wheezes or rubs;     CARDIOVASCULAR: normal rate; rhythm is regular;     GASTROINTESTINAL: nontender; normal bowel sounds; no organomegaly;     MUSCULOSKELETAL: normal gait;     NEUROLOGIC: mental status: alert and oriented x 3; GROSSLY INTACT     PSYCHIATRIC: appropriate affect and demeanor;         Lab/Test Results:             Rapid Strep Screen:  Negative (05/24/2018),     Performed by::  LIU (05/24/2018),             ASSESSMENT           466.0   J20.9  Acute bronchitis              DDx:         ORDERS:         Meds Prescribed:       Prednisone 10mg Tablet take 6 pills today, 5 pills tomorrow, 4 pills day 3, 3 pills day 4, 2 pills day 5 and 1 pill day 6  #21 (Twenty One) tablet(s) Refills: 0       Tessalon Perles (Benzonatate) 100mg Capsules One PO Q 8 hours PRN cough  #30 (Thirty) capsule(s) Refills: 0         Lab Orders:        91064  [facility] - [facility] Rapid strep A  (In-House)         [id number]  [facility] Throat culture, strep  (Send-Out)                   PLAN:          Acute bronchitis     LABORATORY:  Labs ordered to be performed today include rapid strep test.      RECOMMENDATIONS given include: Push Fluids, Rest, Follow up if no improvement or worsening symptoms like high fevers, vomiting, weakness, or increasing shortness of air.     and Warm salt water gargles. Voice rest..      FOLLOW-UP: Schedule follow-up appointments on a p.r.n. basis. Chronic visit follow up           Prescriptions:       Prednisone 10mg Tablet take 6 pills today, 5 pills tomorrow, 4 pills day 3, 3 pills day 4, 2 pills day 5 and 1 pill day 6  #21 (Twenty One) tablet(s) Refills: 0       Tessalon Perles (Benzonatate) 100mg Capsules One PO Q 8 hours PRN cough  #30 (Thirty) capsule(s) Refills: 0           Orders:       [id number]  [facility] - [facility] Rapid strep A  (In-House)         53764  [facility] Throat culture, strep  (Send-Out)               Patient Recommendations:        For  Acute bronchitis:     Schedule follow-up appointments as needed.              CHARGE CAPTURE           **Please note: ICD descriptions below are intended for billing purposes only and may not represent clinical diagnoses**        Primary Diagnosis:         466.0 Acute bronchitis            J20.9    Acute bronchitis, unspecified              Orders:          90314   Office/outpatient visit; established patient, level 3  (In-House)             88101   [facility] - [facility] Rapid strep A  (In-House)

## 2021-05-18 NOTE — PROGRESS NOTES
Tacos Sanders  1945     Office/Outpatient Visit    Visit Date: Thu, Apr 16, 2020 09:06 am    Provider: Tino Smith MD (Assistant: Joann Jauregui MA)    Location: [facility]        Electronically signed by Tino Smith MD on  04/16/2020 10:06:07 AM                             Subjective:        CC: Mrs. Sanders is a 74 year old White female.  This is a follow-up visit.  check up; pt complains of low grade fever up to 99.9.  PRESENT:  TACOS SANDERS, TINO SMITH MD; [employer] [id number]        HPI:       (Improving)     Mrs. Sanders presents with fever, unspecified.  Patient notes 'influenza' type symptoms for the past one to two days.  The symptoms include fever to 99.9 degrees.  She denies exposure to ill contacts.  FEELS S/W BETTER TODAY           Pure hypercholesterolemia, unspecified details; current treatment includes a lipid lowering agent.  Compliance with treatment has been good.  Most recent lab tests include Total Cholesterol:  155 (mg/dL) (04/30/2018), HDL:  57 (mg/dL) (04/30/2018), Triglycerides:  155 (mg/dL) (04/30/2018), LDL:  67 (mg/dL) (04/30/2018).            With regard to the gastro-esophageal reflux disease without esophagitis, the location of the discomfort is primarily substernal and epigastric.  She describes the pain as burning.  Symptoms are improved with a proton-pump inhibitor.            With regard to the other specified hypothyroidism, she is currently taking Synthroid.  TSH was last checked more than 6 months ago.  The result was reported as normal.            Complaint of moderate persistent asthma, uncomplicated..  The symptom began years ago.  The severity is of moderate intensity.  STABLE ON CURRENT MEDS     ROS:     CONSTITUTIONAL:  Negative for chills, fatigue and fever.      EYES:  Negative for eye drainage.      E/N/T:  Negative for ear pain, nasal congestion and sore throat.      CARDIOVASCULAR:  Negative for chest pain,  palpitations and pedal edema.      RESPIRATORY:  Negative for recent cough and dyspnea.      GASTROINTESTINAL:  Negative for abdominal pain, anorexia, nausea and vomiting.      MUSCULOSKELETAL:  Negative for myalgias.      NEUROLOGICAL:  Negative for headaches.          Past Medical History / Family History / Social History:         Last Reviewed on 2020 09:27 AM by Tino Smith    Past Medical History:                 PAST MEDICAL HISTORY         Breast cancer: dx'd in 3-2009;  s/p XRT; right breast;         GYNECOLOGICAL HISTORY:             CURRENT MEDICAL PROVIDERS:    Allergist    Dermatologist    E/N/T    Oncologist    Orthopedist    Urologist    GENERAL SURGEON FOR BREAST EXAMS    Infectious Disease specialist;;    Speech pathologist             ADVANCE DIRECTIVES: Living will         PREVENTIVE HEALTH MAINTENANCE             BONE DENSITY: was last done  with the following abnormality noted-- Osteoporosis     COLORECTAL CANCER SCREENING: Up to date (colonoscopy q10y; sigmoidoscopy q5y; Cologuard q3y) was last done , Results are in chart; colonoscopy with normal results     DENTAL CLEANING: was last done      EYE EXAM: was last done      Hepatitis C Medicare Screening: was last done      MAMMOGRAM: was last done  with normal results     PAP SMEAR: No longer indicated due to age and history         Surgical History:         Biopsy of breast; L/R several     Appendectomy    Cholecystectomy    Hysterectomy    Tonsillectomy/Adenoidectomy    BSO;    right breast lumpectomy 3-2009;    RIGHT MASTECTOMY;;;     Joint Replacement: R knee;     Laminectomy: thoracic region; lumbar region;     L eye surgery;    L hand surgery;    L/R TMJ replacement;    R ankle and L foot surgery; right trigger finger release 2017         Family History:     Father: Coronary Artery Disease; Hypertension     Mother: Sudden Cardiac Death ( 83 )     Brother(s): Sudden Cardiac Death ( 52 )      Paternal Grandmother: Breast Cancer     Maternal Grandmother: Breast Cancer         Social History:     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          Substance Abuse History:     Last Reviewed on 7/23/2019 06:25 PM by Junaid Amaya        Mental Health History:     Last Reviewed on 7/23/2019 06:25 PM by Junaid Amaya        Communicable Diseases (eg STDs):     Last Reviewed on 7/23/2019 06:25 PM by Junaid Amaya        Current Problems:     Last Reviewed on 4/16/2020 09:27 AM by Tino Smith    Rosacea    Sjogren's disease    Pure hypercholesterolemia, unspecified    Gastro-esophageal reflux disease without esophagitis    Decreased hearing    Osteoarthritis of knee    Allergies    History of breast cancer    Obstructive sleep apnea    Palpitations (occ, neg w/u)    Pernicious anemia    Urge incontinence    Iron deficiency anemia    MRSA colonization    Diarrhea-recurrent    Other specified hypothyroidism    Benign paroxysmal vertigo, mild-recurrent    Moderate persistent asthma, uncomplicated    Osteoporosis    Basal cell carcinoma of skin, (history of)        Immunizations:     Prevnar 13 (Pneumococcal PCV 13) 10/27/2015    zzFluzone pf-quadrivalent 3 and up 10/27/2014    Flulaval 9/30/2010    Fluzone (3 + years dose) 9/24/2009    Fluzone (3 + years dose) 10/25/2011    Fluzone (3 + years dose) 10/25/2012    Fluzone pf (3+ years dose) 9/30/2013    Influenza A (H1N1), IM (3+ years) Monovalent 11/11/2009    Pneomovax 9/23/2008    Fluzone High-Dose pf (>=65 yr) 10/27/2015    Fluzone High-Dose pf (>=65 yr) 10/17/2016    Fluzone High-Dose pf (>=65 yr) 10/2/2017    Fluzone High-Dose pf (>=65 yr) 10/10/2018    Fluzone High-Dose pf (>=65 yr) 10/28/2019    PNEUMOVAX 23 (Pneumococcal PPV23) 0/0/2003    Adacel (Tdap)  11/2/2012    Adacel (Tdap) 8/6/2015    Zostavax (Zoster live) 9/30/2009        Allergies:     Last Reviewed on 4/16/2020 09:27 AM by Tino Smith    Prednisone:   (Adverse Reaction)    tape:      Albuterol: Shakiness  (Adverse Reaction)    Flexeril:      Cyclobenzaprine HCl:      hyoscyamine sulfate:   (Adverse Reaction)    Penicillins:      Iodine:      Morphine Sulfate:      Sulfas:      IVP dye:      Demerol HCl:      Solu-Medrol:      Hydrocodone/Aspirin:      Erythromycin Base:      Darvon-N:      Codeine:      Talwin:      Valium:      Talacen:      Duricef:      Minocycline HCl:      Cubicin:      Ultram:      Latex:          Current Medications:     Last Reviewed on 4/16/2020 09:27 AM by Tino Smith    Alrex 0.2 % ophthalmic (eye) Drops, Suspension [INSTILL 1 DROP BOTH EYES DAILY]    Multivitamin/Mineral Supplement  Caplet [Take 1 tablet(s) by mouth daily]    Vitamin B-6 100 mg oral tablet [one tablet twice daily]    Prevacid 30 mg oral capsule,delayed release (enteric coated) [BID]    MetroGel 1% Topical Gel [Apply thin film to affected area BID and rub in.]    Salagen (pilocarpine) 5 mg oral tablet [Take 1 tablet(s) by mouth QID]    atorvastatin 20 mg oral tablet [TAKE 1 TABLET DAILY]    Mobic 15 mg oral tablet [TAKE 1 TABLET DAILY]    Keflex 500 mg oral capsule [1 capsule daily]    Meclizine HCl 25mg Tablet [Take 1 tablet(s) by mouth tid PRN DIZZINESS]    Xopenex HFA 45 mcg/actuation Inhalation HFA Aerosol Inhaler [2 puffs QID PRN]    Lomotil 2.5mg/0.025mg Tablet [One Po Q 6 hours]    Singulair  10 mg oral tablet [1 TAB DAILY]    Flonase 50 mcg/actuation Intranasal Spray, Suspension [2 spray(s) in each nostril daily]    atenolol 25 mg oral tablet [TAKE 1 TABLET EVERY AFTERNOON]    probiotic 2 capsules daily      CPAP      Myrbetriq 25 mg oral Tablet, Extended Release 24 hr [Take 1 tablet(s) by mouth daily]    benzonatate 100 mg oral capsule [TAKE 1 CAPSULE EVERY 8 HOURS AS NEEDED  FOR COUGH]    Allegra Allergy 180 mg oral tablet [1 tab daily]    Potassium Gluconate 595 mg (99 mg) oral tablet [1 capsule daily]    melatonin 10 mg at night     [place] Thyroid 30 mg oral tablet [Take 1 tablet(s) by mouth daily]    Epinephrine Auto-Injector 0.15mg Solution For Injection [Inject as needed]    Prolia         Objective:        Exams:     PHYSICAL EXAM:     GENERAL: vital signs recorded - well developed, well nourished;  no apparent distress;     RESPIRATORY: normal respiratory rate and pattern with no distress;     PSYCHIATRIC:  appropriate affect and demeanor; normal speech pattern; grossly normal memory;         Assessment:         R50.9   Fever, unspecified   (Improving)     E78.00   Pure hypercholesterolemia, unspecified       K21.9   Gastro-esophageal reflux disease without esophagitis       E03.8   Other specified hypothyroidism       J45.40   Moderate persistent asthma, uncomplicated           ORDERS:         Radiology/Test Orders:       3017F  Colorectal CA screen results documented and reviewed (PV)  (In-House)                      Plan:         Fever, unspecified        FOLLOW-UP: Schedule a follow-up visit in 6 months.  Observe for now Consider further workup Go to ER if worse.  OTC meds PRN Consider antibiotics MIPS Vaccines Flu and Pneumonia updated in Shot record Colorectal Cancer Screening is up to date and the results are in the chart Telehealth: Verbal consent obtained for visit to occur via televideo conferencing           Orders:       3017F  Colorectal CA screen results documented and reviewed (PV)  (In-House)              Pure hypercholesterolemia, unspecified        MEDICATIONS: (no change to current medication regimen)         Gastro-esophageal reflux disease without esophagitis        MEDICATIONS: (no change to current medication regimen)         Other specified hypothyroidism        MEDICATIONS: (no change to current medication regimen)         Moderate persistent asthma,  uncomplicated        MEDICATIONS: (no change to current medication regimen)             Patient Recommendations:        For  Fever, unspecified:    Schedule a follow-up visit in 6 months.              Charge Capture:         Primary Diagnosis:     R50.9  Fever, unspecified           Orders:      38345  Office/outpatient visit; established patient, level 3  (In-House)            3017F  Colorectal CA screen results documented and reviewed (PV)  (In-House)              E78.00  Pure hypercholesterolemia, unspecified     K21.9  Gastro-esophageal reflux disease without esophagitis     E03.8  Other specified hypothyroidism     J45.40  Moderate persistent asthma, uncomplicated

## 2021-05-18 NOTE — PROGRESS NOTES
Valery Baez 1945     Office/Outpatient Visit    Visit Date: Tue, Nov 27, 2018 10:04 am    Provider: Tino Smith MD (Assistant: Joann Jauregui MA)    Location: [facility]        Electronically signed by Tino Smith MD on  11/27/2018 01:11:57 PM                             SUBJECTIVE:        CC:     Ms. Baez is a 72 year old White female.  This is a follow-up visit.  med refills;         HPI:         Patient complains of gERD.  The location of the discomfort is primarily substernal and epigastric.  She describes the pain as burning.  Symptoms are improved with a proton-pump inhibitor.          With regard to the acquired hypothyroidism, she is currently taking Synthroid.  TSH was last checked more than 6 months ago.  The result was reported as normal.          Additionally, she presents with history of asthma.  the frequency of daytime attacks averages once per month.  She is currently at baseline, and not having an exacerbation.  Her current asthma medication includes albuterol and a steroid MDI.  Currently, no asthma symptoms are reported.  STABLE ON CURRENT MEDS     ROS:     CONSTITUTIONAL:  Negative for chills and fever.      E/N/T:  Negative for ear pain, nasal congestion and sore throat.      CARDIOVASCULAR:  Negative for chest pain, palpitations and pedal edema.      RESPIRATORY:  Negative for recent cough and dyspnea.      GASTROINTESTINAL:  Negative for abdominal pain, nausea and vomiting.      MUSCULOSKELETAL:  Positive for arthralgias (KNEES, CHRONIC).      NEUROLOGICAL:  Positive for dizziness ( OFF AND ON , BEG W/U, PRN MECLIZINE HELPS ).   Negative for headaches.          PMH/FMH/SH:     Last Reviewed on 11/27/2018 10:20 AM by Tino Smith    Past Medical History:                 PAST MEDICAL HISTORY         Breast cancer: dx'd in 3-2009;  s/p XRT; right breast;     chronic osteomyelitis on supressive therapy (keflex)         GYNECOLOGICAL HISTORY:    G2  P2    Last Pap was 2005         CURRENT MEDICAL PROVIDERS:    Allergist    Dermatologist    E/N/T    Oncologist    Orthopedist    Urologist    GENERAL SURGEON FOR BREAST EXAMS    Infectious Disease specialist;;    speech pathologist             ADVANCE DIRECTIVES: Living will         PREVENTIVE HEALTH MAINTENANCE             COLORECTAL CANCER SCREENING: Up to date (colonoscopy q10y; sigmoidoscopy q5y; Cologuard q3y) was last done 05/2013, Results are in chart; colonoscopy with normal results         Surgical History:         Biopsy of breast; L/R several      Appendectomy    Cholecystectomy    Hysterectomy    Tonsillectomy/Adenoidectomy    BSO;    right breast lumpectomy 3-2009;      Joint Replacement: R knee;     L eye surgery;    L hand surgery;    L/R TMJ replacement;    R ankle and L foot surgery; Procedures: colonoscopy 2003, 2013 NEG;; Treadmill stress test 2007, NEG ECGO 2001 Holter 2001 right trigger finger release nov 2017         Family History:     Father: Coronary Artery Disease; Hypertension     Mother: Sudden Cardiac Death ( 83 )     Brother(s): Sudden Cardiac Death ( 52 )     Paternal Grandmother: Breast Cancer     Maternal Grandmother: Breast Cancer         Social History:     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          Substance Abuse History:     Last Reviewed on 8/28/2018 01:16 PM by Cricket Gan        Mental Health History:     Last Reviewed on 8/28/2018 01:16 PM by Cricket Gan        Communicable Diseases (eg STDs):     Last Reviewed on 8/28/2018 01:16 PM by Cricket Gan            Current Problems:     Last Reviewed on 11/27/2018 10:26 AM by Tino Smith    Asthma     Benign positional vertigo-mild, recurrent     Acquired hypothyroidism     Diarrhea-recurrent     MRSA  colonization     Iron deficiency anemia     Urge incontinence     Pernicious anemia     Palpitations (occ, neg w/u)     Obstructive sleep apnea     History of breast cancer     High cholesterol     Osteopenia     Surgical menopause     GERD     Decreased hearing     Sjogren's disease     Osteoarthritis of knee     Rosacea     Allergies         Immunizations:     Prevnar 13 (Pneumococcal PCV 13) 10/27/2015     zzFluzone pf-quadrivalent 3 and up 10/27/2014     Flulaval 9/30/2010     Fluzone (3 + years dose) 9/24/2009     Fluzone (3 + years dose) 10/25/2011     Fluzone (3 + years dose) 10/25/2012     Fluzone pf (3+ years dose) 9/30/2013     Influenza A (H1N1), IM (3+ years) Monovalent 11/11/2009     Pneomovax 9/23/2008     Fluzone High-Dose pf (>=65 yr) 10/27/2015     Fluzone High-Dose pf (>=65 yr) 10/17/2016     Fluzone High-Dose pf (>=65 yr) 10/2/2017     Fluzone High-Dose pf (>=65 yr) 10/10/2018     PNEUMOVAX 23 (Pneumococcal PPV23) 0/0/2003     Adacel (Tdap) 11/2/2012     Adacel (Tdap) 8/6/2015     Zostavax (Zoster live) 9/30/2009         Allergies:     Last Reviewed on 11/27/2018 10:19 AM by Tino Smith    Prednisone: (Adverse Reaction)    Albuterol: Shakiness (Adverse Reaction)    Penicillins:    Iodine:    Morphine Sulfate:    Sulfas:    Demerol HCl:    Solu-Medrol:    Hydrocodone/Aspirin:    Erythromycin Base:    Darvon-N:    Codeine:    Talwin:    Valium:    Talacen:    Duricef:    Minocycline HCl:    Cubicin:    Ultram:    tape:    IVP dye:    Latex:        Current Medications:     Last Reviewed on 11/27/2018 10:25 AM by Tino Smith    Atenolol 25mg Tablet 1 every afternoon.     Lipitor 20mg Tablet Take 1 tablet(s) by mouth daily     Meclizine HCl 25mg Tablet Take 1 tablet(s) by mouth tid PRN DIZZINESS     Mobic 15mg Tablet Take 1 tablet(s) by mouth daily     Salagen 5mg Tablet Take 1 tablet(s) by mouth QID     Keflex 500mg Capsules 1 capsule daily     Lomotil 2.5mg/0.025mg Tablet One  Po Q 6 hours     MetroGel 1% Topical Gel Apply thin film to affected area BID and rub in.     EpiPen Auto-Injector 1:1,000 Solution For Injection As Directed     Singulair  10mg Tablet 1 TAB DAILY     Allegra Allergy 24 Hour 180mg Tablet 1 tab daily     [place] Thyroid 30mg Tablet Take 1 tablet(s) by mouth daily     Aspirin (ASA) 81mg Tablets, Enteric Coated 1 tab daily     Flovent HFA 110mcg/1actuation Oral Inhaler 2 puffs bid     Myrbetriq 25mg Tablets, Extended Release Take 1 tablet(s) by mouth daily     CPAP     Alrex 0.2% Ophthalmic Suspension INSTILL 1 DROP BOTH EYES DAILY     Flonase 50mcg/1actuation Nasal Spray 2 spray(s) in each nostril daily     Xopenex HFA 45mcg/1actuation Oral Inhaler 2 puffs QID PRN     Prevacid 30mg Capsules, Delayed Release BID     Tessalon Perles     melatonin 10 mg at night     Potassium Gluconate 595mg Tablet 1 capsule daily     probiotic 2 capsules daily     Multivitamin/Mineral Supplement Caplet Take 1 tablet(s) by mouth daily     Vitamin B6 100mg Tablet one tablet twice daily         OBJECTIVE:        Vitals:         Current: 11/27/2018 10:10:51 AM    Ht:  5 ft, 5.75 in;  Wt: 179.6 lbs;  BMI: 29.2    T: 98.2 F (oral);  BP: 119/59 mm Hg (left arm, sitting);  P: 77 bpm (left arm (BP Cuff), sitting);  sCr: 0.97 mg/dL;  GFR: 58.46        Exams:     PHYSICAL EXAM:     GENERAL: vital signs recorded - well developed, well nourished;  no apparent distress;     NECK: trachea is midline; thyroid is non-palpable;     RESPIRATORY: normal respiratory rate and pattern with no distress; normal breath sounds with no rales, rhonchi, wheezes or rubs;     CARDIOVASCULAR: normal rate; rhythm is regular;  no systolic murmur; no edema;     LYMPHATIC: no enlargement of cervical or facial nodes;     NEUROLOGIC: GROSSLY INTACT     PSYCHIATRIC:  appropriate affect and demeanor; normal speech pattern; grossly normal memory;         ASSESSMENT           530.81   K21.9  GERD              DDx:     244.8    E03.8  Acquired hypothyroidism              DDx:     493.00   J45.40  Asthma              DDx:         PLAN:          GERD         MEDICATIONS: (no change to current medication regimen)     FOLLOW-UP: Schedule a follow-up visit in 6 months. LABS IN THE SPRING          Acquired hypothyroidism     AS ABOVE          Asthma         MEDICATIONS: (no change to current medication regimen)             Patient Recommendations:        For  GERD:     Schedule a follow-up visit in 6 months.              CHARGE CAPTURE           **Please note: ICD descriptions below are intended for billing purposes only and may not represent clinical diagnoses**        Primary Diagnosis:         530.81 GERD            K21.9    Gastro-esophageal reflux disease without esophagitis              Orders:          02977   Office/outpatient visit; established patient, level 4  (In-House)           244.8 Acquired hypothyroidism            E03.8    Other specified hypothyroidism    493.00 Asthma            J45.40    Moderate persistent asthma, uncomplicated

## 2021-05-18 NOTE — PROGRESS NOTES
Valery Baez  1945     Office/Outpatient Visit    Visit Date: Wed, Aug 26, 2020 10:33 am    Provider: Tino Smith MD (Assistant: Jovana Munroe MA)    Location: [facility]        Electronically signed by Tino Smith MD on  08/26/2020 11:38:58 AM                             Subjective:        CC: Mrs. Baez is a 74 year old White female.  Patient states last thursday, she had her dog on a leash, dog seen a squrirrel and dog took off with her holding the leash, pt states she went down to the ground, she is hurting on her right side rib cage and has some bruising;         HPI:           Patient complains of other chest pain.  The discomfort is located primarily in the right parasternal, right submammary area, and right axilla.  It radiates to the back.  The pain initially began one week ago.  She characterizes the pain as mild to moderate in intensity and sharp.  It seems to be worse with coughing and deep breathing.  BEGAN AFTER A FALL ONTO HER RIGHT SIDE           Osteoarthritis of knee details; this involves both knees.  for knee pain.  Pain began several years ago.  She describes the intensity of pain as moderate.  The pattern of joint symptoms has been progressive worsening.  Pertinent medical history is remarkable for osteoarthritis and S/P RIGHT TKR.  Other details: BOTH KNEES ARE GETTIBNG WORSE, WANTS TO SEE ORTHO AGAIN..      ROS:     CONSTITUTIONAL:  Positive for fatigue ( mild ) and fever ( low grade; NOW BEING SEEN BY I.D. AGAIN ).   Negative for chills.      CARDIOVASCULAR:  Negative for palpitations and pedal edema.      RESPIRATORY:  Negative for recent cough and dyspnea.      GASTROINTESTINAL:  Positive for diarrhea ( CHRONIC, INTERMITTENT ).   Negative for abdominal pain, constipation, nausea or vomiting.      NEUROLOGICAL:  Positive for paresthesia ( LEFT FACE, OFF AND ON. ).   Negative for dizziness, fainting or headaches.          Past Medical  History / Family History / Social History:         Last Reviewed on 2020 10:37 AM by Tino Smith    Past Medical History:                 PAST MEDICAL HISTORY         Breast cancer: dx'd in 3-2009;  s/p XRT; right breast;         GYNECOLOGICAL HISTORY:             CURRENT MEDICAL PROVIDERS:    Allergist    Dermatologist    E/N/T    Oncologist    Orthopedist    Urologist    GENERAL SURGEON FOR BREAST EXAMS    Infectious Disease specialist;;    Speech pathologist             ADVANCE DIRECTIVES: Living will         PREVENTIVE HEALTH MAINTENANCE             BONE DENSITY: was last done  with the following abnormality noted-- Osteoporosis     COLORECTAL CANCER SCREENING: Up to date (colonoscopy q10y; sigmoidoscopy q5y; Cologuard q3y) was last done , Results are in chart; colonoscopy with normal results     DENTAL CLEANING: was last done      EYE EXAM: was last done      Hepatitis C Medicare Screening: was last done      MAMMOGRAM: was last done  with normal results     PAP SMEAR: No longer indicated due to age and history         Surgical History:         Biopsy of breast; L/R several     Appendectomy    Cholecystectomy    Hysterectomy    Tonsillectomy/Adenoidectomy    BSO;    right breast lumpectomy 3-2009;    RIGHT MASTECTOMY;;;     Joint Replacement: R knee;     Laminectomy: thoracic region; lumbar region;     L eye surgery;    L hand surgery;    L/R TMJ replacement;    R ankle and L foot surgery; right trigger finger release 2017         Family History:     Father: Coronary Artery Disease; Hypertension     Mother: Sudden Cardiac Death ( 83 )     Brother(s): Sudden Cardiac Death ( 52 )     Paternal Grandmother: Breast Cancer     Maternal Grandmother: Breast Cancer         Social History:     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          Substance Abuse History:     Last Reviewed on 7/28/2020 07:09 PM by Junaid Amaya        Mental Health History:     Last Reviewed on 7/28/2020 07:09 PM by Junaid Amaya        Communicable Diseases (eg STDs):     Last Reviewed on 7/28/2020 07:09 PM by Junaid Amaya        Current Problems:     Last Reviewed on 8/26/2020 10:37 AM by Tino Smith    Pure hypercholesterolemia, unspecified    Gastro-esophageal reflux disease without esophagitis    Benign paroxysmal vertigo, mild-recurrent    Other specified hypothyroidism    Moderate persistent asthma, uncomplicated    Basal cell carcinoma of skin, (history of)    Fever, unspecified    Deficiency of vitamin B-12    Iron deficiency    Obstructive sleep apnea (adult) (pediatric)    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        Immunizations:     Prevnar 13 (Pneumococcal PCV 13) 10/27/2015    zzFluzone pf-quadrivalent 3 and up 10/27/2014    Flulaval 9/30/2010    Fluzone (3 + years dose) 9/24/2009    Fluzone (3 + years dose) 10/25/2011    Fluzone (3 + years dose) 10/25/2012    Fluzone pf (3+ years dose) 9/30/2013    Influenza A (H1N1), IM (3+ years) Monovalent 11/11/2009    Pneomovax 9/23/2008    Fluzone High-Dose pf (>=65 yr) 10/27/2015    Fluzone High-Dose pf (>=65 yr) 10/17/2016    Fluzone High-Dose pf (>=65 yr) 10/2/2017    Fluzone High-Dose pf (>=65 yr) 10/10/2018    Fluzone High-Dose pf (>=65 yr) 10/28/2019    PNEUMOVAX 23 (Pneumococcal PPV23) 0/0/2003    Adacel (Tdap) 11/2/2012    Adacel (Tdap) 8/6/2015    Zostavax (Zoster live) 9/30/2009        Allergies:     Last Reviewed on 8/26/2020 10:37 AM by Tino Smith    Prednisone:   (Adverse Reaction)    tape:      Albuterol: Shakiness  (Adverse Reaction)    Flexeril:      Cyclobenzaprine HCl:      hyoscyamine sulfate:   (Adverse Reaction)    Penicillins:      Iodine:      Morphine Sulfate:      Sulfas:       IVP dye:      Demerol HCl:      Solu-Medrol:      Hydrocodone/Aspirin:      Erythromycin Base:      Darvon-N:      Codeine:      Talwin:      Valium:      Talacen:      Duricef:      Minocycline HCl:      Cubicin:      Ultram:      Latex:          Current Medications:     Last Reviewed on 8/26/2020 10:37 AM by Tino Smith    Multivitamin/Mineral Supplement  Caplet [Take 1 tablet(s) by mouth daily]    Vitamin B-6 100 mg oral tablet [one tablet twice daily]    Prevacid 30 mg oral capsule,delayed release (enteric coated) [BID]    Alrex 0.2 % ophthalmic (eye) Drops, Suspension [INSTILL 1 DROP BOTH EYES DAILY]    MetroGel 1% Topical Gel [Apply thin film to affected area BID and rub in.]    atorvastatin 20 mg oral tablet [TAKE 1 TABLET DAILY]    Salagen (pilocarpine) 5 mg oral tablet [Take 1 tablet(s) by mouth QID]    Mobic 15 mg oral tablet [TAKE 1 TABLET DAILY]    Keflex 500 mg oral capsule [TAKE 1 CAPSULE DAILY]    Meclizine HCl 25mg Tablet [Take 1 tablet(s) by mouth tid PRN DIZZINESS]    Lomotil 2.5mg/0.025mg Tablet [One Po Q 6 hours]    Xopenex HFA 45 mcg/actuation Inhalation HFA Aerosol Inhaler [2 puffs QID PRN]    Flonase 50 mcg/actuation Intranasal Spray, Suspension [2 spray(s) in each nostril daily]    Singulair  10 mg oral tablet [1 TAB DAILY]    atenolol 25 mg oral tablet [TAKE 1 TABLET EVERY AFTERNOON]    probiotic 2 capsules daily      CPAP      Myrbetriq 25 mg oral Tablet, Extended Release 24 hr [Take 1 tablet(s) by mouth daily]    Allegra Allergy 180 mg oral tablet [1 tab daily]    Potassium Gluconate 595 mg (99 mg) oral tablet [1 capsule daily]    melatonin 10 mg at night     [place] Thyroid 30 mg oral tablet [Take 1 tablet(s) by mouth daily]    Epinephrine Auto-Injector 0.15mg Solution For Injection [Inject as needed]    Prolia         Objective:        Vitals:         Current: 8/26/2020 10:35:29 AM    Ht:  5 ft, 5.75 in;  Wt: 177 lbs;  BMI: 28.8T: 97.1 F (temporal);  BP: 113/65 mm Hg (left  arm, sitting);  P: 66 bpm (left arm (BP Cuff), sitting);  sCr: 1.02 mg/dL;  GFR: 53.70        Exams:     PHYSICAL EXAM:     GENERAL: vital signs recorded - well developed, well nourished;  no apparent distress;     NECK: range of motion is normal; trachea is midline; thyroid is non-palpable;     RESPIRATORY: normal respiratory rate and pattern with no distress; normal breath sounds with no rales, rhonchi, wheezes or rubs; TENDER IN RIBS UNDER RIGHT BREAST, RIGHT AXILLA AND UNDER RIGHT SCAPULA;     CARDIOVASCULAR: normal rate; rhythm is regular;  no systolic murmur; no edema;     GASTROINTESTINAL: nontender;     LYMPHATIC: no enlargement of cervical or facial nodes; no supraclavicular nodes;     MUSCULOSKELETAL: decreased range of motion noted in: right shoulder;  pain with range of motion in: right shoulder;  TENDER IN THE ABOVE-MENTIONED RIB AREAS;     NEUROLOGIC: GROSSLY INTACT     PSYCHIATRIC:  appropriate affect and demeanor; normal speech pattern; grossly normal memory;         Assessment:         R07.89   Other chest pain       715.16   Bilateral Osteoarthritis of knee       K52.89   Diarrhea, mild-recurrent       R50.9   Fever, unspecified           ORDERS:         Meds Prescribed:       [Refilled] Lomotil 2.5-0.025 mg oral tablet [One Po Q 6 hours], #45 (forty five) tablets, Refills: 1 (one)         Radiology/Test Orders:       [id number]  RT Radiologic examination, ribs, unilateral inc posteroanterior chest min. 3 views  (Send-Out)              Procedures Ordered:       REFER  Referral to Specialist or Other Facility  (Send-Out)                      Plan:         Other chest pain        RADIOLOGY:  I have ordered Rib Xray Rib Xray Unilateral, Right, 2 views to be done today.      FOLLOW-UP: Schedule follow-up appointments on a p.r.n. basis.:.            Orders:       [id number]  RT Radiologic examination, ribs, unilateral inc posteroanterior chest min. 3 views  (Send-Out)              Bilateral Osteoarthritis of  knee        REFERRALS:  Referral initiated to an orthopedist ( Dr. Dejan Lange ).            Orders:       REFER  Referral to Specialist or Other Facility  (Send-Out)              Diarrhea, mild-recurrent          Prescriptions:       [Refilled] Lomotil 2.5-0.025 mg oral tablet [One Po Q 6 hours], #45 (forty five) tablets, Refills: 1 (one)         Fever, unspecified        PLANS PER I.D.              Patient Recommendations:        For  Other chest pain:    Schedule follow-up appointments as needed.                APPOINTMENT INFORMATION:        Monday Tuesday Wednesday Thursday Friday Saturday Sunday            Time:___________________AM  PM   Date:_____________________         For  Fever, unspecified:    I also recommend PLANS PER I.D..              Charge Capture:         Primary Diagnosis:     R07.89  Other chest pain           Orders:      [id number]  Office/outpatient visit; established patient, level 4  (In-House)              715.16  Bilateral Osteoarthritis of knee     K52.89  Diarrhea, mild-recurrent     R50.9  Fever, unspecified

## 2021-05-18 NOTE — PROGRESS NOTES
Valery Baez 1945     Office/Outpatient Visit    Visit Date: Thu, May 2, 2019 09:54 am    Provider: Tino Smith MD (Assistant: Jovana Munroe MA)    Location: [facility]        Electronically signed by Tino Smith MD on  05/02/2019 12:57:11 PM                             SUBJECTIVE:        CC:     Ms. Baez is a 73 year old White female.  This is a follow-up visit.  medicare wellness;         HPI:         Ms. Baez is here for a Medicare wellness visit.  The required HRA questions are integrated within this visit note. Family medical history and individual medical/surgical history were reviewed and updated.  A current height, weight, BMI, blood pressure, and pulse were recorded in the vitals section of the note and have been reviewed. Patient's medications, including supplements, were recorded in the chart and reviewed.  Current providers and suppliers were reviewed and updated.          Self-Assessment of Health: She rates her health as fair. She rates her confidence of being able to control/manage most of her health problems as very confident. Her physical/emotional health has limited her social activites slightly.  A review of possible cognitive impairment was performed and the following was noted: Memory Impairment Screen is normal.  A review of functional ability, including bathing, dressing, walking, and urine/bowel continence as well as level of safety was performed and was found to be negative.  Falls Risk: Has not had any falls or only one fall without injury in the past year.  In regard to hearing, she reports having trouble hearing the TV/radio when others do not, having to strain to hear or understand conversations and wearing hearing aid(s).  Concerning home safety, she reports that at home she DOES have adequate lighting, a skid resistant shower/tub, grab bars in the bath, handrails on stairs, functioning smoke alarms and absence of throw rugs.           Immunization Status: Up to date; Physical Activity: She exercises but less than 20 minutes 3 days per week; Type of diet patient normally eats is described as well-balanced with fruits and vegetables Tobacco: She has never smoked.  Preventative Health updated today         PHQ-9 Depression Screening: Completed form scanned and in chart; Total Score 0 Alcohol Consumption Screening: Completed form scanned and in chart; Total Score 3     ROS:     CONSTITUTIONAL:  Negative for chills and fever.      EYES:  Negative for blurred vision and eye drainage.      E/N/T:  Negative for ear pain, nasal congestion and sore throat.      CARDIOVASCULAR:  Negative for chest pain, palpitations and pedal edema.      RESPIRATORY:  Negative for recent cough and dyspnea.      GASTROINTESTINAL:  Negative for abdominal pain, anorexia, nausea and vomiting.      GENITOURINARY:  Negative for dysuria and hematuria.      MUSCULOSKELETAL:  Negative for myalgias.      NEUROLOGICAL:  Positive for dizziness ( OFF AND ON , BEG W/U, PRN MECLIZINE HELPS ).   Negative for headaches.          PMH/FMH/SH:     Last Reviewed on 2019 12:53 PM by Tino Smith    Past Medical History:                 PAST MEDICAL HISTORY         Breast cancer: dx'd in 3-;  s/p XRT; right breast;     chronic osteomyelitis on supressive therapy (keflex)         GYNECOLOGICAL HISTORY:             CURRENT MEDICAL PROVIDERS:    Allergist    Dermatologist    E/N/T    Oncologist    Orthopedist    Urologist    GENERAL SURGEON FOR BREAST EXAMS    Infectious Disease specialist;;    speech pathologist             ADVANCE DIRECTIVES: Living will         PREVENTIVE HEALTH MAINTENANCE             BONE DENSITY: was last done  with the following abnormality noted-- Osteoporosis     COLORECTAL CANCER SCREENING: Up to date (colonoscopy q10y; sigmoidoscopy q5y; Cologuard q3y) was last done 2013, Results are in chart; colonoscopy with normal results     DENTAL  CLEANING: was last done 2019     EYE EXAM: was last done 2019     Hepatitis C Medicare Screening: was last done 2017     MAMMOGRAM: was last done 2018 with normal results     PAP SMEAR: No longer indicated due to age and history         Surgical History:         Biopsy of breast; L/R several      Appendectomy    Cholecystectomy    Hysterectomy    Tonsillectomy/Adenoidectomy    BSO;    right breast lumpectomy 3-2009;    RIGHT MASTECTOMY;;;      Joint Replacement: R knee;     L eye surgery;    L hand surgery;    L/R TMJ replacement;    R ankle and L foot surgery; right trigger finger release nov 2017         Family History:     Father: Coronary Artery Disease; Hypertension     Mother: Sudden Cardiac Death ( 83 )     Brother(s): Sudden Cardiac Death ( 52 )     Paternal Grandmother: Breast Cancer     Maternal Grandmother: Breast Cancer         Social History:     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          Substance Abuse History:     Last Reviewed on 8/28/2018 01:16 PM by Cricket Gan        Mental Health History:     Last Reviewed on 8/28/2018 01:16 PM by Cricket Gan        Communicable Diseases (eg STDs):     Last Reviewed on 8/28/2018 01:16 PM by Cricket Gan            Current Problems:     Last Reviewed on 5/02/2019 12:53 PM by Tino Smith    Osteoporosis     Asthma     Benign positional vertigo-mild, recurrent     Acquired hypothyroidism     Diarrhea-recurrent     MRSA colonization     Iron deficiency anemia     Urge incontinence     Pernicious anemia     Palpitations (occ, neg w/u)     Obstructive sleep apnea     History of breast cancer     High cholesterol     Surgical menopause     GERD     Decreased hearing     Sjogren's disease     Osteoarthritis of knee     Rosacea     Allergies      Screening for depression         Immunizations:     Prevnar 13 (Pneumococcal PCV 13) 10/27/2015     zzFluzone pf-quadrivalent 3 and up 10/27/2014     Flulaval 9/30/2010     Fluzone (3 + years dose) 9/24/2009     Fluzone (3 + years dose) 10/25/2011     Fluzone (3 + years dose) 10/25/2012     Fluzone pf (3+ years dose) 9/30/2013     Influenza A (H1N1), IM (3+ years) Monovalent 11/11/2009     Pneomovax 9/23/2008     Fluzone High-Dose pf (>=65 yr) 10/27/2015     Fluzone High-Dose pf (>=65 yr) 10/17/2016     Fluzone High-Dose pf (>=65 yr) 10/2/2017     Fluzone High-Dose pf (>=65 yr) 10/10/2018     PNEUMOVAX 23 (Pneumococcal PPV23) 0/0/2003     Adacel (Tdap) 11/2/2012     Adacel (Tdap) 8/6/2015     Zostavax (Zoster live) 9/30/2009         Allergies:     Last Reviewed on 5/02/2019 12:46 PM by Tino Smith    Prednisone: (Adverse Reaction)    Albuterol: Shakiness (Adverse Reaction)    Penicillins:    Iodine:    Morphine Sulfate:    Sulfas:    Demerol HCl:    Solu-Medrol:    Hydrocodone/Aspirin:    Erythromycin Base:    Darvon-N:    Codeine:    Talwin:    Valium:    Talacen:    Duricef:    Minocycline HCl:    Cubicin:    Ultram:    tape:    IVP dye:    Latex:        Current Medications:     Last Reviewed on 5/02/2019 10:04 AM by Jovana Munroe    Atenolol 25mg Tablet 1 every afternoon.     Keflex 500mg Capsules 1 capsule daily     Salagen 5mg Tablet Take 1 tablet(s) by mouth QID     Lipitor 20mg Tablet Take 1 tablet(s) by mouth daily     Mobic 15mg Tablet Take 1 tablet(s) by mouth daily     MetroGel 1% Topical Gel Apply thin film to affected area BID and rub in.     Meclizine HCl 25mg Tablet Take 1 tablet(s) by mouth tid PRN DIZZINESS     Lomotil 2.5mg/0.025mg Tablet One Po Q 6 hours     Tessalon Perles     Singulair  10mg Tablet 1 TAB DAILY     Allegra Allergy 24 Hour 180mg Tablet 1 tab daily     [place] Thyroid 30mg Tablet Take 1 tablet(s) by mouth daily     Aspirin (ASA) 81mg Tablets, Enteric Coated 1 tab  daily     Flovent HFA 110mcg/1actuation Oral Inhaler 2 puffs bid     Myrbetriq 25mg Tablets, Extended Release Take 1 tablet(s) by mouth daily     CPAP     Alrex 0.2% Ophthalmic Suspension INSTILL 1 DROP BOTH EYES DAILY     Flonase 50mcg/1actuation Nasal Spray 2 spray(s) in each nostril daily     Xopenex HFA 45mcg/1actuation Oral Inhaler 2 puffs QID PRN     Prevacid 30mg Capsules, Delayed Release BID     melatonin 10 mg at night     Potassium Gluconate 595mg Tablet 1 capsule daily     probiotic 2 capsules daily     Multivitamin/Mineral Supplement Caplet Take 1 tablet(s) by mouth daily     Vitamin B6 100mg Tablet one tablet twice daily     Epinephrine Auto-Injector 0.15mg Solution For Injection Inject as needed     Prolia         OBJECTIVE:        Vitals:         Current: 5/2/2019 10:04:46 AM    Ht:  5 ft, 5.75 in;  Wt: 181.4 lbs;  BMI: 29.5    T: 97.8 F (oral);  BP: 102/68 mm Hg (left arm, sitting);  P: 79 bpm (left arm (BP Cuff), sitting);  sCr: 0.97 mg/dL;  GFR: 57.88    VA: 20/30 OD, 20/30 OS (with correction)        Exams:     PHYSICAL EXAM:     GENERAL: vital signs recorded - well developed, well nourished;  no apparent distress;     EYES: conjunctiva and cornea are normal;     E/N/T:  normal EACs, TMs, nasal/oral mucosa, teeth, gingiva, and oropharynx;     NECK: trachea is midline; thyroid is non-palpable; carotid exam reveals no bruits;     RESPIRATORY: normal respiratory rate and pattern with no distress; normal breath sounds with no rales, rhonchi, wheezes or rubs;     CARDIOVASCULAR: normal rate; rhythm is regular;  no systolic murmur; no edema;     GASTROINTESTINAL: nontender, nondistended; no hepatosplenomegaly or masses; no bruits;     LYMPHATIC: no enlargement of cervical or facial nodes; no supraclavicular nodes;     MUSCULOSKELETAL: normal gait; normal overall tone     NEUROLOGIC: GROSSLY INTACT     PSYCHIATRIC:  appropriate affect and demeanor; normal speech pattern; grossly normal memory;          ASSESSMENT           V70.0   Z00.00  Health checkup              DDx:     V79.0   Z13.89  Screening for depression              DDx:         ORDERS:         Radiology/Test Orders:       3017F  Colorectal CA screen results documented and reviewed (PV)  (In-House)           Procedures Ordered:         Annual wellness visit, includes a PPPS, subsequent visit  (In-House)           Other Orders:         Depression screen negative  (In-House)         1101F  Pt screen for fall risk; document no falls in past year or only 1 fall w/o injury in past year (KELLI)  (In-House)           Negative EtOH screen  (In-House)           Calculated BMI above the upper parameter and a follow-up plan was documented in the medical record  (In-House)                   PLAN:          Health checkup         COUNSELING provided on: fall prevention, healthy eating habits, Medicare Preventative Services Guide given to patient., regular exercise, use of seat belts, and ADVISED TO SEE AN EYE DOCTOR AND DENTIST REGULARLY.      FOLLOW-UP: Schedule a follow-up visit in 6 months.  MIPS Negative Depression Screen Negative alcohol screen     COLORECTAL CANCER SCREENING: Results are in chart     BMI Elevated - Follow-Up Plan: She was provided education on weight loss strategies           Orders:         Depression screen negative  (In-House)         1101F  Pt screen for fall risk; document no falls in past year or only 1 fall w/o injury in past year (KELLI)  (In-House)           Negative EtOH screen  (In-House)         3017F  Colorectal CA screen results documented and reviewed (PV)  (In-House)           Calculated BMI above the upper parameter and a follow-up plan was documented in the medical record  (In-House)           Annual wellness visit, includes a PPPS, subsequent visit  (In-House)               Patient Recommendations:        For  Health checkup:         Regularly exercise within recommended guidelines,  especially to maintain balance. Remove obstacles in walkways at home.  Use non-skid material for bathtub safety.  Remove loose throw rugs from floor. Use nightlights in bedrooms, hallways, and bathrooms.    Limit dietary intake of fat (especially saturated fat) and cholesterol.  Eat a variety of foods, including plenty of fruits, vegetables, and grain containg fiber, limit fat intake to 30% of total calories. Balance caloric intake with energy expended.    Maintaining regular physical activity is advised to help prevent heart disease, hypertension, diabetes, and obesity.    Always use shoulder/lap restraints when driving or riding in a vehicle, even those equipped with air bags.  Schedule a follow-up visit in 6 months.              CHARGE CAPTURE           **Please note: ICD descriptions below are intended for billing purposes only and may not represent clinical diagnoses**        Primary Diagnosis:         V70.0 Health checkup            Z00.00    Encounter for general adult medical examination without abnormal findings              Orders:             Depression screen negative  (In-House)             1101F   Pt screen for fall risk; document no falls in past year or only 1 fall w/o injury in past year (KELLI)  (In-House)                Negative EtOH screen  (In-House)             3017F   Colorectal CA screen results documented and reviewed (PV)  (In-House)                Calculated BMI above the upper parameter and a follow-up plan was documented in the medical record  (In-House)                Annual wellness visit, includes a PPPS, subsequent visit  (In-House)           V79.0 Screening for depression            Z13.89    Encounter for screening for other disorder

## 2021-05-18 NOTE — PROGRESS NOTES
Valery Baez 1945     Office/Outpatient Visit    Visit Date:  08:56 am    Provider: Tino Smith MD (Assistant: Haven Dove MA)    Location: [facility]        Electronically signed by Tino Smith MD on  2019 09:36:38 AM                             SUBJECTIVE:        CC:     Mrs. Baez is a 73 year old White female.  This is a follow-up visit.  med refill; PT STATES SHE ISNT TAKING MOBIC OR ASPIRIN         HPI:         GERD noted.  The location of the discomfort is primarily substernal and epigastric.  She describes the pain as burning.  Symptoms are improved with a proton-pump inhibitor.          In regard to the asthma, the frequency of daytime attacks averages once per month.  She is currently at baseline, and not having an exacerbation.  Her current asthma medication includes albuterol and a steroid MDI.  Currently, no asthma symptoms are reported.  STABLE ON CURRENT MEDS     ROS:     CONSTITUTIONAL:  Negative for chills and fever.      E/N/T:  Negative for ear pain, nasal congestion and sore throat.      CARDIOVASCULAR:  Negative for chest pain, palpitations and pedal edema.      RESPIRATORY:  Negative for recent cough and dyspnea.      GASTROINTESTINAL:  Negative for abdominal pain, nausea and vomiting.      MUSCULOSKELETAL:  Positive for arthralgias (KNEES, CHRONIC).      INTEGUMENTARY/BREAST:  Positive for RECENTLY HAD MOH'S SURGERY FOR A BASAL CELL CARCINOMA OF THE FACE.      NEUROLOGICAL:  Negative for headaches.          PMH/FMH/SH:     Last Reviewed on 2019 09:10 AM by Tino Smith    Past Medical History:                 PAST MEDICAL HISTORY         Breast cancer: dx'd in 3-;  s/p XRT; right breast;         GYNECOLOGICAL HISTORY:             CURRENT MEDICAL PROVIDERS:    Allergist    Dermatologist    E/N/T    Oncologist    Orthopedist    Urologist    GENERAL SURGEON FOR BREAST EXAMS    Infectious Disease specialist;;     speech pathologist             ADVANCE DIRECTIVES: Living will         PREVENTIVE HEALTH MAINTENANCE             BONE DENSITY: was last done 2019 with the following abnormality noted-- Osteoporosis     COLORECTAL CANCER SCREENING: Up to date (colonoscopy q10y; sigmoidoscopy q5y; Cologuard q3y) was last done 05/2013, Results are in chart; colonoscopy with normal results     DENTAL CLEANING: was last done 2019     EYE EXAM: was last done 2019     Hepatitis C Medicare Screening: was last done 2017     MAMMOGRAM: was last done 2018 with normal results     PAP SMEAR: No longer indicated due to age and history         Surgical History:         Biopsy of breast; L/R several      Appendectomy    Cholecystectomy    Hysterectomy    Tonsillectomy/Adenoidectomy    BSO;    right breast lumpectomy 3-2009;    RIGHT MASTECTOMY;;;      Joint Replacement: R knee;     Laminectomy: thoracic region; lumbar region;     L eye surgery;    L hand surgery;    L/R TMJ replacement;    R ankle and L foot surgery; right trigger finger release nov 2017         Family History:     Father: Coronary Artery Disease; Hypertension     Mother: Sudden Cardiac Death ( 83 )     Brother(s): Sudden Cardiac Death ( 52 )     Paternal Grandmother: Breast Cancer     Maternal Grandmother: Breast Cancer         Social History:     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          Substance Abuse History:     Last Reviewed on 7/23/2019 06:25 PM by Junaid Amaya        Mental Health History:     Last Reviewed on 7/23/2019 06:25 PM by Junaid Amaya        Communicable Diseases (eg STDs):     Last Reviewed on 7/23/2019 06:25 PM by Junaid Amaya            Current Problems:     Last Reviewed on 11/04/2019 09:15 AM by Tino mSith    Osteoporosis     Asthma     Benign  positional vertigo-mild, recurrent     Acquired hypothyroidism     Diarrhea-recurrent     MRSA colonization     Iron deficiency anemia     Urge incontinence     Pernicious anemia     Palpitations (occ, neg w/u)     Obstructive sleep apnea     History of breast cancer     High cholesterol     Surgical menopause     GERD     Decreased hearing     Sjogren's disease     Osteoarthritis of knee     Rosacea     Allergies         Immunizations:     Prevnar 13 (Pneumococcal PCV 13) 10/27/2015     zzFluzone pf-quadrivalent 3 and up 10/27/2014     Flulaval 9/30/2010     Fluzone (3 + years dose) 9/24/2009     Fluzone (3 + years dose) 10/25/2011     Fluzone (3 + years dose) 10/25/2012     Fluzone pf (3+ years dose) 9/30/2013     Influenza A (H1N1), IM (3+ years) Monovalent 11/11/2009     Pneomovax 9/23/2008     Fluzone High-Dose pf (>=65 yr) 10/27/2015     Fluzone High-Dose pf (>=65 yr) 10/17/2016     Fluzone High-Dose pf (>=65 yr) 10/2/2017     Fluzone High-Dose pf (>=65 yr) 10/10/2018     Fluzone High-Dose pf (>=65 yr) 10/28/2019     PNEUMOVAX 23 (Pneumococcal PPV23) 0/0/2003     Adacel (Tdap) 11/2/2012     Adacel (Tdap) 8/6/2015     Zostavax (Zoster live) 9/30/2009         Allergies:     Last Reviewed on 11/04/2019 09:09 AM by Tino Smith    Prednisone: (Adverse Reaction)    Albuterol: Shakiness (Adverse Reaction)    Flexeril:    Cyclobenzaprine HCl:    Penicillins:    Iodine:    Morphine Sulfate:    Sulfas:    Demerol HCl:    Solu-Medrol:    Hydrocodone/Aspirin:    Erythromycin Base:    Darvon-N:    Codeine:    Talwin:    Valium:    Talacen:    Duricef:    Minocycline HCl:    Cubicin:    Ultram:    tape:    IVP dye:    Latex:        Current Medications:     Last Reviewed on 11/04/2019 09:13 AM by Tino Smith    Atenolol 25mg Tablet 1 every afternoon.     Tessalon Perles 100mg Capsules One PO Q 8 hours PRN cough     Lipitor 20mg Tablet Take 1 tablet(s) by mouth daily     Epinephrine Auto-Injector  0.15mg Solution For Injection Inject as needed     Keflex 500mg Capsules 1 capsule daily     Salagen 5mg Tablet Take 1 tablet(s) by mouth QID     MetroGel 1% Topical Gel Apply thin film to affected area BID and rub in.     Meclizine HCl 25mg Tablet Take 1 tablet(s) by mouth tid PRN DIZZINESS     Lomotil 2.5mg/0.025mg Tablet One Po Q 6 hours     Prolia     Singulair  10mg Tablet 1 TAB DAILY     Allegra Allergy 24 Hour 180mg Tablet 1 tab daily     [place] Thyroid 30mg Tablet Take 1 tablet(s) by mouth daily     Flovent HFA 110mcg/1actuation Oral Inhaler 2 puffs bid     Myrbetriq 25mg Tablets, Extended Release Take 1 tablet(s) by mouth daily     CPAP     Alrex 0.2% Ophthalmic Suspension INSTILL 1 DROP BOTH EYES DAILY     Flonase 50mcg/1actuation Nasal Spray 2 spray(s) in each nostril daily     Xopenex HFA 45mcg/1actuation Oral Inhaler 2 puffs QID PRN     Prevacid 30mg Capsules, Delayed Release BID     melatonin 10 mg at night     Potassium Gluconate 595mg Tablet 1 capsule daily     probiotic 2 capsules daily     Multivitamin/Mineral Supplement Caplet Take 1 tablet(s) by mouth daily     Vitamin B6 100mg Tablet one tablet twice daily     Hyoscyamine Sulfate 0.375mg Tablets, Extended Release Take 1 tablet(s) by mouth bid         OBJECTIVE:        Vitals:         Current: 11/4/2019 9:02:43 AM    Ht:  5 ft, 5.75 in;  Wt: 169 lbs;  BMI: 27.5    T: 97.5 F (oral);  BP: 105/74 mm Hg (left arm, sitting);  P: 91 bpm (left arm (BP Cuff), sitting);  sCr: 0.97 mg/dL;  GFR: 56.17        Exams:     PHYSICAL EXAM:     GENERAL: vital signs recorded - well developed, well nourished;  no apparent distress;     NECK: trachea is midline; thyroid is non-palpable;     RESPIRATORY: normal respiratory rate and pattern with no distress; normal breath sounds with no rales, rhonchi, wheezes or rubs;     CARDIOVASCULAR: normal rate; rhythm is regular;  no systolic murmur; no edema;     LYMPHATIC: no enlargement of cervical or facial nodes;      NEUROLOGIC: GROSSLY INTACT     PSYCHIATRIC:  appropriate affect and demeanor; normal speech pattern; grossly normal memory;         ASSESSMENT           530.81   K21.9  GERD              DDx:     493.00   J45.40  Asthma              DDx:     173.91   C44.91  History of basal cell carcinoma              DDx:         PLAN:          GERD         MEDICATIONS: (no change to current medication regimen)     FOLLOW-UP: Schedule a follow-up visit in 5 months. LABS IN THE SPRING          Asthma         MEDICATIONS: (no change to current medication regimen)          History of basal cell carcinoma         PLANS PER DERMATILOGY             Patient Recommendations:        For  GERD:     Schedule a follow-up visit in 5 months.          For  History of basal cell carcinoma:     I also recommend PLANS PER DERMATILOGY.              CHARGE CAPTURE           **Please note: ICD descriptions below are intended for billing purposes only and may not represent clinical diagnoses**        Primary Diagnosis:         530.81 GERD            K21.9    Gastro-esophageal reflux disease without esophagitis              Orders:          48709   Office/outpatient visit; established patient, level 4  (In-House)           493.00 Asthma            J45.40    Moderate persistent asthma, uncomplicated    173.91 History of basal cell carcinoma            C44.91    Basal cell carcinoma of skin, unspecified

## 2021-05-18 NOTE — PROGRESS NOTES
Valery Baez 1945     Office/Outpatient Visit    Visit Date:  09:01 am    Provider: Junaid Amaya MD (Assistant: Jovana Munroe MA)    Location: [facility]        Electronically signed by Junaid Amaya MD on  2019 06:26:32 PM                             SUBJECTIVE:        CC:     Ms. Baez is a 73 year old White female.  She presents with patient states she went camping this past weekend, started having sxs coughing, sob, fever..          HPI:     Pt went camping this past weekend. She has been coughing. Acid reflux Thursday night (5 days ago) and she aspirated some. Saturday tickle in throat.  had temp of 101. Coughing started  as well. No nasal or sinus congestion, coughs up a little phlegm. She has a little headache on top of head (a little forward). She used a vaporizer last night.     ROS:     CONSTITUTIONAL:  Positive for fatigue and fever.   Negative for chills.      EYES:  Negative for blurred vision and eye drainage.      E/N/T:  Positive for sore throat.   Negative for ear pain or nasal congestion.      CARDIOVASCULAR:  Negative for chest pain, palpitations and pedal edema.      RESPIRATORY:  Positive for recent cough ( with scant amounts of purulent sputum ).   Negative for dyspnea.      GASTROINTESTINAL:  Negative for abdominal pain, anorexia, nausea and vomiting.      GENITOURINARY:  Negative for dysuria and hematuria.      MUSCULOSKELETAL:  Negative for myalgias.      NEUROLOGICAL:  Negative for headaches.          PMH/FMH/SH:     Last Reviewed on 2019 06:25 PM by Junaid Amaya    Past Medical History:                 PAST MEDICAL HISTORY         Breast cancer: dx'd in 3-;  s/p XRT; right breast;     chronic osteomyelitis on supressive therapy (keflex)         GYNECOLOGICAL HISTORY:             CURRENT MEDICAL PROVIDERS:    Allergist    Dermatologist    E/N/T    Oncologist    Orthopedist    Urologist    GENERAL  SURGEON FOR BREAST EXAMS    Infectious Disease specialist;;    speech pathologist             ADVANCE DIRECTIVES: Living will         PREVENTIVE HEALTH MAINTENANCE             BONE DENSITY: was last done 2019 with the following abnormality noted-- Osteoporosis     COLORECTAL CANCER SCREENING: Up to date (colonoscopy q10y; sigmoidoscopy q5y; Cologuard q3y) was last done 05/2013, Results are in chart; colonoscopy with normal results     DENTAL CLEANING: was last done 2019     EYE EXAM: was last done 2019     Hepatitis C Medicare Screening: was last done 2017     MAMMOGRAM: was last done 2018 with normal results     PAP SMEAR: No longer indicated due to age and history         Surgical History:         Biopsy of breast; L/R several      Appendectomy    Cholecystectomy    Hysterectomy    Tonsillectomy/Adenoidectomy    BSO;    right breast lumpectomy 3-2009;    RIGHT MASTECTOMY;;;      Joint Replacement: R knee;     L eye surgery;    L hand surgery;    L/R TMJ replacement;    R ankle and L foot surgery; right trigger finger release nov 2017         Family History:     Father: Coronary Artery Disease; Hypertension     Mother: Sudden Cardiac Death ( 83 )     Brother(s): Sudden Cardiac Death ( 52 )     Paternal Grandmother: Breast Cancer     Maternal Grandmother: Breast Cancer         Social History:     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          Substance Abuse History:     Last Reviewed on 7/23/2019 06:25 PM by Junaid Amaya        Mental Health History:     Last Reviewed on 7/23/2019 06:25 PM by Junaid Amaya        Communicable Diseases (eg STDs):     Last Reviewed on 7/23/2019 06:25 PM by Junaid Amaya            Current Problems:     Last Reviewed on 7/23/2019 06:25 PM by Junaid Amaya    Osteoporosis     Asthma     Benign  positional vertigo-mild, recurrent     Acquired hypothyroidism     Diarrhea-recurrent     MRSA colonization     Iron deficiency anemia     Urge incontinence     Pernicious anemia     Palpitations (occ, neg w/u)     Obstructive sleep apnea     History of breast cancer     High cholesterol     Surgical menopause     GERD     Decreased hearing     Sjogren's disease     Osteoarthritis of knee     Rosacea     Allergies     Aspiration pneumonitis, due to inhalation of food or vomitus         Immunizations:     Prevnar 13 (Pneumococcal PCV 13) 10/27/2015     zzFluzone pf-quadrivalent 3 and up 10/27/2014     Flulaval 9/30/2010     Fluzone (3 + years dose) 9/24/2009     Fluzone (3 + years dose) 10/25/2011     Fluzone (3 + years dose) 10/25/2012     Fluzone pf (3+ years dose) 9/30/2013     Influenza A (H1N1), IM (3+ years) Monovalent 11/11/2009     Pneomovax 9/23/2008     Fluzone High-Dose pf (>=65 yr) 10/27/2015     Fluzone High-Dose pf (>=65 yr) 10/17/2016     Fluzone High-Dose pf (>=65 yr) 10/2/2017     Fluzone High-Dose pf (>=65 yr) 10/10/2018     PNEUMOVAX 23 (Pneumococcal PPV23) 0/0/2003     Adacel (Tdap) 11/2/2012     Adacel (Tdap) 8/6/2015     Zostavax (Zoster live) 9/30/2009         Allergies:     Last Reviewed on 7/23/2019 06:25 PM by Junaid Amaya    Prednisone: (Adverse Reaction)    Albuterol: Shakiness (Adverse Reaction)    Penicillins:    Iodine:    Morphine Sulfate:    Sulfas:    Demerol HCl:    Solu-Medrol:    Hydrocodone/Aspirin:    Erythromycin Base:    Darvon-N:    Codeine:    Talwin:    Valium:    Talacen:    Duricef:    Minocycline HCl:    Cubicin:    Ultram:    tape:    IVP dye:    Latex:        Current Medications:     Last Reviewed on 7/23/2019 06:25 PM by Junaid Amaya    Atenolol 25mg Tablet 1 every afternoon.     Lipitor 20mg Tablet Take 1 tablet(s) by mouth daily     Mobic 15mg Tablet Take 1 tablet(s) by mouth daily     Epinephrine Auto-Injector 0.15mg Solution For Injection Inject as needed      Keflex 500mg Capsules 1 capsule daily     Salagen 5mg Tablet Take 1 tablet(s) by mouth QID     MetroGel 1% Topical Gel Apply thin film to affected area BID and rub in.     Meclizine HCl 25mg Tablet Take 1 tablet(s) by mouth tid PRN DIZZINESS     Lomotil 2.5mg/0.025mg Tablet One Po Q 6 hours     Prolia     Tessalon Perles     Singulair  10mg Tablet 1 TAB DAILY     Allegra Allergy 24 Hour 180mg Tablet 1 tab daily     Havana Thyroid 30mg Tablet Take 1 tablet(s) by mouth daily     Aspirin (ASA) 81mg Tablets, Enteric Coated 1 tab daily     Flovent HFA 110mcg/1actuation Oral Inhaler 2 puffs bid     Myrbetriq 25mg Tablets, Extended Release Take 1 tablet(s) by mouth daily     CPAP     Alrex 0.2% Ophthalmic Suspension INSTILL 1 DROP BOTH EYES DAILY     Flonase 50mcg/1actuation Nasal Spray 2 spray(s) in each nostril daily     Xopenex HFA 45mcg/1actuation Oral Inhaler 2 puffs QID PRN     Prevacid 30mg Capsules, Delayed Release BID     melatonin 10 mg at night     Potassium Gluconate 595mg Tablet 1 capsule daily     probiotic 2 capsules daily     Multivitamin/Mineral Supplement Caplet Take 1 tablet(s) by mouth daily     Vitamin B6 100mg Tablet one tablet twice daily         OBJECTIVE:        Vitals:         Current: 7/23/2019 9:08:48 AM    Ht:  5 ft, 5.75 in;  Wt: 178.6 lbs;  BMI: 29.0    T: 97.8 F (oral);  BP: 107/69 mm Hg (left arm, sitting);  P: 77 bpm (left arm (BP Cuff), sitting);  sCr: 0.97 mg/dL;  GFR: 57.50    O2 Sat: 98 % (room air)        Exams:     PHYSICAL EXAM:     GENERAL: vital signs recorded - well developed, well nourished;  well groomed;  no apparent distress;     EYES: extraocular movements intact; conjunctiva and cornea are normal; PERRLA;     E/N/T: OROPHARYNX:  normal mucosa, dentition, gingiva, and posterior pharynx;     RESPIRATORY: normal respiratory rate and pattern with no distress; coarse breath sounds in the bases;     CARDIOVASCULAR: normal rate; rhythm is regular;  no systolic murmur; no  edema;     GASTROINTESTINAL: nontender; normal bowel sounds;     MUSCULOSKELETAL: normal gait; normal overall tone     NEUROLOGIC: mental status: alert and oriented x 3;     PSYCHIATRIC:  appropriate affect and demeanor; normal speech pattern; grossly normal memory;         ASSESSMENT           507.0   J69.0  Aspiration pneumonitis, due to inhalation of food or vomitus              DDx:         ORDERS:         Meds Prescribed:       Azithromycin 500mg Tablet 1 po daily QS for 3 day(s) Refills: 0         Other Orders:       [id number]  Noninvasive ear or pulse oximetry for oxygen saturation; single determination  (In-House)                   PLAN:          Aspiration pneumonitis, due to inhalation of food or vomitusPresentation c/w aspiration pneumonia and azithromycin called in. She has an allergy to erythromycin base but has tried azithromycin several times without adverse effect. She says she typically needs a second round of a typical z-pack. If Sx worsen will order CXR.           Prescriptions:       Azithromycin 500mg Tablet 1 po daily QS for 3 day(s) Refills: 0           Orders:       [id number]  Noninvasive ear or pulse oximetry for oxygen saturation; single determination  (In-House)               CHARGE CAPTURE           **Please note: ICD descriptions below are intended for billing purposes only and may not represent clinical diagnoses**        Primary Diagnosis:         507.0 Aspiration pneumonitis, due to inhalation of food or vomitus            J69.0    Pneumonitis due to inhalation of food and vomit              Orders:          [id number]   Office/outpatient visit; established patient, level 3  (In-House)             02691   Noninvasive ear or pulse oximetry for oxygen saturation; single determination  (In-House)

## 2021-05-18 NOTE — PROGRESS NOTES
Valery Baez  1945     Office/Outpatient Visit    Visit Date: Mon, May 4, 2020 02:54 pm    Provider: Rosemarie Smith MD (Assistant: Angelina Nevarez RN)    Location: [facility]        Electronically signed by Rosemarie Smith MD on  2020 12:31:32 PM                             Subjective:        CC: Mrs. Baez is a 74 year old White female.  left earache--[phone] DOXIMITY VIDEO; Today's encounter is being done with a telehealth visit. She has consented verbally with two witnesses for todays treatment. Todays visit is being conducted by audio and video. Individuals present during the telemedicine consultation include ROSEMARIE FLOREZ MD         HPI:           Patient to be evaluated for otalgia, left ear.      Mrs. Baez complains of left ear pain.  Associated symptoms include PND.  She denies diminished hearing, ear drainage, external ear swelling or external ear pain.  The symptoms began one week ago.      ROS:     CONSTITUTIONAL:  Positive for fever ( low grade; moderate ).   Negative for chills.      EYES:  Negative for eye drainage.      E/N/T:  Positive for nasal congestion.   Negative for sore throat.      RESPIRATORY:  Negative for recent cough and dyspnea.      GASTROINTESTINAL:  Negative for abdominal pain, anorexia, nausea and vomiting.      MUSCULOSKELETAL:  Negative for myalgias.      NEUROLOGICAL:  Negative for headaches.          Past Medical History / Family History / Social History:         Last Reviewed on 2020 12:19 PM by Rosemarie Smith    Past Medical History:                 PAST MEDICAL HISTORY         Breast cancer: dx'd in 3-;  s/p XRT; right breast;         GYNECOLOGICAL HISTORY:             CURRENT MEDICAL PROVIDERS:    Allergist    Dermatologist    E/N/T    Oncologist    Orthopedist    Urologist    GENERAL SURGEON FOR BREAST EXAMS    Infectious Disease specialist;;    Speech pathologist              ADVANCE DIRECTIVES: Living will         PREVENTIVE HEALTH MAINTENANCE             BONE DENSITY: was last done 2019 with the following abnormality noted-- Osteoporosis     COLORECTAL CANCER SCREENING: Up to date (colonoscopy q10y; sigmoidoscopy q5y; Cologuard q3y) was last done 2019, Results are in chart; colonoscopy with normal results     DENTAL CLEANING: was last done 2019     EYE EXAM: was last done 2019     Hepatitis C Medicare Screening: was last done 2017     MAMMOGRAM: was last done 2018 with normal results     PAP SMEAR: No longer indicated due to age and history         Surgical History:         Biopsy of breast; L/R several     Appendectomy    Cholecystectomy    Hysterectomy    Tonsillectomy/Adenoidectomy    BSO;    right breast lumpectomy 3-2009;    RIGHT MASTECTOMY;;;     Joint Replacement: R knee;     Laminectomy: thoracic region; lumbar region;     L eye surgery;    L hand surgery;    L/R TMJ replacement;    R ankle and L foot surgery; right trigger finger release nov 2017         Family History:     Father: Coronary Artery Disease; Hypertension     Mother: Sudden Cardiac Death ( 83 )     Brother(s): Sudden Cardiac Death ( 52 )     Paternal Grandmother: Breast Cancer     Maternal Grandmother: Breast Cancer         Social History:     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          Substance Abuse History:     Last Reviewed on 5/17/2020 12:19 PM by Tino Smith        Mental Health History:     Last Reviewed on 7/23/2019 06:25 PM by Junaid Amaya        Communicable Diseases (eg STDs):     Last Reviewed on 7/23/2019 06:25 PM by Junaid Amaya        Current Problems:     Last Reviewed on 5/17/2020 12:19 PM by Tino Smith    Rosacea    Sjogren's disease    Pure hypercholesterolemia, unspecified     Decreased hearing    Osteoarthritis of knee    Allergies    Gastro-esophageal reflux disease without esophagitis    History of breast cancer    Obstructive sleep apnea    Palpitations (occ, neg w/u)    Pernicious anemia    Urge incontinence    Iron deficiency anemia    MRSA colonization    Diarrhea-recurrent    Other specified hypothyroidism    Benign paroxysmal vertigo, mild-recurrent    Moderate persistent asthma, uncomplicated    Osteoporosis    Basal cell carcinoma of skin, (history of)        Immunizations:     Prevnar 13 (Pneumococcal PCV 13) 10/27/2015    zzFluzone pf-quadrivalent 3 and up 10/27/2014    Flulaval 9/30/2010    Fluzone (3 + years dose) 9/24/2009    Fluzone (3 + years dose) 10/25/2011    Fluzone (3 + years dose) 10/25/2012    Fluzone pf (3+ years dose) 9/30/2013    Influenza A (H1N1), IM (3+ years) Monovalent 11/11/2009    Pneomovax 9/23/2008    Fluzone High-Dose pf (>=65 yr) 10/27/2015    Fluzone High-Dose pf (>=65 yr) 10/17/2016    Fluzone High-Dose pf (>=65 yr) 10/2/2017    Fluzone High-Dose pf (>=65 yr) 10/10/2018    Fluzone High-Dose pf (>=65 yr) 10/28/2019    PNEUMOVAX 23 (Pneumococcal PPV23) 0/0/2003    Adacel (Tdap) 11/2/2012    Adacel (Tdap) 8/6/2015    Zostavax (Zoster live) 9/30/2009        Allergies:     Last Reviewed on 5/17/2020 12:19 PM by Tino Smith    Prednisone:   (Adverse Reaction)    tape:      Albuterol: Shakiness  (Adverse Reaction)    Flexeril:      Cyclobenzaprine HCl:      hyoscyamine sulfate:   (Adverse Reaction)    Penicillins:      Iodine:      Morphine Sulfate:      Sulfas:      IVP dye:      Demerol HCl:      Solu-Medrol:      Hydrocodone/Aspirin:      Erythromycin Base:      Darvon-N:      Codeine:      Talwin:      Valium:      Talacen:      Duricef:      Minocycline HCl:      Cubicin:      Ultram:      Latex:          Current Medications:     Last Reviewed on 5/17/2020 12:19 PM by Tino Smith    Alrex 0.2 % ophthalmic (eye) Drops,  Suspension [INSTILL 1 DROP BOTH EYES DAILY]    Multivitamin/Mineral Supplement  Caplet [Take 1 tablet(s) by mouth daily]    Vitamin B-6 100 mg oral tablet [one tablet twice daily]    Prevacid 30 mg oral capsule,delayed release (enteric coated) [BID]    MetroGel 1% Topical Gel [Apply thin film to affected area BID and rub in.]    atorvastatin 20 mg oral tablet [TAKE 1 TABLET DAILY]    Keflex 500 mg oral capsule [1 capsule daily]    Mobic 15 mg oral tablet [TAKE 1 TABLET DAILY]    Salagen (pilocarpine) 5 mg oral tablet [Take 1 tablet(s) by mouth QID]    Meclizine HCl 25mg Tablet [Take 1 tablet(s) by mouth tid PRN DIZZINESS]    Lomotil 2.5mg/0.025mg Tablet [One Po Q 6 hours]    Xopenex HFA 45 mcg/actuation Inhalation HFA Aerosol Inhaler [2 puffs QID PRN]    Flonase 50 mcg/actuation Intranasal Spray, Suspension [2 spray(s) in each nostril daily]    Singulair  10 mg oral tablet [1 TAB DAILY]    atenolol 25 mg oral tablet [TAKE 1 TABLET EVERY AFTERNOON]    probiotic 2 capsules daily      CPAP      Myrbetriq 25 mg oral Tablet, Extended Release 24 hr [Take 1 tablet(s) by mouth daily]    benzonatate 100 mg oral capsule [TAKE 1 CAPSULE EVERY 8 HOURS AS NEEDED FOR COUGH]    Allegra Allergy 180 mg oral tablet [1 tab daily]    Potassium Gluconate 595 mg (99 mg) oral tablet [1 capsule daily]    melatonin 10 mg at night     [place]ena Thyroid 30 mg oral tablet [Take 1 tablet(s) by mouth daily]    Epinephrine Auto-Injector 0.15mg Solution For Injection [Inject as needed]    Prolia         Objective:        Vitals:         Current: 5/4/2020 2:56:50 PM    Ht:  5 ft, 5.75 inT: 99.1 F (oral);  sCr: 0.97 mg/dL;  GFR: 51.59        Exams:     PHYSICAL EXAM:     GENERAL: vital signs recorded - well developed, well nourished;  no apparent distress;     RESPIRATORY: normal respiratory rate and pattern with no distress; normal breath sounds with no rales, rhonchi, wheezes or rubs;     PSYCHIATRIC:  appropriate affect and demeanor; normal speech  pattern; grossly normal memory;         Assessment:         H92.02   Otalgia, left ear   POSSIBLE O.M.         Plan:         Otalgia, left ear        FOLLOW-UP: Schedule follow-up appointments on a p.r.n. basis.      RX SENT FOR TWO Z-PACKS Telehealth: Verbal consent obtained for visit to occur via televideo conferencing             Patient Recommendations:        For  Otalgia, left ear:    Schedule follow-up appointments as needed.  I also recommend RX SENT FOR TWO Z-PACKS.              Charge Capture:         Primary Diagnosis:     H92.02  Otalgia, left ear           Orders:      [id number]  Office/outpatient visit; established patient, level 3  (In-House)

## 2021-05-18 NOTE — PROGRESS NOTES
Valery Baez 1945     Office/Outpatient Visit    Visit Date:  12:58 pm    Provider: Sweetie Rubio N.P. (Assistant: Lola Myers MA)    Location: [facility]        Electronically signed by Sweetie Rubio N.P. on  2019 05:43:04 PM                             SUBJECTIVE:        CC: (PT HAS BEEN OFF MYBETRIQ FOR 3 WEEKS, DUE TO NO REFILL) (PT NEEDS MORE LOMOTIL DUE TO DIARRHEA FROM ABX)     Mrs. Baez is a 73 year old White female.  PT WAS DX WITH ASPIRATIONAL PNEUMONIA HERE LAST WEEK & HAS HAD 2 ROUNDS OF ABX;         HPI:         has completed 2 courses of azithromycin since last visit.  feels shortness of breath increasing again the last 1-2 days.   98 is low grade temp for her.   occasional productive cough with clear to yellow sputum.  tessalon helps at night.  using xopenex and flovent.      ROS:     CONSTITUTIONAL:  Positive for fatigue.   Negative for chills.      E/N/T:  Negative for frequent rhinorrhea and sore throat.      CARDIOVASCULAR:  Negative for chest pain, palpitations, tachycardia, orthopnea, and edema.      RESPIRATORY:  Positive for chronic cough.   Negative for frequent wheezing.      GASTROINTESTINAL:  Positive for acid reflux symptoms ( stable ) and diarrhea ( intermittent and improved since azithromycin completed. ).      MUSCULOSKELETAL:  Negative for arthralgias, back pain, and myalgias.      NEUROLOGICAL:  Negative for dizziness, headaches, paresthesias, and weakness.          PMH/FMH/SH:     Last Reviewed on 2019 06:25 PM by Junaid Amaya    Past Medical History:                 PAST MEDICAL HISTORY         Breast cancer: dx'd in 3-2009;  s/p XRT; right breast;     chronic osteomyelitis on supressive therapy (keflex)         GYNECOLOGICAL HISTORY:             CURRENT MEDICAL PROVIDERS:    Allergist    Dermatologist    E/N/T    Oncologist    Orthopedist    Urologist    GENERAL SURGEON FOR BREAST EXAMS    Infectious  Disease specialist;;    speech pathologist             ADVANCE DIRECTIVES: Living will         PREVENTIVE HEALTH MAINTENANCE             BONE DENSITY: was last done 2019 with the following abnormality noted-- Osteoporosis     COLORECTAL CANCER SCREENING: Up to date (colonoscopy q10y; sigmoidoscopy q5y; Cologuard q3y) was last done 05/2013, Results are in chart; colonoscopy with normal results     DENTAL CLEANING: was last done 2019     EYE EXAM: was last done 2019     Hepatitis C Medicare Screening: was last done 2017     MAMMOGRAM: was last done 2018 with normal results     PAP SMEAR: No longer indicated due to age and history         Surgical History:         Biopsy of breast; L/R several      Appendectomy    Cholecystectomy    Hysterectomy    Tonsillectomy/Adenoidectomy    BSO;    right breast lumpectomy 3-2009;    RIGHT MASTECTOMY;;;      Joint Replacement: R knee;     L eye surgery;    L hand surgery;    L/R TMJ replacement;    R ankle and L foot surgery; right trigger finger release nov 2017         Family History:     Father: Coronary Artery Disease; Hypertension     Mother: Sudden Cardiac Death ( 83 )     Brother(s): Sudden Cardiac Death ( 52 )     Paternal Grandmother: Breast Cancer     Maternal Grandmother: Breast Cancer         Social History:     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          Substance Abuse History:     Last Reviewed on 7/23/2019 06:25 PM by Junaid Amaya        Mental Health History:     Last Reviewed on 7/23/2019 06:25 PM by Junaid Amaya        Communicable Diseases (eg STDs):     Last Reviewed on 7/23/2019 06:25 PM by Junaid Amaya            Current Problems:     Last Reviewed on 7/23/2019 06:25 PM by Junaid Amaya    Osteoporosis     Asthma     Benign positional vertigo-mild, recurrent      Acquired hypothyroidism     Diarrhea-recurrent     MRSA colonization     Iron deficiency anemia     Urge incontinence     Pernicious anemia     Palpitations (occ, neg w/u)     Obstructive sleep apnea     History of breast cancer     High cholesterol     Surgical menopause     GERD     Decreased hearing     Sjogren's disease     Osteoarthritis of knee     Rosacea     Allergies     Aspiration pneumonitis, due to inhalation of food or vomitus         Immunizations:     Prevnar 13 (Pneumococcal PCV 13) 10/27/2015     zzFluzone pf-quadrivalent 3 and up 10/27/2014     Flulaval 9/30/2010     Fluzone (3 + years dose) 9/24/2009     Fluzone (3 + years dose) 10/25/2011     Fluzone (3 + years dose) 10/25/2012     Fluzone pf (3+ years dose) 9/30/2013     Influenza A (H1N1), IM (3+ years) Monovalent 11/11/2009     Pneomovax 9/23/2008     Fluzone High-Dose pf (>=65 yr) 10/27/2015     Fluzone High-Dose pf (>=65 yr) 10/17/2016     Fluzone High-Dose pf (>=65 yr) 10/2/2017     Fluzone High-Dose pf (>=65 yr) 10/10/2018     PNEUMOVAX 23 (Pneumococcal PPV23) 0/0/2003     Adacel (Tdap) 11/2/2012     Adacel (Tdap) 8/6/2015     Zostavax (Zoster live) 9/30/2009         Allergies:     Last Reviewed on 7/23/2019 06:25 PM by Junaid Amaya    Prednisone: (Adverse Reaction)    Albuterol: Shakiness (Adverse Reaction)    Penicillins:    Iodine:    Morphine Sulfate:    Sulfas:    Demerol HCl:    Solu-Medrol:    Hydrocodone/Aspirin:    Erythromycin Base:    Darvon-N:    Codeine:    Talwin:    Valium:    Talacen:    Duricef:    Minocycline HCl:    Cubicin:    Ultram:    tape:    IVP dye:    Latex:        Current Medications:     Last Reviewed on 7/31/2019 01:18 PM by Lola Myers    Atenolol 25mg Tablet 1 every afternoon.     Lipitor 20mg Tablet Take 1 tablet(s) by mouth daily     Mobic 15mg Tablet Take 1 tablet(s) by mouth daily     Epinephrine Auto-Injector 0.15mg Solution For Injection Inject as needed     Keflex 500mg Capsules 1 capsule  daily     Salagen 5mg Tablet Take 1 tablet(s) by mouth QID     MetroGel 1% Topical Gel Apply thin film to affected area BID and rub in.     Meclizine HCl 25mg Tablet Take 1 tablet(s) by mouth tid PRN DIZZINESS     Lomotil 2.5mg/0.025mg Tablet One Po Q 6 hours     Prolia     Tessalon Perles     Singulair  10mg Tablet 1 TAB DAILY     Allegra Allergy 24 Hour 180mg Tablet 1 tab daily     [place]ner Thyroid 30mg Tablet Take 1 tablet(s) by mouth daily     Aspirin (ASA) 81mg Tablets, Enteric Coated 1 tab daily     Flovent HFA 110mcg/1actuation Oral Inhaler 2 puffs bid     Myrbetriq 25mg Tablets, Extended Release Take 1 tablet(s) by mouth daily     CPAP     Alrex 0.2% Ophthalmic Suspension INSTILL 1 DROP BOTH EYES DAILY     Flonase 50mcg/1actuation Nasal Spray 2 spray(s) in each nostril daily     Xopenex HFA 45mcg/1actuation Oral Inhaler 2 puffs QID PRN     Prevacid 30mg Capsules, Delayed Release BID     melatonin 10 mg at night     Potassium Gluconate 595mg Tablet 1 capsule daily     probiotic 2 capsules daily     Multivitamin/Mineral Supplement Caplet Take 1 tablet(s) by mouth daily     Vitamin B6 100mg Tablet one tablet twice daily         OBJECTIVE:        Vitals:         Historical:     07/23/2019  BP:   107/69 mm Hg ( (left arm, , sitting, );)     07/23/2019  Wt:   178.6lbs        Current: 7/31/2019 1:22:29 PM    Ht:  5 ft, 5.75 in;  Wt: 178.6 lbs;  BMI: 29.0    T: 97.5 F (oral);  BP: 99/62 mm Hg (left arm, sitting);  P: 75 bpm (left arm (BP Cuff), sitting);  sCr: 0.97 mg/dL;  GFR: 57.50    O2 Sat: 97 % (room air)        Exams:     PHYSICAL EXAM:     GENERAL:  well developed and nourished; appropriately groomed; in no apparent distress;     E/N/T: EARS: both TMs are have fluid behind them;  OROPHARYNX: posterior pharynx, including tonsils, tongue, and uvula are normal;     RESPIRATORY: normal respiratory rate and pattern with no distress; normal breath sounds with no rales, rhonchi, wheezes or rubs;     CARDIOVASCULAR:  normal rate; rhythm is regular;     MUSCULOSKELETAL:  Normal range of motion, strength and tone;     NEUROLOGIC: mental status: alert and oriented x 3; GROSSLY INTACT     PSYCHIATRIC:  appropriate affect and demeanor; normal speech pattern; grossly normal memory;         ASSESSMENT           507.0   R06.02   J69.0  Aspiration pneumonitis, due to inhalation of food or vomitus              DDx:         ORDERS:         Radiology/Test Orders:       [id number]  Radiologic exam chest 2 views  (Send-Out)           Other Orders:       1101F  Pt screen for fall risk; document no falls in past year or only 1 fall w/o injury in past year (KELLI)  (In-House)                   PLAN:          Aspiration pneumonitis, due to inhalation of food or vomitus         RADIOLOGY:  I have ordered a chest x-ray (PA and lateral) to be done today.  MIPS Has had no falls in the past year           Orders:       [id number]  Radiologic exam chest 2 views  (Send-Out)         1101F  Pt screen for fall risk; document no falls in past year or only 1 fall w/o injury in past year (KELLI)  (In-House)               CHARGE CAPTURE           **Please note: ICD descriptions below are intended for billing purposes only and may not represent clinical diagnoses**        Primary Diagnosis:         507.0 Aspiration pneumonitis, due to inhalation of food or vomitus            R06.02    Shortness of breath           J69.0    Pneumonitis due to inhalation of food and vomit              Orders:          [id number]   Office/outpatient visit; established patient, level 3  (In-House)             1101F   Pt screen for fall risk; document no falls in past year or only 1 fall w/o injury in past year (KELLI)  (In-House)

## 2021-05-18 NOTE — PROGRESS NOTES
Valery Baez 1945     Office/Outpatient Visit    Visit Date: Tue, Aug 28, 2018 12:51 pm    Provider: Cricket Gan MD (Assistant: Nayla Garcia)    Location: [facility]        Electronically signed by Cricket Gan MD on  2018 07:07:27 AM                             SUBJECTIVE:        CC: possible thrush         HPI:     Shaista is in today for possible thrush.  She says that she would like to get something for this if possible.  She also has had a deer tick bite a couple of weeks ago and wanted to get that looked at.      ROS:     CONSTITUTIONAL:  Negative for chills and fever.      CARDIOVASCULAR:  Negative for chest pain and palpitations.      RESPIRATORY:  Negative for recent cough and dyspnea.      GASTROINTESTINAL:  Negative for abdominal pain, nausea and vomiting.          PM/[facility]/:     Last Reviewed on 2018 01:15 PM by Cricket Gan    Past Medical History:                 PAST MEDICAL HISTORY         Breast cancer: dx'd in 3-2009;  s/p XRT; right breast;     chronic osteomyelitis on supressive therapy (keflex)         GYNECOLOGICAL HISTORY:        Last Pap was          CURRENT MEDICAL PROVIDERS:    Allergist    Dermatologist    E/N/T    Oncologist    Orthopedist    Urologist    GENERAL SURGEON FOR BREAST EXAMS    Infectious Disease specialist;;             ADVANCE DIRECTIVES: Living will         PREVENTIVE HEALTH MAINTENANCE             COLORECTAL CANCER SCREENING: Up to date (colonoscopy q10y; sigmoidoscopy q5y; Cologuard q3y) was last done 2013, Results are in chart; colonoscopy with normal results         Surgical History:         Biopsy of breast; L/R several      Appendectomy    Cholecystectomy    Hysterectomy    Tonsillectomy/Adenoidectomy    BSO;    right breast lumpectomy 3-2009;      Joint Replacement: R knee;     L eye surgery;    L hand surgery;    L/R TMJ replacement;    R ankle and L foot surgery; Procedures: colonoscopy ,  NEG;;  Treadmill stress test 2007, NEG ECGO 2001 Holter 2001 right trigger finger release nov 2017         Family History:     Father: Coronary Artery Disease; Hypertension     Mother: Sudden Cardiac Death ( 83 )     Brother(s): Sudden Cardiac Death ( 52 )     Paternal Grandmother: Breast Cancer     Maternal Grandmother: Breast Cancer         Social History:     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          Substance Abuse History:     Last Reviewed on 8/28/2018 01:15 PM by Cricket Gan        Mental Health History:     Last Reviewed on 8/28/2018 01:15 PM by Cricket Gan        Communicable Diseases (eg STDs):     Last Reviewed on 8/28/2018 01:15 PM by Cricket Gan            Current Problems:     Last Reviewed on 8/28/2018 01:15 PM by Cricket Gan    Asthma     Benign positional vertigo-mild, recurrent     Acquired hypothyroidism     Diarrhea-recurrent     MRSA colonization     Iron deficiency anemia     Urge incontinence     Pernicious anemia     Palpitations (occ, neg w/u)     Obstructive sleep apnea     History of breast cancer     High cholesterol     Osteopenia     Surgical menopause     GERD     Decreased hearing     Sjogren's disease     Osteoarthritis of knee     Rosacea     Allergies         Immunizations:     Prevnar 13 (Pneumococcal PCV 13) 10/27/2015     zzFluzone pf-quadrivalent 3 and up 10/27/2014     Flulaval 9/30/2010     Fluzone (3 + years dose) 9/24/2009     Fluzone (3 + years dose) 10/25/2011     Fluzone (3 + years dose) 10/25/2012     Fluzone pf (3+ years dose) 9/30/2013     Influenza A (H1N1), IM (3+ years) Monovalent 11/11/2009     Pneomovax 9/23/2008     Fluzone High-Dose pf (>=65 yr) 10/27/2015     Fluzone High-Dose pf (>=65 yr) 10/17/2016     Fluzone High-Dose pf (>=65 yr)  10/2/2017     PNEUMOVAX 23 (Pneumococcal PPV23) 0/0/2003     Adacel (Tdap) 11/2/2012     Adacel (Tdap) 8/6/2015     Zostavax (Zoster) 9/30/2009         Allergies:     Last Reviewed on 8/28/2018 01:15 PM by Cricket Gan    Prednisone: (Adverse Reaction)    Albuterol: Shakiness (Adverse Reaction)    Penicillins:    Iodine:    Morphine Sulfate:    Sulfas:    Demerol HCl:    Solu-Medrol:    Hydrocodone/Aspirin:    Erythromycin Base:    Darvon-N:    Codeine:    Talwin:    Valium:    Talacen:    Duricef:    Minocycline HCl:    Cubicin:    Ultram:        Current Medications:     Last Reviewed on 8/28/2018 01:15 PM by Cricket Gan    Lipitor 20mg Tablet Take 1 tablet(s) by mouth daily     Meclizine HCl 25mg Tablet Take 1 tablet(s) by mouth tid PRN DIZZINESS     Mobic 15mg Tablet Take 1 tablet(s) by mouth daily     Atenolol 25mg Tablet 1 every afternoon.     Salagen 5mg Tablet Take 1 tablet(s) by mouth QID     Keflex 500mg Capsules 1 capsule daily     Lomotil 2.5mg/0.025mg Tablet One Po Q 6 hours     MetroGel 1% Topical Gel Apply thin film to affected area BID and rub in.     EpiPen Auto-Injector 1:1,000 Solution For Injection As Directed     Singulair  10mg Tablet 1 TAB DAILY     Allegra Allergy 24 Hour 180mg Tablet 1 tab daily     [place] Thyroid 30mg Tablet Take 1 tablet(s) by mouth daily     Aspirin (ASA) 81mg Tablets, Enteric Coated 1 tab daily     Flovent HFA 110mcg/1actuation Oral Inhaler 2 puffs bid     Myrbetriq 25mg Tablets, Extended Release Take 1 tablet(s) by mouth daily     CPAP     Alrex 0.2% Ophthalmic Suspension INSTILL 1 DROP BOTH EYES DAILY     Flonase 50mcg/1actuation Nasal Spray 2 spray(s) in each nostril daily     Xopenex HFA 45mcg/1actuation Oral Inhaler 2 puffs QID PRN     Prevacid 30mg Capsules, Delayed Release Take 1 capsule(s) by mouth daily     Tessalon Perles     melatonin 10 mg at night     Potassium Gluconate 595mg Tablet 1 capsule daily     probiotic 2 capsules daily      Multivitamin/Mineral Supplement Caplet Take 1 tablet(s) by mouth daily     Vitamin B6 100mg Tablet one tablet twice daily         OBJECTIVE:        Vitals:         Current: 8/28/2018 12:55:36 PM    Ht:  5 ft, 5.75 in;  Wt: 178.6 lbs;  BMI: 29.0    T: 98.1 F (oral);  BP: 134/76 mm Hg (left arm, sitting);  P: 86 bpm (left arm (BP Cuff), sitting);  sCr: 0.97 mg/dL;  GFR: 58.32        Exams:     PHYSICAL EXAM:     GENERAL: vital signs recorded - well developed, well nourished;  no apparent distress;     E/N/T: EARS:  normal external auditory canals and tympanic membranes;  grossly normal hearing; OROPHARYNX: there is some degree of redness in the oropharynx - there is not other acute finding;     NECK: range of motion is normal; thyroid is non-palpable;     RESPIRATORY: normal respiratory rate and pattern with no distress; normal breath sounds with no rales, rhonchi, wheezes or rubs;     CARDIOVASCULAR: normal rate; rhythm is regular;  no systolic murmur; no edema;     GASTROINTESTINAL: nontender; normal bowel sounds; no organomegaly;     BREAST/INTEGUMENT: SKIN: no significant rashes or lesions; no suspicious moles; mild redness at site of bite on L upper abdomen         ASSESSMENT           112.0   B37.0  Thrush              DDx:         ORDERS:         Meds Prescribed:       Diflucan (Fluconazole) 150mg Tablet Take 1 tablet(s) by mouth once and repeat in 1 week  #2 (Two) tablet(s) Refills: 0                 PLAN:          Thrush         RECOMMENDATIONS given include: We will cover her for this as noted below.  She has an unusual history, and I think use of Diflucan is reasonable.  We will follow..            Prescriptions:       Diflucan (Fluconazole) 150mg Tablet Take 1 tablet(s) by mouth once and repeat in 1 week  #2 (Two) tablet(s) Refills: 0           Patient Education Handouts:       [facility] [place] Medication Compliance              CHARGE CAPTURE           **Please note: ICD descriptions below are intended for billing  purposes only and may not represent clinical diagnoses**        Primary Diagnosis:         112.0 Thrush            B37.0    Candidal stomatitis              Orders:          [id number]   Office/outpatient visit; established patient, level 3  (In-House)

## 2021-05-25 ENCOUNTER — APPOINTMENT (OUTPATIENT)
Dept: CT IMAGING | Facility: HOSPITAL | Age: 76
End: 2021-05-25

## 2021-06-08 ENCOUNTER — OFFICE VISIT (OUTPATIENT)
Dept: FAMILY MEDICINE CLINIC | Age: 76
End: 2021-06-08

## 2021-06-08 VITALS
HEART RATE: 73 BPM | BODY MASS INDEX: 29.83 KG/M2 | WEIGHT: 185.6 LBS | SYSTOLIC BLOOD PRESSURE: 122 MMHG | TEMPERATURE: 97.9 F | HEIGHT: 66 IN | DIASTOLIC BLOOD PRESSURE: 79 MMHG

## 2021-06-08 DIAGNOSIS — J30.2 SEASONAL ALLERGIES: Primary | ICD-10-CM

## 2021-06-08 DIAGNOSIS — J45.20 MILD INTERMITTENT ASTHMA WITHOUT COMPLICATION: ICD-10-CM

## 2021-06-08 PROCEDURE — 99213 OFFICE O/P EST LOW 20 MIN: CPT | Performed by: FAMILY MEDICINE

## 2021-06-08 RX ORDER — FLUTICASONE PROPIONATE 110 UG/1
1 AEROSOL, METERED RESPIRATORY (INHALATION) DAILY
COMMUNITY
End: 2021-06-08 | Stop reason: SDUPTHER

## 2021-06-08 RX ORDER — ATORVASTATIN CALCIUM 20 MG/1
1 TABLET, FILM COATED ORAL
COMMUNITY
Start: 2020-12-12 | End: 2021-06-08 | Stop reason: SDUPTHER

## 2021-06-08 RX ORDER — MELOXICAM 15 MG/1
1 TABLET ORAL DAILY
COMMUNITY
Start: 2021-01-09 | End: 2021-07-06

## 2021-06-08 RX ORDER — ASPIRIN 81 MG/1
1 TABLET ORAL DAILY
COMMUNITY
End: 2021-12-23 | Stop reason: SDUPTHER

## 2021-06-08 RX ORDER — ATENOLOL 25 MG/1
1 TABLET ORAL DAILY
COMMUNITY
Start: 2020-12-12 | End: 2021-06-08 | Stop reason: SDUPTHER

## 2021-06-08 RX ORDER — PHENOL 1.4 %
1 AEROSOL, SPRAY (ML) MUCOUS MEMBRANE DAILY
COMMUNITY
End: 2021-06-08 | Stop reason: SDUPTHER

## 2021-06-08 RX ORDER — METRONIDAZOLE 7.5 MG/G
GEL TOPICAL
COMMUNITY
End: 2021-08-12 | Stop reason: SDUPTHER

## 2021-06-08 RX ORDER — PILOCARPINE HYDROCHLORIDE 5 MG/1
1 TABLET, FILM COATED ORAL 4 TIMES DAILY
COMMUNITY
End: 2021-06-08 | Stop reason: SDUPTHER

## 2021-06-08 RX ORDER — EPINEPHRINE 0.15 MG/.15ML
INJECTION SUBCUTANEOUS
COMMUNITY
Start: 2021-05-08 | End: 2021-06-08 | Stop reason: SDUPTHER

## 2021-06-08 RX ORDER — LANSOPRAZOLE 30 MG/1
1 CAPSULE, DELAYED RELEASE ORAL
COMMUNITY
End: 2021-06-08 | Stop reason: DRUGHIGH

## 2021-06-08 RX ORDER — DIPHENOXYLATE HYDROCHLORIDE AND ATROPINE SULFATE 2.5; .025 MG/1; MG/1
1 TABLET ORAL DAILY
COMMUNITY
Start: 2021-01-18 | End: 2021-06-08 | Stop reason: SDUPTHER

## 2021-06-08 RX ORDER — DIPHENHYDRAMINE HYDROCHLORIDE 12.5 MG/1
25 BAR, CHEWABLE ORAL
COMMUNITY
End: 2021-06-08 | Stop reason: SDUPTHER

## 2021-06-08 RX ORDER — FLUTICASONE PROPIONATE 50 MCG
2 SPRAY, SUSPENSION (ML) NASAL DAILY
COMMUNITY
End: 2021-06-08 | Stop reason: SDUPTHER

## 2021-06-08 RX ORDER — LEVALBUTEROL TARTRATE 45 UG/1
1-2 AEROSOL, METERED ORAL EVERY 4 HOURS PRN
COMMUNITY

## 2021-06-08 NOTE — PROGRESS NOTES
"Chief Complaint  Allergies and Sinusitis    Subjective    History of Present Illness {CC  Problem List  Visit  Diagnosis   Encounters  Notes  Medications  Labs  Result Review Imaging  Media :23}     Valery Baez presents to [facility] for   Allergies    Sinusitis     patient presents stating that she woke up this morning sneezing multiple times.  She has sleep apnea and was wearing her CPAP so said it felt like she was going to explode.  She has not had any fever.  No colored sputum.  Has a long history of allergies and asthma.  Takes multiple allergy medications as well as inhaled pulmonary medications.  She takes Keflex on a chronic basis as as suppression for staph infections.      Objective     Vital Signs:   Vitals:    06/08/21 1114   BP: 122/79   BP Location: Left arm   Patient Position: Sitting   Cuff Size: Adult   Pulse: 73   Temp: 97.9 °F (36.6 °C)   TempSrc: Oral   Weight: 84.2 kg (185 lb 9.6 oz)   Height: 167.6 cm (65.98\")     Body mass index is 29.97 kg/m².     PHYSICAL:   Patient is in no acute distress at time of visit.  Tympanic membranes are clear.  The eyes are nonicteric.  The oropharynx is clear.  There is no cervical or supraclavicular adenopathy.  The heart is of regular rate and rhythm.  There is a 2/6 systolic murmur.  The lungs had good air movement without wheezes or crackles.  Lower extremities are without edema.  Neurologic without gross focal neurologic deficit      Result Review  Data Reviewed:{ Labs  Result Review  Imaging  Med Tab  Media :23}                   Assessment and Plan {CC Problem List  Visit Diagnosis  ROS  Review (Popup)  Health Maintenance  Quality  BestPractice  Medications  SmartSets  SnapShot Encounters  Media :23}   1. Seasonal allergies  Her symptoms seem most consistent with allergies to me.  She is already on multiple allergy medications and I want to avoid exposing her to further antibiotics.  " Recommend that she try McDonald pot.  Certainly if her symptoms worsen or change substantially she can let us know.    2. Mild intermittent asthma without complication  Her asthma seems to be under good control at this time.          Follow Up {Instructions Charge Capture  Follow-up Communications :23}   No follow-ups on file.    Patient was given instructions and counseling regarding her condition or for health maintenance advice. Please see specific information pulled into the AVS if appropriate.  Patient was instructed to call the [facility] with any questions, concerns, or worsening symptoms.    *Please note that portions of this note were completed with a voice recognition program. Efforts were made to edit the dictations, but occasionally words are mistranscribed.

## 2021-06-28 RX ORDER — CEPHALEXIN 500 MG/1
CAPSULE ORAL
Qty: 90 CAPSULE | Refills: 3 | Status: SHIPPED | OUTPATIENT
Start: 2021-06-28 | End: 2022-06-29 | Stop reason: SDUPTHER

## 2021-06-28 NOTE — TELEPHONE ENCOUNTER
Pharm requesting refill on Keflex. Appears pt takes med daily prophylacticly. LF 8/20/2020 #90 with 3 refills. LOV 1/29/2021, f/u appt ([facility] [place]) 7/30/2021- clr

## 2021-07-01 VITALS
SYSTOLIC BLOOD PRESSURE: 102 MMHG | WEIGHT: 182.3 LBS | BODY MASS INDEX: 29.3 KG/M2 | TEMPERATURE: 98.6 F | DIASTOLIC BLOOD PRESSURE: 60 MMHG | HEIGHT: 66 IN | HEART RATE: 65 BPM

## 2021-07-01 VITALS
HEART RATE: 75 BPM | HEIGHT: 66 IN | OXYGEN SATURATION: 97 % | SYSTOLIC BLOOD PRESSURE: 99 MMHG | WEIGHT: 178.6 LBS | DIASTOLIC BLOOD PRESSURE: 62 MMHG | TEMPERATURE: 97.5 F | BODY MASS INDEX: 28.7 KG/M2

## 2021-07-01 VITALS
DIASTOLIC BLOOD PRESSURE: 76 MMHG | HEART RATE: 86 BPM | SYSTOLIC BLOOD PRESSURE: 134 MMHG | BODY MASS INDEX: 28.7 KG/M2 | TEMPERATURE: 98.1 F | WEIGHT: 178.6 LBS | HEIGHT: 66 IN

## 2021-07-01 VITALS
TEMPERATURE: 97.8 F | BODY MASS INDEX: 28.7 KG/M2 | OXYGEN SATURATION: 98 % | WEIGHT: 178.6 LBS | SYSTOLIC BLOOD PRESSURE: 107 MMHG | HEIGHT: 66 IN | DIASTOLIC BLOOD PRESSURE: 69 MMHG | HEART RATE: 77 BPM

## 2021-07-01 VITALS
TEMPERATURE: 97.7 F | SYSTOLIC BLOOD PRESSURE: 108 MMHG | BODY MASS INDEX: 29.07 KG/M2 | WEIGHT: 180.9 LBS | HEIGHT: 66 IN | HEART RATE: 78 BPM | DIASTOLIC BLOOD PRESSURE: 68 MMHG

## 2021-07-01 VITALS
DIASTOLIC BLOOD PRESSURE: 67 MMHG | TEMPERATURE: 97.7 F | WEIGHT: 174.8 LBS | BODY MASS INDEX: 28.09 KG/M2 | HEIGHT: 66 IN | HEART RATE: 95 BPM | SYSTOLIC BLOOD PRESSURE: 101 MMHG

## 2021-07-01 VITALS
BODY MASS INDEX: 27.16 KG/M2 | SYSTOLIC BLOOD PRESSURE: 105 MMHG | WEIGHT: 169 LBS | TEMPERATURE: 97.5 F | HEIGHT: 66 IN | HEART RATE: 91 BPM | DIASTOLIC BLOOD PRESSURE: 74 MMHG

## 2021-07-01 VITALS
WEIGHT: 179.9 LBS | TEMPERATURE: 98.6 F | HEIGHT: 66 IN | OXYGEN SATURATION: 96 % | BODY MASS INDEX: 28.91 KG/M2 | HEART RATE: 66 BPM | SYSTOLIC BLOOD PRESSURE: 133 MMHG | DIASTOLIC BLOOD PRESSURE: 75 MMHG

## 2021-07-01 VITALS
DIASTOLIC BLOOD PRESSURE: 59 MMHG | WEIGHT: 179.6 LBS | TEMPERATURE: 98.2 F | BODY MASS INDEX: 28.87 KG/M2 | SYSTOLIC BLOOD PRESSURE: 119 MMHG | HEART RATE: 77 BPM | HEIGHT: 66 IN

## 2021-07-01 VITALS
SYSTOLIC BLOOD PRESSURE: 102 MMHG | BODY MASS INDEX: 29.15 KG/M2 | HEART RATE: 79 BPM | WEIGHT: 181.4 LBS | DIASTOLIC BLOOD PRESSURE: 68 MMHG | TEMPERATURE: 97.8 F | HEIGHT: 66 IN

## 2021-07-02 VITALS
BODY MASS INDEX: 28.17 KG/M2 | BODY MASS INDEX: 28.28 KG/M2 | HEIGHT: 66 IN | DIASTOLIC BLOOD PRESSURE: 62 MMHG | HEIGHT: 66 IN | TEMPERATURE: 97.6 F | SYSTOLIC BLOOD PRESSURE: 117 MMHG | SYSTOLIC BLOOD PRESSURE: 136 MMHG | DIASTOLIC BLOOD PRESSURE: 76 MMHG | WEIGHT: 176 LBS | TEMPERATURE: 97.1 F | WEIGHT: 175.3 LBS | HEART RATE: 76 BPM | HEART RATE: 76 BPM

## 2021-07-02 VITALS
SYSTOLIC BLOOD PRESSURE: 123 MMHG | HEART RATE: 69 BPM | DIASTOLIC BLOOD PRESSURE: 70 MMHG | HEIGHT: 66 IN | TEMPERATURE: 96 F | WEIGHT: 189.2 LBS | BODY MASS INDEX: 30.41 KG/M2

## 2021-07-02 VITALS — BODY MASS INDEX: 26.67 KG/M2 | TEMPERATURE: 99.1 F | HEIGHT: 66 IN

## 2021-07-02 VITALS
BODY MASS INDEX: 28.45 KG/M2 | HEART RATE: 66 BPM | HEIGHT: 66 IN | TEMPERATURE: 97.1 F | DIASTOLIC BLOOD PRESSURE: 65 MMHG | SYSTOLIC BLOOD PRESSURE: 113 MMHG | WEIGHT: 177 LBS

## 2021-07-02 VITALS — TEMPERATURE: 97.4 F | HEIGHT: 66 IN | BODY MASS INDEX: 26.67 KG/M2

## 2021-07-06 RX ORDER — MELOXICAM 15 MG/1
TABLET ORAL
Qty: 90 TABLET | Refills: 3 | Status: SHIPPED | OUTPATIENT
Start: 2021-07-06 | End: 2021-12-25 | Stop reason: HOSPADM

## 2021-07-08 RX ORDER — PILOCARPINE HYDROCHLORIDE 5 MG/1
TABLET, FILM COATED ORAL
Qty: 360 TABLET | Refills: 3 | Status: SHIPPED | OUTPATIENT
Start: 2021-07-08 | End: 2022-07-06

## 2021-07-30 ENCOUNTER — OFFICE VISIT (OUTPATIENT)
Dept: FAMILY MEDICINE CLINIC | Age: 76
End: 2021-07-30

## 2021-07-30 VITALS
BODY MASS INDEX: 29.81 KG/M2 | DIASTOLIC BLOOD PRESSURE: 66 MMHG | SYSTOLIC BLOOD PRESSURE: 119 MMHG | WEIGHT: 184.6 LBS | HEART RATE: 68 BPM

## 2021-07-30 DIAGNOSIS — Z00.00 MEDICARE ANNUAL WELLNESS VISIT, SUBSEQUENT: Primary | ICD-10-CM

## 2021-07-30 PROBLEM — Z96.651 H/O TOTAL KNEE REPLACEMENT, RIGHT: Status: RESOLVED | Noted: 2017-10-18 | Resolved: 2021-07-30

## 2021-07-30 PROBLEM — Z96.651 HISTORY OF TOTAL KNEE ARTHROPLASTY, RIGHT: Status: RESOLVED | Noted: 2017-10-18 | Resolved: 2021-07-30

## 2021-07-30 PROBLEM — M65.311 TRIGGER THUMB, RIGHT THUMB: Status: RESOLVED | Noted: 2017-10-18 | Resolved: 2021-07-30

## 2021-07-30 PROCEDURE — G0402 INITIAL PREVENTIVE EXAM: HCPCS | Performed by: FAMILY MEDICINE

## 2021-07-30 NOTE — PROGRESS NOTES
The ABCs of the Annual Wellness Visit  Subsequent Medicare Wellness Visit    Chief Complaint   Patient presents with   • Follow-up     Medicare Wellness       Subjective   History of Present Illness:  Valery Baez is a 75 y.o. female who presents for a Subsequent Medicare Wellness Visit.    F/U:  CHOL, GERD, ASTHMA, VERTIGO  ? EGD  ?GXT  ?MAMMO  ?LIP9ID     HEALTH RISK ASSESSMENT    Recent Hospitalizations:  No hospitalization(s) within the last year.No    Current Medical Providers:  Patient Care Team:  Tino Smith MD as PCP - General  Tino Smith MD as PCP - Family Medicine    Smoking Status:  Social History     Tobacco Use   Smoking Status Never Smoker   Smokeless Tobacco Never Used       Alcohol Consumption:  Social History     Substance and Sexual Activity   Alcohol Use Yes   • Alcohol/week: 2.0 standard drinks   • Types: 2 Shots of liquor per week    Comment: 4 drinks per week       Depression Screen:   PHQ-2/PHQ-9 Depression Screening 7/30/2021   Little interest or pleasure in doing things 0   Feeling down, depressed, or hopeless 0   Total Score 0       Fall Risk Screen:  WILD Fall Risk Assessment was completed, and patient is at LOW risk for falls.Assessment completed on:7/30/2021    Health Habits and Functional and Cognitive Screening:  Functional & Cognitive Status 7/30/2021   Do you have difficulty preparing food and eating? No   Do you have difficulty bathing yourself, getting dressed or grooming yourself? No   Do you have difficulty using the toilet? No   Do you have difficulty moving around from place to place? No   Do you have trouble with steps or getting out of a bed or a chair? No   Current Diet Well Balanced Diet   Dental Exam Up to date        Dental Exam Comment 3 x year   Eye Exam Up to date        Eye Exam Comment In April/Dr. Zhanna Smith   Exercise (times per week) 0 times per week   Current Exercises Include No Regular Exercise        Exercise Comment Does  walk and go up and down stairs   Do you need help using the phone?  No   Are you deaf or do you have serious difficulty hearing?  No   Do you need help with transportation? No   Do you need help shopping? No   Do you need help preparing meals?  No   Do you need help with housework?  No   Do you need help with laundry? No   Do you need help taking your medications? No   Do you need help managing money? No   Do you ever drive or ride in a car without wearing a seat belt? No         Does the patient have evidence of cognitive impairment? No    Asprin use counseling:Taking ASA appropriately as indicated    Age-appropriate Screening Schedule:  Refer to the list below for future screening recommendations based on patient's age, sex and/or medical conditions. Orders for these recommended tests are listed in the plan section. The patient has been provided with a written plan.    Health Maintenance   Topic Date Due   • ZOSTER VACCINE (1 of 2) Never done   • LIPID PANEL  04/30/2019   • INFLUENZA VACCINE  10/01/2021   • DXA SCAN  09/30/2022   • MAMMOGRAM  10/21/2022   • TDAP/TD VACCINES (3 - Td or Tdap) 11/06/2022          The following portions of the patient's history were reviewed and updated as appropriate: allergies, current medications, past family history, past medical history, past social history, past surgical history and problem list.    Outpatient Medications Prior to Visit   Medication Sig Dispense Refill   • acetaminophen (TYLENOL) 325 MG tablet Take 2 tablets by mouth Every 4 (Four) Hours As Needed for Mild Pain .     • ARMOUR THYROID 30 MG tablet      • aspirin (aspirin) 81 MG EC tablet Take 1 tablet by mouth Daily.     • atenolol (TENORMIN) 25 MG tablet Take 25 mg by mouth Daily. TAKES AT 1500     • atorvastatin (LIPITOR) 20 MG tablet Take 20 mg by mouth Every Night.     • benzonatate (TESSALON) 100 MG capsule Take 100 mg by mouth 2 (Two) Times a Day As Needed.     • Calcium Citrate-Vitamin D (CALCIUM CITRATE +  D PO) Take 1 tablet by mouth Every Night. ON HOLD FOR SX.     • cephalexin (KEFLEX) 500 MG capsule TAKE 1 CAPSULE DAILY 90 capsule 3   • Denosumab (PROLIA SC) Inject  under the skin into the appropriate area as directed See Admin Instructions. Every 6 months   4/5/19 WAS LAST DOSE     • diphenhydrAMINE (BENADRYL ALLERGY CHILDRENS) 12.5 MG chewable tablet Chew 25 mg 4 (Four) Times a Day As Needed for Allergies.     • diphenoxylate-atropine (LOMOTIL) 2.5-0.025 MG per tablet Take 1 tablet by mouth 4 (Four) Times a Day As Needed.  1   • EPIPEN 2-TOMMY 0.3 MG/0.3ML solution auto-injector injection      • fexofenadine (ALLEGRA) 180 MG tablet Take 180 mg by mouth Daily.     • fluticasone (FLONASE) 50 MCG/ACT nasal spray 2 sprays into the nostril(s) as directed by provider 2 (Two) Times a Day.     • fluticasone (FLOVENT HFA) 110 MCG/ACT inhaler Inhale 1 puff 2 (two) times a day.     • lansoprazole (PREVACID) 30 MG capsule Take 30 mg by mouth 2 (Two) Times a Day.     • levalbuterol (XOPENEX HFA) 45 MCG/ACT inhaler Inhale 1-2 puffs.     • loteprednol (LOTEMAX) 0.2 % suspension Administer 1 drop to both eyes 2 (Two) Times a Day.     • meclizine (ANTIVERT) 25 MG tablet Take 25 mg by mouth 3 (Three) Times a Day As Needed.     • Melatonin 10 MG tablet Take 1 tablet by mouth Every Night. ON HOLD FOR SX.     • meloxicam (MOBIC) 15 MG tablet TAKE 1 TABLET DAILY 90 tablet 3   • metroNIDAZOLE (METROGEL) 0.75 % gel Apply  topically to the appropriate area as directed 2 (Two) Times a Day As Needed.     • metroNIDAZOLE (METROGEL) 0.75 % gel Apply  topically to the appropriate area as directed.     • mometasone (ASMANEX TWISTHALER) inhaler 220 mcg/inhalation Inhale 2 puffs 2 (Two) Times a Day As Needed.     • mometasone (NASONEX) 50 MCG/ACT nasal spray 2 sprays into the nostril(s) as directed by provider Daily.     • montelukast (SINGULAIR) 10 MG tablet Take 10 mg by mouth Every Night.     • Multiple Vitamins-Minerals (MULTIVITAMIN ADULT  PO) Take 1 tablet by mouth Every Night. ON HOLD FOR SX.     • mupirocin (BACTROBAN) 2 % ointment if needed.     • MYRBETRIQ 25 MG tablet sustained-release 24 hour 24 hr tablet      • pilocarpine (SALAGEN) 5 MG tablet TAKE 1 TABLET FOUR TIMES A  tablet 3   • POTASSIUM CHLORIDE PO Take 99 mg by mouth Daily.     • PROBIOTIC PRODUCT PO Take 1 capsule by mouth.     • vitamin B-6 (PYRIDOXINE) 100 MG tablet Take 100 mg by mouth 2 (Two) Times a Day. ON HOLD FOR SX.     • XOPENEX HFA 45 MCG/ACT inhaler Inhale 2 puffs Every 4 (Four) Hours As Needed.       No facility-administered medications prior to visit.       Patient Active Problem List   Diagnosis   • Chronic pain of left knee   • Spondylolisthesis   • Spondylolisthesis of lumbar region   • Spinal stenosis of lumbar region with neurogenic claudication   • Sleep apnea   • Stage 3 chronic kidney disease (CMS/[facility])   • Iron deficiency anemia   • Sjogren's disease (CMS/[facility])   • Asthma   • GERD (gastroesophageal reflux disease)   • Seasonal allergies   • MRSA (methicillin resistant Staphylococcus aureus) infection   • Medicare annual wellness visit, subsequent       Advanced Care Planning:  ACP discussion was held with the patient during this visit. Patient has an advance directive in EMR which is still valid. Yes @ [employer]    Review of Systems   Constitutional: Negative for activity change, appetite change, chills, fatigue and fever.   HENT: Positive for hearing loss. Negative for congestion, ear pain, rhinorrhea and sore throat.    Eyes: Negative for discharge and visual disturbance.   Respiratory: Negative for cough and shortness of breath.    Cardiovascular: Negative for chest pain, palpitations and leg swelling.   Gastrointestinal: Negative for abdominal pain, nausea and vomiting.   Genitourinary: Negative for dysuria and hematuria.   Musculoskeletal: Positive for arthralgias.   Neurological: Negative for headaches.   Psychiatric/Behavioral: Negative for dysphoric  mood.       Compared to one year ago, the patient feels her physical health is the same.Same  Compared to one year ago, the patient feels her mental health is the same.Better    Reviewed chart for potential of high risk medication in the elderly: yes  Reviewed chart for potential of harmful drug interactions in the elderly:yes    Objective         Vitals:    07/30/21 0924   BP: 119/66   BP Location: Left arm   Patient Position: Sitting   Cuff Size: Adult   Pulse: 68   Weight: 83.7 kg (184 lb 9.6 oz)   PainSc: 0-No pain       Body mass index is 29.81 kg/m².  Discussed the patient's BMI with her. The BMI is in the acceptable range.    Physical Exam  Vitals and nursing note reviewed.   Constitutional:       General: She is not in acute distress.     Appearance: Normal appearance.   HENT:      Right Ear: Tympanic membrane normal.      Left Ear: Tympanic membrane normal.      Mouth/Throat:      Pharynx: Oropharynx is clear.   Eyes:      Conjunctiva/sclera: Conjunctivae normal.   Cardiovascular:      Rate and Rhythm: Normal rate and regular rhythm.      Heart sounds: No murmur heard.     Pulmonary:      Effort: Pulmonary effort is normal.      Breath sounds: Normal breath sounds.   Abdominal:      Palpations: Abdomen is soft.      Tenderness: There is no abdominal tenderness.   Musculoskeletal:         General: No swelling.      Cervical back: Neck supple.   Lymphadenopathy:      Cervical: No cervical adenopathy.   Neurological:      General: No focal deficit present.      Mental Status: She is alert.      Cranial Nerves: No cranial nerve deficit.      Coordination: Coordination normal.      Gait: Gait normal.   Psychiatric:         Mood and Affect: Mood normal.         Behavior: Behavior normal.               Assessment/Plan   Medicare Risks and Personalized Health Plan  CMS Preventative Services Quick Reference  Abdominal Aortic Aneurysm Screening  Advance Directive Discussion  Alcohol Misuse  Breast Cancer/Mammogram  Screening  Cardiovascular risk  Chronic Pain   Colon Cancer Screening  Dementia/Memory   Depression/Dysphoria  Diabetic Lab Screening   Fall Risk  Glaucoma Risk  Hearing Problem  Immunizations Discussed/Encouraged (specific immunizations; Influenza )    The above risks/problems have been discussed with the patient.  Pertinent information has been shared with the patient in the After Visit Summary.  Follow up plans and orders are seen below in the Assessment/Plan Section.    There are no diagnoses linked to this encounter.  Follow Up:  Return in about 6 months (around 1/30/2022) for Recheck.     An After Visit Summary and PPPS were given to the patient.

## 2021-08-09 ENCOUNTER — TELEPHONE (OUTPATIENT)
Dept: ORTHOPEDIC SURGERY | Facility: CLINIC | Age: 76
End: 2021-08-09

## 2021-08-09 NOTE — TELEPHONE ENCOUNTER
Caller: TACOS SANDERS    Relationship to patient: SELF    Best call back number: [phone]    Type of visit: NEW PROBLEM / RT HIP PAIN    If rescheduling, when is the original appointment: 9-24-21    Additional notes: PT WOULD LIKE A CALL BACK TO DISCUSS POSSIBLY BEING WORKED IN SOONER. PT STATED THAT SHE WAS WALKING Friday AND FELT HER HIP POP IN AND OUT.

## 2021-08-09 NOTE — TELEPHONE ENCOUNTER
CALLED PATIENT TO LET HER KNOW THAT WE DO NOT HAVE ANY SOONER APPOINTMENTS AT THIS TIME.  PATIENT WAS ENCOURAGED TO CONTACT HER PCP OR URGENT CARE FACILITY TO OBTAIN IMAGING AND EVALUATION.  IF THERE IS A FRACTURE OR OTHER URGENT MATTER, WE CAN EVALUATE WORKING PATIENT IN SOONER.  PATIENT HAS BEEN PLACED ON WAIT LIST AND SHE EXPRESSED UNDERSTANDING

## 2021-08-12 ENCOUNTER — OFFICE VISIT (OUTPATIENT)
Dept: FAMILY MEDICINE CLINIC | Age: 76
End: 2021-08-12

## 2021-08-12 ENCOUNTER — HOSPITAL ENCOUNTER (OUTPATIENT)
Dept: GENERAL RADIOLOGY | Facility: HOSPITAL | Age: 76
Discharge: HOME | End: 2021-08-12
Admitting: NURSE PRACTITIONER

## 2021-08-12 VITALS
SYSTOLIC BLOOD PRESSURE: 124 MMHG | TEMPERATURE: 98.8 F | DIASTOLIC BLOOD PRESSURE: 75 MMHG | BODY MASS INDEX: 29.63 KG/M2 | HEIGHT: 66 IN | WEIGHT: 184.4 LBS | HEART RATE: 73 BPM

## 2021-08-12 DIAGNOSIS — M25.551 HIP PAIN, ACUTE, RIGHT: ICD-10-CM

## 2021-08-12 DIAGNOSIS — M25.551 HIP PAIN, ACUTE, RIGHT: Primary | ICD-10-CM

## 2021-08-12 PROCEDURE — 99213 OFFICE O/P EST LOW 20 MIN: CPT | Performed by: NURSE PRACTITIONER

## 2021-08-12 PROCEDURE — 73502 X-RAY EXAM HIP UNI 2-3 VIEWS: CPT

## 2021-08-12 NOTE — PROGRESS NOTES
"Chief Complaint  Hip Pain    Subjective    Patient in today with complaints of right hip pain.  Patient states last Friday she was going upstairs and heard a pop in her hip.  Pain with movement and weightbearing.  No alleviating factors.  Pain radiates down leg.  Denies numbness.          Valery Baez presents to [facility]  .[facility]    Hip Pain   The incident occurred more than 1 week ago. [place] The pain is present in the right hip. The quality of the pain is described as burning, shooting and stabbing. The pain is at a severity of 10/10. The pain is severe. The pain has been constant since onset. Associated symptoms include a loss of motion and tingling. Pertinent negatives include no inability to bear weight. The symptoms are aggravated by movement and weight bearing. She has tried ice and rest for the symptoms. The treatment provided no relief.       Objective   Vital Signs:   /75   Pulse 73   Temp 98.8 °F (37.1 °C) (Oral)   Ht 167.6 cm (65.98\")   Wt 83.6 kg (184 lb 6.4 oz)   BMI 29.78 kg/m²     Physical Exam  Constitutional:       Appearance: Normal appearance.   Cardiovascular:      Rate and Rhythm: Normal rate and regular rhythm.      Pulses: Normal pulses.      Heart sounds: Normal heart sounds.   Pulmonary:      Effort: Pulmonary effort is normal.      Breath sounds: Normal breath sounds.   Musculoskeletal:      Right hip: Tenderness present. Decreased range of motion. Decreased strength.      Left hip: Normal.      Right foot: Normal. Normal capillary refill. Normal pulse.      Left foot: Normal. Normal capillary refill. Normal pulse.   Skin:     General: Skin is warm and dry.   Neurological:      Mental Status: She is alert and oriented to person, place, and time.   Psychiatric:         Mood and Affect: Mood normal.         Behavior: Behavior normal.        Result Review :                 Assessment and Plan    Diagnoses and " all orders for this visit:    1. Hip pain, acute, right (Primary)  -     XR Hip With or Without Pelvis 2 - 3 View Right; Future        Follow Up   No follow-ups on file.  Patient was given instructions and counseling regarding her condition or for health maintenance advice. Please see specific information pulled into the AVS if appropriate.

## 2021-08-13 ENCOUNTER — TELEPHONE (OUTPATIENT)
Dept: FAMILY MEDICINE CLINIC | Age: 76
End: 2021-08-13

## 2021-08-13 NOTE — TELEPHONE ENCOUNTER
Caller: Valery Baez    Relationship: Self    Best call back number: [phone]    What test was performed: X-RAY RIGHT HIP    When was the test performed: 8/13/21    Additional notes: REQUESTING ZHANNA TO DISCUSS RESULTS

## 2021-08-24 ENCOUNTER — TELEPHONE (OUTPATIENT)
Dept: FAMILY MEDICINE CLINIC | Age: 76
End: 2021-08-24

## 2021-08-24 NOTE — TELEPHONE ENCOUNTER
Spoke to pt, she had questions on if she should get the Shingrix vaccine, advised pt that she should but needed to check with pharmacy to see what her insurance would pay for/ pdr

## 2021-08-24 NOTE — TELEPHONE ENCOUNTER
Caller: Valery Baez    Relationship: Self    Best call back number: [phone]     What was the call regarding: PATIENT CALLED WITH QUESTIONS REGARDING HER SHINGLES VACCINE    Do you require a callback: YES PLEASE CALL AND ADVISE

## 2021-09-21 ENCOUNTER — OFFICE VISIT (OUTPATIENT)
Dept: FAMILY MEDICINE CLINIC | Age: 76
End: 2021-09-21

## 2021-09-21 VITALS
BODY MASS INDEX: 29.62 KG/M2 | HEART RATE: 69 BPM | SYSTOLIC BLOOD PRESSURE: 97 MMHG | WEIGHT: 183.4 LBS | DIASTOLIC BLOOD PRESSURE: 64 MMHG

## 2021-09-21 DIAGNOSIS — H83.03 LABYRINTHITIS OF BOTH EARS: Primary | ICD-10-CM

## 2021-09-21 PROCEDURE — 99213 OFFICE O/P EST LOW 20 MIN: CPT | Performed by: NURSE PRACTITIONER

## 2021-09-21 RX ORDER — PREDNISONE 1 MG/1
TABLET ORAL
Qty: 21 TABLET | Refills: 0 | Status: SHIPPED | OUTPATIENT
Start: 2021-09-21 | End: 2021-09-22 | Stop reason: SDUPTHER

## 2021-09-21 NOTE — PROGRESS NOTES
Chief Complaint  Earache (Left ear) and Ear Drainage (Both ears)    Subjective    Patient is a 75 year old female in for ear ache, drainage and dizziness that began a couple of days ago. Patient reports having this issue in the past. Patient denies fever or cough.          Valery Baez presents to [facility]  Earache   There is pain in both ears. This is a recurrent problem. The current episode started 1 to 4 weeks ago. The problem occurs constantly. The problem has been gradually worsening. There has been no fever. The pain is at a severity of 4/10. The pain is mild. Associated symptoms include ear discharge. Pertinent negatives include no coughing.   Ear Drainage   Associated symptoms include ear discharge. Pertinent negatives include no coughing.       Objective   Vital Signs:   BP 97/64 (BP Location: Left arm, Patient Position: Sitting, Cuff Size: Adult)   Pulse 69   Wt 83.2 kg (183 lb 6.4 oz)   BMI 29.62 kg/m²     Physical Exam  Constitutional:       Appearance: She is not ill-appearing.   HENT:      Head: Normocephalic.      Right Ear: Drainage present. A middle ear effusion is present. Tympanic membrane is erythematous. Tympanic membrane is not bulging.      Left Ear: Drainage and tenderness present. A middle ear effusion is present. Tympanic membrane is erythematous. Tympanic membrane is not bulging.      Nose: Rhinorrhea present.      Mouth/Throat:      Pharynx: No posterior oropharyngeal erythema.   Cardiovascular:      Rate and Rhythm: Normal rate and regular rhythm.      Pulses: Normal pulses.      Heart sounds: Normal heart sounds.   Pulmonary:      Effort: Pulmonary effort is normal. No respiratory distress.      Breath sounds: Normal breath sounds. No stridor. No wheezing, rhonchi or rales.   Skin:     General: Skin is warm and dry.   Neurological:      Mental Status: She is alert and oriented to person, place, and time.   Psychiatric:         Mood and  Affect: Mood normal.        Result Review :                 Assessment and Plan    Diagnoses and all orders for this visit:    1. Labyrinthitis of both ears (Primary)  -     predniSONE (DELTASONE) 5 MG tablet; Take 2 tablets by mouth 2 (Two) Times a Day for 3 days, THEN 1 tablet 2 (Two) Times a Day for 3 days, THEN 1 tablet Daily for 3 days.  Dispense: 21 tablet; Refill: 0    Other orders  -     Cancel: PredniSONE 5 MG tablet therapy pack dosepak; Take 1 tablet by mouth 2 (two) times a day. Take as directed on package instructions.  Dispense: 21 each; Refill: 0        Follow Up   No follow-ups on file.  Patient was given instructions and counseling regarding her condition or for health maintenance advice. Please see specific information pulled into the AVS if appropriate.

## 2021-09-22 ENCOUNTER — TELEPHONE (OUTPATIENT)
Dept: FAMILY MEDICINE CLINIC | Age: 76
End: 2021-09-22

## 2021-09-22 DIAGNOSIS — H83.03 LABYRINTHITIS OF BOTH EARS: ICD-10-CM

## 2021-09-22 RX ORDER — PREDNISONE 1 MG/1
TABLET ORAL
Qty: 21 TABLET | Refills: 0 | Status: SHIPPED | OUTPATIENT
Start: 2021-09-22 | End: 2021-10-01

## 2021-09-22 NOTE — TELEPHONE ENCOUNTER
Caller: [facility] - [place] [id number] - [phone]  - [phone] FX    Relationship: Pharmacy    Medication requested (name and dosage):    predniSONE (DELTASONE) 5 MG tablet     Pharmacy where request should be sent: [facility]    Additional details provided by patient: SCRIPT WAS SENT TO WRONG PHARMACY, PLEASE RESEND TO [facility]    Does the patient have less than a 3 day supply:  [x] Yes  [] No    Logan Yanes Rep   09/22/21 12:04 EDT

## 2021-09-22 NOTE — TELEPHONE ENCOUNTER
Caller: Valery Baez    Relationship: Self      Medication requested (name and dosage): predniSONE (DELTASONE) 5 MG tablet    Pharmacy where request should be sent: [facility] [address] [phone] [facility] [phone] FX     Additional details provided by patient: PATIENT STATED SHE CALLED HER PHARMACY THIS MORNING AND IT WAS NOT RECEIVED YET    Best call back number: [phone]    Does the patient have less than a 3 day supply:  [x] Yes  [] No    Logan Conner Rep   09/22/21 10:01 EDT

## 2021-09-24 ENCOUNTER — OFFICE VISIT (OUTPATIENT)
Dept: ORTHOPEDIC SURGERY | Facility: CLINIC | Age: 76
End: 2021-09-24

## 2021-09-24 VITALS — BODY MASS INDEX: 29.41 KG/M2 | HEIGHT: 66 IN | WEIGHT: 183 LBS | TEMPERATURE: 96.9 F

## 2021-09-24 DIAGNOSIS — M70.61 GREATER TROCHANTERIC BURSITIS OF RIGHT HIP: Primary | ICD-10-CM

## 2021-09-24 PROCEDURE — 20610 DRAIN/INJ JOINT/BURSA W/O US: CPT | Performed by: PHYSICIAN ASSISTANT

## 2021-09-24 PROCEDURE — 99214 OFFICE O/P EST MOD 30 MIN: CPT | Performed by: PHYSICIAN ASSISTANT

## 2021-09-24 RX ORDER — METHYLPREDNISOLONE ACETATE 80 MG/ML
160 INJECTION, SUSPENSION INTRA-ARTICULAR; INTRALESIONAL; INTRAMUSCULAR; SOFT TISSUE
Status: COMPLETED | OUTPATIENT
Start: 2021-09-24 | End: 2021-09-24

## 2021-09-24 RX ADMIN — METHYLPREDNISOLONE ACETATE 160 MG: 80 INJECTION, SUSPENSION INTRA-ARTICULAR; INTRALESIONAL; INTRAMUSCULAR; SOFT TISSUE at 08:54

## 2021-09-24 NOTE — PROGRESS NOTES
"Chief Complaint  Pain of the Right Hip and Establish Care    Subjective    History of Present Illness      Valery Baez is a 75 y.o. female who presents to [facility] for new complaint of right hip pain that began approximately 6 weeks ago.  She states she was doing her reenactment play when walking at the hill and felt a pop and significant pain at the right hip.  She reports she felt as if the hip popped out of place.  She reports the pain as moderate and described as burning.  She reports pain over the lateral aspect of the hip as well as pain in the opposite buttock/ischium.  She does report that certain shoes help to alleviate some of her discomfort.         Objective   Vital Signs:   Temp 96.9 °F (36.1 °C)   Ht 167.6 cm (65.98\")   Wt 83 kg (183 lb)   BMI 29.55 kg/m²     Physical Exam  Vitals signs and nursing note reviewed.   Constitutional:       Appearance: Normal appearance.   Pulmonary:      Effort: Pulmonary effort is normal.   Skin:     General: Skin is warm and dry.      Capillary Refill: Capillary refill takes less than 2 seconds.   Neurological:      General: No focal deficit present.      Mental Status: He is alert and oriented to person, place, and time. Mental status is at baseline.   Psychiatric:         Mood and Affect: Mood normal.         Behavior: Behavior normal.         Thought Content: Thought content normal.         Judgment: Judgment normal.     Ortho Exam   RIGHT hip  There is tenderness with palpation over the greater trochanteric bursa.   There is tenderness with palpation of the IT band.  Cross body adduction is positive.   Positive for pain over the greater trochanter with internal and external rotation.   There is no clinical deformity and no shortening of extremity.   She does have a limp upon walking.   There is piriformis tightness.  Dorsalis pedis and posterior tibial artery pulses are palpable.   Neurovascular status is intact and " capillary refill is less than 2 seconds.   Common peroneal nerve function is well preserved.      Result Review :   Radiologic studies - see below for interpretation  RIGHT hip with pelvis xrays 3 views were performed at [facility] on 8/12/21. Images were independently viewed and interpreted by myself, my impression as follows:  · Mild degenerative change at the inferior aspect of the right hip, moderate SI joint arthritis        PROCEDURE  Large Joint Arthrocentesis  Date/Time: 9/24/2021 8:54 AM  Consent given by: patient  Site marked: site marked  Timeout: Immediately prior to procedure a time out was called to verify the correct patient, procedure, equipment, support staff and site/side marked as required   Supporting Documentation  Indications: pain   Procedure Details  Location: hip -   Preparation: Patient was prepped and draped in the usual sterile fashion  Needle size: 22 G  Approach: lateral  Medications administered: 160 mg methylPREDNISolone acetate 80 MG/ML; 2 mL lidocaine (cardiac)  Patient tolerance: patient tolerated the procedure well with no immediate complications          Assessment   Assessment and Plan    Problem List Items Addressed This Visit        Musculoskeletal and Injuries    Greater trochanteric bursitis of right hip - Primary    Relevant Orders    Large Joint Arthrocentesis          Follow Up   · Discussion of any imaging in detail. Discussion of orthopaedic goals.  Discussed treatment options to include bursal steroid injection, physical therapy, further imaging with MRI to rule out gluteal tear.  At this point she would like to proceed with trying a steroid injection.  · Ice, heat, and/or modalities as beneficial  · Risks of minor surgical procedure/intra-articular injection, including but not limited to pain, bleeding, infection into the joint, pigment skin changes related to steroid administration, increased blood sugar levels secondary to steroid  administration, scar, recurrence of pain, and tendon rupture associated with steroid administration and possible need for further surgery reviewed with patient. She accepts these risks and wishes to proceed with procedure. Injected patient's right hip-greater trochanteric bursa joint(s) with Depo-Medrol from a(n) lateral approach.  Patient tolerated the procedure well and no complications were encountered.   · Patient is encouraged to call or return for any issues or concerns.  · Follow up in 3 months or prn with Dr. Lange  • Patient was given instructions and counseling regarding her condition or for health maintenance advice. Please see specific information pulled into the AVS if appropriate.     Dave Plata PA-C   Date of Encounter: 9/24/2021   Electronically signed by Dave Plata PA-C, 09/24/21, 6:31 AM EDT.     EMR Dragon/Transcription disclaimer:  Much of this encounter note is an electronic transcription/translation of spoken language to printed text. The electronic translation of spoken language may permit erroneous, or at times, nonsensical words or phrases to be inadvertently transcribed; Although I have reviewed the note for such errors, some may still exist.

## 2021-10-25 ENCOUNTER — TELEPHONE (OUTPATIENT)
Dept: ORTHOPEDIC SURGERY | Facility: CLINIC | Age: 76
End: 2021-10-25

## 2021-10-25 NOTE — TELEPHONE ENCOUNTER
PATIENT CALLED AND STATED THAT SHE WAS IN THE ER AT [facility] ON 10/22/21 FOR PAIN IN HER RIGHT HIP.   PATIENT WAS HOPING TO BE SEEN IN THE OFFICE PRIOR TO HER 11/11/21 APPOINTMENT WITH DR. PROCTOR.  SHE IS IN QUITE A BIT OF PAIN AND WILL BE OUT OF THE MEDICATION SHE RECEIVED FROM THE HOSPITAL TOMORROW.  SHE WAS GIVEN:    DEXAMETHASONE (2 TABLETS/DAY)  HYDROMORPHON HCL 2 MG (1/2 TABLET EVERY 4 HOURS PRN PAIN)  BACLOFEN 10 MG ( 1 TABLET EVERY 8 HOURS)  TEODORO    PATIENT DOES HAVE MULTIPLE DRUG ALLERGIES.  SHE USES [facility] IN [place]    THEY DID DO A CT OF HER HIP WHILE SHE WAS THERE.  SHE STATED THE DOCTOR FELT AN MRI WOULD BE BENEFICIAL.

## 2021-10-26 NOTE — TELEPHONE ENCOUNTER
Reviewed CT scan, there is no fracture noted. Appears she was given dilaudid for rx for pain. We can add her on, I think it would be good for her to see [facility]. We will not be able to give her the pain medication she was given in ER.

## 2021-10-27 ENCOUNTER — HOSPITAL ENCOUNTER (OUTPATIENT)
Dept: GENERAL RADIOLOGY | Facility: HOSPITAL | Age: 76
Discharge: HOME | End: 2021-10-27
Admitting: ORTHOPAEDIC SURGERY

## 2021-10-27 ENCOUNTER — OFFICE VISIT (OUTPATIENT)
Dept: ORTHOPEDIC SURGERY | Facility: CLINIC | Age: 76
End: 2021-10-27

## 2021-10-27 VITALS — WEIGHT: 183 LBS | BODY MASS INDEX: 29.41 KG/M2 | HEIGHT: 66 IN | TEMPERATURE: 96.9 F

## 2021-10-27 DIAGNOSIS — M25.562 CHRONIC PAIN OF LEFT KNEE: ICD-10-CM

## 2021-10-27 DIAGNOSIS — Z96.651 HISTORY OF TOTAL KNEE ARTHROPLASTY, RIGHT: ICD-10-CM

## 2021-10-27 DIAGNOSIS — M25.551 RIGHT HIP PAIN: Primary | ICD-10-CM

## 2021-10-27 DIAGNOSIS — G89.29 CHRONIC PAIN OF LEFT KNEE: ICD-10-CM

## 2021-10-27 PROCEDURE — 99214 OFFICE O/P EST MOD 30 MIN: CPT | Performed by: ORTHOPAEDIC SURGERY

## 2021-10-27 PROCEDURE — 73562 X-RAY EXAM OF KNEE 3: CPT

## 2021-10-27 RX ORDER — ONDANSETRON 4 MG/1
TABLET, ORALLY DISINTEGRATING ORAL
COMMUNITY
Start: 2021-10-22 | End: 2021-10-27 | Stop reason: SDUPTHER

## 2021-10-27 RX ORDER — HYDROMORPHONE HYDROCHLORIDE 2 MG/1
TABLET ORAL
COMMUNITY
Start: 2021-10-22 | End: 2021-12-22

## 2021-10-27 RX ORDER — DEXAMETHASONE 1 MG
1 TABLET ORAL 2 TIMES DAILY WITH MEALS
Qty: 40 TABLET | Refills: 0 | Status: SHIPPED | OUTPATIENT
Start: 2021-10-27 | End: 2021-12-22

## 2021-10-27 RX ORDER — BACLOFEN 10 MG/1
TABLET ORAL
COMMUNITY
Start: 2021-10-22 | End: 2021-10-27 | Stop reason: SDUPTHER

## 2021-10-27 RX ORDER — ONDANSETRON 4 MG/1
4 TABLET, ORALLY DISINTEGRATING ORAL EVERY 6 HOURS PRN
Qty: 40 TABLET | Refills: 0 | Status: SHIPPED | OUTPATIENT
Start: 2021-10-27 | End: 2021-12-22

## 2021-10-27 RX ORDER — BACLOFEN 10 MG/1
10 TABLET ORAL EVERY 12 HOURS PRN
Qty: 40 TABLET | Refills: 0 | Status: SHIPPED | OUTPATIENT
Start: 2021-10-27 | End: 2021-12-22

## 2021-10-27 NOTE — TELEPHONE ENCOUNTER
Dr. Lange is okay with refilling Dexamethasone, Baclofen and Zofran but she will need to contact her PCP for the Dilaudid

## 2021-10-28 ENCOUNTER — TELEPHONE (OUTPATIENT)
Dept: ORTHOPEDIC SURGERY | Facility: CLINIC | Age: 76
End: 2021-10-28

## 2021-10-28 NOTE — TELEPHONE ENCOUNTER
PATIENT HAS BEEN INFORMED THAT [facility] WILL CONTACT HER TO SCHEDULE THE MRI ONCE DR MATIAS OFFICE NOTE HAS BEEN COMPLETED.

## 2021-10-28 NOTE — TELEPHONE ENCOUNTER
Provider: DR. PROCTOR    Caller:  TACOS SANDERS    Relationship to Patient:  SELF    Phone Number: [phone]     Reason for Call:  PATIENT STATED SHE HAS NOT RECEIVED A CALL TO SCHEDULE MRI ON RIGHT HIP.

## 2021-11-01 ENCOUNTER — TELEPHONE (OUTPATIENT)
Dept: ORTHOPEDIC SURGERY | Facility: CLINIC | Age: 76
End: 2021-11-01

## 2021-11-01 NOTE — TELEPHONE ENCOUNTER
Patient is requesting office note to be completed so she can obtain her MRI.  She is complaining that her pain level is a 20 out of a 10 scale.  She states she will be running out of her medication in a couple of days.  I checked her chart and her meds were filled on 10/27 for a 20 day supply.  I called the patient back and told her she should not be running out of her medication.  She states the pharmacy owed her some.

## 2021-11-11 PROBLEM — M25.551 RIGHT HIP PAIN: Status: ACTIVE | Noted: 2021-11-11

## 2021-11-11 NOTE — TELEPHONE ENCOUNTER
Her note is done in the chart.  Go ahead and get that MRI ordered for the hip.  Really and truly her spinal doctor, JORGE RICE should be taking care of her pain medication unless we come up with a hip fracture or a pelvic fracture which would be based on the MRI.  Thank you

## 2021-11-15 ENCOUNTER — TELEPHONE (OUTPATIENT)
Dept: FAMILY MEDICINE CLINIC | Age: 76
End: 2021-11-15

## 2021-11-15 NOTE — TELEPHONE ENCOUNTER
Caller: Valery Baez    Relationship to patient: Self    Best call back number: [phone]     Patient is needing: PATIENT IS WANTING TO KNOW WHEN WAS THE LAST TIME SHE HAS A TETANUS SHOT. PLEASE CALL AND ADVISE.

## 2021-11-18 ENCOUNTER — HOSPITAL ENCOUNTER (OUTPATIENT)
Dept: MRI IMAGING | Facility: HOSPITAL | Age: 76
Discharge: HOME | End: 2021-11-18
Admitting: ORTHOPAEDIC SURGERY

## 2021-11-18 DIAGNOSIS — M25.551 RIGHT HIP PAIN: ICD-10-CM

## 2021-11-18 PROCEDURE — 73721 MRI JNT OF LWR EXTRE W/O DYE: CPT

## 2021-11-22 ENCOUNTER — TELEPHONE (OUTPATIENT)
Dept: ORTHOPEDIC SURGERY | Facility: CLINIC | Age: 76
End: 2021-11-22

## 2021-11-22 NOTE — TELEPHONE ENCOUNTER
Caller: TACOS SANDERS     Relationship to patient: SELF     Best call back number: [phone]    Chief complaint: RIGHT HIP     Type of visit: SURGERY     Requested date: ASAP     Additional notes:PATIENT LEAVES AND GOES TO [place] FOR THE WINTER  MONTHS AND WANTS THE SURGERY DONE BEFORE LEAVING.

## 2021-11-22 NOTE — TELEPHONE ENCOUNTER
Spoke with the patient.  She has been advised the first available for surgery will be toward the end of January.

## 2021-11-24 ENCOUNTER — OFFICE VISIT (OUTPATIENT)
Dept: ORTHOPEDIC SURGERY | Facility: CLINIC | Age: 76
End: 2021-11-24

## 2021-11-24 VITALS — WEIGHT: 180 LBS | HEIGHT: 66 IN | TEMPERATURE: 97.2 F | BODY MASS INDEX: 28.93 KG/M2

## 2021-11-24 DIAGNOSIS — Z96.651 STATUS POST TOTAL KNEE REPLACEMENT, RIGHT: ICD-10-CM

## 2021-11-24 DIAGNOSIS — M25.551 RIGHT HIP PAIN: Primary | ICD-10-CM

## 2021-11-24 PROCEDURE — 99214 OFFICE O/P EST MOD 30 MIN: CPT | Performed by: ORTHOPAEDIC SURGERY

## 2021-11-24 NOTE — PROGRESS NOTES
"Chief Complaint  Follow-up and Pain of the Right Hip, Follow-up and Pain of the Left Knee, and Follow-up and Pain of the Right Knee    Subjective    History of Present Illness      Valery Baez is a 75 y.o. female who presents to [facility] for Patient returns today for right knee pain.  Her pain is located over the medial and lateral aspect of the joint.  The pain has been progressive in nature and remains intermittent .  Her pain is worsened by kneeling, rising after sitting, squatting. There has been improvement in the past with heat and NSAIDS.   Patient returns for follow-up on hip pain. The pain is over the lateral aspect of the right hip at the greater trochanteric bursa.  Her pain has been progressive in nature and remains constant.   Her pain is worsened  kneeling, rising after sitting, squatting, standing. There has not been improvement in the past with ice, heat and NSAIDS.  She continues to have very significant pain and discomfort in the right hip.  She has had previous lumbar spine surgery in September 2018 performed by my partner Dr. JORGE RICE.  At this point she states that the hip pain is progressively taking over her life.  She has a lot of nighttime pain and discomfort as well.  The patient states that she understands that she has a stress reaction of the femoral neck and she is most likely going to need hip replacement surgery sooner rather than later.    Objective   Vital Signs:   Temp 97.2 °F (36.2 °C)   Ht 167.6 cm (65.98\")   Wt 81.6 kg (180 lb)   BMI 29.07 kg/m²     Physical Exam  75 y.o. female is awake, alert, oriented, in no acute distress and well developed, well nourished.  Ortho Exam   RIGHT knee  RIGHT hip  right knee.The patient is status post total knee arthroplasty postoperative 16 year(s). Incision is clean. Calf is soft and nontender. Homans sign is negative. There is no clicking, popping or catching. Anterior and posterior drawer signs are " negative.  There is no instability of the components. Appropriate amounts of swelling and bruising are noted. Dorsalis pedis and posterior tibial artery pulses are palpable. Common peroneal nerve function is well preserved. Range of motion is from 0-125 degrees of flexion. Gait is cautious but otherwise fairly normal. There is no evidence of a deep seated joint infection.    Right hip. Neurovascular status is intact. Patient has a distant limp on the affected side. Internal and external rotations are associated with pain and discomfort. Anterior joint line pain and tenderness is significant. Stinchfield sign is positive. Figure of 4 sign is positive. Patient is unable to perform an active straight leg raise exam.  The patient is unable to lean over and get to their socks and shoes because of the hip pain and discomfort. The greater trochanter is tender. Crossover adduction test is positive. Cross body adduction is limited and painful for the patient. Patient has very significant limp and joint line tenderness anteriorly, posteriorly and medially. Dorsalis pedis and posterior tibial artery pulses are palpable. Common peroneal nerve function is well preserved.  Result Review :              MRI reports available in the office today.  These MRI images are discussed with the patient.  The MRI images are consistent with fusion hardware in the lumbosacral spine.  She does have hip arthritis bilaterally with hip effusions.  There is increased marrow edema at the anterior inferior femoral head neck junction.  This is consistent with arthropathy and early stress and insufficiency fractures in this location.  There are degenerative changes in the sacroiliac joint.  There are some tears of the gluteal and hamstring musculature as well.    Procedures         Assessment   Assessment and Plan    Problem List Items Addressed This Visit        Musculoskeletal and Injuries    Right hip pain - Primary    Status post total knee  replacement, right          Follow Up   · Extensive discussion with the patient on different treatment options.  · I have reassured her that the hip college is most likely causing her knee pain and discomfort.  · Compression/brace the knee to prevent it from buckling giving out.  · Extensive discussion with the patient to the extent of 45 minutes about the different options for hip surgery including stabilization with percutaneous screws and eventually hip replacement surgery.  The patient opts to go forward with a right total hip arthroplasty through direct after approach.  · We would have to coordinate this care with her primary care physician and get medical clearance and proceed with surgical intervention soon.  · Use a cane for assistance with ambulation.  · Falls precautions.  · Rest, ice, compression, and elevation (RICE) therapy  · OTC Tylenol 500-1000mg by mouth every 6 hours as needed for pain   · Follow up in 6 week(s)  The patient was seen today for preoperative discussion.  The patient has been tried on over-the-counter and prescription NSAID's despite the risks of anti-inflammatory bleeding, peptic ulcers and erosive gastritis with short term benefit only.  Braces have been prescribed for mechanical support.  Patient has been participating in an exercise program specifically targeting joint pain relief with limited benefit. Intraarticular injections have been used periodically with some but not complete relief of pain.  Ambulation aids have also been utilized.    · The details of the surgical procedure were explained including the location of probable incisions and a description of the likely hardware/grafts to be used. The patient understands the likely convalescence after surgery as well as the rehabilitation required.  Also, we have thoroughly discussed with the patient the risks, benefits and alternatives to surgery.  Risks include but are not limited to the risk of infection, joint stiffness,  limited range of motion, wound healing problems, scar tissue build up, myocardial infarction, stroke, blood clots (including DVT and/or pulmonary embolus along with the risk of death) neurologic and/or vascular injury, limb length discrepancy, fracture, dislocation, nonunion, malunion, continued pain and need for further surgery including hardware failure requiring revision.   • Patient was given instructions and counseling regarding her condition or for health maintenance advice. Please see specific information pulled into the AVS if appropriate.     Dejan Lange MD   Date of Encounter: 11/24/2021       EMR Dragon/Transcription disclaimer:  Much of this encounter note is an electronic transcription/translation of spoken language to printed text. The electronic translation of spoken language may permit erroneous, or at times, nonsensical words or phrases to be inadvertently transcribed; Although I have reviewed the note for such errors, some may still exist.

## 2021-12-02 ENCOUNTER — PREP FOR SURGERY (OUTPATIENT)
Dept: OTHER | Facility: HOSPITAL | Age: 76
End: 2021-12-02

## 2021-12-02 DIAGNOSIS — M16.11 PRIMARY OSTEOARTHRITIS OF RIGHT HIP: Primary | ICD-10-CM

## 2021-12-02 RX ORDER — CLINDAMYCIN PHOSPHATE 900 MG/50ML
900 INJECTION INTRAVENOUS ONCE
Status: CANCELLED | OUTPATIENT
Start: 2021-12-02 | End: 2021-12-02

## 2021-12-02 RX ORDER — ACETAMINOPHEN 500 MG
1000 TABLET ORAL ONCE
Status: CANCELLED | OUTPATIENT
Start: 2021-12-02 | End: 2021-12-02

## 2021-12-02 RX ORDER — CHLORHEXIDINE GLUCONATE 500 MG/1
CLOTH TOPICAL TAKE AS DIRECTED
Status: CANCELLED | OUTPATIENT
Start: 2021-12-02

## 2021-12-02 RX ORDER — MELOXICAM 15 MG/1
15 TABLET ORAL ONCE
Status: CANCELLED | OUTPATIENT
Start: 2021-12-02 | End: 2021-12-02

## 2021-12-02 RX ORDER — PREGABALIN 75 MG/1
150 CAPSULE ORAL ONCE
Status: CANCELLED | OUTPATIENT
Start: 2021-12-02 | End: 2021-12-02

## 2021-12-13 ENCOUNTER — TELEPHONE (OUTPATIENT)
Dept: ORTHOPEDIC SURGERY | Facility: CLINIC | Age: 76
End: 2021-12-13

## 2021-12-13 NOTE — TELEPHONE ENCOUNTER
Please call her and let her know to stop both of those medications exactly 5 days prior to the day of surgery thank you

## 2021-12-13 NOTE — TELEPHONE ENCOUNTER
Patient having hip replacement on Christmas Eve.  She wants to make sure she will get her pain pills before the surgery because of the holiday.

## 2021-12-13 NOTE — TELEPHONE ENCOUNTER
PATIENT WANTS TO KNOW EXACTLY WHEN TO STOP HER MELOXICAM AND ASPIRIN FOR HER 12/24/21 SURGERY. PLEASE ADVISE. SHE CAN BE REACHED -[phone]. THANKS

## 2021-12-22 ENCOUNTER — ANESTHESIA EVENT (OUTPATIENT)
Dept: PERIOP | Facility: HOSPITAL | Age: 76
End: 2021-12-22

## 2021-12-22 ENCOUNTER — HOSPITAL ENCOUNTER (OUTPATIENT)
Dept: GENERAL RADIOLOGY | Facility: HOSPITAL | Age: 76
Discharge: HOME | End: 2021-12-22

## 2021-12-22 ENCOUNTER — PRE-ADMISSION TESTING (OUTPATIENT)
Dept: PREADMISSION TESTING | Facility: HOSPITAL | Age: 76
End: 2021-12-22

## 2021-12-22 VITALS
DIASTOLIC BLOOD PRESSURE: 75 MMHG | WEIGHT: 176.3 LBS | SYSTOLIC BLOOD PRESSURE: 120 MMHG | TEMPERATURE: 98.3 F | RESPIRATION RATE: 20 BRPM | HEIGHT: 67 IN | HEART RATE: 77 BPM | BODY MASS INDEX: 27.67 KG/M2 | OXYGEN SATURATION: 99 %

## 2021-12-22 DIAGNOSIS — M16.11 PRIMARY OSTEOARTHRITIS OF RIGHT HIP: ICD-10-CM

## 2021-12-22 LAB
ABO GROUP BLD: NORMAL
ANION GAP SERPL CALCULATED.3IONS-SCNC: 9.9 MMOL/L (ref 5–15)
BLD GP AB SCN SERPL QL: NEGATIVE
BUN SERPL-MCNC: 17 MG/DL (ref 8–23)
BUN/CREAT SERPL: 23.9 (ref 7–25)
CALCIUM SPEC-SCNC: 8.4 MG/DL (ref 8.6–10.5)
CHLORIDE SERPL-SCNC: 109 MMOL/L (ref 98–107)
CO2 SERPL-SCNC: 21.1 MMOL/L (ref 22–29)
CREAT SERPL-MCNC: 0.71 MG/DL (ref 0.57–1)
DEPRECATED RDW RBC AUTO: 42.2 FL (ref 37–54)
ERYTHROCYTE [DISTWIDTH] IN BLOOD BY AUTOMATED COUNT: 13 % (ref 12.3–15.4)
GFR SERPL CREATININE-BSD FRML MDRD: 80 ML/MIN/1.73
GLUCOSE SERPL-MCNC: 109 MG/DL (ref 65–99)
HCT VFR BLD AUTO: 33.6 % (ref 34–46.6)
HGB BLD-MCNC: 11.1 G/DL (ref 12–15.9)
MCH RBC QN AUTO: 29.8 PG (ref 26.6–33)
MCHC RBC AUTO-ENTMCNC: 33 G/DL (ref 31.5–35.7)
MCV RBC AUTO: 90.1 FL (ref 79–97)
PLATELET # BLD AUTO: 341 10*3/MM3 (ref 140–450)
PMV BLD AUTO: 10.8 FL (ref 6–12)
POTASSIUM SERPL-SCNC: 3.8 MMOL/L (ref 3.5–5.2)
QT INTERVAL: 389 MS
RBC # BLD AUTO: 3.73 10*6/MM3 (ref 3.77–5.28)
RH BLD: POSITIVE
SARS-COV-2 ORF1AB RESP QL NAA+PROBE: NOT DETECTED
SODIUM SERPL-SCNC: 140 MMOL/L (ref 136–145)
T&S EXPIRATION DATE: NORMAL
WBC NRBC COR # BLD: 14.5 10*3/MM3 (ref 3.4–10.8)

## 2021-12-22 PROCEDURE — C9803 HOPD COVID-19 SPEC COLLECT: HCPCS

## 2021-12-22 PROCEDURE — 36415 COLL VENOUS BLD VENIPUNCTURE: CPT

## 2021-12-22 PROCEDURE — 86850 RBC ANTIBODY SCREEN: CPT

## 2021-12-22 PROCEDURE — 80048 BASIC METABOLIC PNL TOTAL CA: CPT

## 2021-12-22 PROCEDURE — U0004 COV-19 TEST NON-CDC HGH THRU: HCPCS

## 2021-12-22 PROCEDURE — 86901 BLOOD TYPING SEROLOGIC RH(D): CPT

## 2021-12-22 PROCEDURE — 93010 ELECTROCARDIOGRAM REPORT: CPT | Performed by: INTERNAL MEDICINE

## 2021-12-22 PROCEDURE — U0005 INFEC AGEN DETEC AMPLI PROBE: HCPCS

## 2021-12-22 PROCEDURE — 73502 X-RAY EXAM HIP UNI 2-3 VIEWS: CPT

## 2021-12-22 PROCEDURE — 93005 ELECTROCARDIOGRAM TRACING: CPT

## 2021-12-22 PROCEDURE — A9270 NON-COVERED ITEM OR SERVICE: HCPCS | Performed by: ORTHOPAEDIC SURGERY

## 2021-12-22 PROCEDURE — 63710000001 MUPIROCIN 2 % OINTMENT: Performed by: ORTHOPAEDIC SURGERY

## 2021-12-22 PROCEDURE — 86900 BLOOD TYPING SEROLOGIC ABO: CPT

## 2021-12-22 PROCEDURE — 85027 COMPLETE CBC AUTOMATED: CPT

## 2021-12-22 RX ORDER — CHLORHEXIDINE GLUCONATE 500 MG/1
CLOTH TOPICAL TAKE AS DIRECTED
Status: ACTIVE | OUTPATIENT
Start: 2021-12-22

## 2021-12-22 RX ORDER — LIFITEGRAST 50 MG/ML
1 SOLUTION/ DROPS OPHTHALMIC 2 TIMES DAILY
COMMUNITY

## 2021-12-22 ASSESSMENT — HOOS JR
HOOS JR SCORE: 29.009
HOOS JR SCORE: 19

## 2021-12-22 NOTE — DISCHARGE INSTRUCTIONS
Take the following medications the morning of surgery:    ARMOUR, LANSOPRAZOLE    ARRIVE TO MAIN OR DESK 12-24-21 AT 5:15 AM    If you are on prescription narcotic pain medication to control your pain you may also take that medication the morning of surgery.    General Instructions:  • Do not eat solid food after midnight the night before surgery.  • You may drink clear liquids day of surgery but must stop at least one hour before your hospital arrival time.  • It is beneficial for you to have a clear drink that contains carbohydrates the day of surgery.  We suggest a 12 to 20 ounce bottle of Gatorade or Powerade for non-diabetic patients or a 12 to 20 ounce bottle of G2 or Powerade Zero for diabetic patients. (Pediatric patients, are not advised to drink a 12 to 20 ounce carbohydrate drink)    Clear liquids are liquids you can see through.  Nothing red in color.     Plain water                               Sports drinks  Sodas                                   Gelatin (Jell-O)  Fruit juices without pulp such as white grape juice and apple juice  Popsicles that contain no fruit or yogurt  Tea or coffee (no cream or milk added)  Gatorade / Powerade  G2 / Powerade Zero    • Infants may have breast milk up to four hours before surgery.  • Infants drinking formula may drink formula up to six hours before surgery.   • Patients who avoid smoking, chewing tobacco and alcohol for 4 weeks prior to surgery have a reduced risk of post-operative complications.  Quit smoking as many days before surgery as you can.  • Do not smoke, use chewing tobacco or drink alcohol the day of surgery.   • If applicable bring your C-PAP/ BI-PAP machine.  • Bring any papers given to you in the doctor’s office.  • Wear clean comfortable clothes.  • Do not wear contact lenses, false eyelashes or make-up.  Bring a case for your glasses.   • Bring crutches or walker if applicable.  • Remove all piercings.  Leave jewelry and any other valuables at  home.  • Hair extensions with metal clips must be removed prior to surgery.  • The Pre-Admission Testing nurse will instruct you to bring medications if unable to obtain an accurate list in Pre-Admission Testing.        If you were given a blood bank ID arm band remember to bring it with you the day of surgery.    Preventing a Surgical Site Infection:  • For 2 to 3 days before surgery, avoid shaving with a razor because the razor can irritate skin and make it easier to develop an infection.    • Any areas of open skin can increase the risk of a post-operative wound infection by allowing bacteria to enter and travel throughout the body.  Notify your surgeon if you have any skin wounds / rashes even if it is not near the expected surgical site.  The area will need assessed to determine if surgery should be delayed until it is healed.  • The night prior to surgery shower using a fresh bar of anti-bacterial soap (such as Dial) and clean washcloth.  Sleep in a clean bed with clean clothing.  Do not allow pets to sleep with you.  • Shower on the morning of surgery using a fresh bar of anti-bacterial soap (such as Dial) and clean washcloth.  Dry with a clean towel and dress in clean clothing.  • Ask your surgeon if you will be receiving antibiotics prior to surgery.  • Make sure you, your family, and all healthcare providers clean their hands with soap and water or an alcohol based hand  before caring for you or your wound.    Day of surgery:  Your arrival time is approximately two hours before your scheduled surgery time.  Upon arrival, a Pre-op nurse and Anesthesiologist will review your health history, obtain vital signs, and answer questions you may have.  The only belongings needed at this time will be a list of your home medications and if applicable your C-PAP/BI-PAP machine.  A Pre-op nurse will start an IV and you may receive medication in preparation for surgery, including something to help you relax.      Please be aware that surgery does come with discomfort.  We want to make every effort to control your discomfort so please discuss any uncontrolled symptoms with your nurse.   Your doctor will most likely have prescribed pain medications.      If you are going home after surgery you will receive individualized written care instructions before being discharged.  A responsible adult must drive you to and from the hospital on the day of your surgery and stay with you for 24 hours.  Discharge prescriptions can be filled by the hospital pharmacy during regular pharmacy hours.  If you are having surgery late in the day/evening your prescription may be e-prescribed to your pharmacy.  Please verify your pharmacy hours or chose a 24 hour pharmacy to avoid not having access to your prescription because your pharmacy has closed for the day.    If you are staying overnight following surgery, you will be transported to your hospital room following the recovery period.  [facility] has all private rooms.    If you have any questions please call Pre-Admission Testing at [phone].  Deductibles and co-payments are collected on the day of service. Please be prepared to pay the required co-pay, deductible or deposit on the day of service as defined by your plan.    Patient Education for Self-Quarantine Process    • Following your COVID testing, we strongly recommend that you wear a mask when you are with other people and practice social distancing.   • Limit your activities to only required outings.  • Wash your hands with soap and water frequently for at least 20 seconds.   • Avoid touching your eyes, nose and mouth with unwashed hands.  • Do not share anything - utensils, drinking glasses, food from the same bowl.   • Sanitize household surfaces daily. Include all high touch areas (door handles, light switches, phones, countertops, etc.)    Call your surgeon immediately if you experience any of the following  symptoms:  • Sore Throat  • Shortness of Breath or difficulty breathing  • Cough  • Chills  • Body soreness or muscle pain  • Headache  • Fever  • New loss of taste or smell  • Do not arrive for your surgery ill.  Your procedure will need to be rescheduled to another time.  You will need to call your physician before the day of surgery to avoid any unnecessary exposure to hospital staff as well as other patients.      CHLORHEXIDINE CLOTH INSTRUCTIONS  The morning of surgery follow these instructions using the Chlorhexidine cloths you've been given.  These steps reduce bacteria on the body.  Do not use the cloths near your eyes, ears mouth, genitalia or on open wounds.  Throw the cloths away after use but do not try to flush them down a toilet.      • Open and remove one cloth at a time from the package.    • Leave the cloth unfolded and begin the bathing.  • Massage the skin with the cloths using gentle pressure to remove bacteria.  Do not scrub harshly.   • Follow the steps below with one 2% CHG cloth per area (6 total cloths).  • One cloth for neck, shoulders and chest.  • One cloth for both arms, hands, fingers and underarms (do underarms last).  • One cloth for the abdomen followed by groin.  • One cloth for right leg and foot including between the toes.  • One cloth for left leg and foot including between the toes.  • The last cloth is to be used for the back of the neck, back and buttocks.    Allow the CHG to air dry 3 minutes on the skin which will give it time to work and decrease the chance of irritation.  The skin may feel sticky until it is dry.  Do not rinse with water or any other liquid or you will lose the beneficial effects of the CHG.  If mild skin irritation occurs, do rinse the skin to remove the CHG.  Report this to the nurse at time of admission.  Do not apply lotions, creams, ointments, deodorants or perfumes after using the clothes. Dress in clean clothes before coming to the  [facility].    BACTROBAN NASAL OINTMENT  There are many germs normally in your nose. Bactroban is an ointment that will help reduce these germs. Please follow these instructions for Bactroban use:      ____The day before surgery in the morning  Date________    ____The day before surgery in the evening              Date________    ____The day of surgery in the morning    Date________    **Squirt ½ package of Bactroban Ointment onto a cotton applicator and apply to inside of 1st nostril.  Squirt the remaining Bactroban and apply to the inside of the other nostril.

## 2021-12-23 DIAGNOSIS — M25.551 RIGHT HIP PAIN: Primary | ICD-10-CM

## 2021-12-23 RX ORDER — ASPIRIN 81 MG/1
81 TABLET ORAL DAILY
Start: 2021-12-23 | End: 2022-06-29

## 2021-12-23 RX ORDER — ONDANSETRON 4 MG/1
4 TABLET, FILM COATED ORAL EVERY 6 HOURS PRN
Qty: 40 TABLET | Refills: 1 | Status: SHIPPED | OUTPATIENT
Start: 2021-12-23

## 2021-12-23 RX ORDER — HYDROCODONE BITARTRATE AND ACETAMINOPHEN 7.5; 325 MG/1; MG/1
TABLET ORAL
Qty: 40 TABLET | Refills: 0 | Status: SHIPPED | OUTPATIENT
Start: 2021-12-23 | End: 2022-04-22

## 2021-12-24 ENCOUNTER — APPOINTMENT (OUTPATIENT)
Dept: GENERAL RADIOLOGY | Facility: HOSPITAL | Age: 76
End: 2021-12-24

## 2021-12-24 ENCOUNTER — HOSPITAL ENCOUNTER (OUTPATIENT)
Facility: HOSPITAL | Age: 76
Discharge: HOME | End: 2021-12-25
Attending: ORTHOPAEDIC SURGERY | Admitting: ORTHOPAEDIC SURGERY

## 2021-12-24 ENCOUNTER — ANESTHESIA (OUTPATIENT)
Dept: PERIOP | Facility: HOSPITAL | Age: 76
End: 2021-12-24

## 2021-12-24 DIAGNOSIS — M16.11 PRIMARY OSTEOARTHRITIS OF RIGHT HIP: Primary | ICD-10-CM

## 2021-12-24 LAB
ANION GAP SERPL CALCULATED.3IONS-SCNC: 11.9 MMOL/L (ref 5–15)
BUN SERPL-MCNC: 14 MG/DL (ref 8–23)
BUN/CREAT SERPL: 19.7 (ref 7–25)
CALCIUM SPEC-SCNC: 8.2 MG/DL (ref 8.6–10.5)
CHLORIDE SERPL-SCNC: 105 MMOL/L (ref 98–107)
CO2 SERPL-SCNC: 20.1 MMOL/L (ref 22–29)
CREAT SERPL-MCNC: 0.71 MG/DL (ref 0.57–1)
GFR SERPL CREATININE-BSD FRML MDRD: 80 ML/MIN/1.73
GLUCOSE SERPL-MCNC: 162 MG/DL (ref 65–99)
POTASSIUM SERPL-SCNC: 4.3 MMOL/L (ref 3.5–5.2)
SODIUM SERPL-SCNC: 137 MMOL/L (ref 136–145)

## 2021-12-24 PROCEDURE — C1713 ANCHOR/SCREW BN/BN,TIS/BN: HCPCS | Performed by: ORTHOPAEDIC SURGERY

## 2021-12-24 PROCEDURE — 63710000001 PANTOPRAZOLE 40 MG TABLET DELAYED-RELEASE: Performed by: ORTHOPAEDIC SURGERY

## 2021-12-24 PROCEDURE — G0378 HOSPITAL OBSERVATION PER HR: HCPCS

## 2021-12-24 PROCEDURE — A9270 NON-COVERED ITEM OR SERVICE: HCPCS | Performed by: ORTHOPAEDIC SURGERY

## 2021-12-24 PROCEDURE — 63710000001 CETIRIZINE 10 MG TABLET: Performed by: ORTHOPAEDIC SURGERY

## 2021-12-24 PROCEDURE — 63710000001 LACTOBACILLUS ACIDOPHILUS CAPSULE: Performed by: ORTHOPAEDIC SURGERY

## 2021-12-24 PROCEDURE — 0 BUPIVACAINE LIPOSOME 1.3 % SUSPENSION: Performed by: ORTHOPAEDIC SURGERY

## 2021-12-24 PROCEDURE — 63710000001 GUAIFENESIN 600 MG TABLET SUSTAINED-RELEASE 12 HOUR: Performed by: ORTHOPAEDIC SURGERY

## 2021-12-24 PROCEDURE — 25010000002 ROPIVACAINE PER 1 MG: Performed by: ORTHOPAEDIC SURGERY

## 2021-12-24 PROCEDURE — 63710000001 MIRABEGRON ER 25 MG TABLET SUSTAINED-RELEASE 24 HOUR: Performed by: ORTHOPAEDIC SURGERY

## 2021-12-24 PROCEDURE — 72170 X-RAY EXAM OF PELVIS: CPT

## 2021-12-24 PROCEDURE — 63710000001 ATENOLOL 25 MG TABLET: Performed by: ORTHOPAEDIC SURGERY

## 2021-12-24 PROCEDURE — 80048 BASIC METABOLIC PNL TOTAL CA: CPT | Performed by: ORTHOPAEDIC SURGERY

## 2021-12-24 PROCEDURE — C9290 INJ, BUPIVACAINE LIPOSOME: HCPCS | Performed by: ORTHOPAEDIC SURGERY

## 2021-12-24 PROCEDURE — 63710000001 POVIDONE-IODINE 10 % SOLUTION 30 ML BOTTLE: Performed by: ORTHOPAEDIC SURGERY

## 2021-12-24 PROCEDURE — 63710000001 MONTELUKAST 10 MG TABLET: Performed by: ORTHOPAEDIC SURGERY

## 2021-12-24 PROCEDURE — 25010000002 FENTANYL CITRATE (PF) 50 MCG/ML SOLUTION: Performed by: NURSE ANESTHETIST, CERTIFIED REGISTERED

## 2021-12-24 PROCEDURE — 25010000002 NEOSTIGMINE 5 MG/10ML SOLUTION: Performed by: NURSE ANESTHETIST, CERTIFIED REGISTERED

## 2021-12-24 PROCEDURE — 63710000001 RIVAROXABAN 10 MG TABLET: Performed by: ORTHOPAEDIC SURGERY

## 2021-12-24 PROCEDURE — 25010000002 HYDROMORPHONE PER 4 MG: Performed by: NURSE ANESTHETIST, CERTIFIED REGISTERED

## 2021-12-24 PROCEDURE — 25010000002 DEXAMETHASONE PER 1 MG: Performed by: NURSE ANESTHETIST, CERTIFIED REGISTERED

## 2021-12-24 PROCEDURE — S0260 H&P FOR SURGERY: HCPCS | Performed by: ORTHOPAEDIC SURGERY

## 2021-12-24 PROCEDURE — C1889 IMPLANT/INSERT DEVICE, NOC: HCPCS | Performed by: ORTHOPAEDIC SURGERY

## 2021-12-24 PROCEDURE — 63710000001 FLUTICASONE 50 MCG/ACT SUSPENSION 16 G BOTTLE: Performed by: ORTHOPAEDIC SURGERY

## 2021-12-24 PROCEDURE — 27130 TOTAL HIP ARTHROPLASTY: CPT | Performed by: ORTHOPAEDIC SURGERY

## 2021-12-24 PROCEDURE — C1776 JOINT DEVICE (IMPLANTABLE): HCPCS | Performed by: ORTHOPAEDIC SURGERY

## 2021-12-24 PROCEDURE — 76000 FLUOROSCOPY <1 HR PHYS/QHP: CPT

## 2021-12-24 PROCEDURE — 63710000001 PREGABALIN 75 MG CAPSULE: Performed by: ORTHOPAEDIC SURGERY

## 2021-12-24 PROCEDURE — 63710000001 MELOXICAM 15 MG TABLET: Performed by: ORTHOPAEDIC SURGERY

## 2021-12-24 PROCEDURE — 97110 THERAPEUTIC EXERCISES: CPT

## 2021-12-24 PROCEDURE — 63710000001 ATORVASTATIN 20 MG TABLET: Performed by: ORTHOPAEDIC SURGERY

## 2021-12-24 PROCEDURE — 63710000001 MELATONIN 5 MG TABLET: Performed by: ORTHOPAEDIC SURGERY

## 2021-12-24 PROCEDURE — A9270 NON-COVERED ITEM OR SERVICE: HCPCS | Performed by: ANESTHESIOLOGY

## 2021-12-24 PROCEDURE — 97161 PT EVAL LOW COMPLEX 20 MIN: CPT

## 2021-12-24 PROCEDURE — 63710000001 ACETAMINOPHEN 500 MG TABLET: Performed by: ORTHOPAEDIC SURGERY

## 2021-12-24 PROCEDURE — 25010000002 VANCOMYCIN 10 G RECONSTITUTED SOLUTION: Performed by: ORTHOPAEDIC SURGERY

## 2021-12-24 PROCEDURE — 63710000001 POLYETHYLENE GLYCOL 17 G PACK: Performed by: ORTHOPAEDIC SURGERY

## 2021-12-24 PROCEDURE — 63710000001 PILOCARPINE 5 MG TABLET: Performed by: ORTHOPAEDIC SURGERY

## 2021-12-24 PROCEDURE — 73501 X-RAY EXAM HIP UNI 1 VIEW: CPT

## 2021-12-24 PROCEDURE — 63710000001 MUPIROCIN 2 % OINTMENT: Performed by: ORTHOPAEDIC SURGERY

## 2021-12-24 PROCEDURE — 25010000002 PROPOFOL 10 MG/ML EMULSION: Performed by: NURSE ANESTHETIST, CERTIFIED REGISTERED

## 2021-12-24 PROCEDURE — 63710000001 SCOPOLAMINE 1 MG/3DAYS PATCH 72 HOUR: Performed by: ANESTHESIOLOGY

## 2021-12-24 PROCEDURE — 25010000002 ONDANSETRON PER 1 MG: Performed by: NURSE ANESTHETIST, CERTIFIED REGISTERED

## 2021-12-24 DEVICE — TOTAL HIP PRIMARY: Type: IMPLANTABLE DEVICE | Site: HIP | Status: FUNCTIONAL

## 2021-12-24 DEVICE — SUT NONABS MAXBRAID/PE NMBR2 C7 38IN BLU 900335: Type: IMPLANTABLE DEVICE | Site: HIP | Status: FUNCTIONAL

## 2021-12-24 DEVICE — CP HIP UPCHRG OSSEOTI LTD HL CUPS: Type: IMPLANTABLE DEVICE | Site: HIP | Status: FUNCTIONAL

## 2021-12-24 DEVICE — LINER G7 2MOBL SZE 44MM: Type: IMPLANTABLE DEVICE | Site: HIP | Status: FUNCTIONAL

## 2021-12-24 DEVICE — SHLL ACET OSSEOTI G7 4H SZF 54MM: Type: IMPLANTABLE DEVICE | Site: HIP | Status: FUNCTIONAL

## 2021-12-24 DEVICE — CAP HIP 2 MOBL UPCHRG: Type: IMPLANTABLE DEVICE | Site: HIP | Status: FUNCTIONAL

## 2021-12-24 DEVICE — BIOLOX® DELTA HEAD, 12/14, 28 X -3.5
Type: IMPLANTABLE DEVICE | Site: HIP | Status: FUNCTIONAL
Brand: BIOLOX® DELTA

## 2021-12-24 DEVICE — SCRW ACET CORT TRILOGY S/TAP 6.5X20: Type: IMPLANTABLE DEVICE | Site: HIP | Status: FUNCTIONAL

## 2021-12-24 DEVICE — STEM FEM/HIP AVENIRCOMPLETE COLAR STFF HA SZ3: Type: IMPLANTABLE DEVICE | Site: HIP | Status: FUNCTIONAL

## 2021-12-24 DEVICE — IMPLANTABLE DEVICE
Type: IMPLANTABLE DEVICE | Site: HIP | Status: FUNCTIONAL
Brand: VIVACIT-E®

## 2021-12-24 DEVICE — ARISTA AH ABSORBABLE HEMOSTATIC PARTICLES
Type: IMPLANTABLE DEVICE | Site: HIP | Status: FUNCTIONAL
Brand: ARISTA™ AH

## 2021-12-24 RX ORDER — PROMETHAZINE HYDROCHLORIDE 25 MG/1
25 SUPPOSITORY RECTAL ONCE AS NEEDED
Status: DISCONTINUED | OUTPATIENT
Start: 2021-12-24 | End: 2021-12-24 | Stop reason: HOSPADM

## 2021-12-24 RX ORDER — DEXAMETHASONE SODIUM PHOSPHATE 10 MG/ML
INJECTION INTRAMUSCULAR; INTRAVENOUS AS NEEDED
Status: DISCONTINUED | OUTPATIENT
Start: 2021-12-24 | End: 2021-12-24 | Stop reason: SURG

## 2021-12-24 RX ORDER — PREGABALIN 75 MG/1
150 CAPSULE ORAL ONCE
Status: COMPLETED | OUTPATIENT
Start: 2021-12-24 | End: 2021-12-24

## 2021-12-24 RX ORDER — HYDROCODONE BITARTRATE AND ACETAMINOPHEN 7.5; 325 MG/1; MG/1
1 TABLET ORAL EVERY 4 HOURS PRN
Status: DISCONTINUED | OUTPATIENT
Start: 2021-12-24 | End: 2021-12-25 | Stop reason: HOSPADM

## 2021-12-24 RX ORDER — DIPHENHYDRAMINE HCL 25 MG
25 CAPSULE ORAL
Status: DISCONTINUED | OUTPATIENT
Start: 2021-12-24 | End: 2021-12-24 | Stop reason: HOSPADM

## 2021-12-24 RX ORDER — MAGNESIUM HYDROXIDE 1200 MG/15ML
LIQUID ORAL AS NEEDED
Status: DISCONTINUED | OUTPATIENT
Start: 2021-12-24 | End: 2021-12-24 | Stop reason: HOSPADM

## 2021-12-24 RX ORDER — HYDRALAZINE HYDROCHLORIDE 20 MG/ML
5 INJECTION INTRAMUSCULAR; INTRAVENOUS
Status: DISCONTINUED | OUTPATIENT
Start: 2021-12-24 | End: 2021-12-24 | Stop reason: HOSPADM

## 2021-12-24 RX ORDER — HYDROMORPHONE HYDROCHLORIDE 1 MG/ML
0.25 INJECTION, SOLUTION INTRAMUSCULAR; INTRAVENOUS; SUBCUTANEOUS
Status: DISCONTINUED | OUTPATIENT
Start: 2021-12-24 | End: 2021-12-24 | Stop reason: HOSPADM

## 2021-12-24 RX ORDER — LEVOTHYROXINE AND LIOTHYRONINE 19; 4.5 UG/1; UG/1
30 TABLET ORAL DAILY
Status: DISCONTINUED | OUTPATIENT
Start: 2021-12-25 | End: 2021-12-25 | Stop reason: HOSPADM

## 2021-12-24 RX ORDER — SODIUM CHLORIDE 9 MG/ML
75 INJECTION, SOLUTION INTRAVENOUS CONTINUOUS
Status: DISCONTINUED | OUTPATIENT
Start: 2021-12-24 | End: 2021-12-25 | Stop reason: HOSPADM

## 2021-12-24 RX ORDER — HYDROCODONE BITARTRATE AND ACETAMINOPHEN 7.5; 325 MG/1; MG/1
2 TABLET ORAL EVERY 4 HOURS PRN
Status: DISCONTINUED | OUTPATIENT
Start: 2021-12-24 | End: 2021-12-25 | Stop reason: HOSPADM

## 2021-12-24 RX ORDER — ONDANSETRON 2 MG/ML
4 INJECTION INTRAMUSCULAR; INTRAVENOUS ONCE AS NEEDED
Status: DISCONTINUED | OUTPATIENT
Start: 2021-12-24 | End: 2021-12-24 | Stop reason: HOSPADM

## 2021-12-24 RX ORDER — EPHEDRINE SULFATE 50 MG/ML
INJECTION, SOLUTION INTRAVENOUS AS NEEDED
Status: DISCONTINUED | OUTPATIENT
Start: 2021-12-24 | End: 2021-12-24 | Stop reason: SURG

## 2021-12-24 RX ORDER — TRANEXAMIC ACID 100 MG/ML
INJECTION, SOLUTION INTRAVENOUS AS NEEDED
Status: DISCONTINUED | OUTPATIENT
Start: 2021-12-24 | End: 2021-12-24 | Stop reason: SURG

## 2021-12-24 RX ORDER — ATORVASTATIN CALCIUM 20 MG/1
20 TABLET, FILM COATED ORAL NIGHTLY
Status: DISCONTINUED | OUTPATIENT
Start: 2021-12-24 | End: 2021-12-25 | Stop reason: HOSPADM

## 2021-12-24 RX ORDER — DIPHENOXYLATE HYDROCHLORIDE AND ATROPINE SULFATE 2.5; .025 MG/1; MG/1
1 TABLET ORAL DAILY
Status: DISCONTINUED | OUTPATIENT
Start: 2021-12-24 | End: 2021-12-25 | Stop reason: HOSPADM

## 2021-12-24 RX ORDER — PROMETHAZINE HYDROCHLORIDE 25 MG/1
25 TABLET ORAL ONCE AS NEEDED
Status: DISCONTINUED | OUTPATIENT
Start: 2021-12-24 | End: 2021-12-24 | Stop reason: HOSPADM

## 2021-12-24 RX ORDER — ONDANSETRON 4 MG/1
4 TABLET, FILM COATED ORAL EVERY 6 HOURS PRN
Status: DISCONTINUED | OUTPATIENT
Start: 2021-12-24 | End: 2021-12-25 | Stop reason: HOSPADM

## 2021-12-24 RX ORDER — FENTANYL CITRATE 50 UG/ML
INJECTION, SOLUTION INTRAMUSCULAR; INTRAVENOUS AS NEEDED
Status: DISCONTINUED | OUTPATIENT
Start: 2021-12-24 | End: 2021-12-24 | Stop reason: SURG

## 2021-12-24 RX ORDER — SODIUM CHLORIDE 0.9 % (FLUSH) 0.9 %
3 SYRINGE (ML) INJECTION EVERY 12 HOURS SCHEDULED
Status: DISCONTINUED | OUTPATIENT
Start: 2021-12-24 | End: 2021-12-24 | Stop reason: HOSPADM

## 2021-12-24 RX ORDER — LIDOCAINE HYDROCHLORIDE 10 MG/ML
0.5 INJECTION, SOLUTION EPIDURAL; INFILTRATION; INTRACAUDAL; PERINEURAL ONCE AS NEEDED
Status: DISCONTINUED | OUTPATIENT
Start: 2021-12-24 | End: 2021-12-24 | Stop reason: HOSPADM

## 2021-12-24 RX ORDER — FLUTICASONE PROPIONATE 50 MCG
2 SPRAY, SUSPENSION (ML) NASAL NIGHTLY
Status: DISCONTINUED | OUTPATIENT
Start: 2021-12-24 | End: 2021-12-25 | Stop reason: HOSPADM

## 2021-12-24 RX ORDER — SCOLOPAMINE TRANSDERMAL SYSTEM 1 MG/1
1 PATCH, EXTENDED RELEASE TRANSDERMAL ONCE
Status: COMPLETED | OUTPATIENT
Start: 2021-12-24 | End: 2021-12-25

## 2021-12-24 RX ORDER — CLINDAMYCIN PHOSPHATE 900 MG/50ML
900 INJECTION INTRAVENOUS ONCE
Status: DISCONTINUED | OUTPATIENT
Start: 2021-12-24 | End: 2021-12-24

## 2021-12-24 RX ORDER — MELOXICAM 15 MG/1
15 TABLET ORAL DAILY
Status: DISCONTINUED | OUTPATIENT
Start: 2021-12-25 | End: 2021-12-25 | Stop reason: HOSPADM

## 2021-12-24 RX ORDER — NALOXONE HCL 0.4 MG/ML
0.1 VIAL (ML) INJECTION
Status: DISCONTINUED | OUTPATIENT
Start: 2021-12-24 | End: 2021-12-25 | Stop reason: HOSPADM

## 2021-12-24 RX ORDER — EPHEDRINE SULFATE 50 MG/ML
5 INJECTION, SOLUTION INTRAVENOUS ONCE AS NEEDED
Status: DISCONTINUED | OUTPATIENT
Start: 2021-12-24 | End: 2021-12-24 | Stop reason: HOSPADM

## 2021-12-24 RX ORDER — DIPHENHYDRAMINE HYDROCHLORIDE 50 MG/ML
12.5 INJECTION INTRAMUSCULAR; INTRAVENOUS
Status: DISCONTINUED | OUTPATIENT
Start: 2021-12-24 | End: 2021-12-24 | Stop reason: HOSPADM

## 2021-12-24 RX ORDER — PILOCARPINE HYDROCHLORIDE 5 MG/1
5 TABLET, FILM COATED ORAL 3 TIMES DAILY
Status: DISCONTINUED | OUTPATIENT
Start: 2021-12-24 | End: 2021-12-25 | Stop reason: HOSPADM

## 2021-12-24 RX ORDER — FAMOTIDINE 10 MG/ML
20 INJECTION, SOLUTION INTRAVENOUS ONCE
Status: COMPLETED | OUTPATIENT
Start: 2021-12-24 | End: 2021-12-24

## 2021-12-24 RX ORDER — ATENOLOL 25 MG/1
25 TABLET ORAL EVERY EVENING
Status: DISCONTINUED | OUTPATIENT
Start: 2021-12-24 | End: 2021-12-25 | Stop reason: HOSPADM

## 2021-12-24 RX ORDER — PANTOPRAZOLE SODIUM 40 MG/1
40 TABLET, DELAYED RELEASE ORAL
Status: DISCONTINUED | OUTPATIENT
Start: 2021-12-24 | End: 2021-12-25 | Stop reason: HOSPADM

## 2021-12-24 RX ORDER — LABETALOL HYDROCHLORIDE 5 MG/ML
5 INJECTION, SOLUTION INTRAVENOUS
Status: DISCONTINUED | OUTPATIENT
Start: 2021-12-24 | End: 2021-12-24 | Stop reason: HOSPADM

## 2021-12-24 RX ORDER — MONTELUKAST SODIUM 10 MG/1
10 TABLET ORAL NIGHTLY
Status: DISCONTINUED | OUTPATIENT
Start: 2021-12-24 | End: 2021-12-25 | Stop reason: HOSPADM

## 2021-12-24 RX ORDER — NALOXONE HCL 0.4 MG/ML
0.2 VIAL (ML) INJECTION AS NEEDED
Status: DISCONTINUED | OUTPATIENT
Start: 2021-12-24 | End: 2021-12-24 | Stop reason: HOSPADM

## 2021-12-24 RX ORDER — LIDOCAINE HYDROCHLORIDE 20 MG/ML
INJECTION, SOLUTION INFILTRATION; PERINEURAL AS NEEDED
Status: DISCONTINUED | OUTPATIENT
Start: 2021-12-24 | End: 2021-12-24 | Stop reason: SURG

## 2021-12-24 RX ORDER — NEOSTIGMINE METHYLSULFATE 0.5 MG/ML
INJECTION, SOLUTION INTRAVENOUS AS NEEDED
Status: DISCONTINUED | OUTPATIENT
Start: 2021-12-24 | End: 2021-12-24 | Stop reason: SURG

## 2021-12-24 RX ORDER — FENTANYL CITRATE 50 UG/ML
50 INJECTION, SOLUTION INTRAMUSCULAR; INTRAVENOUS
Status: DISCONTINUED | OUTPATIENT
Start: 2021-12-24 | End: 2021-12-24 | Stop reason: HOSPADM

## 2021-12-24 RX ORDER — ACETAMINOPHEN 500 MG
1000 TABLET ORAL ONCE
Status: COMPLETED | OUTPATIENT
Start: 2021-12-24 | End: 2021-12-24

## 2021-12-24 RX ORDER — KETAMINE HYDROCHLORIDE 10 MG/ML
INJECTION INTRAMUSCULAR; INTRAVENOUS AS NEEDED
Status: DISCONTINUED | OUTPATIENT
Start: 2021-12-24 | End: 2021-12-24 | Stop reason: SURG

## 2021-12-24 RX ORDER — SODIUM CHLORIDE, SODIUM LACTATE, POTASSIUM CHLORIDE, CALCIUM CHLORIDE 600; 310; 30; 20 MG/100ML; MG/100ML; MG/100ML; MG/100ML
9 INJECTION, SOLUTION INTRAVENOUS CONTINUOUS
Status: DISCONTINUED | OUTPATIENT
Start: 2021-12-24 | End: 2021-12-25 | Stop reason: HOSPADM

## 2021-12-24 RX ORDER — ONDANSETRON 2 MG/ML
INJECTION INTRAMUSCULAR; INTRAVENOUS AS NEEDED
Status: DISCONTINUED | OUTPATIENT
Start: 2021-12-24 | End: 2021-12-24 | Stop reason: SURG

## 2021-12-24 RX ORDER — ROCURONIUM BROMIDE 10 MG/ML
INJECTION, SOLUTION INTRAVENOUS AS NEEDED
Status: DISCONTINUED | OUTPATIENT
Start: 2021-12-24 | End: 2021-12-24 | Stop reason: SURG

## 2021-12-24 RX ORDER — GLYCOPYRROLATE 0.2 MG/ML
INJECTION INTRAMUSCULAR; INTRAVENOUS AS NEEDED
Status: DISCONTINUED | OUTPATIENT
Start: 2021-12-24 | End: 2021-12-24 | Stop reason: SURG

## 2021-12-24 RX ORDER — POLYETHYLENE GLYCOL 3350 17 G/17G
17 POWDER, FOR SOLUTION ORAL DAILY
Status: DISCONTINUED | OUTPATIENT
Start: 2021-12-24 | End: 2021-12-25 | Stop reason: HOSPADM

## 2021-12-24 RX ORDER — ACETAMINOPHEN 325 MG/1
325 TABLET ORAL EVERY 4 HOURS PRN
Status: DISCONTINUED | OUTPATIENT
Start: 2021-12-24 | End: 2021-12-25 | Stop reason: HOSPADM

## 2021-12-24 RX ORDER — ONDANSETRON 2 MG/ML
4 INJECTION INTRAMUSCULAR; INTRAVENOUS EVERY 6 HOURS PRN
Status: DISCONTINUED | OUTPATIENT
Start: 2021-12-24 | End: 2021-12-25 | Stop reason: HOSPADM

## 2021-12-24 RX ORDER — CHOLECALCIFEROL (VITAMIN D3) 125 MCG
10 CAPSULE ORAL NIGHTLY
Status: DISCONTINUED | OUTPATIENT
Start: 2021-12-24 | End: 2021-12-25 | Stop reason: HOSPADM

## 2021-12-24 RX ORDER — ROPIVACAINE HYDROCHLORIDE 5 MG/ML
INJECTION, SOLUTION EPIDURAL; INFILTRATION; PERINEURAL AS NEEDED
Status: DISCONTINUED | OUTPATIENT
Start: 2021-12-24 | End: 2021-12-24 | Stop reason: HOSPADM

## 2021-12-24 RX ORDER — L.ACID,PARA/B.BIFIDUM/S.THERM 8B CELL
1 CAPSULE ORAL 2 TIMES DAILY
Status: DISCONTINUED | OUTPATIENT
Start: 2021-12-24 | End: 2021-12-25 | Stop reason: HOSPADM

## 2021-12-24 RX ORDER — GUAIFENESIN 600 MG/1
600 TABLET, EXTENDED RELEASE ORAL 2 TIMES DAILY
Status: DISCONTINUED | OUTPATIENT
Start: 2021-12-24 | End: 2021-12-25 | Stop reason: HOSPADM

## 2021-12-24 RX ORDER — SODIUM CHLORIDE 0.9 % (FLUSH) 0.9 %
3-10 SYRINGE (ML) INJECTION AS NEEDED
Status: DISCONTINUED | OUTPATIENT
Start: 2021-12-24 | End: 2021-12-24 | Stop reason: HOSPADM

## 2021-12-24 RX ORDER — PROPOFOL 10 MG/ML
VIAL (ML) INTRAVENOUS AS NEEDED
Status: DISCONTINUED | OUTPATIENT
Start: 2021-12-24 | End: 2021-12-24 | Stop reason: SURG

## 2021-12-24 RX ORDER — MELOXICAM 15 MG/1
15 TABLET ORAL ONCE
Status: COMPLETED | OUTPATIENT
Start: 2021-12-24 | End: 2021-12-24

## 2021-12-24 RX ORDER — CLINDAMYCIN PHOSPHATE 900 MG/50ML
900 INJECTION INTRAVENOUS EVERY 8 HOURS
Status: COMPLETED | OUTPATIENT
Start: 2021-12-24 | End: 2021-12-25

## 2021-12-24 RX ORDER — HYDROMORPHONE HYDROCHLORIDE 1 MG/ML
0.5 INJECTION, SOLUTION INTRAMUSCULAR; INTRAVENOUS; SUBCUTANEOUS
Status: DISCONTINUED | OUTPATIENT
Start: 2021-12-24 | End: 2021-12-25 | Stop reason: HOSPADM

## 2021-12-24 RX ORDER — CETIRIZINE HYDROCHLORIDE 10 MG/1
10 TABLET ORAL DAILY
Status: DISCONTINUED | OUTPATIENT
Start: 2021-12-24 | End: 2021-12-25 | Stop reason: HOSPADM

## 2021-12-24 RX ADMIN — HYDROMORPHONE HYDROCHLORIDE 0.25 MG: 1 INJECTION, SOLUTION INTRAMUSCULAR; INTRAVENOUS; SUBCUTANEOUS at 10:07

## 2021-12-24 RX ADMIN — CLINDAMYCIN PHOSPHATE 900 MG: 900 INJECTION, SOLUTION INTRAVENOUS at 14:34

## 2021-12-24 RX ADMIN — HYDROMORPHONE HYDROCHLORIDE 0.25 MG: 1 INJECTION, SOLUTION INTRAMUSCULAR; INTRAVENOUS; SUBCUTANEOUS at 10:24

## 2021-12-24 RX ADMIN — ATORVASTATIN CALCIUM 20 MG: 20 TABLET, FILM COATED ORAL at 20:47

## 2021-12-24 RX ADMIN — HYDROMORPHONE HYDROCHLORIDE 0.25 MG: 1 INJECTION, SOLUTION INTRAMUSCULAR; INTRAVENOUS; SUBCUTANEOUS at 10:18

## 2021-12-24 RX ADMIN — FENTANYL CITRATE 25 MCG: 0.05 INJECTION, SOLUTION INTRAMUSCULAR; INTRAVENOUS at 07:59

## 2021-12-24 RX ADMIN — MIRABEGRON 25 MG: 25 TABLET, FILM COATED, EXTENDED RELEASE ORAL at 14:34

## 2021-12-24 RX ADMIN — Medication 1 CAPSULE: at 20:47

## 2021-12-24 RX ADMIN — RIVAROXABAN 10 MG: 10 TABLET, FILM COATED ORAL at 18:00

## 2021-12-24 RX ADMIN — POLYETHYLENE GLYCOL 3350 17 G: 17 POWDER, FOR SOLUTION ORAL at 18:00

## 2021-12-24 RX ADMIN — ATENOLOL 25 MG: 25 TABLET ORAL at 16:16

## 2021-12-24 RX ADMIN — PROPOFOL 25 MCG/KG/MIN: 10 INJECTION, EMULSION INTRAVENOUS at 07:25

## 2021-12-24 RX ADMIN — CLINDAMYCIN PHOSPHATE 900 MG: 900 INJECTION, SOLUTION INTRAVENOUS at 22:48

## 2021-12-24 RX ADMIN — CETIRIZINE HYDROCHLORIDE 10 MG: 10 TABLET ORAL at 14:34

## 2021-12-24 RX ADMIN — PREGABALIN 150 MG: 75 CAPSULE ORAL at 05:48

## 2021-12-24 RX ADMIN — KETAMINE HYDROCHLORIDE 10 MG: 10 INJECTION INTRAMUSCULAR; INTRAVENOUS at 08:36

## 2021-12-24 RX ADMIN — Medication 10 MG: at 20:47

## 2021-12-24 RX ADMIN — KETAMINE HYDROCHLORIDE 10 MG: 10 INJECTION INTRAMUSCULAR; INTRAVENOUS at 09:12

## 2021-12-24 RX ADMIN — NEOSTIGMINE METHYLSULFATE 4 MG: 0.5 INJECTION INTRAVENOUS at 09:03

## 2021-12-24 RX ADMIN — SCOPALAMINE 1 PATCH: 1 PATCH, EXTENDED RELEASE TRANSDERMAL at 06:45

## 2021-12-24 RX ADMIN — FENTANYL CITRATE 25 MCG: 0.05 INJECTION, SOLUTION INTRAMUSCULAR; INTRAVENOUS at 08:27

## 2021-12-24 RX ADMIN — TRANEXAMIC ACID 1000 MG: 1 INJECTION, SOLUTION INTRAVENOUS at 07:35

## 2021-12-24 RX ADMIN — LIDOCAINE HYDROCHLORIDE 100 MG: 20 INJECTION, SOLUTION INFILTRATION; PERINEURAL at 07:19

## 2021-12-24 RX ADMIN — DEXAMETHASONE SODIUM PHOSPHATE 8 MG: 10 INJECTION INTRAMUSCULAR; INTRAVENOUS at 07:54

## 2021-12-24 RX ADMIN — ROCURONIUM BROMIDE 10 MG: 50 INJECTION INTRAVENOUS at 08:38

## 2021-12-24 RX ADMIN — EPHEDRINE SULFATE 10 MG: 50 INJECTION INTRAVENOUS at 07:38

## 2021-12-24 RX ADMIN — MUPIROCIN 1 APPLICATION: 20 OINTMENT TOPICAL at 20:47

## 2021-12-24 RX ADMIN — PILOCARPINE HYDRCHLORIDE 5 MG: 5 TABLET, FILM COATED ORAL at 20:47

## 2021-12-24 RX ADMIN — GLYCOPYRROLATE 0.4 MG: 0.2 INJECTION INTRAMUSCULAR; INTRAVENOUS at 09:03

## 2021-12-24 RX ADMIN — FENTANYL CITRATE 50 MCG: 50 INJECTION INTRAMUSCULAR; INTRAVENOUS at 09:33

## 2021-12-24 RX ADMIN — VANCOMYCIN HYDROCHLORIDE 1250 MG: 10 INJECTION, POWDER, LYOPHILIZED, FOR SOLUTION INTRAVENOUS at 06:54

## 2021-12-24 RX ADMIN — PANTOPRAZOLE SODIUM 40 MG: 40 TABLET, DELAYED RELEASE ORAL at 16:16

## 2021-12-24 RX ADMIN — HYDROMORPHONE HYDROCHLORIDE 0.25 MG: 1 INJECTION, SOLUTION INTRAMUSCULAR; INTRAVENOUS; SUBCUTANEOUS at 09:59

## 2021-12-24 RX ADMIN — ROCURONIUM BROMIDE 20 MG: 50 INJECTION INTRAVENOUS at 07:53

## 2021-12-24 RX ADMIN — MONTELUKAST SODIUM 10 MG: 10 TABLET, FILM COATED ORAL at 20:46

## 2021-12-24 RX ADMIN — PROPOFOL 200 MG: 10 INJECTION, EMULSION INTRAVENOUS at 07:19

## 2021-12-24 RX ADMIN — TRANEXAMIC ACID 1000 MG: 1 INJECTION, SOLUTION INTRAVENOUS at 08:48

## 2021-12-24 RX ADMIN — MELOXICAM 15 MG: 15 TABLET ORAL at 05:48

## 2021-12-24 RX ADMIN — FLUTICASONE PROPIONATE 2 SPRAY: 50 SPRAY, METERED NASAL at 20:47

## 2021-12-24 RX ADMIN — KETAMINE HYDROCHLORIDE 30 MG: 10 INJECTION INTRAMUSCULAR; INTRAVENOUS at 07:27

## 2021-12-24 RX ADMIN — PILOCARPINE HYDRCHLORIDE 5 MG: 5 TABLET, FILM COATED ORAL at 16:16

## 2021-12-24 RX ADMIN — FAMOTIDINE 20 MG: 10 INJECTION INTRAVENOUS at 06:45

## 2021-12-24 RX ADMIN — ACETAMINOPHEN 1000 MG: 500 TABLET ORAL at 05:48

## 2021-12-24 RX ADMIN — SODIUM CHLORIDE, POTASSIUM CHLORIDE, SODIUM LACTATE AND CALCIUM CHLORIDE 9 ML/HR: 600; 310; 30; 20 INJECTION, SOLUTION INTRAVENOUS at 06:44

## 2021-12-24 RX ADMIN — ONDANSETRON 4 MG: 2 INJECTION INTRAMUSCULAR; INTRAVENOUS at 08:59

## 2021-12-24 RX ADMIN — EPHEDRINE SULFATE 10 MG: 50 INJECTION INTRAVENOUS at 09:03

## 2021-12-24 RX ADMIN — ROCURONIUM BROMIDE 20 MG: 50 INJECTION INTRAVENOUS at 07:24

## 2021-12-24 RX ADMIN — FENTANYL CITRATE 50 MCG: 0.05 INJECTION, SOLUTION INTRAMUSCULAR; INTRAVENOUS at 08:38

## 2021-12-24 RX ADMIN — GUAIFENESIN 600 MG: 600 TABLET, EXTENDED RELEASE ORAL at 20:47

## 2021-12-24 NOTE — PLAN OF CARE
Goal Outcome Evaluation:  Plan of Care Reviewed With: patient           Outcome Summary: Pt seen in PACU for PT evaluation this morning. Pt is s/p R anterior DANIELLE. Pt reports having a few steps to enter with R handrail and will have assist at home. Upon exam pt demonstrates ROM deficits, anticipated post op strength deficits and general functional mobility deficits. Pt completed bed mobility with min A at RLE, transfers with cga and ambulated 40' using walker and cga. Pt demonstrates ability to march in place with good foot clearance bilaterally suggestive of being able to complete stairs. Pt has necessary equipment at home and plan is to d/c home today vs tomorrow. Ok with PT for pt to d/c home today with assist and HH services.

## 2021-12-24 NOTE — PLAN OF CARE
Goal Outcome Evaluation:  Plan of Care Reviewed With: patient        Progress: improving  Outcome Summary: VSS, NVI, astrid c/d/i-scant drainage, ambulating assist of 1 with walker, dtv by 1730, plan to dc home tomorrow, educated on breast CA restrictions

## 2021-12-24 NOTE — THERAPY EVALUATION
Patient Name: Valery Baez  : 1945    MRN: [id number]                              Today's Date: 2021       Admit Date: 2021    Visit Dx:     ICD-10-CM ICD-9-CM   1. Primary osteoarthritis of right hip  M16.11 715.15     Patient Active Problem List   Diagnosis   • Chronic pain of left knee   • Spondylolisthesis   • Spondylolisthesis of lumbar region   • Spinal stenosis of lumbar region with neurogenic claudication   • Sleep apnea   • Stage 3 chronic kidney disease (HCC)   • Iron deficiency anemia   • Sjogren's disease (HCC)   • Asthma   • GERD (gastroesophageal reflux disease)   • Seasonal allergies   • MRSA (methicillin resistant Staphylococcus aureus) infection   • Medicare annual wellness visit, subsequent   • Greater trochanteric bursitis of right hip   • Right hip pain   • Status post total knee replacement, right   • Osteoarthritis of right hip     Past Medical History:   Diagnosis Date   • Anemia     CHRONIC IRON DEFICIENT   • Anesthesia complication     STATES SHE IS VERY SLOW TO WAKE UP   • Asthma    • Basal cell carcinoma     FACE   • Cancer (HCC)     right breast cancer   • Chronic diarrhea    • Chronic kidney disease    • DDD (degenerative disc disease), lumbar    • Disease of thyroid gland    • Dry eye    • Elevated cholesterol    • Frequency of urination    • GERD (gastroesophageal reflux disease)    • Hard to intubate     REQUESTING TO SEE AA 19; PT HAS COMPLETE BILATERAL JAW PROSTESIS   • Headache     or migraines   • Heart palpitations    • History of breast cancer     RIGHT   • History of transfusion    • [place] (hard of hearing)     BILAT HEARING AID   • Hx of renal calculi    • Hypercholesteremia    • Limited mobility     RIGHT HIP   • Limited mobility     JAW   • MRSA (methicillin resistant Staphylococcus aureus)     LEFT JAW @TMJ JOINT, MULT. ,LAST , TREATED AT [facility], infection control notified 16. 1300   • Osteoarthritis    • Osteoporosis     • PONV (postoperative nausea and vomiting)    • Right hip pain    • Rosacea    • Short of breath on exertion     WITH STAIRS ONLY   • Sleep apnea     C PAP   • Spinal headache    • Spinal stenosis    • Urgency of urination    • Vertigo     WAKES UP WITH THIS SOMETIMES   • Weakness     RIGHT HIP     Past Surgical History:   Procedure Laterality Date   • ABSCESS DRAINAGE      abscess removed from left jaw x2   • APPENDECTOMY  1965   • AVULSION TOENAIL PLATE      6 REMOVED   • BILATERAL BREAST REDUCTION Left 8/29/2016    Procedure: LT BREAST REDUCTION ;  Surgeon: Luis Zambrano MD;  Location: [facility];  Service:    • BREAST BIOPSY Bilateral    • CHOLECYSTECTOMY  09/2000   • COLONOSCOPY     • CYST REMOVAL  03/1984    eye cyst left lower lid   • CYST REMOVAL  01/2007    right upper thigh   • CYSTOSCOPY      x2  02/2013 & 04/2014   • CYSTOSCOPY      NUMEROUS   • D & C HYSTEROSCOPY     • HYSTERECTOMY  1971    LATER BSO   • KNEE ARTHROPLASTY Right 03/2005   • LAPAROSCOPIC SALPINGOOPHERECTOMY     • LUMBAR DISCECTOMY FUSION INSTRUMENTATION N/A 9/9/2019    Procedure: Lumbar 3 to sacral 1 laminectomy and fusion with instrumentation;  Surgeon: Vikas Couch MD;  Location: [facility];  Service: Orthopedic Spine   • MANDIBLE SURGERY      UPPER AND LOWER JAW ADJUSTED WITH PLATES AND WIRES   • MASTECTOMY Right 03/2015   • MASTECTOMY PARTIAL / LUMPECTOMY Right 03/2009   • NIPPLE RECONSTRUCTION Right 8/29/2016    Procedure: RT NIPPLE RECONSTRUCTION;  Surgeon: Luis Zambrano MD;  Location: [facility];  Service:    • OTHER SURGICAL HISTORY      laparoscopy adhesions right side x2   • OTHER SURGICAL HISTORY      drain mammosite area   • OTHER SURGICAL HISTORY  01/2010    pressure washed prosthesis area left jaw   • OTHER SURGICAL HISTORY      drain mammosite area right breast 09/2011   • RECONSTRUCTION BREAST W/ TRAM FLAP Right 02/2016   • ROTATOR CUFF REPAIR Left 09/2014   • TEMPOROMANDIBULAR JOINT ARTHROPLASTY  Bilateral 1993    MULT. REPLACEMENTS LATER   • TONSILLECTOMY  1952      General Information     Row Name 12/24/21 1245          Physical Therapy Time and Intention    Document Type evaluation  -     Mode of Treatment individual therapy; physical therapy  -     Row Name 12/24/21 1245          General Information    Patient Profile Reviewed yes  -CF     Prior Level of Function independent:  -CF     Existing Precautions/Restrictions fall; hip, anterior; right  -CF     Barriers to Rehab none identified  -     Row Name 12/24/21 1245          Living Environment    Lives With spouse  -     Row Name 12/24/21 1245          Home Main Entrance    Number of Stairs, Main Entrance two  -CF     Stair Railings, Main Entrance railing on right side (ascending)  -     Row Name 12/24/21 1245          Stairs Within Home, Primary    Number of Stairs, Within Home, Primary none  -     Row Name 12/24/21 1245          Cognition    Orientation Status (Cognition) oriented x 4  -     Row Name 12/24/21 1245          Safety Issues, Functional Mobility    Impairments Affecting Function (Mobility) endurance/activity tolerance; strength; range of motion (ROM); pain  -CF           User Key  (r) = Recorded By, (t) = Taken By, (c) = Cosigned By    Initials Name Provider Type    CF Deidra Howe, RUBENS Physical Therapist               Mobility     Row Name 12/24/21 1246          Bed Mobility    Bed Mobility supine-sit; sit-supine  -CF     Supine-Sit [place] (Bed Mobility) minimum assist (75% patient effort); verbal cues  -CF     Sit-Supine [place] (Bed Mobility) minimum assist (75% patient effort); verbal cues  -CF     Assistive Device (Bed Mobility) head of bed elevated; bed rails  -     Comment (Bed Mobility) at RLE  -     Row Name 12/24/21 1246          Sit-Stand Transfer    Sit-Stand [place] (Transfers) contact guard; verbal cues  -CF     Assistive Device (Sit-Stand Transfers) walker, front-wheeled  -     Row Name  12/24/21 1246          Gait/Stairs (Locomotion)    [place] Level (Gait) contact guard; verbal cues  -CF     Assistive Device (Gait) walker, front-wheeled  -CF     Distance in Feet (Gait) 40'  -CF     Deviations/Abnormal Patterns (Gait) jemal decreased; gait speed decreased  -CF     Comment (Gait/Stairs) PT discussed how to ascend steps with pt - pt demo ability to march in place and complete Single leg stance enough to complete stairs if needed  -     Row Name 12/24/21 1246          Mobility    Extremity Weight-bearing Status right lower extremity  -CF     Right Lower Extremity (Weight-bearing Status) weight-bearing as tolerated (WBAT)  -           User Key  (r) = Recorded By, (t) = Taken By, (c) = Cosigned By    Initials Name Provider Type    CF Deidra Howe PT Physical Therapist               Obj/Interventions     Row Name 12/24/21 1249          Range of Motion Comprehensive    Comment, General Range of Motion R hip limited post op but WFL  -     Row Name 12/24/21 1249          Strength Comprehensive (MMT)    Comment, General Manual Muscle Testing (MMT) Assessment Expected post op weakness on RLE  -CF     Row Name 12/24/21 1249          Motor Skills    Therapeutic Exercise other (see comments)  DANIELLE exercises x5 reps  -     Row Name 12/24/21 1249          Balance    Balance Assessment sitting static balance; sitting dynamic balance; standing static balance; standing dynamic balance  -     Static Sitting Balance WFL; sitting, edge of bed  -CF     Dynamic Sitting Balance WFL; sitting, edge of bed  -CF     Static Standing Balance WFL; supported  -     Dynamic Standing Balance WFL; supported  -     Row Name 12/24/21 1249          Sensory Assessment (Somatosensory)    Sensory Assessment (Somatosensory) LE sensation intact  -           User Key  (r) = Recorded By, (t) = Taken By, (c) = Cosigned By    Initials Name Provider Type    Deidra Bravo PT Physical Therapist                Goals/Plan     Row Name 12/24/21 1254          Bed Mobility Goal 1 (PT)    Activity/Assistive Device (Bed Mobility Goal 1, PT) bed mobility activities, all  -CF     [place] Level/Cues Needed (Bed Mobility Goal 1, PT) contact guard assist  -CF     Time Frame (Bed Mobility Goal 1, PT) 3 days  -CF     Row Name 12/24/21 1254          Transfer Goal 1 (PT)    Activity/Assistive Device (Transfer Goal 1, PT) sit-to-stand/stand-to-sit; bed-to-chair/chair-to-bed  -CF     [place] Level/Cues Needed (Transfer Goal 1, PT) contact guard assist  -CF     Time Frame (Transfer Goal 1, PT) 3 days  -CF     Row Name 12/24/21 1254          Gait Training Goal 1 (PT)    Activity/Assistive Device (Gait Training Goal 1, PT) gait (walking locomotion); walker, rolling  -CF     [place] Level (Gait Training Goal 1, PT) contact guard assist  -CF     Distance (Gait Training Goal 1, PT) 75'  -CF     Time Frame (Gait Training Goal 1, PT) 3 days  -CF     Row Name 12/24/21 1254          Stairs Goal 1 (PT)    Activity/Assistive Device (Stairs Goal 1, PT) ascending stairs; descending stairs  -CF     [place] Level/Cues Needed (Stairs Goal 1, PT) contact guard assist  -CF     Number of Stairs (Stairs Goal 1, PT) 2  -CF     Time Frame (Stairs Goal 1, PT) 3 days  -CF           User Key  (r) = Recorded By, (t) = Taken By, (c) = Cosigned By    Initials Name Provider Type    CF Deidra Howe, PT Physical Therapist               Clinical Impression     Row Name 12/24/21 1249          Pain    Additional Documentation Pain Scale: Numbers Pre/Post-Treatment (Group)  -CF     Row Name 12/24/21 9491          Pain Scale: Numbers Pre/Post-Treatment    Pretreatment Pain Rating 0/10 - no pain  -CF     Posttreatment Pain Rating 3/10  -CF     Pain Location - Side Right  -CF     Pain Location - Orientation incisional  -CF     Pain Location hip  -CF     Pain Intervention(s) Repositioned; Ambulation/increased activity; Rest  -     Row Name 12/24/21  1249          Plan of Care Review    Plan of Care Reviewed With patient  -CF     Outcome Summary Pt seen in PACU for PT evaluation this morning. Pt is s/p R anterior DANIELLE. Pt reports having a few steps to enter with R handrail and will have assist at home. Upon exam pt demonstrates ROM deficits, anticipated post op strength deficits and general functional mobility deficits. Pt completed bed mobility with min A at RLE, transfers with cga and ambulated 40' using walker and cga. Pt demonstrates ability to march in place with good foot clearance bilaterally suggestive of being able to complete stairs. Pt has necessary equipment at home and plan is to d/c home today vs tomorrow. Ok with PT for pt to d/c home today with assist and HH services.  -CF     Row Name 12/24/21 2753          Therapy Assessment/Plan (PT)    Rehab Potential (PT) good, to achieve stated therapy goals  -CF     Criteria for Skilled Interventions Met (PT) yes  -CF     Predicted Duration of Therapy Intervention (PT) 4 days  -CF     Row Name 12/24/21 1249          Vital Signs    O2 Delivery Pre Treatment room air  -CF     O2 Delivery Intra Treatment room air  -CF     O2 Delivery Post Treatment room air  -CF     Row Name 12/24/21 1245          Positioning and Restraints    Pre-Treatment Position in bed  -CF     Post Treatment Position bed  in pacu  -CF     In Bed call light within reach; notified nsg; encouraged to call for assist; fowlers; side rails up x2; SCD pump applied  -CF           User Key  (r) = Recorded By, (t) = Taken By, (c) = Cosigned By    Initials Name Provider Type    CF Deidra Howe, PT Physical Therapist               Outcome Measures     Row Name 12/24/21 3750          How much help from another person do you currently need...    Turning from your back to your side while in flat bed without using bedrails? 3  -CF     Moving from lying on back to sitting on the side of a flat bed without bedrails? 3  -CF     Moving to and from a bed  to a chair (including a wheelchair)? 3  -CF     Standing up from a chair using your arms (e.g., wheelchair, bedside chair)? 3  -CF     Climbing 3-5 steps with a railing? 3  -CF     To walk in hospital room? 3  -CF     AM-PAC 6 Clicks Score (PT) 18  -CF     Row Name 12/24/21 1255          Functional Assessment    Outcome Measure Options AM-PAC 6 Clicks Basic Mobility (PT)  -CF           User Key  (r) = Recorded By, (t) = Taken By, (c) = Cosigned By    Initials Name Provider Type    CF Deidra Howe, PT Physical Therapist                             Physical Therapy Education                 Title: PT OT SLP Therapies (Done)     Topic: Physical Therapy (Done)     Point: Mobility training (Done)     Learning Progress Summary           Patient Acceptance, E, VU by CF at 12/24/2021 1255                   Point: Home exercise program (Done)     Learning Progress Summary           Patient Acceptance, E, VU by CF at 12/24/2021 1255                   Point: Body mechanics (Done)     Learning Progress Summary           Patient Acceptance, E, VU by CF at 12/24/2021 1255                   Point: Precautions (Done)     Learning Progress Summary           Patient Acceptance, E, VU by CF at 12/24/2021 1255                               User Key     Initials Effective Dates Name Provider Type Discipline    CF 06/16/21 -  Deidra Howe, RUBENS Physical Therapist PT              PT Recommendation and Plan  Planned Therapy Interventions (PT): balance training, bed mobility training, gait training, ROM (range of motion), patient/family education, strengthening, transfer training, stair training  Plan of Care Reviewed With: patient  Outcome Summary: Pt seen in PACU for PT evaluation this morning. Pt is s/p R anterior DANIELLE. Pt reports having a few steps to enter with R handrail and will have assist at home. Upon exam pt demonstrates ROM deficits, anticipated post op strength deficits and general functional mobility deficits. Pt  completed bed mobility with min A at RLE, transfers with cga and ambulated 40' using walker and cga. Pt demonstrates ability to march in place with good foot clearance bilaterally suggestive of being able to complete stairs. Pt has necessary equipment at home and plan is to d/c home today vs tomorrow. Ok with PT for pt to d/c home today with assist and HH services.     Time Calculation:    PT Charges     Row Name 12/24/21 1245             Time Calculation    Start Time 1208  -CF      Stop Time 1222  -CF      Time Calculation (min) 14 min  -CF      PT Received On 12/24/21  -CF      PT - Next Appointment 12/25/21  -CF      PT Goal Re-Cert Due Date 12/28/21  -CF              Time Calculation- PT    Total Timed Code Minutes- PT 8 minute(s)  -CF            User Key  (r) = Recorded By, (t) = Taken By, (c) = Cosigned By    Initials Name Provider Type    CF Deidra Howe, PT Physical Therapist              Therapy Charges for Today     Code Description Service Date Service Provider Modifiers Qty    98017462133 HC PT EVAL LOW COMPLEXITY 2 12/24/2021 Deidra Howe, PT GP 1    92469553723 HC PT THER PROC EA 15 MIN 12/24/2021 Deidra Howe, PT GP 1          PT G-Codes  Outcome Measure Options: AM-PAC 6 Clicks Basic Mobility (PT)  AM-PAC 6 Clicks Score (PT): 18    Deidra Howe PT  12/24/2021

## 2021-12-24 NOTE — ANESTHESIA POSTPROCEDURE EVALUATION
"Patient: Valery Baez    Procedure Summary     Date: 12/24/21 Room / Location: [facility] OR [address] [place] [address] MAIN OR    Anesthesia Start: 0708 Anesthesia Stop: 0929    Procedure: Right anterior total hip arthroplasty (Right Hip) Diagnosis:       Primary osteoarthritis of right hip      (Primary osteoarthritis of right hip [M16.11])    Surgeons: Dejan Lange MD Provider: Aston Mackay MD    Anesthesia Type: general ASA Status: 3          Anesthesia Type: general    Vitals  Vitals Value Taken Time   /58 12/24/21 0946   Temp 36.4 °C (97.6 °F) 12/24/21 0924   Pulse 70 12/24/21 0954   Resp 16 12/24/21 0945   SpO2 100 % 12/24/21 0954   Vitals shown include unvalidated device data.        Post Anesthesia Care and Evaluation    Patient location during evaluation: bedside  Patient participation: complete - patient participated  Level of consciousness: awake and alert  Pain management: adequate  Airway patency: patent  Anesthetic complications: No anesthetic complications  PONV Status: none  Cardiovascular status: acceptable  Respiratory status: acceptable  Hydration status: acceptable    Comments: /58   Pulse 78   Temp 36.4 °C (97.6 °F) (Oral)   Resp 16   Ht 167.6 cm (66\")   Wt 80 kg (176 lb 5.9 oz)   SpO2 99%   BMI 28.47 kg/m²         "

## 2021-12-24 NOTE — H&P
"History & Physical       Patient: Valery Baez    Date of Admission: 12/24/2021  5:14 AM    YOB: 1945    Medical Record Number: [id number]    Attending Physician: Dejan Lange MD        Chief Complaints: Primary osteoarthritis of right hip [M16.11]  Osteoarthritis of right hip [M16.11]      History of Present Illness: 76 y.o. female presents with Primary osteoarthritis of right hip [M16.11]  Osteoarthritis of right hip [M16.11]. Onset of symptoms was gradual and slowly progressive since July.  She has had significant groin pain with a limp from the right hip discomfort.  Symptoms are associated with pain and discomfort and difficulty with flexion of the hip.  Symptoms are aggravated by going up and down steps.   Symptoms improve with using a cane and anti-inflammatory medication. Patient is now being admitted to the services of Dejan Lange MD for further evaluation and treatment.        Allergies   Allergen Reactions   • Bee Venom Anaphylaxis   • Latex Hives   • Codeine Hives     And Derivatives   • Cyclobenzaprine Other (See Comments) and Unknown - High Severity     Experienced side effects - blurred vision, dry mouth, constipation       • Darvon [Propoxyphene] Nausea And Vomiting     CONFUSION.    • Ditropan [Oxybutynin] Itching   • Duricef [Cefadroxil] Itching and Nausea Only   • Erythromycin Hives     Also allergic to Duricef   • Hydrocodone Nausea And Vomiting and GI Intolerance   • Iodine Hives     Iodine scrubbing solutions /IVP Contrast Dyes     • Meperidine Nausea And Vomiting   • Methylprednisolone Unknown (See Comments)     CAUSES ITCHING AND FACIAL FLUSHING BUT PT \"CAN TAKE LOWER DOSES\"   • Minocycline Hives, Diarrhea and Nausea And Vomiting     Only with doses > 100mg BID         • Morphine And Related Hives and GI Intolerance     Derivatives   • Oxycodone-Acetaminophen Hives   • Talwin [Pentazocine] Hives   • Tramadol Hives and GI Intolerance     ULTRAM ER   • Tramadol " Hcl Hives and Nausea And Vomiting   • Valium [Diazepam] Hives and Hallucinations     CONFUSION.    • Adhesive Tape Rash   • Bacitracin-Polymyxin B Rash   • Betadine [Povidone Iodine] Rash   • Contrast Dye Hives and Palpitations   • Daptomycin Itching and Hives   • Miconazole Rash   • Neomycin-Polymyxin-Gramicidin Rash   • Neosporin Af [Miconazole Nitrate] Rash   • Penicillins Hives, Swelling and Rash     As infant   • Silver Rash   • Sulfa Antibiotics Hives, Nausea And Vomiting and Rash         Home Medications:  Medications Prior to Admission   Medication Sig Dispense Refill Last Dose   • acetaminophen (TYLENOL) 325 MG tablet Take 2 tablets by mouth Every 4 (Four) Hours As Needed for Mild Pain .   12/23/2021 at 1300   • ARMOUR THYROID 30 MG tablet Take 30 mg by mouth Daily.   12/24/2021 at 0315   • atenolol (TENORMIN) 25 MG tablet Take 25 mg by mouth Every Evening. TAKES AT 1500   12/23/2021 at 1500   • atorvastatin (LIPITOR) 20 MG tablet Take 20 mg by mouth Every Night.   12/23/2021 at 2130   • cephalexin (KEFLEX) 500 MG capsule TAKE 1 CAPSULE DAILY (Patient taking differently: Take 500 mg by mouth Daily. STATES HAS BEEN ON FOR 10 YEARS TO SUPPRESS MRSA) 90 capsule 3 12/23/2021 at 2030   • diphenoxylate-atropine (LOMOTIL) 2.5-0.025 MG per tablet Take 1 tablet by mouth Daily.  1 12/23/2021 at 0830   • fexofenadine (ALLEGRA) 180 MG tablet Take 180 mg by mouth Daily.   12/23/2021 at 2030   • fluticasone (FLONASE) 50 MCG/ACT nasal spray 2 sprays into the nostril(s) as directed by provider Every Night.   Past Week at Unknown time   • guaiFENesin (MUCINEX PO) Take 400 mg by mouth 2 (Two) Times a Day.   12/23/2021 at 2130   • lansoprazole (PREVACID) 30 MG capsule Take 30 mg by mouth 2 (Two) Times a Day.   12/24/2021 at 0315   • levalbuterol (XOPENEX HFA) 45 MCG/ACT inhaler Inhale 1-2 puffs Every 4 (Four) Hours As Needed.   12/23/2021 at 2200   • Lifitegrast (Xiidra) 5 % ophthalmic solution Administer 1 drop to both eyes  2 (Two) Times a Day.   12/22/2021 at Unknown time   • montelukast (SINGULAIR) 10 MG tablet Take 10 mg by mouth Every Night.   12/23/2021 at 2100   • mupirocin (BACTROBAN) 2 % ointment if needed.   12/24/2021 at 0315   • MYRBETRIQ 25 MG tablet sustained-release 24 hour 24 hr tablet Take 25 mg by mouth Daily.   12/23/2021 at 0830   • pilocarpine (SALAGEN) 5 MG tablet TAKE 1 TABLET FOUR TIMES A DAY (Patient taking differently: Take 5 mg by mouth 3 (Three) Times a Day. TAKES WITH MEALS, MAY TAKE ONE AT BEDTIME AS WELL IF NEEDED) 360 tablet 3 12/23/2021 at 2130   • PROBIOTIC PRODUCT PO Take 1 capsule by mouth 2 (Two) Times a Day.   12/23/2021 at 2130   • Calcium Citrate-Vitamin D (CALCIUM CITRATE + D PO) Take 1 tablet by mouth Every Night. HOLD FOR SURGERY   12/21/2021   • Calcium-Magnesium-Zinc 333-133-5 MG tablet ONCE A DAY   12/21/2021   • Denosumab (PROLIA SC) Inject  under the skin into the appropriate area as directed See Admin Instructions. Every 6 months   11/4/21  WAS LAST DOSE   11/4/2021   • diphenhydrAMINE (BENADRYL ALLERGY CHILDRENS) 12.5 MG chewable tablet Chew 25 mg 4 (Four) Times a Day As Needed for Allergies. FOR BEE STINGS ONLY   More than a month at Unknown time   • EPIPEN 2-TOMMY 0.3 MG/0.3ML solution auto-injector injection As Needed. FOR BEE STINGS   More than a month at Unknown time   • HYDROcodone-acetaminophen (Norco) 7.5-325 MG per tablet Take one by mouth every 4-6 hours prn pain  Indications: s/p right total hip arthroplasty 40 tablet 0 pt has not taken yet   • meclizine (ANTIVERT) 25 MG tablet Take 25 mg by mouth 3 (Three) Times a Day As Needed.   More than a month at Unknown time   • Melatonin 10 MG tablet Take 1 tablet by mouth Every Night.   12/21/2021   • meloxicam (MOBIC) 15 MG tablet TAKE 1 TABLET DAILY (Patient taking differently: Take 15 mg by mouth Daily. HOLDING FOR SURGERY) 90 tablet 3    • metroNIDAZOLE (METROGEL) 0.75 % gel Apply 1 application topically to the appropriate area as  directed 2 (Two) Times a Day As Needed. ROASCEA   More than a month at Unknown time   • Multiple Vitamins-Minerals (MULTIVITAMIN ADULT PO) Take 1 tablet by mouth Every Night. HOLD FOR SURGERY   12/21/2021   • ondansetron (Zofran) 4 MG tablet Take 1 tablet by mouth Every 6 (Six) Hours As Needed for Nausea or Vomiting. Indications: Nausea and Vomiting Following an Operation 40 tablet 1 More than a month at Unknown time   • POTASSIUM CHLORIDE PO Take 99 mg by mouth Every Night. OTC  HOLD FOR SURGERY   12/21/2021   • rivaroxaban (Xarelto) 10 MG tablet Take 1 tablet by mouth Daily. Indications: Post Surgical - Hip 30 tablet 0 pt has not taken yet   • vitamin B-6 (PYRIDOXINE) 100 MG tablet Take 100 mg by mouth 2 (Two) Times a Day. HOLD FOR SURGERY   12/21/2021       Current Medications:  Scheduled Meds:Pharmacy to dose vancomycin, , Does not apply, Once  Scopolamine, 1 patch, Transdermal, Once  sodium chloride, 3 mL, Intravenous, Q12H  vancomycin, 15 mg/kg, Intravenous, Once      Continuous Infusions:lactated ringers, 9 mL/hr, Last Rate: 9 mL/hr (12/24/21 0644)      PRN Meds:.•  fentanyl  •  lidocaine PF 1%  •  sodium chloride       Past Medical History:   Diagnosis Date   • Anemia     CHRONIC IRON DEFICIENT   • Anesthesia complication     STATES SHE IS VERY SLOW TO WAKE UP   • Asthma    • Basal cell carcinoma     FACE   • Cancer (HCC)     right breast cancer   • Chronic diarrhea    • Chronic kidney disease    • DDD (degenerative disc disease), lumbar    • Disease of thyroid gland    • Dry eye    • Elevated cholesterol    • Frequency of urination    • GERD (gastroesophageal reflux disease)    • Hard to intubate     REQUESTING TO SEE [facility] 8/22/19; PT HAS COMPLETE BILATERAL JAW PROSTESIS   • Headache     or migraines   • Heart palpitations    • History of breast cancer     RIGHT   • History of transfusion    • [place] (hard of hearing)     BILAT HEARING AID   • Hx of renal calculi    • Hypercholesteremia    • Limited mobility      RIGHT HIP   • Limited mobility     JAW   • MRSA (methicillin resistant Staphylococcus aureus)     LEFT JAW @[facility] JOINT, MULT. ,LAST 2013, TREATED AT [facility], infection control notified 8/24/16. 1300   • Osteoarthritis    • Osteoporosis    • PONV (postoperative nausea and vomiting)    • Right hip pain    • Rosacea    • Short of breath on exertion     WITH STAIRS ONLY   • Sleep apnea     C PAP   • Spinal headache    • Spinal stenosis    • Urgency of urination    • Vertigo     WAKES UP WITH THIS SOMETIMES   • Weakness     RIGHT HIP        Past Surgical History:   Procedure Laterality Date   • ABSCESS DRAINAGE      abscess removed from left jaw x2   • APPENDECTOMY  1965   • AVULSION TOENAIL PLATE      6 REMOVED   • BILATERAL BREAST REDUCTION Left 8/29/2016    Procedure: LT BREAST REDUCTION ;  Surgeon: Luis Zambrano MD;  Location: [facility] OR;  Service:    • BREAST BIOPSY Bilateral    • CHOLECYSTECTOMY  09/2000   • COLONOSCOPY     • CYST REMOVAL  03/1984    eye cyst left lower lid   • CYST REMOVAL  01/2007    right upper thigh   • CYSTOSCOPY      x2  02/2013 & 04/2014   • CYSTOSCOPY      NUMEROUS   • D & C HYSTEROSCOPY     • HYSTERECTOMY  1971    LATER BSO   • KNEE ARTHROPLASTY Right 03/2005   • LAPAROSCOPIC SALPINGOOPHERECTOMY     • LUMBAR DISCECTOMY FUSION INSTRUMENTATION N/A 9/9/2019    Procedure: Lumbar 3 to sacral 1 laminectomy and fusion with instrumentation;  Surgeon: Vikas Couch MD;  Location: [facility] OR;  Service: Orthopedic Spine   • MANDIBLE SURGERY      UPPER AND LOWER JAW ADJUSTED WITH PLATES AND WIRES   • MASTECTOMY Right 03/2015   • MASTECTOMY PARTIAL / LUMPECTOMY Right 03/2009   • NIPPLE RECONSTRUCTION Right 8/29/2016    Procedure: RT NIPPLE RECONSTRUCTION;  Surgeon: Luis Zambrano MD;  Location: [facility] OR;  Service:    • OTHER SURGICAL HISTORY      laparoscopy adhesions right side x2   • OTHER SURGICAL HISTORY      drain mammosite area   • OTHER SURGICAL HISTORY  01/2010     pressure washed prosthesis area left jaw   • OTHER SURGICAL HISTORY      drain mammosite area right breast 09/2011   • RECONSTRUCTION BREAST W/ TRAM FLAP Right 02/2016   • ROTATOR CUFF REPAIR Left 09/2014   • TEMPOROMANDIBULAR JOINT ARTHROPLASTY Bilateral 1993    MULT. REPLACEMENTS LATER   • TONSILLECTOMY  1952        Social History     Occupational History   • Not on file   Tobacco Use   • Smoking status: Never Smoker   • Smokeless tobacco: Never Used   Vaping Use   • Vaping Use: Never used   Substance and Sexual Activity   • Alcohol use: Yes     Alcohol/week: 2.0 standard drinks     Types: 2 Shots of liquor per week     Comment: 4 drinks per week   • Drug use: Never   • Sexual activity: Defer      Social History     Social History Narrative   • Not on file        Family History   Problem Relation Age of Onset   • Hypertension Other    • Cancer Other         breast   • Heart disease Mother    • Heart disease Father    • Asthma Father    • Heart disease Maternal Grandmother    • Heart disease Maternal Grandfather    • Heart disease Paternal Grandmother    • Heart disease Paternal Grandfather    • Malig Hyperthermia Neg Hx          Review of Systems:   HEENT: Patient denies any headaches, vision changes, change in hearing, or tinnitus, Patient denies any rhinorrhea,epistaxis, sinus pain, mouth or dental problems, sore throat or hoarseness, or dysphagia  Pulmonary: Patient denies any cough, congestion, SOA, or wheezing  Cardiovascular: Patient denies any chest pain, dyspnea, palpitations, weakness, intolerance of exercise, varicosities, swelling of extremities, known murmur  Gastrointestinal:  Patient denies nausea, vomiting, diarrhea, constipation, loss  of appetite, change in appetite, dysphagia, gas, heartburn, melena, change in bowel habits, use of laxatives or other drugs to alter the function of the gastrointestinal tract.  Genital/Urinary: Patient denies dysuria, change in color of urine, change in frequency of  "urination, pain with urgency, incontinence, retention, or nocturia.  Musculoskeletal: Patient denies increased warmth; redness; or swelling of joints; limitation of function; deformity; crepitation: pain in a joint or an extremity, the neck, or the back, especially with movement.  Neurological: Patient denies dizziness, tremor, ataxia, difficulty in speaking, change in speech, paresthesia, loss of sensation, seizures, syncope, changes in memory.  Endocrine system: Patient denies tremors, palpitations, intolerance of heat or cold, polyuria, polydipsia, polyphagia, diaphoresis, exophthalmos, or goiter.  Psychological: Patient denies thoughts/plans or harming self or other; depression,  insomnia, night terrors, pastora, memory loss, disorientation.  Skin: Patient denies any bruising, rashes, discoloration, pruritus, wounds, ulcers, decubiti, changes in the hair or nails  Hematopoietic: Patient denies history of spontaneous or excessive bleeding, epistaxis, hematuria, melena, fatigue, enlarged or tender lymph nodes, pallor, history of anemia.    Physical Exam: 76 y.o. female  Vitals:    12/24/21 0603   BP: 124/79   BP Location: Left arm   Patient Position: Lying   Pulse: 98   Resp: 20   SpO2: 98%   Weight: 80 kg (176 lb 5.9 oz)   Height: 167.6 cm (66\")       General Appearance:          Alert, cooperative, in no acute distress                                                 Head:    Normocephalic, without obvious abnormality, atraumatic   Eyes:            Lids and lashes normal, conjunctivae and sclerae normal, no   icterus, no pallor, corneas clear, PERRLA   Ears:    Ears appear intact with no abnormalities noted   Throat:   No oral lesions, no thrush, oral mucosa moist   Neck:   No adenopathy, supple, trachea midline, no thyromegaly, no   carotid bruit, no JVD   Back:     No kyphosis present, no scoliosis present, no skin lesions,      erythema or scars, no tenderness to percussion or                   palpation,   " range of motion normal   Lungs:     Clear to auscultation,respirations regular, even and                  unlabored    Heart:    Regular rhythm and normal rate, normal S1 and S2, no            murmur, no gallop, no rub, no click   Chest Wall:    No abnormalities observed   Abdomen:     Normal bowel sounds, no masses, no organomegaly, soft        nontender, nondistended, no guarding, no rebound                tenderness   Rectal:     Deferred   Extremities:   Tenderness over anterior aspect of the right hip. Moves all extremities well, no edema,   no cyanosis, no redness   Pulses:   Pulses palpable and equal bilaterally   Skin:   No bleeding, bruising or rash   Lymph nodes:   No palpable adenopathy   Neurologic:   Cranial nerves 2 - 12 grossly intact, sensation intact, DTR       present and equal bilaterally      Right Hip. Patient has a very significant limp on the affected side. There is no lower extremity shortening. There is a positive Figure of 4 sign. Anterior joint line is exquisitely painful and tender for the patient. Greater trochanter is tender. Hip Flexion restricted from 0-60 degrees. Internal and external rotations are both associated with very significant pain and discomfort. Trendelenburg limp with gait. Skin and soft tissues are essentially normal. Dorsalis pedis and posterior tibial artery pulses are palpable distally. Common peroneal nerve function is well preserved.     Diagnostic Tests:  No visits with results within 2 Day(s) from this visit.   Latest known visit with results is:   Pre-Admission Testing on 12/22/2021   Component Date Value Ref Range Status   • COVID19 12/22/2021 Not Detected  Not Detected - Ref. Range Final   • QT Interval 12/22/2021 389  ms Final   • Glucose 12/22/2021 109* 65 - 99 mg/dL Final   • BUN 12/22/2021 17  8 - 23 mg/dL Final   • Creatinine 12/22/2021 0.71  0.57 - 1.00 mg/dL Final   • Sodium 12/22/2021 140  136 - 145 mmol/L Final   • Potassium 12/22/2021 3.8  3.5 - 5.2  mmol/L Final   • Chloride 12/22/2021 109* 98 - 107 mmol/L Final   • CO2 12/22/2021 21.1* 22.0 - 29.0 mmol/L Final   • Calcium 12/22/2021 8.4* 8.6 - 10.5 mg/dL Final   • eGFR Non  Amer 12/22/2021 80  >60 mL/min/1.73 Final   • BUN/Creatinine Ratio 12/22/2021 23.9  7.0 - 25.0 Final   • Anion Gap 12/22/2021 9.9  5.0 - 15.0 mmol/L Final   • WBC 12/22/2021 14.50* 3.40 - 10.80 10*3/mm3 Final   • RBC 12/22/2021 3.73* 3.77 - 5.28 10*6/mm3 Final   • Hemoglobin 12/22/2021 11.1* 12.0 - 15.9 g/dL Final   • Hematocrit 12/22/2021 33.6* 34.0 - 46.6 % Final   • MCV 12/22/2021 90.1  79.0 - 97.0 fL Final   • MCH 12/22/2021 29.8  26.6 - 33.0 pg Final   • MCHC 12/22/2021 33.0  31.5 - 35.7 g/dL Final   • RDW 12/22/2021 13.0  12.3 - 15.4 % Final   • RDW-SD 12/22/2021 42.2  37.0 - 54.0 fl Final   • MPV 12/22/2021 10.8  6.0 - 12.0 fL Final   • Platelets 12/22/2021 341  140 - 450 10*3/mm3 Final   • ABO Type 12/22/2021 A   Final   • RH type 12/22/2021 Positive   Final   • Antibody Screen 12/22/2021 Negative   Final   • T&S Expiration Date 12/22/2021 1/5/2022 11:59:00 PM   Final     No results found.      Assessment:  Patient Active Problem List   Diagnosis   • Chronic pain of left knee   • Spondylolisthesis   • Spondylolisthesis of lumbar region   • Spinal stenosis of lumbar region with neurogenic claudication   • Sleep apnea   • Stage 3 chronic kidney disease (HCC)   • Iron deficiency anemia   • Sjogren's disease (HCC)   • Asthma   • GERD (gastroesophageal reflux disease)   • Seasonal allergies   • MRSA (methicillin resistant Staphylococcus aureus) infection   • Medicare annual wellness visit, subsequent   • Greater trochanteric bursitis of right hip   • Right hip pain   • Status post total knee replacement, right   • Osteoarthritis of right hip         Plan:  The patient voiced understanding of the risks, benefits, and alternative forms of treatment that were discussed and the patient consents to proceed with right total hip  arthroplasty through direct anterior approach.     The patient was seen today for preoperative discussion.  The patient has been tried on over-the-counter and prescription NSAID's despite the risks of anti-inflammatory bleeding, peptic ulcers and erosive gastritis with short term benefit only.  Braces have been prescribed for mechanical support.  Patient has been participating in an exercise program specifically targeting joint pain relief with limited benefit. Intraarticular injections have been used periodically with some but not complete relief of pain.  Ambulation aids have also been utilized.      The details of the surgical procedure were explained including the location of probable incisions and a description of the likely hardware/grafts to be used. The patient understands the likely convalescence after surgery as well as the rehabilitation required.  Also, we have thoroughly discussed with the patient the risks, benefits and alternatives to surgery.  Risks include but are not limited to the risk of infection, joint stiffness, limited range of motion, wound healing problems, scar tissue build up, myocardial infarction, stroke, blood clots (including DVT and/or pulmonary embolus along with the risk of death) neurologic and/or vascular injury, limb length discrepancy, fracture, dislocation, nonunion, malunion, continued pain and need for further surgery including hardware failure requiring revision.   Discharge Plan: tomorrow to home and home health      Date: 12/24/2021    Dejan Lange MD      DICTATED UTILIZING DRAGON DICTATION

## 2021-12-24 NOTE — ANESTHESIA PREPROCEDURE EVALUATION
Anesthesia Evaluation     Patient summary reviewed and Nursing notes reviewed   history of anesthetic complications (has bilateral mandibular prostheses from traumatic injury in 1988): PONV difficult airway prolonged sedation  NPO Solid Status: > 8 hours  NPO Liquid Status: > 8 hours           Airway   Mallampati: II  TM distance: >3 FB  Neck ROM: full  Difficult intubation highly probable, Anterior and Small opening  Dental - normal exam     Pulmonary    (+) asthma,sleep apnea on CPAP,   (-) rhonchi, decreased breath sounds, wheezes, not a smoker  Cardiovascular   Exercise tolerance: good (4-7 METS)    Rhythm: regular  Rate: normal    (+) hyperlipidemia,   (-) hypertension, CAD, dysrhythmias, angina, PERLA, murmur      Neuro/Psych  (+) headaches,     (-) CVA  GI/Hepatic/Renal/Endo    (+)  GERD well controlled,  renal disease CRI, thyroid problem hypothyroidism  (-) diabetes    Musculoskeletal     Abdominal     Abdomen: soft.   Substance History      OB/GYN          Other   arthritis,    history of cancer remission                    Anesthesia Plan    ASA 3     general   (CMAC with eschmann for intubation   Low dose propofol for PONV prevention and scopolamine patch   Multimodal analgesia )  intravenous induction     Anesthetic plan, all risks, benefits, and alternatives have been provided, discussed and informed consent has been obtained with: patient.

## 2021-12-24 NOTE — DISCHARGE PLACEMENT REQUEST
"Tacos Baez (76 y.o. Female)             Date of Birth Social Security Number Address Home Phone MRN    1945  [address] [place] [address] [phone] [id number]    [employer] Marital Status             Yarsani        Admission Date Admission Type Admitting Provider Attending Provider Department, Room/Bed    12/24/21 Elective Dejan Lange MD Mehta, Sanjiv, MD [address], P876/1    Discharge Date Discharge Disposition Discharge Destination                         Attending Provider: Dejan Lange MD    Allergies: Bee Venom, Latex, Codeine, Cyclobenzaprine, Darvon [Propoxyphene], Ditropan [Oxybutynin], Duricef [Cefadroxil], Erythromycin, Hydrocodone, Iodine, Meperidine, Methylprednisolone, Minocycline, Morphine And Related, Oxycodone-acetaminophen, Talwin [Pentazocine], Tramadol, Tramadol Hcl, Valium [Diazepam], Adhesive Tape, Bacitracin-polymyxin B, Betadine [Povidone Iodine], Contrast Dye, Daptomycin, Miconazole, Neomycin-polymyxin-gramicidin, Neosporin Af [Miconazole Nitrate], Penicillins, Silver, Sulfa Antibiotics    Isolation: None   Infection: MRSA/History Only (09/09/19)   Code Status: Prior   Advance Care Planning Activity    Ht: 167.6 cm (66\")   Wt: 80 kg (176 lb 5.9 oz)    Admission Cmt: None   Principal Problem: Osteoarthritis of right hip [M16.11]                 Active Insurance as of 12/24/2021     Primary Coverage     Payor Plan Insurance Group Employer/Plan Group    MEDICARE MEDICARE A & B      Payor Plan Address Payor Plan Phone Number Payor Plan Fax Number Effective Dates    [address] [phone]  12/1/2011 - None Entered    [facility] [id number]       Subscriber Name Subscriber Birth Date Member ID       TACOS BAEZ 1945 [id number]           Secondary Coverage     Payor Plan Insurance Group Employer/Plan Group     FOR LIFE  FOR LIFE  SUP       Payor Plan Address Payor Plan Phone Number Payor Plan " Fax Number Effective Dates    [address] [phone]  2/19/2016 - None Entered    [facility] [id number]       Subscriber Name Subscriber Birth Date Member ID       TOMMYVERNELL HOLDER 1945 [id number]                 Emergency Contacts      (Rel.) Home Phone Work Phone Mobile Phone    Jj Baez (Spouse) -- -- [phone]

## 2021-12-24 NOTE — CASE MANAGEMENT/SOCIAL WORK
Discharge Planning Assessment  [facility]     Patient Name: Valery Baez  MRN: [id number]  Today's Date: 12/24/2021    Admit Date: 12/24/2021     Discharge Needs Assessment     Row Name 12/24/21 1401       Living Environment    Lives With spouse    Name(s) of Who Lives With Patient /Jj.    Current Living Arrangements home/apartment/condo    Primary Care Provided by self    Provides Primary Care For no one    Family Caregiver if Needed spouse    Quality of Family Relationships helpful; involved; supportive    Able to Return to Prior Arrangements yes       Resource/Environmental Concerns    Transportation Concerns car, none       Transition Planning    Patient/Family Anticipates Transition to home with family; home with help/services    Patient/Family Anticipated Services at Transition     Transportation Anticipated family or friend will provide       Discharge Needs Assessment    Readmission Within the Last 30 Days no previous admission in last 30 days    Equipment Currently Used at Home cane, straight    Discharge Facility/Level of Care Needs home with home health    Provided Post Acute Provider List? N/A    N/A Provider List Comment Requests [facility] .               Discharge Plan     Row Name 12/24/21 1400       Plan    Plan Home with family support & [facility] .    Patient/Family in Agreement with Plan yes    Plan Comments Spoke with the patient, verified current information, and explained the role of the CCP. Patient said she lives with her /Jj and has family support. She's IADL and has a straight cane she uses when running errands. She has no history with [facility] and has worked with [facility]  in the past. Patient plans to d/c home with family support & HH. She requests [facility] . CMS Compare HH List was offered and declined. Referral sent in [facility]. Patient said she plans for Jj to transport her home at d/c. No other needs identified. CCP will follow.              Continued Care and  Services - Admitted Since 12/24/2021     Home Medical Care     Service Provider Request Status Selected Services Address Phone Fax Patient Preferred    [facility]  Accepted N/A [id number] ([email or web address]) [place] [address] [phone] --                 Demographic Summary     Row Name 12/24/21 1400       General Information    Admission Type observation    Reason for Consult discharge planning    Preferred Language English     Used During This Interaction no       Contact Information    Permission Granted to Share Info With ; family/designee               Functional Status     Row Name 12/24/21 1400       Functional Status    Usual Activity Tolerance good       Functional Status, IADL    Medications independent    Meal Preparation independent    Housekeeping independent    Laundry independent    Shopping assistive person       Mental Status Summary    Recent Changes in Mental Status/Cognitive Functioning no changes               Psychosocial     Row Name 12/24/21 1401       Intellectual Performance WDL    Level of Consciousness Alert       Coping/Stress    Patient Personal Strengths able to adapt    Sources of Support spouse    Reaction to Health Status accepting    Understanding of Condition and Treatment adequate understanding of medical condition       Developmental Stage (Eriksson's)    Developmental Stage Stage 8 (66 years-death/Late Adulthood) Integrity vs. Despair               Abuse/Neglect    No documentation.                Legal    No documentation.                Substance Abuse    No documentation.                Patient Forms    No documentation.                   Valery Boone, RN

## 2021-12-24 NOTE — OP NOTE
TOTAL HIP ARTHROPLASTY     PATIENT NAME:  Valery Baez     YOB: 1945     ATTENDING PHYSICIAN: Dejan Lange MD     DATE OF PROCEDURE: 12/24/2021     PREOPERATIVE DIAGNOSIS: Primary osteoarthritis of right hip [M16.11]     Post-Op Diagnosis Codes:     * Primary osteoarthritis of right hip [M16.11]    PRINCIPAL DIAGNOSIS: Stress fracture of the right femoral neck.    PROCEDURE: Right total hip arthroplasty, direct anterior.    SURGEON:  Dejan Lange M.D.    ASSISTANT: first Markell assistant was responsible for performing the following activities: Retraction, Suction, Irrigation, Suturing, Closing and Placing Dressing and their skilled assistance was necessary for the success of this case.       ANESTHESIOLOGIST: Dr. David MD.    ANESTHESIA: General with an LMA.    POSITION: Supine on a [place] table.    DRAINS: None.    COMPLICATIONS: None.    ESTIMATED BLOOD LOSS:  250 mL.    SPECIMENS: * No orders in the log *    IMPLANT USED: Johnny press-fit total hip replacement system, #3 Avenir complete collared HA-coated femoral stem with a standard offset and a standard neck length with a 44 mm diameter outer dual mobility shell with a 28 mm inner Biolox ceramic head, -3.5 mm neck length, 54 mm Johnny G-7  OsseotiTM coated multi-hole cup with a single dome screw in the posterosuperior quadrant with a highly cross-linked, Vitamin E impregnated, polyethylene liner, 44 mm outer diameter with a 28 mm inner ceramic head ball for a dual mobility construct on the femoral head.    INDICATION: Right hip pain since July with a femoral neck stress fracture diagnosed with MRI.  All risks and benefits, potential complications, possibility of death, infection, myocardial infarction, DVT, pulmonary embolism, wound and soft tissue breakdown, dislocation of the prosthesis, limb length discrepancy, etc. were all discussed with the patient and an informed consent was signed.     DETAILS OF PROCEDURE: Surgical  timeout was called. Operative extremity was correctly identified in the operating room suite. Patient was given antibiotics per the SCIP protocol.  Patient was placed under appropriate anesthetic. C-arm was used through the entire procedure to accurately evaluate the limb lengths and the stability of the components as well as appropriate positioning of the components in the proximal femur and the acetabulum.  Patient was placed on the [place] table, prepped and draped in a standard fashion.  A direct anterior approach was carried out.      The fascia over the TFL was incised. The TFL was retracted laterally. A Cobra retractor was placed on the superior aspect of the femoral neck. A second Cobra was placed on the inferior aspect of the femoral neck. The leg was manipulated and the anterior capsule was carefully identified. The rectus femoris was retracted medially. Distally, the vastus lateralis was mobilized with a Cifuentes elevator and L-shaped capsulotomy was carried out and the flaps of the capsule were developed. FiberWire sutures were placed in the flaps of the capsule to allow for mobilization and subsequent closure of the soft tissue envelope. The Cobra retractors were placed subperiosteally, one inferior to the femoral neck and one superior to the femoral neck. The dissection was carried out up to the saddle of the greater trochanter laterally. The femoral osteotomy was carried out along the line of the broach. A napkin ring segment of femoral neck was removed to allow easy removal of the femoral head. A motorized corkscrew was used and the femoral head was removed from the acetabulum.    Retractors were placed around the acetabulum at the 4 o’clock to 7 o’clock and the 11 o’clock positions. Acetabular labrum was resected. The ligamentum teres was resected. Reaming was commenced under direct C-arm control.  Appropriate amounts of flexion, abduction and anteversion were built into the reaming process. Smaller sized  reamer was used and the smaller sized reamer was used to medialize the acetabulum up to the teardrop, which was visualized on the C-arm monitors. The reaming size went up progressively up until a good bed of bleeding subchondral bone was exposed. The acetabulum was then lavaged with Bacitracin irrigating solution. Diluted Betadine was used in antibiotic solution and the 54 mm diameter, G-7 multi-hole cup was then impacted and locked into position on the native acetabulum. Appropriate amounts of flexion, abduction and anteversion were built into the positioning of the cup. A screw was placed in the posterosuperior quadrant to augment the stability of the cup. The neutral cobalt chrome liner, for a dual mobility construct Vitamin E impregnated, highly cross-linked was impacted and locked into position as well.    Attention was then directed towards the proximal femur.  The proximal femur was delivered into the wound with a combination of adduction, extension and external rotation.  The pigtail device was placed subperiosteally under the proximal femur and attached to the [place] table to stabilize the femur for instrumentation.  The piriformis fossa was cleared of all soft tissue.  The cancellus bone on the internal aspect of the lateral femur was removed with a curette to minimize the malpositioning of the broaches into varus.  The rat tailed device was then used to enter into the femoral canal.  We then commenced broaching with the smallest broach.  The broach was leaned up against the lateral cortex to minimize varus-valgus malposition.  We broached up to the point that the proximal metaphysis was well filled and there was good torsional stability to the broach.  A #3 Avenir HA-coated complete collared stem with a standard neck length and a standard offset trial was found to be the best fit.  A trial head and neck was placed and the femur was reduced into the acetabulum.  The limb lengths were checked and found to be  anatomically accurate.  Intraoperative C-arm images were obtained to determine the limb lengths and the positioning of the components.  These were found to be anatomically accurate.  The femoral canal was then lavaged with bacitracin irrigating solution and dried.  The femoral component, a #3 Avenir complete HA-coated standard offset standard neck length HA-coated implant was impacted into position and a good fit was obtained.  There was good torsional stability.  Some bone graft was packed around the femoral component to promote ingrowth.  The trunnion of the femoral component was dried and the dual mobility, 44 mm diameter outer shell with a 28 mm ceramic Biolox head ball was impacted and locked onto the femoral component.  Final reduction was carried out.  The stability and limb length were checked one more time.  There was no tendency towards dislocation of the components in any position of the lower extremity.  Limb lengths were checked on C-arm images found to be anatomical.  The capsule and the subperiosteal structures were infiltrated with an analgesic for postoperative analgesia.  The anterior capsule was repaired with interrupted sutures.  The fascia over the TFL was repaired with interrupted suture.  Subcuticular sutures applied.  Occlusive dressing was applied.  Sponge gauze and needle count was correct.  No complications were encountered.  Patient was reversed from the anesthetic and taken from the operating room to the recovery room in stable condition.  No complications encountered.  I discussed the satisfactory performance of this procedure with the patient's family and answered all questions for them.     Dejan Lange MD  12/24/2021  07:24 EST

## 2021-12-25 ENCOUNTER — READMISSION MANAGEMENT (OUTPATIENT)
Dept: CALL CENTER | Facility: HOSPITAL | Age: 76
End: 2021-12-25

## 2021-12-25 VITALS
WEIGHT: 176.37 LBS | BODY MASS INDEX: 28.34 KG/M2 | TEMPERATURE: 97.8 F | HEIGHT: 66 IN | OXYGEN SATURATION: 96 % | RESPIRATION RATE: 16 BRPM | DIASTOLIC BLOOD PRESSURE: 54 MMHG | HEART RATE: 73 BPM | SYSTOLIC BLOOD PRESSURE: 96 MMHG

## 2021-12-25 LAB
HCT VFR BLD AUTO: 26.8 % (ref 34–46.6)
HGB BLD-MCNC: 9.1 G/DL (ref 12–15.9)

## 2021-12-25 PROCEDURE — 63710000001 THYROID 30 MG TABLET: Performed by: ORTHOPAEDIC SURGERY

## 2021-12-25 PROCEDURE — G0378 HOSPITAL OBSERVATION PER HR: HCPCS

## 2021-12-25 PROCEDURE — A9270 NON-COVERED ITEM OR SERVICE: HCPCS | Performed by: ORTHOPAEDIC SURGERY

## 2021-12-25 PROCEDURE — 63710000001 DIPHENOXYLATE-ATROPINE 2.5-0.025 MG TABLET: Performed by: ORTHOPAEDIC SURGERY

## 2021-12-25 PROCEDURE — 63710000001 MELOXICAM 15 MG TABLET: Performed by: ORTHOPAEDIC SURGERY

## 2021-12-25 PROCEDURE — 63710000001 PILOCARPINE 5 MG TABLET: Performed by: ORTHOPAEDIC SURGERY

## 2021-12-25 PROCEDURE — 63710000001 CETIRIZINE 10 MG TABLET: Performed by: ORTHOPAEDIC SURGERY

## 2021-12-25 PROCEDURE — 85018 HEMOGLOBIN: CPT | Performed by: ORTHOPAEDIC SURGERY

## 2021-12-25 PROCEDURE — 63710000001 PANTOPRAZOLE 40 MG TABLET DELAYED-RELEASE: Performed by: ORTHOPAEDIC SURGERY

## 2021-12-25 PROCEDURE — 63710000001 POLYETHYLENE GLYCOL 17 G PACK: Performed by: ORTHOPAEDIC SURGERY

## 2021-12-25 PROCEDURE — 63710000001 LACTOBACILLUS ACIDOPHILUS CAPSULE: Performed by: ORTHOPAEDIC SURGERY

## 2021-12-25 PROCEDURE — 97530 THERAPEUTIC ACTIVITIES: CPT

## 2021-12-25 PROCEDURE — 99024 POSTOP FOLLOW-UP VISIT: CPT | Performed by: ORTHOPAEDIC SURGERY

## 2021-12-25 PROCEDURE — 85014 HEMATOCRIT: CPT | Performed by: ORTHOPAEDIC SURGERY

## 2021-12-25 PROCEDURE — 63710000001 ACETAMINOPHEN 325 MG TABLET: Performed by: ORTHOPAEDIC SURGERY

## 2021-12-25 PROCEDURE — 63710000001 MUPIROCIN 2 % OINTMENT: Performed by: ORTHOPAEDIC SURGERY

## 2021-12-25 PROCEDURE — 63710000001 MIRABEGRON ER 25 MG TABLET SUSTAINED-RELEASE 24 HOUR: Performed by: ORTHOPAEDIC SURGERY

## 2021-12-25 PROCEDURE — 63710000001 GUAIFENESIN 600 MG TABLET SUSTAINED-RELEASE 12 HOUR: Performed by: ORTHOPAEDIC SURGERY

## 2021-12-25 PROCEDURE — 97116 GAIT TRAINING THERAPY: CPT

## 2021-12-25 RX ADMIN — ACETAMINOPHEN 325 MG: 325 TABLET, FILM COATED ORAL at 09:03

## 2021-12-25 RX ADMIN — PANTOPRAZOLE SODIUM 40 MG: 40 TABLET, DELAYED RELEASE ORAL at 09:03

## 2021-12-25 RX ADMIN — GUAIFENESIN 600 MG: 600 TABLET, EXTENDED RELEASE ORAL at 09:03

## 2021-12-25 RX ADMIN — PILOCARPINE HYDRCHLORIDE 5 MG: 5 TABLET, FILM COATED ORAL at 09:03

## 2021-12-25 RX ADMIN — MELOXICAM 15 MG: 15 TABLET ORAL at 09:03

## 2021-12-25 RX ADMIN — MIRABEGRON 25 MG: 25 TABLET, FILM COATED, EXTENDED RELEASE ORAL at 09:03

## 2021-12-25 RX ADMIN — LEVOTHYROXINE, LIOTHYRONINE 30 MG: 19; 4.5 TABLET ORAL at 09:03

## 2021-12-25 RX ADMIN — MUPIROCIN 1 APPLICATION: 20 OINTMENT TOPICAL at 09:03

## 2021-12-25 RX ADMIN — Medication 1 CAPSULE: at 09:03

## 2021-12-25 RX ADMIN — CETIRIZINE HYDROCHLORIDE 10 MG: 10 TABLET ORAL at 09:03

## 2021-12-25 RX ADMIN — DIPHENOXYLATE HYDROCHLORIDE AND ATROPINE SULFATE 1 TABLET: 2.5; .025 TABLET ORAL at 09:06

## 2021-12-25 RX ADMIN — POLYETHYLENE GLYCOL 3350 17 G: 17 POWDER, FOR SOLUTION ORAL at 09:03

## 2021-12-25 NOTE — OUTREACH NOTE
Prep Survey      Responses   [facility] facility patient discharged from? [place]   Is LACE score < 7 ? No   Emergency Room discharge w/ pulse ox? No   [facility]   Date of Admission 12/24/21   Date of Discharge 12/25/21   Discharge Disposition Home or Self Care   Discharge diagnosis Right anterior total hip arthroplasty   Does the patient have one of the following disease processes/diagnoses(primary or secondary)? Total Joint Replacement   Does the patient have Home health ordered? Yes   What is the Home health agency?   [facility]   Is there a DME ordered? No   Prep survey completed? Yes          Martina Shaver RN

## 2021-12-25 NOTE — DISCHARGE SUMMARY
"Orthopedic Discharge Summary      Patient: Valery Baez      YOB: 1945    Medical Record Number: [id number]    Attending Physician: Dejan Lange MD orthopedic surgery.  Consulting Physician(s): Hospitalist service, Dr. Isai Tracy.  Date of Admission: 12/24/2021  5:14 AM  Date of Discharge: 12/25/2021      Patient Active Problem List   Diagnosis   • Chronic pain of left knee   • Spondylolisthesis   • Spondylolisthesis of lumbar region   • Spinal stenosis of lumbar region with neurogenic claudication   • Sleep apnea   • Stage 3 chronic kidney disease (HCC)   • Iron deficiency anemia   • Sjogren's disease (HCC)   • Asthma   • GERD (gastroesophageal reflux disease)   • Seasonal allergies   • MRSA (methicillin resistant Staphylococcus aureus) infection   • Medicare annual wellness visit, subsequent   • Greater trochanteric bursitis of right hip   • Right hip pain   • Status post total knee replacement, right   • Osteoarthritis of right hip     AR TOTAL HIP ARTHROPLASTY [13727] (Right anterior total hip arthroplasty)       Allergies   Allergen Reactions   • Bee Venom Anaphylaxis   • Latex Hives   • Codeine Hives     And Derivatives   • Cyclobenzaprine Other (See Comments) and Unknown - High Severity     Experienced side effects - blurred vision, dry mouth, constipation       • Darvon [Propoxyphene] Nausea And Vomiting     CONFUSION.    • Ditropan [Oxybutynin] Itching   • Duricef [Cefadroxil] Itching and Nausea Only   • Erythromycin Hives     Also allergic to Duricef   • Hydrocodone Nausea And Vomiting and GI Intolerance   • Iodine Hives     Iodine scrubbing solutions /IVP Contrast Dyes     • Meperidine Nausea And Vomiting   • Methylprednisolone Unknown (See Comments)     CAUSES ITCHING AND FACIAL FLUSHING BUT PT \"CAN TAKE LOWER DOSES\"   • Minocycline Hives, Diarrhea and Nausea And Vomiting     Only with doses > 100mg BID         • Morphine And Related Hives and GI Intolerance     Derivatives "   • Oxycodone-Acetaminophen Hives   • Talwin [Pentazocine] Hives   • Tramadol Hives and GI Intolerance     ULTRAM ER   • Tramadol Hcl Hives and Nausea And Vomiting   • Valium [Diazepam] Hives and Hallucinations     CONFUSION.    • Adhesive Tape Rash   • Bacitracin-Polymyxin B Rash   • Betadine [Povidone Iodine] Rash   • Contrast Dye Hives and Palpitations   • Daptomycin Itching and Hives   • Miconazole Rash   • Neomycin-Polymyxin-Gramicidin Rash   • Neosporin Af [Miconazole Nitrate] Rash   • Penicillins Hives, Swelling and Rash     As infant   • Silver Rash   • Sulfa Antibiotics Hives, Nausea And Vomiting and Rash       Current Medications:     Discharge Medications      New Medications      Instructions Start Date   aspirin 81 MG EC tablet  Notes to patient: Next dose due after finishing Xarelto   81 mg, Oral, Daily, Resume after taking Xarelto RX         Changes to Medications      Instructions Start Date   cephalexin 500 MG capsule  Commonly known as: KEFLEX  What changed: additional instructions  Notes to patient: Next dose due: tomorrow   TAKE 1 CAPSULE DAILY      pilocarpine 5 MG tablet  Commonly known as: SALAGEN  What changed:   · when to take this  · additional instructions  Notes to patient: Next dose due: 4pm   TAKE 1 TABLET FOUR TIMES A DAY         Continue These Medications      Instructions Start Date   acetaminophen 325 MG tablet  Commonly known as: TYLENOL  Notes to patient: Next dose available: 1pm   650 mg, Oral, Every 4 Hours PRN      Scammon Thyroid 30 MG tablet  Generic drug: Thyroid  Notes to patient: Next dose due: tomorrow   30 mg, Oral, Daily      atenolol 25 MG tablet  Commonly known as: TENORMIN  Notes to patient: Next dose due: tonight   25 mg, Oral, Every Evening, TAKES AT 1500      atorvastatin 20 MG tablet  Commonly known as: LIPITOR  Notes to patient: Next dose due: tonight   20 mg, Oral, Nightly      Benadryl Allergy Childrens 12.5 MG chewable tablet  Generic drug:  diphenhydrAMINE  Notes to patient: Next dose available at any time   25 mg, Oral, 4 Times Daily PRN, FOR BEE STINGS ONLY      CALCIUM CITRATE + D PO  Notes to patient: Next dose due: tonight   1 tablet, Oral, Nightly, HOLD FOR SURGERY      Calcium-Magnesium-Zinc 333-133-5 MG tablet  Notes to patient: Next dose due: tomorrow   ONCE A DAY      diphenoxylate-atropine 2.5-0.025 MG per tablet  Commonly known as: LOMOTIL  Notes to patient: Next dose due: tomorrow   1 tablet, Oral, Daily      EpiPen 2-Calderon 0.3 MG/0.3ML solution auto-injector injection  Generic drug: EPINEPHrine   As Needed, FOR BEE STINGS      fexofenadine 180 MG tablet  Commonly known as: ALLEGRA  Notes to patient: Next dose due: tomorrow   180 mg, Oral, Daily      fluticasone 50 MCG/ACT nasal spray  Commonly known as: FLONASE  Notes to patient: Next dose due: tonight   2 sprays, Nasal, Nightly      HYDROcodone-acetaminophen 7.5-325 MG per tablet  Commonly known as: Norco  Notes to patient: Next dose available at any time   Take one by mouth every 4-6 hours prn pain      lansoprazole 30 MG capsule  Commonly known as: PREVACID  Notes to patient: Next dose due: tonight   30 mg, Oral, 2 Times Daily      levalbuterol 45 MCG/ACT inhaler  Commonly known as: XOPENEX HFA  Notes to patient: Next dose available at any time   1-2 puffs, Inhalation, Every 4 Hours PRN      meclizine 25 MG tablet  Commonly known as: ANTIVERT  Notes to patient: Next dose available at any time   25 mg, Oral, 3 Times Daily PRN      Melatonin 10 MG tablet  Notes to patient: Next dose due: tonight   1 tablet, Oral, Nightly      metroNIDAZOLE 0.75 % gel  Commonly known as: METROGEL  Notes to patient: Next dose available at any time   1 application, Topical, 2 Times Daily PRN, ROASCEA      montelukast 10 MG tablet  Commonly known as: SINGULAIR  Notes to patient: Next dose due: tonight   10 mg, Oral, Nightly      MUCINEX PO  Notes to patient: Next dose due: tonight   400 mg, Oral, 2 Times  Daily      multivitamin with minerals tablet tablet  Notes to patient: Next dose due: tonight   1 tablet, Oral, Nightly, HOLD FOR SURGERY      Myrbetriq 25 MG tablet sustained-release 24 hour 24 hr tablet  Generic drug: Mirabegron ER  Notes to patient: Next dose due: tomorrow   25 mg, Oral, Daily      ondansetron 4 MG tablet  Commonly known as: Zofran  Notes to patient: Next dose available at any time   4 mg, Oral, Every 6 Hours PRN      POTASSIUM CHLORIDE PO  Notes to patient: Next dose due: tonight   99 mg, Oral, Nightly, OTC HOLD FOR SURGERY      PROBIOTIC PRODUCT PO  Notes to patient: Next dose due: tonight   1 capsule, Oral, 2 Times Daily      PROLIA SC  Notes to patient: Resume regular schedule   Subcutaneous, See Admin Instructions, Every 6 months   11/4/21  WAS LAST DOSE      rivaroxaban 10 MG tablet  Commonly known as: Xarelto  Notes to patient: Next dose due: tonight   10 mg, Oral, Daily      vitamin B-6 100 MG tablet  Commonly known as: PYRIDOXINE  Notes to patient: Next dose due: tonight   100 mg, Oral, 2 Times Daily, HOLD FOR SURGERY      Xiidra 5 % ophthalmic solution  Generic drug: Lifitegrast  Notes to patient: Next dose due: tonight   1 drop, Both Eyes, 2 Times Daily         Stop These Medications    meloxicam 15 MG tablet  Commonly known as: MOBIC     mupirocin 2 % ointment  Commonly known as: BACTROBAN                 Past Medical History:   Diagnosis Date   • Anemia     CHRONIC IRON DEFICIENT   • Anesthesia complication     STATES SHE IS VERY SLOW TO WAKE UP   • Asthma    • Basal cell carcinoma     FACE   • Cancer (HCC)     right breast cancer   • Chronic diarrhea    • Chronic kidney disease    • DDD (degenerative disc disease), lumbar    • Disease of thyroid gland    • Dry eye    • Elevated cholesterol    • Frequency of urination    • GERD (gastroesophageal reflux disease)    • Hard to intubate     REQUESTING TO SEE [facility] 8/22/19; PT HAS COMPLETE BILATERAL JAW PROSTESIS   • Headache     or  migraines   • Heart palpitations    • History of breast cancer     RIGHT   • History of transfusion    • Jamul (hard of hearing)     BILAT HEARING AID   • Hx of renal calculi    • Hypercholesteremia    • Limited mobility     RIGHT HIP   • Limited mobility     JAW   • MRSA (methicillin resistant Staphylococcus aureus)     LEFT JAW @[facility] JOINT, MULT. ,LAST 2013, TREATED AT [facility], infection control notified 8/24/16. 1300   • Osteoarthritis    • Osteoporosis    • PONV (postoperative nausea and vomiting)    • Right hip pain    • Rosacea    • Short of breath on exertion     WITH STAIRS ONLY   • Sleep apnea     C PAP   • Spinal headache    • Spinal stenosis    • Urgency of urination    • Vertigo     WAKES UP WITH THIS SOMETIMES   • Weakness     RIGHT HIP        Past Surgical History:   Procedure Laterality Date   • ABSCESS DRAINAGE      abscess removed from left jaw x2   • APPENDECTOMY  1965   • AVULSION TOENAIL PLATE      6 REMOVED   • BILATERAL BREAST REDUCTION Left 8/29/2016    Procedure: LT BREAST REDUCTION ;  Surgeon: Luis Zambrano MD;  Location: [facility] OR;  Service:    • BREAST BIOPSY Bilateral    • CHOLECYSTECTOMY  09/2000   • COLONOSCOPY     • CYST REMOVAL  03/1984    eye cyst left lower lid   • CYST REMOVAL  01/2007    right upper thigh   • CYSTOSCOPY      x2  02/2013 & 04/2014   • CYSTOSCOPY      NUMEROUS   • D & C HYSTEROSCOPY     • HYSTERECTOMY  1971    LATER BSO   • KNEE ARTHROPLASTY Right 03/2005   • LAPAROSCOPIC SALPINGOOPHERECTOMY     • LUMBAR DISCECTOMY FUSION INSTRUMENTATION N/A 9/9/2019    Procedure: Lumbar 3 to sacral 1 laminectomy and fusion with instrumentation;  Surgeon: Vikas Couch MD;  Location: [facility] OR;  Service: Orthopedic Spine   • MANDIBLE SURGERY      UPPER AND LOWER JAW ADJUSTED WITH PLATES AND WIRES   • MASTECTOMY Right 03/2015   • MASTECTOMY PARTIAL / LUMPECTOMY Right 03/2009   • NIPPLE RECONSTRUCTION Right 8/29/2016    Procedure: RT NIPPLE RECONSTRUCTION;   Surgeon: Luis Zambrano MD;  Location: [facility] OR;  Service:    • OTHER SURGICAL HISTORY      laparoscopy adhesions right side x2   • OTHER SURGICAL HISTORY      drain mammosite area   • OTHER SURGICAL HISTORY  01/2010    pressure washed prosthesis area left jaw   • OTHER SURGICAL HISTORY      drain mammosite area right breast 09/2011   • RECONSTRUCTION BREAST W/ TRAM FLAP Right 02/2016   • ROTATOR CUFF REPAIR Left 09/2014   • TEMPOROMANDIBULAR JOINT ARTHROPLASTY Bilateral 1993    MULT. REPLACEMENTS LATER   • TONSILLECTOMY  1952        Social History     Occupational History   • Not on file   Tobacco Use   • Smoking status: Never Smoker   • Smokeless tobacco: Never Used   Vaping Use   • Vaping Use: Never used   Substance and Sexual Activity   • Alcohol use: Yes     Alcohol/week: 2.0 standard drinks     Types: 2 Shots of liquor per week     Comment: 4 drinks per week   • Drug use: Never   • Sexual activity: Defer      Social History     Social History Narrative   • Not on file        Family History   Problem Relation Age of Onset   • Hypertension Other    • Cancer Other         breast   • Heart disease Mother    • Heart disease Father    • Asthma Father    • Heart disease Maternal Grandmother    • Heart disease Maternal Grandfather    • Heart disease Paternal Grandmother    • Heart disease Paternal Grandfather    • Malig Hyperthermia Neg               Hospital Course:  76 y.o. female admitted to [facility] to services of No att. providers found with Primary osteoarthritis of right hip [M16.11]  Osteoarthritis of right hip [M16.11] on 12/24/2021 and underwent ND TOTAL HIP ARTHROPLASTY [20932] (Right anterior total hip arthroplasty) Per No att. providers found. Antibiotic and VTE prophylaxis were per [facility] protocols. Post-operatively the patient transferred to the post-operative floor where the patient underwent mobilization therapy that included active as well as passive ROM exercises. Opioids were titrated  to achieve appropriate pain management to allow for participation in mobilization exercises. Vital signs are now stable. The incision is intact without signs or symptoms of infection. Operative extremity neurovascular status remains intact.   Appropriate education re: incision care, activity levels, medications, and follow up visits was completed and all questions were answered. The patient is now deemed stable for discharge from hospital.      DIAGNOSTIC TESTS:   Admission on 12/24/2021, Discharged on 12/25/2021   Component Date Value Ref Range Status   • Glucose 12/24/2021 162* 65 - 99 mg/dL Final   • BUN 12/24/2021 14  8 - 23 mg/dL Final   • Creatinine 12/24/2021 0.71  0.57 - 1.00 mg/dL Final   • Sodium 12/24/2021 137  136 - 145 mmol/L Final   • Potassium 12/24/2021 4.3  3.5 - 5.2 mmol/L Final   • Chloride 12/24/2021 105  98 - 107 mmol/L Final   • CO2 12/24/2021 20.1* 22.0 - 29.0 mmol/L Final   • Calcium 12/24/2021 8.2* 8.6 - 10.5 mg/dL Final   • eGFR Non  Amer 12/24/2021 80  >60 mL/min/1.73 Final   • BUN/Creatinine Ratio 12/24/2021 19.7  7.0 - 25.0 Final   • Anion Gap 12/24/2021 11.9  5.0 - 15.0 mmol/L Final   • Hemoglobin 12/25/2021 9.1* 12.0 - 15.9 g/dL Final   • Hematocrit 12/25/2021 26.8* 34.0 - 46.6 % Final     No results found.    Discharge and Follow up Instructions:   Discharge Instructions:       1. Patient is to continue with physical therapy exercises BID and continue working with        the physical therapist as ordered.  2.  Continue to follow precautions as instructed.   3.  Patient may weight bear as tolerated.   4.  Continue RONN hose daily and ice regularly. Patient instructed on frequent calf                  pumping exercises.  May remove compression stockings at night.  5.  Patient also instructed on incentive spirometer during hospitalization and                          encouraged to continue to use at home regularly.   6.  Patient is instructed to continue DVT prophylaxis.  7.  The  dressing should be left in place. If waterproof dressing is intact the patient may         shower immediately following discharge. If the dressing becomes disloged or                   saturated it should be changed and patient must wait until POD #5 to shower. If               dressing is changed, apply dry sterile dressing after showering.  8.  Follow up appointment in 2 weeks - patient to call the office at [phone] to schedule.       Date: 12/25/2021    Dejan Lange MD      Time: Discharge 20 min     DICTATED UTILIZING DRAGON DICTATION

## 2021-12-25 NOTE — DISCHARGE INSTR - ACTIVITY
Lower Extremity Discharge Instructions:    I. ACTIVITIES:  1. Exercises:  Complete exercise program 2 times per day as instructed by surgeon  Exercise program will be directed by surgeon  During the day may be up ambulating to rest room/chair  Complete the ankle pump exercises at least 10 times per hour (while awake)  Keep legs elevated most of the day post operatively.  Use cold packs 20-30 minutes approximately 5 times per day. This should be done before and after completing your exercises and at any time you are experiencing pain/ stiffness in your operative extremity.      2. Activities of Daily Living:  No tub baths, hot tubs, or swimming pools until staples/sutures are removed and incision is healed.  May shower with YUNIER dressing, remove battery pack prior to doing so.    II. Restrictions  Your surgeon will discuss with you when you will be able to drive again.   Weight bearing as tolerated    III. Precautions:  Everyone that comes near you should wash their hands  No elective dental, genital-urinary, or colon procedures or surgical procedures for 12 weeks after surgery unless absolutely necessary.  Avoid sick people. If you must be around someone who is ill, they should wear a mask.  Avoid visits to the Emergency Room or Urgent Care. If you feel you must go to the Emergency Room or Urgent Care, please notify your surgeon.   If ordered stockings are to be placed on in the morning and removed at night. Monitor the stockings to ensure that any swelling is not causing the stockings to become too tight. In this case, remove stockings immediately.    IV. INCISION CARE:  Leave dressing in place until follow up  Do not touch or pick at the incision  Check incision every day and notify surgeon immediately if any of the following signs or symptoms are noted:  Increase in redness  Increase in swelling around the incision and of the entire extremity  Increase in pain  Drainage oozing from the incision  Pulling apart of  the edges of the incision  Increase in overall body temperature (greater than 100.5 degrees)  If staples or sutures are present, your surgeon will instruct you regarding suture or staple removal    V. Medications:   1. Anticoagulants: You may be discharged on an anticoagulant. This is a prophylactic medication that helps prevent blood clots during your post-operative period. The type and length of dosage varies based on your individual needs, procedure performed, and surgeon’s preference.  While taking the anticoagulant, you should avoid taking any additional aspirin, ibuprofen (Advil or Motrin), Aleve (Naprosyn) or other non-steroidal anti-inflammatory medications.   Notify surgeon immediately if any teodoro bleeding is noted in the urine, stool, emesis, or from the nose or the incision. Blood in the stool will often appear as black rather than red. Blood in urine may appear as pink. Blood in emesis may appear as brown/black like coffee grounds.  You will need to apply pressure for longer periods of time to any cuts or abrasions to stop bleeding  Avoid alcohol while taking anticoagulants    2. Stool Softeners: You will be at greater risk of constipation after surgery due to being less mobile and the pain medications.   Take stool softeners as instructed by your surgeon while on pain medications. Over the counter Colace 100 mg 1-2 capsules twice daily.   If stools become too loose or too frequent, please decreases the dosage or stop the stool softener.  If constipation occurs despite use of stool softeners, you are to continue the stool softeners and add a laxative (Milk of Magnesia 1 ounce daily as needed)  Drink plenty of fluids, and eat fruits and vegetables during your recovery time    3. Pain Medications utilized after surgery are narcotics and the law requires that the following information be given to all patients that are prescribed narcotics:  CLASSIFICATION: Pain medications are called Opioids and are  narcotics  LEGALITIES: It is illegal to share narcotics with others and to drive within 24 hours of taking narcotics  POTENTIAL SIDE EFFECTS: Potential side effects of opioids include: nausea, vomiting, itching, dizziness, drowsiness, dry mouth, constipation, and difficulty urinating.  POTENTIAL ADVERSE EFFECTS:   Opioid tolerance can develop with use of pain medications and this simply means that it requires more and more of the medication to control pain; however, this is seen more in patients that use opioids for longer periods of time.  Opioid dependence can develop with use of Opioids and this simply means that to stop the medication can cause withdrawal symptoms; however, this is seen with patients that use Opioids for longer periods of time.  Opioid addiction can develop with use of Opioids and the incidence of this is very unlikely in patients who take the medications as ordered and stop the medications as instructed.  Opioid overdose can be dangerous, but is unlikely when the medication is taken as ordered and stopped when ordered. It is important not to mix opioids with alcohol or with and type of sedative such as Benadryl as this can lead to over sedation and respiratory difficulty.  DOSAGE:   Pain medications will need to be taken consistently for the first week to decrease pain and promote adequate pain relief and participation in physical therapy.  After the initial surgical pain begins to resolve, you may begin to decrease the pain medication. By the end of 6 weeks, you should be off of pain medications.  Refills will not be given by the office during evening hours, on weekends, or after 6 weeks post-op.  To seek refills on pain medications during the initial 6 week post-operative period, you must call the office 48 hours in advance to request the refill. The office will then notify you when to  the prescription. DO NOT wait until you are out of the medication to request a refill.    V.  FOLLOW-UP VISITS:  You will need to follow up in the office with your surgeon in 1 week on Wednesday. Please call this number [phone] to schedule this appointment.  If you have any concerns or suspected complications prior to your follow up visit, please call your surgeons office. Do not wait until your appointment time if you suspect complications. These will need to be addressed in the office promptly.

## 2021-12-25 NOTE — CONSULTS
CONSULT NOTE    INTERNAL MEDICINE   [facility]       Patient Identification:  Name: Valery Baez  Age: 76 y.o.  Sex: female  :  1945  MRN: [id number]             Date of Consultation:  21          Primary Care Physician: Tino Smith MD                               Requesting Physician: dr loza  Reason for Consultation:medical management    Chief Complaint:  76 year old female who was admitted after a hip replacement by dr loza; we are asked to see her regarding medical management; she has a history of anemia, asthma, hypothyroidism and ckd3; the patient is feeling well postop; has some mild pain;     History of Present Illness:   As above      Past Medical History:  Past Medical History:   Diagnosis Date   • Anemia     CHRONIC IRON DEFICIENT   • Anesthesia complication     STATES SHE IS VERY SLOW TO WAKE UP   • Asthma    • Basal cell carcinoma     FACE   • Cancer (HCC)     right breast cancer   • Chronic diarrhea    • Chronic kidney disease    • DDD (degenerative disc disease), lumbar    • Disease of thyroid gland    • Dry eye    • Elevated cholesterol    • Frequency of urination    • GERD (gastroesophageal reflux disease)    • Hard to intubate     REQUESTING TO SEE AA 19; PT HAS COMPLETE BILATERAL JAW PROSTESIS   • Headache     or migraines   • Heart palpitations    • History of breast cancer     RIGHT   • History of transfusion    • [place] (hard of hearing)     BILAT HEARING AID   • Hx of renal calculi    • Hypercholesteremia    • Limited mobility     RIGHT HIP   • Limited mobility     JAW   • MRSA (methicillin resistant Staphylococcus aureus)     LEFT JAW @TMJ JOINT, MULT. ,LAST , TREATED AT [facility], infection control notified 16. 1300   • Osteoarthritis    • Osteoporosis    • PONV (postoperative nausea and vomiting)    • Right hip pain    • Rosacea    • Short of breath on exertion     WITH STAIRS ONLY   • Sleep apnea     C PAP   •  Spinal headache    • Spinal stenosis    • Urgency of urination    • Vertigo     WAKES UP WITH THIS SOMETIMES   • Weakness     RIGHT HIP     Past Surgical History:  Past Surgical History:   Procedure Laterality Date   • ABSCESS DRAINAGE      abscess removed from left jaw x2   • APPENDECTOMY  1965   • AVULSION TOENAIL PLATE      6 REMOVED   • BILATERAL BREAST REDUCTION Left 8/29/2016    Procedure: LT BREAST REDUCTION ;  Surgeon: Luis Zambrano MD;  Location: [facility];  Service:    • BREAST BIOPSY Bilateral    • CHOLECYSTECTOMY  09/2000   • COLONOSCOPY     • CYST REMOVAL  03/1984    eye cyst left lower lid   • CYST REMOVAL  01/2007    right upper thigh   • CYSTOSCOPY      x2  02/2013 & 04/2014   • CYSTOSCOPY      NUMEROUS   • D & C HYSTEROSCOPY     • HYSTERECTOMY  1971    LATER BSO   • KNEE ARTHROPLASTY Right 03/2005   • LAPAROSCOPIC SALPINGOOPHERECTOMY     • LUMBAR DISCECTOMY FUSION INSTRUMENTATION N/A 9/9/2019    Procedure: Lumbar 3 to sacral 1 laminectomy and fusion with instrumentation;  Surgeon: Vikas Couch MD;  Location: [facility];  Service: Orthopedic Spine   • MANDIBLE SURGERY      UPPER AND LOWER JAW ADJUSTED WITH PLATES AND WIRES   • MASTECTOMY Right 03/2015   • MASTECTOMY PARTIAL / LUMPECTOMY Right 03/2009   • NIPPLE RECONSTRUCTION Right 8/29/2016    Procedure: RT NIPPLE RECONSTRUCTION;  Surgeon: Luis Zambrano MD;  Location: [facility];  Service:    • OTHER SURGICAL HISTORY      laparoscopy adhesions right side x2   • OTHER SURGICAL HISTORY      drain mammosite area   • OTHER SURGICAL HISTORY  01/2010    pressure washed prosthesis area left jaw   • OTHER SURGICAL HISTORY      drain mammosite area right breast 09/2011   • RECONSTRUCTION BREAST W/ TRAM FLAP Right 02/2016   • ROTATOR CUFF REPAIR Left 09/2014   • TEMPOROMANDIBULAR JOINT ARTHROPLASTY Bilateral 1993    MULT. REPLACEMENTS LATER   • TONSILLECTOMY  1952      Home Meds:  Medications Prior to Admission   Medication Sig Dispense  Refill Last Dose   • acetaminophen (TYLENOL) 325 MG tablet Take 2 tablets by mouth Every 4 (Four) Hours As Needed for Mild Pain .   12/23/2021 at 1300   • ARMOUR THYROID 30 MG tablet Take 30 mg by mouth Daily.   12/24/2021 at 0315   • atenolol (TENORMIN) 25 MG tablet Take 25 mg by mouth Every Evening. TAKES AT 1500   12/23/2021 at 1500   • atorvastatin (LIPITOR) 20 MG tablet Take 20 mg by mouth Every Night.   12/23/2021 at 2130   • cephalexin (KEFLEX) 500 MG capsule TAKE 1 CAPSULE DAILY (Patient taking differently: Take 500 mg by mouth Daily. STATES HAS BEEN ON FOR 10 YEARS TO SUPPRESS MRSA) 90 capsule 3 12/23/2021 at 2030   • diphenoxylate-atropine (LOMOTIL) 2.5-0.025 MG per tablet Take 1 tablet by mouth Daily.  1 12/23/2021 at 0830   • fexofenadine (ALLEGRA) 180 MG tablet Take 180 mg by mouth Daily.   12/23/2021 at 2030   • fluticasone (FLONASE) 50 MCG/ACT nasal spray 2 sprays into the nostril(s) as directed by provider Every Night.   Past Week at Unknown time   • guaiFENesin (MUCINEX PO) Take 400 mg by mouth 2 (Two) Times a Day.   12/23/2021 at 2130   • lansoprazole (PREVACID) 30 MG capsule Take 30 mg by mouth 2 (Two) Times a Day.   12/24/2021 at 0315   • levalbuterol (XOPENEX HFA) 45 MCG/ACT inhaler Inhale 1-2 puffs Every 4 (Four) Hours As Needed.   12/23/2021 at 2200   • Lifitegrast (Xiidra) 5 % ophthalmic solution Administer 1 drop to both eyes 2 (Two) Times a Day.   12/22/2021 at Unknown time   • montelukast (SINGULAIR) 10 MG tablet Take 10 mg by mouth Every Night.   12/23/2021 at 2100   • mupirocin (BACTROBAN) 2 % ointment if needed.   12/24/2021 at 0315   • MYRBETRIQ 25 MG tablet sustained-release 24 hour 24 hr tablet Take 25 mg by mouth Daily.   12/23/2021 at 0830   • pilocarpine (SALAGEN) 5 MG tablet TAKE 1 TABLET FOUR TIMES A DAY (Patient taking differently: Take 5 mg by mouth 3 (Three) Times a Day. TAKES WITH MEALS, MAY TAKE ONE AT BEDTIME AS WELL IF NEEDED) 360 tablet 3 12/23/2021 at 2130   • PROBIOTIC  PRODUCT PO Take 1 capsule by mouth 2 (Two) Times a Day.   12/23/2021 at 2130   • Calcium Citrate-Vitamin D (CALCIUM CITRATE + D PO) Take 1 tablet by mouth Every Night. HOLD FOR SURGERY   12/21/2021   • Calcium-Magnesium-Zinc 333-133-5 MG tablet ONCE A DAY   12/21/2021   • Denosumab (PROLIA SC) Inject  under the skin into the appropriate area as directed See Admin Instructions. Every 6 months   11/4/21  WAS LAST DOSE   11/4/2021   • diphenhydrAMINE (BENADRYL ALLERGY CHILDRENS) 12.5 MG chewable tablet Chew 25 mg 4 (Four) Times a Day As Needed for Allergies. FOR BEE STINGS ONLY   More than a month at Unknown time   • EPIPEN 2-TOMMY 0.3 MG/0.3ML solution auto-injector injection As Needed. FOR BEE STINGS   More than a month at Unknown time   • HYDROcodone-acetaminophen (Norco) 7.5-325 MG per tablet Take one by mouth every 4-6 hours prn pain  Indications: s/p right total hip arthroplasty 40 tablet 0 pt has not taken yet   • meclizine (ANTIVERT) 25 MG tablet Take 25 mg by mouth 3 (Three) Times a Day As Needed.   More than a month at Unknown time   • Melatonin 10 MG tablet Take 1 tablet by mouth Every Night.   12/21/2021   • meloxicam (MOBIC) 15 MG tablet TAKE 1 TABLET DAILY (Patient taking differently: Take 15 mg by mouth Daily. HOLDING FOR SURGERY) 90 tablet 3    • metroNIDAZOLE (METROGEL) 0.75 % gel Apply 1 application topically to the appropriate area as directed 2 (Two) Times a Day As Needed. ROASCEA   More than a month at Unknown time   • Multiple Vitamins-Minerals (MULTIVITAMIN ADULT PO) Take 1 tablet by mouth Every Night. HOLD FOR SURGERY   12/21/2021   • ondansetron (Zofran) 4 MG tablet Take 1 tablet by mouth Every 6 (Six) Hours As Needed for Nausea or Vomiting. Indications: Nausea and Vomiting Following an Operation 40 tablet 1 More than a month at Unknown time   • POTASSIUM CHLORIDE PO Take 99 mg by mouth Every Night. OTC  HOLD FOR SURGERY   12/21/2021   • rivaroxaban (Xarelto) 10 MG tablet Take 1 tablet by mouth  Daily. Indications: Post Surgical - Hip 30 tablet 0 pt has not taken yet   • vitamin B-6 (PYRIDOXINE) 100 MG tablet Take 100 mg by mouth 2 (Two) Times a Day. HOLD FOR SURGERY   12/21/2021     Current Meds:     Current Facility-Administered Medications:   •  acetaminophen (TYLENOL) tablet 325 mg, 325 mg, Oral, Q4H PRN, Dejan Lange MD  •  atenolol (TENORMIN) tablet 25 mg, 25 mg, Oral, Q PM, Dejan Lange MD, 25 mg at 12/24/21 1616  •  atorvastatin (LIPITOR) tablet 20 mg, 20 mg, Oral, Nightly, Dejan Lange MD, 20 mg at 12/24/21 2047  •  cetirizine (zyrTEC) tablet 10 mg, 10 mg, Oral, Daily, Dejan Lange MD, 10 mg at 12/24/21 1434  •  clindamycin (CLEOCIN) 900 mg in dextrose 5% 50 mL IVPB (premix), 900 mg, Intravenous, Q8H, Dejan Lange MD, Stopped at 12/24/21 1505  •  diphenoxylate-atropine (LOMOTIL) 2.5-0.025 MG per tablet 1 tablet, 1 tablet, Oral, Daily, Dejan Lange MD  •  fluticasone (FLONASE) 50 MCG/ACT nasal spray 2 spray, 2 spray, Nasal, Nightly, Dejan Lange MD, 2 spray at 12/24/21 2047  •  guaiFENesin (MUCINEX) 12 hr tablet 600 mg, 600 mg, Oral, BID, Dejan Lange MD, 600 mg at 12/24/21 2047  •  HYDROcodone-acetaminophen (NORCO) 7.5-325 MG per tablet 1 tablet, 1 tablet, Oral, Q4H PRN, Dejan Lange MD  •  HYDROcodone-acetaminophen (NORCO) 7.5-325 MG per tablet 2 tablet, 2 tablet, Oral, Q4H PRN, Dejan Lange MD  •  HYDROmorphone (DILAUDID) injection 0.5 mg, 0.5 mg, Intravenous, Q2H PRN **AND** naloxone (NARCAN) injection 0.1 mg, 0.1 mg, Intravenous, Q5 Min PRN, Dejan Lange MD  •  lactated ringers infusion, 9 mL/hr, Intravenous, Continuous, Aston Mackay MD, Stopped at 12/24/21 0917  •  lactobacillus acidophilus (RISAQUAD) capsule 1 capsule, 1 capsule, Oral, BID, Dejan Lange MD, 1 capsule at 12/24/21 2047  •  melatonin tablet 10 mg, 10 mg, Oral, Nightly, Dejan Lange MD, 10 mg at 12/24/21 2047  •  [START ON 12/25/2021] meloxicam (MOBIC) tablet 15 mg, 15 mg, Oral, Daily, Nazario  MD Dejan  •  Mirabegron ER (MYRBETRIQ) 24 hr tablet 25 mg, 25 mg, Oral, Daily, Dejan Lange MD, 25 mg at 12/24/21 1434  •  montelukast (SINGULAIR) tablet 10 mg, 10 mg, Oral, Nightly, Dejan Lange MD, 10 mg at 12/24/21 2046  •  mupirocin (BACTROBAN) 2 % nasal ointment, , Each Nare, BID, Dejan Lange MD, 1 application at 12/24/21 2047  •  ondansetron (ZOFRAN) tablet 4 mg, 4 mg, Oral, Q6H PRN **OR** ondansetron (ZOFRAN) injection 4 mg, 4 mg, Intravenous, Q6H PRN, Dejan Lange MD  •  pantoprazole (PROTONIX) EC tablet 40 mg, 40 mg, Oral, BID AC, Dejan Lange MD, 40 mg at 12/24/21 1616  •  pilocarpine (SALAGEN) tablet 5 mg, 5 mg, Oral, TID, Dejan Lange MD, 5 mg at 12/24/21 2047  •  polyethylene glycol (MIRALAX) packet 17 g, 17 g, Oral, Daily, Dejan Lange MD, 17 g at 12/24/21 1800  •  rivaroxaban (XARELTO) tablet 10 mg, 10 mg, Oral, Daily With Dinner, Dejan Lange MD, 10 mg at 12/24/21 1800  •  scopolamine patch 1 mg/72 hr, 1 patch, Transdermal, Once, Aston Mackay MD, 1 patch at 12/24/21 0645  •  sodium chloride 0.9 % infusion, 75 mL/hr, Intravenous, Continuous, Dejan Lange MD, Stopped at 12/24/21 1324  •  [START ON 12/25/2021] Thyroid (ARMOUR) tablet 30 mg, 30 mg, Oral, Daily, Dejan Lange MD    Facility-Administered Medications Ordered in Other Encounters:   •  Chlorhexidine Gluconate Cloth 2 % pads, , Apply externally, Take As Directed, Dejan Lange MD  Allergies:  Allergies   Allergen Reactions   • Bee Venom Anaphylaxis   • Latex Hives   • Codeine Hives     And Derivatives   • Cyclobenzaprine Other (See Comments) and Unknown - High Severity     Experienced side effects - blurred vision, dry mouth, constipation       • Darvon [Propoxyphene] Nausea And Vomiting     CONFUSION.    • Ditropan [Oxybutynin] Itching   • Duricef [Cefadroxil] Itching and Nausea Only   • Erythromycin Hives     Also allergic to Duricef   • Hydrocodone Nausea And Vomiting and GI Intolerance   • Iodine Hives      "Iodine scrubbing solutions /IVP Contrast Dyes     • Meperidine Nausea And Vomiting   • Methylprednisolone Unknown (See Comments)     CAUSES ITCHING AND FACIAL FLUSHING BUT PT \"CAN TAKE LOWER DOSES\"   • Minocycline Hives, Diarrhea and Nausea And Vomiting     Only with doses > 100mg BID         • Morphine And Related Hives and GI Intolerance     Derivatives   • Oxycodone-Acetaminophen Hives   • Talwin [Pentazocine] Hives   • Tramadol Hives and GI Intolerance     ULTRAM ER   • Tramadol Hcl Hives and Nausea And Vomiting   • Valium [Diazepam] Hives and Hallucinations     CONFUSION.    • Adhesive Tape Rash   • Bacitracin-Polymyxin B Rash   • Betadine [Povidone Iodine] Rash   • Contrast Dye Hives and Palpitations   • Daptomycin Itching and Hives   • Miconazole Rash   • Neomycin-Polymyxin-Gramicidin Rash   • Neosporin Af [Miconazole Nitrate] Rash   • Penicillins Hives, Swelling and Rash     As infant   • Silver Rash   • Sulfa Antibiotics Hives, Nausea And Vomiting and Rash     Social History:   Social History     Socioeconomic History   • Marital status:    Tobacco Use   • Smoking status: Never Smoker   • Smokeless tobacco: Never Used   Vaping Use   • Vaping Use: Never used   Substance and Sexual Activity   • Alcohol use: Yes     Alcohol/week: 2.0 standard drinks     Types: 2 Shots of liquor per week     Comment: 4 drinks per week   • Drug use: Never   • Sexual activity: Defer     Family History:  Family History   Problem Relation Age of Onset   • Hypertension Other    • Cancer Other         breast   • Heart disease Mother    • Heart disease Father    • Asthma Father    • Heart disease Maternal Grandmother    • Heart disease Maternal Grandfather    • Heart disease Paternal Grandmother    • Heart disease Paternal Grandfather    • [place] Hyperthermia Neg Hx           Review of Systems  See history of present illness and past medical history.  Patient denies headache, dizziness, syncope, falls, trauma, change in vision, " "change in hearing, change in taste, changes in weight, changes in appetite, focal weakness, numbness, or paresthesia.  Patient denies chest pain, palpitations, dyspnea, orthopnea, PND, cough, sinus pressure, rhinorrhea, epistaxis, hemoptysis, nausea, vomiting,hematemesis, diarrhea, constipation or hematchezia.  Denies cold or heat intolerance, polydipsia, polyuria, polyphagia. Denies hematuria, pyuria, dysuria, hesitancy, frequency or urgency. Denies consumption of raw and under cooked meats foods or change in water source.  Denies fever, chills, sweats, night sweats.  Denies missing any routine medications. Remainder of ROS is negative.      Vitals:   /64 (BP Location: Left arm, Patient Position: Lying)   Pulse 62   Temp 97.1 °F (36.2 °C) (Skin)   Resp 16   Ht 167.6 cm (66\")   Wt 80 kg (176 lb 5.9 oz)   SpO2 98%   BMI 28.47 kg/m²   I/O:     Intake/Output Summary (Last 24 hours) at 12/24/2021 2109  Last data filed at 12/24/2021 2013  Gross per 24 hour   Intake 1350 ml   Output 900 ml   Net 450 ml     Exam:  General Appearance:    Alert, cooperative, no distress, appears stated age   Head:    Normocephalic, without obvious abnormality, atraumatic   Eyes:    PERRL, conjunctivae/corneas clear, EOM's intact, both eyes   Ears:    Normal external ear canals, both ears   Nose:   Nares normal, septum midline, mucosa normal, no drainage    or sinus tenderness   Throat:   Lips, tongue, gums normal; oral mucosa pink and moist   Neck:   Supple, symmetrical, trachea midline, no adenopathy;     thyroid:  no enlargement/tenderness/nodules; no carotid    bruit or JVD   Back:     Symmetric, no curvature, ROM normal, no CVA tenderness   Lungs:     Decreased breath sounds bilaterally, respirations unlabored   Chest Wall:    No tenderness or deformity    Heart:    Regular rate and rhythm, S1 and S2 normal, no murmur, rub   or gallop   Abdomen:     Soft, nontender, bowel sounds active all four quadrants,     no masses, no " hepatomegaly, no splenomegaly   Extremities:   Extremities normal, atraumatic, no cyanosis or edema   Pulses:   Pulses palpable in all extremities; symmetric all extremities   Skin:   Skin color normal, Skin is warm and dry,  no rashes or palpable lesions   Neurologic:   CNII-XII intact, motor strength grossly intact, sensation grossly intact to light touch, no focal deficits noted       Data Review:  Labs in chart were reviewed.  WBC   Date Value Ref Range Status   12/22/2021 14.50 (H) 3.40 - 10.80 10*3/mm3 Final     Hemoglobin   Date Value Ref Range Status   12/22/2021 11.1 (L) 12.0 - 15.9 g/dL Final     Hematocrit   Date Value Ref Range Status   12/22/2021 33.6 (L) 34.0 - 46.6 % Final     Platelets   Date Value Ref Range Status   12/22/2021 341 140 - 450 10*3/mm3 Final     Sodium   Date Value Ref Range Status   12/24/2021 137 136 - 145 mmol/L Final     Potassium   Date Value Ref Range Status   12/24/2021 4.3 3.5 - 5.2 mmol/L Final     Chloride   Date Value Ref Range Status   12/24/2021 105 98 - 107 mmol/L Final     CO2   Date Value Ref Range Status   12/24/2021 20.1 (L) 22.0 - 29.0 mmol/L Final     BUN   Date Value Ref Range Status   12/24/2021 14 8 - 23 mg/dL Final     Creatinine   Date Value Ref Range Status   12/24/2021 0.71 0.57 - 1.00 mg/dL Final     Glucose   Date Value Ref Range Status   12/24/2021 162 (H) 65 - 99 mg/dL Final     Calcium   Date Value Ref Range Status   12/24/2021 8.2 (L) 8.6 - 10.5 mg/dL Final     No results found for: AST, ALT, ALKPHOS            Imaging Results (Last 7 Days)     Procedure Component Value Units Date/Time    XR Pelvis 1 or 2 View [[id number]] Collected: 12/24/21 1001     Updated: 12/24/21 1005    Narrative:      ONE-VIEW PORTABLE AP PELVIS     HISTORY: Right hip replacement for nonhealing stress fracture.     FINDINGS:  The patient has had recent total hip replacement and the  alignment appears satisfactory.     This report was finalized on 12/24/2021 10:02 AM by Dr. Hernandez  ALVA Baugh.       XR Hip With or Without Pelvis 1 View Right [[id number]] Collected: 12/24/21 0918     Updated: 12/24/21 1005    Narrative:      TWO-VIEW PORTABLE RIGHT HIP IN OR     HISTORY: Hip replacement for insufficiency fracture and pain.     FINDINGS:  Imaging in the operating room was performed at the time of  total hip replacement and the final image shows satisfactory alignment.  3 images were obtained and the fluoroscopy time measures 28 seconds.     This report was finalized on 12/24/2021 10:02 AM by Dr. David Baugh M.D.       FL C Arm During Surgery [[id number]] Resulted: 12/24/21 0903     Updated: 12/24/21 0903    Narrative:      This procedure was auto-finalized with no dictation required.        Past Medical History:   Diagnosis Date   • Anemia     CHRONIC IRON DEFICIENT   • Anesthesia complication     STATES SHE IS VERY SLOW TO WAKE UP   • Asthma    • Basal cell carcinoma     FACE   • Cancer (HCC)     right breast cancer   • Chronic diarrhea    • Chronic kidney disease    • DDD (degenerative disc disease), lumbar    • Disease of thyroid gland    • Dry eye    • Elevated cholesterol    • Frequency of urination    • GERD (gastroesophageal reflux disease)    • Hard to intubate     REQUESTING TO SEE AA 8/22/19; PT HAS COMPLETE BILATERAL JAW PROSTESIS   • Headache     or migraines   • Heart palpitations    • History of breast cancer     RIGHT   • History of transfusion    • [place] (hard of hearing)     BILAT HEARING AID   • Hx of renal calculi    • Hypercholesteremia    • Limited mobility     RIGHT HIP   • Limited mobility     JAW   • MRSA (methicillin resistant Staphylococcus aureus)     LEFT JAW @TMJ JOINT, MULT. ,LAST 2013, TREATED AT [facility], infection control notified 8/24/16. 1300   • Osteoarthritis    • Osteoporosis    • PONV (postoperative nausea and vomiting)    • Right hip pain    • Rosacea    • Short of breath on exertion     WITH STAIRS ONLY   • Sleep apnea     C PAP   • Spinal  headache    • Spinal stenosis    • Urgency of urination    • Vertigo     WAKES UP WITH THIS SOMETIMES   • Weakness     RIGHT HIP       Assessment:  Active Hospital Problems    Diagnosis  POA   • **Osteoarthritis of right hip [M16.11]  Unknown      Resolved Hospital Problems   No resolved problems to display.   anemia  Hyperglycemia  ckd3  Hypothyroidism      Plan:  Monitor blood sugar  Blood pressure is stable  Asthma is without exacerbation  Will follow with you while she is hospitalized  Thanks for involving us in her care    Sole Rose MD   12/24/2021  21:09 EST

## 2021-12-25 NOTE — PLAN OF CARE
Goal Outcome Evaluation:           Progress: improving   Pt is a post op day 1 of a rt total hip anterior approach. Dressing is clean dry and intact. Pt continues with the YUNIER dressing, green light flashing.Scant amount of dried drainage noted. Pt has ambulated to the bathroom with an assist x1. Voiding function intact. Pt has minimal complaints of pain as of this time. No sticks or B/P to RUE due to a history of breast cancer. Pt is resting at this time, will continue to monitor.

## 2021-12-25 NOTE — PLAN OF CARE
Goal Outcome Evaluation:  Plan of Care Reviewed With: patient        Progress: improving  Outcome Summary: Pt demonstrated good progress with PT today. Pt completed DANIELLE exercises, completed toilet transfer, ambulated 150ft with RW CGA, and ascended/descended 4 steps with CGA demonstrating safe mobility for return home.    Patient was wearing a face mask during this therapy encounter. Therapist used appropriate personal protective equipment including eye protection, mask, and gloves.  Mask used was standard procedure mask. Appropriate PPE was worn during the entire therapy session. Hand hygiene was completed before and after therapy session. Patient is not in enhanced droplet precautions.

## 2021-12-25 NOTE — THERAPY TREATMENT NOTE
Patient Name: Valery Baez  : 1945    MRN: [id number]                              Today's Date: 2021       Admit Date: 2021    Visit Dx:     ICD-10-CM ICD-9-CM   1. Primary osteoarthritis of right hip  M16.11 715.15     Patient Active Problem List   Diagnosis   • Chronic pain of left knee   • Spondylolisthesis   • Spondylolisthesis of lumbar region   • Spinal stenosis of lumbar region with neurogenic claudication   • Sleep apnea   • Stage 3 chronic kidney disease (HCC)   • Iron deficiency anemia   • Sjogren's disease (HCC)   • Asthma   • GERD (gastroesophageal reflux disease)   • Seasonal allergies   • MRSA (methicillin resistant Staphylococcus aureus) infection   • Medicare annual wellness visit, subsequent   • Greater trochanteric bursitis of right hip   • Right hip pain   • Status post total knee replacement, right   • Osteoarthritis of right hip     Past Medical History:   Diagnosis Date   • Anemia     CHRONIC IRON DEFICIENT   • Anesthesia complication     STATES SHE IS VERY SLOW TO WAKE UP   • Asthma    • Basal cell carcinoma     FACE   • Cancer (HCC)     right breast cancer   • Chronic diarrhea    • Chronic kidney disease    • DDD (degenerative disc disease), lumbar    • Disease of thyroid gland    • Dry eye    • Elevated cholesterol    • Frequency of urination    • GERD (gastroesophageal reflux disease)    • Hard to intubate     REQUESTING TO SEE AA 19; PT HAS COMPLETE BILATERAL JAW PROSTESIS   • Headache     or migraines   • Heart palpitations    • History of breast cancer     RIGHT   • History of transfusion    • [place] (hard of hearing)     BILAT HEARING AID   • Hx of renal calculi    • Hypercholesteremia    • Limited mobility     RIGHT HIP   • Limited mobility     JAW   • MRSA (methicillin resistant Staphylococcus aureus)     LEFT JAW @TMJ JOINT, MULT. ,LAST , TREATED AT [facility], infection control notified 16. 1300   • Osteoarthritis    • Osteoporosis     • PONV (postoperative nausea and vomiting)    • Right hip pain    • Rosacea    • Short of breath on exertion     WITH STAIRS ONLY   • Sleep apnea     C PAP   • Spinal headache    • Spinal stenosis    • Urgency of urination    • Vertigo     WAKES UP WITH THIS SOMETIMES   • Weakness     RIGHT HIP     Past Surgical History:   Procedure Laterality Date   • ABSCESS DRAINAGE      abscess removed from left jaw x2   • APPENDECTOMY  1965   • AVULSION TOENAIL PLATE      6 REMOVED   • BILATERAL BREAST REDUCTION Left 8/29/2016    Procedure: LT BREAST REDUCTION ;  Surgeon: Luis Zambrano MD;  Location: [facility];  Service:    • BREAST BIOPSY Bilateral    • CHOLECYSTECTOMY  09/2000   • COLONOSCOPY     • CYST REMOVAL  03/1984    eye cyst left lower lid   • CYST REMOVAL  01/2007    right upper thigh   • CYSTOSCOPY      x2  02/2013 & 04/2014   • CYSTOSCOPY      NUMEROUS   • D & C HYSTEROSCOPY     • HYSTERECTOMY  1971    LATER BSO   • KNEE ARTHROPLASTY Right 03/2005   • LAPAROSCOPIC SALPINGOOPHERECTOMY     • LUMBAR DISCECTOMY FUSION INSTRUMENTATION N/A 9/9/2019    Procedure: Lumbar 3 to sacral 1 laminectomy and fusion with instrumentation;  Surgeon: Vikas Couch MD;  Location: [facility];  Service: Orthopedic Spine   • MANDIBLE SURGERY      UPPER AND LOWER JAW ADJUSTED WITH PLATES AND WIRES   • MASTECTOMY Right 03/2015   • MASTECTOMY PARTIAL / LUMPECTOMY Right 03/2009   • NIPPLE RECONSTRUCTION Right 8/29/2016    Procedure: RT NIPPLE RECONSTRUCTION;  Surgeon: Luis Zambrano MD;  Location: [facility];  Service:    • OTHER SURGICAL HISTORY      laparoscopy adhesions right side x2   • OTHER SURGICAL HISTORY      drain mammosite area   • OTHER SURGICAL HISTORY  01/2010    pressure washed prosthesis area left jaw   • OTHER SURGICAL HISTORY      drain mammosite area right breast 09/2011   • RECONSTRUCTION BREAST W/ TRAM FLAP Right 02/2016   • ROTATOR CUFF REPAIR Left 09/2014   • TEMPOROMANDIBULAR JOINT ARTHROPLASTY  Bilateral 1993    MULT. REPLACEMENTS LATER   • TONSILLECTOMY  1952      General Information     Row Name 12/25/21 1038          Physical Therapy Time and Intention    Document Type therapy note (daily note)  -SR     Mode of Treatment physical therapy; individual therapy  -SR     Row Name 12/25/21 1038          General Information    Patient Profile Reviewed yes  -SR     Existing Precautions/Restrictions fall; hip, anterior; right  -SR     Row Name 12/25/21 1038          Cognition    Orientation Status (Cognition) oriented x 4  -SR     Row Name 12/25/21 1038          Safety Issues, Functional Mobility    Impairments Affecting Function (Mobility) endurance/activity tolerance; strength; range of motion (ROM); pain; balance  -SR     Comment, Safety Issues/Impairments (Mobility) gait belt and non slip socks for safety  -SR           User Key  (r) = Recorded By, (t) = Taken By, (c) = Cosigned By    Initials Name Provider Type    SR Brisa Christian Physical Therapist               Mobility     Row Name 12/25/21 1040          Bed Mobility    Bed Mobility supine-sit  -SR     Supine-Sit [place] (Bed Mobility) standby assist  -SR     Assistive Device (Bed Mobility) head of bed elevated; bed rails  -SR     Comment (Bed Mobility) required additional time to complete  -SR     Row Name 12/25/21 1040          Transfers    Comment (Transfers) 3 x sit <> stand with RW CGA with cues for hand placement during transition; 1 x toilet transfer with cues for use of grab bar CGA  -SR     Row Name 12/25/21 1040          Sit-Stand Transfer    Sit-Stand [place] (Transfers) contact guard; verbal cues  -SR     Assistive Device (Sit-Stand Transfers) walker, front-wheeled  -SR     Row Name 12/25/21 1040          Gait/Stairs (Locomotion)    [place] Level (Gait) contact guard; verbal cues  -SR     Assistive Device (Gait) walker, front-wheeled  -SR     Distance in Feet (Gait) 150ft  -SR     Deviations/Abnormal Patterns (Gait) jemal  decreased; gait speed decreased; antalgic  -SR     Right Sided Gait Deviations weight shift ability decreased  -SR     [place] Level (Stairs) contact guard; 1 person assist  -SR     Handrail Location (Stairs) both sides  -SR     Ascending Technique (Stairs) step-to-step  -SR     Descending Technique (Stairs) step-to-step  -SR     Comment (Gait/Stairs) Pt ambulated 150ft with RW CGA requiring additional time to compelete due to slow gait speed, no LOB, cues for positioning AD; Pt ascended/descended 4 steps with B rails with CGA and cues for safe step to pattern  -SR     Row Name 12/25/21 1040          Mobility    Extremity Weight-bearing Status right lower extremity  -SR     Right Lower Extremity (Weight-bearing Status) weight-bearing as tolerated (WBAT)  -SR           User Key  (r) = Recorded By, (t) = Taken By, (c) = Cosigned By    Initials Name Provider Type    Brisa Jimenez Physical Therapist               Obj/Interventions     Row Name 12/25/21 1042          Motor Skills    Therapeutic Exercise --  10x DANIELLE protocol  -SR     Row Name 12/25/21 1042          Balance    Balance Assessment sitting static balance; sitting dynamic balance; standing static balance; standing dynamic balance  -SR     Static Sitting Balance WFL; sitting, edge of bed; sitting in chair  -SR     Dynamic Sitting Balance WFL; sitting, edge of bed; sitting in chair  -SR     Static Standing Balance mild impairment; supported  -SR     Dynamic Standing Balance mild impairment; supported  -SR     Comment, Balance CGA/SBA with RW for standing balance  -SR           User Key  (r) = Recorded By, (t) = Taken By, (c) = Cosigned By    Initials Name Provider Type    Brisa Jimenez Physical Therapist               Goals/Plan    No documentation.                Clinical Impression     Row Name 12/25/21 1043          Pain    Additional Documentation Pain Scale: Numbers Pre/Post-Treatment (Group)  -SR     Row Name 12/25/21 1043          Pain Scale:  Numbers Pre/Post-Treatment    Pretreatment Pain Rating 4/10  -SR     Posttreatment Pain Rating 4/10  -SR     Pain Location - Side Right  -SR     Pain Location - Orientation lower  -SR     Pain Location extremity  -SR     Pain Intervention(s) Ambulation/increased activity; Repositioned; Rest  -SR     Row Name 12/25/21 1043          Plan of Care Review    Plan of Care Reviewed With patient  -SR     Progress improving  -SR     Outcome Summary Pt demonstrated good progress with PT today. Pt completed DANIELLE exercises, completed toilet transfer, ambulated 150ft with RW CGA, and ascended/descended 4 steps with CGA demonstrating safe mobility for return home.  -SR     Row Name 12/25/21 1043          Therapy Assessment/Plan (PT)    Rehab Potential (PT) good, to achieve stated therapy goals  -SR     Criteria for Skilled Interventions Met (PT) yes  -SR     Row Name 12/25/21 1043          Positioning and Restraints    Pre-Treatment Position in bed  -SR     Post Treatment Position chair  -SR     In Chair reclined; call light within reach; encouraged to call for assist; exit alarm on; notified nsg  -SR           User Key  (r) = Recorded By, (t) = Taken By, (c) = Cosigned By    Initials Name Provider Type    SR Brisa Christian Physical Therapist               Outcome Measures     Row Name 12/25/21 1045          How much help from another person do you currently need...    Turning from your back to your side while in flat bed without using bedrails? 3  -SR     Moving from lying on back to sitting on the side of a flat bed without bedrails? 3  -SR     Moving to and from a bed to a chair (including a wheelchair)? 3  -SR     Standing up from a chair using your arms (e.g., wheelchair, bedside chair)? 3  -SR     Climbing 3-5 steps with a railing? 3  -SR     To walk in hospital room? 3  -SR     AM-PAC 6 Clicks Score (PT) 18  -SR     Row Name 12/25/21 1045          Functional Assessment    Outcome Measure Options AM-PAC 6 Clicks Basic  Mobility (PT)  -SR           User Key  (r) = Recorded By, (t) = Taken By, (c) = Cosigned By    Initials Name Provider Type    SR Brisa Christian Physical Therapist                             Physical Therapy Education                 Title: PT OT SLP Therapies (Done)     Topic: Physical Therapy (Done)     Point: Mobility training (Done)     Learning Progress Summary           Patient Acceptance, E, VU by SR at 12/25/2021 1045    Acceptance, E, VU by CF at 12/24/2021 1255                   Point: Home exercise program (Done)     Learning Progress Summary           Patient Acceptance, E, VU by SR at 12/25/2021 1045    Acceptance, E, VU by CF at 12/24/2021 1255                   Point: Body mechanics (Done)     Learning Progress Summary           Patient Acceptance, E, VU by SR at 12/25/2021 1045    Acceptance, E, VU by CF at 12/24/2021 1255                   Point: Precautions (Done)     Learning Progress Summary           Patient Acceptance, E, VU by SR at 12/25/2021 1045    Acceptance, E, VU by CF at 12/24/2021 1255                               User Key     Initials Effective Dates Name Provider Type Discipline    CF 06/16/21 -  Deidra Howe, PT Physical Therapist PT    SR 02/08/21 -  Brisa Christian Physical Therapist PT              PT Recommendation and Plan     Plan of Care Reviewed With: patient  Progress: improving  Outcome Summary: Pt demonstrated good progress with PT today. Pt completed DANIELLE exercises, completed toilet transfer, ambulated 150ft with RW CGA, and ascended/descended 4 steps with CGA demonstrating safe mobility for return home.     Time Calculation:    PT Charges     Row Name 12/25/21 1046             Time Calculation    Start Time 0946  -SR      Stop Time 1020  -SR      Time Calculation (min) 34 min  -SR      PT Received On 12/25/21  -SR      PT - Next Appointment 12/26/21  -SR      PT Goal Re-Cert Due Date 12/28/21  -SR              Time Calculation- PT    Total Timed Code Minutes- PT 34  minute(s)  -SR            User Key  (r) = Recorded By, (t) = Taken By, (c) = Cosigned By    Initials Name Provider Type    SR Brisa Christian Physical Therapist              Therapy Charges for Today     Code Description Service Date Service Provider Modifiers Qty    80769266235 HC PT THERAPEUTIC ACT EA 15 MIN 12/25/2021 Brisa Christian GP 1    [id number] HC GAIT TRAINING EA 15 MIN 12/25/2021 Brisa Christian GP 1          PT G-Codes  Outcome Measure Options: AM-PAC 6 Clicks Basic Mobility (PT)  AM-PAC 6 Clicks Score (PT): 18    Brisa Christian  12/25/2021

## 2021-12-27 ENCOUNTER — TRANSITIONAL CARE MANAGEMENT TELEPHONE ENCOUNTER (OUTPATIENT)
Dept: CALL CENTER | Facility: HOSPITAL | Age: 76
End: 2021-12-27

## 2021-12-27 NOTE — CASE MANAGEMENT/SOCIAL WORK
Case Management Discharge Note      Final Note: Home with [facility].    Provided Post Acute Provider List?: N/A  N/A Provider List Comment: Requests [facility].    Selected Continued Care - Discharged on 12/25/2021 Admission date: 12/24/2021 - Discharge disposition: Home or Self Care    Destination    No services have been selected for the patient.              Durable Medical Equipment    No services have been selected for the patient.              Dialysis/Infusion    No services have been selected for the patient.              Home Medical Care Coordination complete.    Service Provider Selected Services Address Phone Fax Patient Preferred    [facility]  [facility] [address] [place] [address] [phone] --          Therapy    No services have been selected for the patient.              Community Resources    No services have been selected for the patient.              Community & DME    No services have been selected for the patient.                       Final Discharge Disposition Code: 06 - home with home health care

## 2021-12-27 NOTE — OUTREACH NOTE
Call Center TCM Note      Responses   [facility] patient discharged from? [place]   Does the patient have one of the following disease processes/diagnoses(primary or secondary)? Total Joint Replacement   Joint surgery performed? Hip   TCM attempt successful? Yes  [Verbal release - ]   Call start time 0810   Call end time 0816   Has the patient been back in either the hospital or Emergency Department since discharge? No   Discharge diagnosis Right anterior total hip arthroplasty   Does the patient have all medications related to this admission filled (includes all antibiotics, pain medications, etc.) Yes   Is the patient taking all medications as directed (includes completed medication regime)? Yes   Is the patient able to teach back alternate methods of pain control? Ice,  Reposition,  Other   Does the patient have a follow up appointment with their surgeon? Yes  [1/4/22]   Has the patient kept scheduled appointments due by today? N/A   Comments PCP HOSP DC FU APPT 1/5/22 @ 1:30pm   What is the Home health agency?   [facility]   Has home health visited the patient within 72 hours of discharge? Call prior to 72 hours   Home health comments Therapy will come today   Has all DME been delivered? Yes   Psychosocial issues? No   Has the patient began therapy sessions (either in the home or as an out patient)? Yes   If the patient has started attending therapy, what post op day did they begin to attend (either in home or as an out patient)?   today   Does the patient have a wound vac in place? Yes   Has the patient fallen since discharge? No   If the patient has fallen, were there any injuries? No   Did the patient receive a copy of their discharge instructions? Yes   Nursing interventions Reviewed instructions with patient   What is the patient's perception of their functional status since discharge? Improving   Is the patient able to teach back signs and symptoms of infection? Temp >100.4 for 24h or longer,   Shortness of breath or chest pain,  Increased swelling or redness around incision (not associated with surgical edema),  Severe discomfort or pain,  Incisional drainage   Is the patient able to teach back how to prevent infection? No tub baths, hot tub or swimming,  Keep incision covered if drainage,  Shower only as directed by surgeon   Is the patient able to teach back signs and symptoms of DVT? Redness in calf,  Area hot to touch,  Shortness of breath or chest pain,  Severe pain in calf,  Swelling in calf   Did the patient implement home safety suggestions from pre-surgery classes if attended? N/A   Is the patient/caregiver able to teach back the hierarchy of who to call/visit for symptoms/problems? PCP, Specialist, Home health nurse, Urgent Care, ED, 911 Yes   TCM call completed? Yes   Wrap up additional comments Pt reports she is still sore, owever is doing well. Therapy to start today.           Jacqueline Bronson RN    12/27/2021, 08:17 EST

## 2021-12-28 DIAGNOSIS — Z96.641 S/P HIP REPLACEMENT, RIGHT: Primary | ICD-10-CM

## 2022-01-03 ENCOUNTER — READMISSION MANAGEMENT (OUTPATIENT)
Dept: CALL CENTER | Facility: HOSPITAL | Age: 77
End: 2022-01-03

## 2022-01-03 NOTE — OUTREACH NOTE
Total Joint Week 2 Survey      Responses   [facility] patient discharged from? [place]   Does the patient have one of the following disease processes/diagnoses(primary or secondary)? Total Joint Replacement   Joint surgery performed? Hip   Week 2 attempt successful? Yes   Call start time 1715   Call end time 1721   Has the patient been back in either the hospital or Emergency Department since discharge? No   Discharge diagnosis Right anterior total hip arthroplasty   Is patient permission given to speak with other caregiver? No   Does the patient have all medications related to this admission filled (includes all antibiotics, pain medications, etc.) Yes   Is the patient taking all medications as directed (includes completed medication regime)? Yes   Is the patient able to teach back alternate methods of pain control? Ice   Does the patient have a follow up appointment with their surgeon? Yes  [1/4/22]   Has the patient kept scheduled appointments due by today? N/A   What is the Home health agency?   [facility]   Has home health visited the patient within 72 hours of discharge? Yes   Has all DME been delivered? Yes   Psychosocial issues? No   Has the patient began therapy sessions (either in the home or as an out patient)? Yes  [Patient having HH PT 3 X a week. ]   Does the patient have a wound vac in place? No  [YUNIER vac has been removed. ]   Has the patient fallen since discharge? No   Did the patient receive a copy of their discharge instructions? Yes   Nursing interventions Reviewed instructions with patient   What is the patient's perception of their functional status since discharge? Improving   Is the patient able to teach back signs and symptoms of infection? Incisional drainage,  Increased swelling or redness around incision (not associated with surgical edema),  Blisters around incision,  Temp >100.4 for 24h or longer   Is the patient able to teach back how to prevent infection? Check incision daily    If the patient is a current smoker, are they able to teach back resources for cessation? Not a smoker   Is the patient/caregiver able to teach back the hierarchy of who to call/visit for symptoms/problems? PCP, Specialist, Home health nurse, Urgent Care, ED, 911 Yes   Week 2 call completed? Yes          Martina Villarreal RN

## 2022-01-04 ENCOUNTER — OFFICE VISIT (OUTPATIENT)
Dept: ORTHOPEDIC SURGERY | Facility: CLINIC | Age: 77
End: 2022-01-04

## 2022-01-04 ENCOUNTER — HOSPITAL ENCOUNTER (OUTPATIENT)
Dept: GENERAL RADIOLOGY | Facility: HOSPITAL | Age: 77
Discharge: HOME | End: 2022-01-04
Admitting: PHYSICIAN ASSISTANT

## 2022-01-04 VITALS — WEIGHT: 176 LBS | TEMPERATURE: 98 F | HEIGHT: 66 IN | BODY MASS INDEX: 28.28 KG/M2

## 2022-01-04 DIAGNOSIS — Z96.641 S/P HIP REPLACEMENT, RIGHT: ICD-10-CM

## 2022-01-04 DIAGNOSIS — Z96.641 STATUS POST TOTAL HIP REPLACEMENT, RIGHT: Primary | ICD-10-CM

## 2022-01-04 PROCEDURE — 73502 X-RAY EXAM HIP UNI 2-3 VIEWS: CPT

## 2022-01-04 PROCEDURE — 99024 POSTOP FOLLOW-UP VISIT: CPT | Performed by: ORTHOPAEDIC SURGERY

## 2022-01-04 NOTE — PROGRESS NOTES
POST OP TOTAL GLOBAL      NAME: Valery Baez           : 1945    MRN: [id number]    Chief Complaint   Patient presents with   • Right Hip - Follow-up, Pain   • Post-op     RIGHT DANIELLE 21       Date of Surgery: 21  ?  HPI:   Patient returns today for 10 day follow up of right total hip arthroplasty Incision(s) healing nicely/as expected with no signs of infection. Patient reports doing well with no unusual complaints. No fevers, rigors or chills. Appears to be progressing appropriately. Patient is on appropriate anticoagulation.  She is doing quite well with physical therapy.  There is no limb length discrepancy.  She is neurovascularly intact.  She is very pleased with her postoperative outcome.      Ortho Exam:   Right hip. Patient is post op 10 day(s) from total hip arthoplasty, direct anterior. Incision is clean and healing well. Calf is soft and nontender. Homans sign is negative. Skin and soft tissues are appropriate for postoperative status of the patient. Hip flexion is 0-60 degrees, hip abduction is 0-30 degrees. No limb lenth discrepancy. Neurovascular status is intact. Patients gait is significantly improved. The pain relief is extremely dramatic for the patient. Dorsalis pedis and posterior tibial artery pulses palpable. Common peroneal function is well preserved. There is no evidence of cellulitis or erythema or deep seated joint infection.      Diagnostic Studies:  RIGHT hip with pelvis xrays  nonweightbearing ap/lateral views were ordered by Dejan Lange MD. Performed at [facility] on 2022. Images were independently viewed and interpreted by myself, my impression as follows:    right Hip X-Ray  Indication: Evaluation of implant position after total hip arthroplasty  AP and lateral  Findings: Well-placed implants with a good press-fit profile without any subsidence of the implants.  no bony lesion  Soft tissues within normal limits  within normal  limits joint spaces  Hardware appropriately positioned yes      yes prior studies available for comparison.    X-RAY was ordered and reviewed by Dejan Lange MD        Assessment:  Diagnoses and all orders for this visit:    1. Status post total hip replacement, right (Primary)            Plan   · Incision care  · To use ACE wrap/RONN stocking  · Continue ice to joint   · Stretching and strengthening exercises of the hip flexors and abductors.  · Calcium and vitamin D for bone health.  · Falls precautions and dislocation precautions discussed with the patient.  · Aggressive ROM  · Falls precautions and she should use a walker or a cane for assistance with ambulation.  · Once Xarelto is completed, change to an enteric coated Aspirin 325 mg daily until 6 weeks following your surgery  · Continue with lifelong antibiotic prophylaxis with dental procedures following total joint replacement.  · Follow up in 6 week(s)    Date of encounter: 01/04/2022   Dejan Lange MD

## 2022-01-05 ENCOUNTER — OFFICE VISIT (OUTPATIENT)
Dept: FAMILY MEDICINE CLINIC | Age: 77
End: 2022-01-05

## 2022-01-05 VITALS
BODY MASS INDEX: 28.09 KG/M2 | TEMPERATURE: 97.7 F | HEART RATE: 86 BPM | SYSTOLIC BLOOD PRESSURE: 96 MMHG | WEIGHT: 174.8 LBS | DIASTOLIC BLOOD PRESSURE: 65 MMHG | HEIGHT: 66 IN

## 2022-01-05 DIAGNOSIS — Z96.641 STATUS POST TOTAL HIP REPLACEMENT, RIGHT: Primary | ICD-10-CM

## 2022-01-05 DIAGNOSIS — N32.81 OVERACTIVE BLADDER: ICD-10-CM

## 2022-01-05 DIAGNOSIS — R00.0 SINUS TACHYCARDIA: ICD-10-CM

## 2022-01-05 DIAGNOSIS — A49.02 MRSA (METHICILLIN RESISTANT STAPHYLOCOCCUS AUREUS) INFECTION: ICD-10-CM

## 2022-01-05 PROBLEM — M43.10 SPONDYLOLISTHESIS: Status: RESOLVED | Noted: 2018-11-02 | Resolved: 2022-01-05

## 2022-01-05 PROBLEM — M25.562 CHRONIC PAIN OF LEFT KNEE: Status: RESOLVED | Noted: 2018-09-06 | Resolved: 2022-01-05

## 2022-01-05 PROBLEM — E03.9 HYPOTHYROIDISM, ACQUIRED: Status: ACTIVE | Noted: 2022-01-05

## 2022-01-05 PROBLEM — M43.16 SPONDYLOLISTHESIS OF LUMBAR REGION: Status: RESOLVED | Noted: 2019-08-09 | Resolved: 2022-01-05

## 2022-01-05 PROBLEM — M81.0 POSTMENOPAUSAL OSTEOPOROSIS: Status: ACTIVE | Noted: 2022-01-05

## 2022-01-05 PROBLEM — J45.40 MODERATE PERSISTENT ASTHMA, UNCOMPLICATED: Status: ACTIVE | Noted: 2019-09-10

## 2022-01-05 PROBLEM — Z96.651 STATUS POST TOTAL KNEE REPLACEMENT, RIGHT: Status: RESOLVED | Noted: 2021-11-24 | Resolved: 2022-01-05

## 2022-01-05 PROBLEM — M25.551 RIGHT HIP PAIN: Status: RESOLVED | Noted: 2021-11-11 | Resolved: 2022-01-05

## 2022-01-05 PROBLEM — E78.00 HIGH CHOLESTEROL: Status: ACTIVE | Noted: 2022-01-05

## 2022-01-05 PROBLEM — Z00.00 MEDICARE ANNUAL WELLNESS VISIT, SUBSEQUENT: Status: RESOLVED | Noted: 2021-07-30 | Resolved: 2022-01-05

## 2022-01-05 PROBLEM — G89.29 CHRONIC PAIN OF LEFT KNEE: Status: RESOLVED | Noted: 2018-09-06 | Resolved: 2022-01-05

## 2022-01-05 PROBLEM — N18.30 STAGE 3 CHRONIC KIDNEY DISEASE: Status: RESOLVED | Noted: 2019-09-10 | Resolved: 2022-01-05

## 2022-01-05 PROBLEM — M70.61 GREATER TROCHANTERIC BURSITIS OF RIGHT HIP: Status: RESOLVED | Noted: 2021-09-24 | Resolved: 2022-01-05

## 2022-01-05 PROBLEM — M16.11 OSTEOARTHRITIS OF RIGHT HIP: Status: RESOLVED | Noted: 2021-12-02 | Resolved: 2022-01-05

## 2022-01-05 PROCEDURE — 1111F DSCHRG MED/CURRENT MED MERGE: CPT | Performed by: FAMILY MEDICINE

## 2022-01-05 PROCEDURE — 99495 TRANSJ CARE MGMT MOD F2F 14D: CPT | Performed by: FAMILY MEDICINE

## 2022-01-05 NOTE — ASSESSMENT & PLAN NOTE
AVAILABLE RECORDS REVIEWED.  UNEVENTFUL RECOVERY THUS FAR.  CONTINUE P.T. AND F/U WITH ORTHO AS PLANNED

## 2022-01-05 NOTE — PROGRESS NOTES
Transitional Care Follow Up Visit  Subjective     Valery Baez is a 76 y.o. female who presents for a transitional care management visit.    Within 48 business hours after discharge our office contacted her via telephone to coordinate her care and needs.      I reviewed and discussed the details of that call along with the discharge summary, hospital problems, inpatient lab results, inpatient diagnostic studies, and consultation reports with Valery.     Current outpatient and discharge medications have been reconciled for the patient.  Reviewed by: Tino Smith MD      Date of TCM Phone Call 12/25/2021   [facility]   Date of Admission 12/24/2021   Date of Discharge 12/25/2021   Discharge Disposition Home or Self Care     Risk for Readmission (LACE) Score: 7 (12/25/2021  6:01 AM)      --S/P RECENT RIGHT HIP REL=PLACEMENT.  RECEIVING HOME P.T. AND WILL SEE ORTHO AGAIN SOON  --CONTINUES ON LOW-DOSE BETA BLOCKER FOR UNSPECIFIED TACHYCARDIA  --CONTINUES ON MYRETRIQ FOR OVERACTIVE BLADDER, DOING WELL  --CONTINUES ON CHRONIC KEFLEX FOR MRSA COLONIZATION.  SEEING I.D. YEARLY     Course During Hospital Stay:       The following portions of the patient's history were reviewed and updated as appropriate: allergies, current medications, past family history, past medical history, past social history, past surgical history and problem list.    Review of Systems   Constitutional: Negative for chills, fatigue and fever.   HENT: Negative for congestion, ear pain and rhinorrhea.    Respiratory: Negative for cough and shortness of breath.    Cardiovascular: Negative for chest pain, palpitations and leg swelling.   Gastrointestinal: Negative for abdominal pain, nausea and vomiting.   Musculoskeletal: Negative for arthralgias and myalgias.   Neurological: Negative for headaches.       Objective   Physical Exam  Vitals and nursing note reviewed.   Constitutional:       General: She is not in acute  distress.     Appearance: Normal appearance.   Cardiovascular:      Rate and Rhythm: Normal rate and regular rhythm.      Heart sounds: Normal heart sounds. No murmur heard.      Pulmonary:      Effort: Pulmonary effort is normal.      Breath sounds: Normal breath sounds.   Abdominal:      Palpations: Abdomen is soft.      Tenderness: There is no abdominal tenderness.   Musculoskeletal:      Cervical back: Neck supple.      Right lower leg: No edema.      Left lower leg: No edema.   Lymphadenopathy:      Cervical: No cervical adenopathy.   Neurological:      General: No focal deficit present.      Mental Status: She is alert.      Cranial Nerves: No cranial nerve deficit.      Coordination: Coordination normal.      Gait: Gait normal.   Psychiatric:         Mood and Affect: Mood normal.         Behavior: Behavior normal.         Assessment/Plan   Diagnoses and all orders for this visit:    1. Status post total hip replacement, right (Primary)  Assessment & Plan:  AVAILABLE RECORDS REVIEWED.  UNEVENTFUL RECOVERY THUS FAR.  CONTINUE P.T. AND F/U WITH ORTHO AS PLANNED        2. Sinus tachycardia  Assessment & Plan:  IMPROVED WITH CURRENT TREATMENT, CONTINUE SAME, WILL REEVALUATE AT NEXT VISIT       3. Overactive bladder  Assessment & Plan:  IMPROVED WITH CURRENT TREATMENT, CONTINUE SAME, WILL REEVALUATE AT NEXT VISIT       4. MRSA (methicillin resistant Staphylococcus aureus) infection  Assessment & Plan:  IMPROVED WITH CURRENT TREATMENT, CONTINUE SAME, WILL REEVALUATE AT NEXT VISIT

## 2022-01-10 ENCOUNTER — TELEPHONE (OUTPATIENT)
Dept: ORTHOPEDIC SURGERY | Facility: CLINIC | Age: 77
End: 2022-01-10

## 2022-01-10 DIAGNOSIS — M25.551 RIGHT HIP PAIN: Primary | ICD-10-CM

## 2022-01-10 RX ORDER — MELOXICAM 7.5 MG/1
TABLET ORAL
Qty: 40 TABLET | Refills: 0 | Status: SHIPPED | OUTPATIENT
Start: 2022-01-10 | End: 2022-04-14 | Stop reason: SDUPTHER

## 2022-01-10 NOTE — TELEPHONE ENCOUNTER
I SPOKE WITH PATIENT REGARDING DR YOUNG REPLY AND SHE WAS IN UNDERSTANDING AND GOING TO STOP TAKING THE XARLETO AND SWITCH TO THE ASPRIN

## 2022-01-14 ENCOUNTER — TELEPHONE (OUTPATIENT)
Dept: ORTHOPEDIC SURGERY | Facility: CLINIC | Age: 77
End: 2022-01-14

## 2022-01-14 NOTE — TELEPHONE ENCOUNTER
I called and left patient know she can come anytime on a Thursday and Abigail will take care of her

## 2022-01-14 NOTE — TELEPHONE ENCOUNTER
Caller: Valery Baez    Relationship to patient: Self    Best call back number:[phone]    Patient is needing: CALLBACK. PATIENT BRACE ILL FITTING AND SHE IS CALLING TO SEE WHEN SHE CAN COME INTO THE OFFICE TO HAVE IT ADJUSTED        UNABLE TO WARM TRANSFER

## 2022-01-17 ENCOUNTER — READMISSION MANAGEMENT (OUTPATIENT)
Dept: CALL CENTER | Facility: HOSPITAL | Age: 77
End: 2022-01-17

## 2022-01-17 NOTE — OUTREACH NOTE
Total Joint Month 1 Survey      Responses   [facility] patient discharged from? [place]   Does the patient have one of the following disease processes/diagnoses(primary or secondary)? Total Joint Replacement   Joint surgery performed? Hip   Month 1 attempt successful? Yes   Call start time 1514   Call end time 1520   Has the patient been back in either the hospital or Emergency Department since discharge? No   Medication alerts for this patient Pt is no longer taking pain medication.   Has the patient kept scheduled appointments due by today? Yes   Comments PCP HOSP DC FU APPT 1/5/22 @ 1:30pm   Is the patient still attending therapy sessions(either in the home or as an outpatient)? Yes   Has the patient fallen since discharge? No   If the patient has fallen, were there any injuries? No   What is the patient's perception of their functional status since discharge? Improving   Month 1 call completed? Yes   Wrap up additional comments Pt reports improving well. Pt is participating in PT. Pt ambulating with cane.          Joann Mancini RN

## 2022-02-03 ENCOUNTER — OFFICE VISIT (OUTPATIENT)
Dept: ORTHOPEDIC SURGERY | Facility: CLINIC | Age: 77
End: 2022-02-03

## 2022-02-03 DIAGNOSIS — Z96.641 STATUS POST TOTAL HIP REPLACEMENT, RIGHT: ICD-10-CM

## 2022-02-03 DIAGNOSIS — M48.062 SPINAL STENOSIS OF LUMBAR REGION WITH NEUROGENIC CLAUDICATION: Primary | ICD-10-CM

## 2022-02-03 PROCEDURE — 99024 POSTOP FOLLOW-UP VISIT: CPT | Performed by: ORTHOPAEDIC SURGERY

## 2022-02-03 NOTE — PROGRESS NOTES
Telemetry visit    Patient: Valery Baez    YOB: 1945  You have chosen to receive care through a telephone visit. Do you consent to use a telephone visit for your medical care today? Yes  MRN: [id number]    Chief Complaints: This is a follow-up on total hip arthroplasty on the right side performed on 1224 2021.    History of Present Illness: The patient is following up on a total hip arthroplasty performed by me on [facility] through direct anterior approach. She has done extremely well as far as the hip replacement is concerned. There is no clinical deformity. She does not have any limb length discrepancy. She is very pleased with her outcome. She is taking very minimal amount of pain medication to control her symptoms at this point. She does have some sessions of physical therapy yet to be completed but overall has made excellent progress. She does use her cane while ambulating to minimize the possibility of a fall. She states that she still has some issues as far as the low back pain is concerned and we have discussed about appropriate management of that with the spinal specialist as well. She has had a right total knee arthroplasty performed 16 years ago by Dr. Crow Pierce and she states that she may have to have a revision because that knee keeps on bothering her quite significantly.  This problem is not new to this examiner.     Allergies:   Allergies   Allergen Reactions   • Bee Venom Anaphylaxis   • Latex Hives   • Codeine Hives     And Derivatives   • Cyclobenzaprine Other (See Comments) and Unknown - High Severity     Experienced side effects - blurred vision, dry mouth, constipation       • Darvon [Propoxyphene] Nausea And Vomiting     CONFUSION.    • Ditropan [Oxybutynin] Itching   • Duricef [Cefadroxil] Itching and Nausea Only   • Erythromycin Hives     Also allergic to Duricef   • Hydrocodone Nausea And Vomiting and GI Intolerance   • Iodine Hives     Iodine scrubbing  "solutions /IVP Contrast Dyes     • Meperidine Nausea And Vomiting   • Methylprednisolone Unknown (See Comments)     CAUSES ITCHING AND FACIAL FLUSHING BUT PT \"CAN TAKE LOWER DOSES\"   • Minocycline Hives, Diarrhea and Nausea And Vomiting     Only with doses > 100mg BID         • Morphine And Related Hives and GI Intolerance     Derivatives   • Oxycodone-Acetaminophen Hives   • Talwin [Pentazocine] Hives   • Tramadol Hives and GI Intolerance     ULTRAM ER   • Tramadol Hcl Hives and Nausea And Vomiting   • Valium [Diazepam] Hives and Hallucinations     CONFUSION.    • Adhesive Tape Rash   • Bacitracin-Polymyxin B Rash   • Betadine [Povidone Iodine] Rash   • Contrast Dye Hives and Palpitations   • Daptomycin Itching and Hives   • Miconazole Rash   • Neomycin-Polymyxin-Gramicidin Rash   • Neosporin Af [Miconazole Nitrate] Rash   • Penicillins Hives, Swelling and Rash     As infant   • Silver Rash   • Sulfa Antibiotics Hives, Nausea And Vomiting and Rash       Medications:   Home Medications:  Current Outpatient Medications on File Prior to Visit   Medication Sig   • acetaminophen (TYLENOL) 325 MG tablet Take 2 tablets by mouth Every 4 (Four) Hours As Needed for Mild Pain .   • ARMOUR THYROID 30 MG tablet Take 30 mg by mouth Daily.   • aspirin (aspirin) 81 MG EC tablet Take 1 tablet by mouth Daily. Resume after taking Xarelto RX   • atenolol (TENORMIN) 25 MG tablet Take 25 mg by mouth Every Evening. TAKES AT 1500   • atorvastatin (LIPITOR) 20 MG tablet Take 20 mg by mouth Every Night.   • cephalexin (KEFLEX) 500 MG capsule TAKE 1 CAPSULE DAILY (Patient taking differently: Take 500 mg by mouth Daily. STATES HAS BEEN ON FOR 10 YEARS TO SUPPRESS MRSA)   • Denosumab (PROLIA SC) Inject  under the skin into the appropriate area as directed See Admin Instructions. Every 6 months   11/4/21  WAS LAST DOSE   • diphenhydrAMINE (BENADRYL ALLERGY CHILDRENS) 12.5 MG chewable tablet Chew 25 mg 4 (Four) Times a Day As Needed for " Allergies. FOR BEE STINGS ONLY   • diphenoxylate-atropine (LOMOTIL) 2.5-0.025 MG per tablet Take 1 tablet by mouth Daily.   • EPIPEN 2-TOMMY 0.3 MG/0.3ML solution auto-injector injection As Needed. FOR BEE STINGS   • fexofenadine (ALLEGRA) 180 MG tablet Take 180 mg by mouth Daily.   • fluticasone (FLONASE) 50 MCG/ACT nasal spray 2 sprays into the nostril(s) as directed by provider Every Night.   • guaiFENesin (MUCINEX PO) Take 400 mg by mouth 2 (Two) Times a Day.   • HYDROcodone-acetaminophen (Norco) 7.5-325 MG per tablet Take one by mouth every 4-6 hours prn pain  Indications: s/p right total hip arthroplasty   • lansoprazole (PREVACID) 30 MG capsule Take 30 mg by mouth 2 (Two) Times a Day.   • levalbuterol (XOPENEX HFA) 45 MCG/ACT inhaler Inhale 1-2 puffs Every 4 (Four) Hours As Needed.   • Lifitegrast (Xiidra) 5 % ophthalmic solution Administer 1 drop to both eyes 2 (Two) Times a Day.   • meclizine (ANTIVERT) 25 MG tablet Take 25 mg by mouth 3 (Three) Times a Day As Needed.   • Melatonin 10 MG tablet Take 1 tablet by mouth Every Night.   • meloxicam (Mobic) 7.5 MG tablet QD   • metroNIDAZOLE (METROGEL) 0.75 % gel Apply 1 application topically to the appropriate area as directed 2 (Two) Times a Day As Needed. ROASCEA   • montelukast (SINGULAIR) 10 MG tablet Take 10 mg by mouth Every Night.   • Multiple Vitamins-Minerals (MULTIVITAMIN ADULT PO) Take 1 tablet by mouth Every Night. HOLD FOR SURGERY   • MYRBETRIQ 25 MG tablet sustained-release 24 hour 24 hr tablet Take 25 mg by mouth Daily.   • ondansetron (Zofran) 4 MG tablet Take 1 tablet by mouth Every 6 (Six) Hours As Needed for Nausea or Vomiting. Indications: Nausea and Vomiting Following an Operation   • pilocarpine (SALAGEN) 5 MG tablet TAKE 1 TABLET FOUR TIMES A DAY (Patient taking differently: Take 5 mg by mouth 3 (Three) Times a Day. TAKES WITH MEALS, MAY TAKE ONE AT BEDTIME AS WELL IF NEEDED)   • POTASSIUM CHLORIDE PO Take 99 mg by mouth Every Night.  OTC  HOLD FOR SURGERY   • PROBIOTIC PRODUCT PO Take 1 capsule by mouth 2 (Two) Times a Day.   • rivaroxaban (Xarelto) 10 MG tablet Take 1 tablet by mouth Daily. Indications: Post Surgical - Hip     Current Facility-Administered Medications on File Prior to Visit   Medication   • Chlorhexidine Gluconate Cloth 2 % pads     Current Medications:  Scheduled Meds:  PRN Meds:.    I have reviewed the patient's medical history in detail and updated the computerized patient record.  Review and summarization of old records include:    Past Medical History:   Diagnosis Date   • Anemia     CHRONIC IRON DEFICIENT   • Anesthesia complication     STATES SHE IS VERY SLOW TO WAKE UP   • Asthma    • Basal cell carcinoma     FACE   • Cancer (HCC)     right breast cancer   • Chronic diarrhea    • Chronic kidney disease    • DDD (degenerative disc disease), lumbar    • Disease of thyroid gland    • Dry eye    • Elevated cholesterol    • Frequency of urination    • GERD (gastroesophageal reflux disease)    • Hard to intubate     REQUESTING TO SEE [facility] 8/22/19; PT HAS COMPLETE BILATERAL JAW PROSTESIS   • Headache     or migraines   • Heart palpitations    • History of breast cancer     RIGHT   • History of transfusion    • Lummi (hard of hearing)     BILAT HEARING AID   • Hx of renal calculi    • Hypercholesteremia    • Limited mobility     RIGHT HIP   • Limited mobility     JAW   • MRSA (methicillin resistant Staphylococcus aureus)     LEFT JAW @[facility] JOINT, MULT. ,LAST 2013, [facility]RE[facility], infection control notified 8/24/16. 1300   • Osteoarthritis    • Osteoporosis    • PONV (postoperative nausea and vomiting)    • Right hip pain    • Rosacea    • Short of breath on exertion     WITH STAIRS ONLY   • Sleep apnea     C PAP   • Spinal headache    • Spinal stenosis    • Urgency of urination    • Vertigo     WAKES UP WITH THIS SOMETIMES   • Weakness     RIGHT HIP     Past Surgical History:   Procedure Laterality Date   • ABSCESS  DRAINAGE      abscess removed from left jaw x2   • APPENDECTOMY  1965   • AVULSION TOENAIL PLATE      6 REMOVED   • BILATERAL BREAST REDUCTION Left 8/29/2016    Procedure: LT BREAST REDUCTION ;  Surgeon: Luis Zambrano MD;  Location: [facility];  Service:    • BREAST BIOPSY Bilateral    • CHOLECYSTECTOMY  09/2000   • COLONOSCOPY     • CYST REMOVAL  03/1984    eye cyst left lower lid   • CYST REMOVAL  01/2007    right upper thigh   • CYSTOSCOPY      x2  02/2013 & 04/2014   • CYSTOSCOPY      NUMEROUS   • D & C HYSTEROSCOPY     • HYSTERECTOMY  1971    LATER BSO   • KNEE ARTHROPLASTY Right 03/2005   • LAPAROSCOPIC SALPINGOOPHERECTOMY     • LUMBAR DISCECTOMY FUSION INSTRUMENTATION N/A 9/9/2019    Procedure: Lumbar 3 to sacral 1 laminectomy and fusion with instrumentation;  Surgeon: Vikas Couch MD;  Location: [facility];  Service: Orthopedic Spine   • MANDIBLE SURGERY      UPPER AND LOWER JAW ADJUSTED WITH PLATES AND WIRES   • MASTECTOMY Right 03/2015   • MASTECTOMY PARTIAL / LUMPECTOMY Right 03/2009   • NIPPLE RECONSTRUCTION Right 8/29/2016    Procedure: RT NIPPLE RECONSTRUCTION;  Surgeon: Luis Zambrano MD;  Location: [facility];  Service:    • OTHER SURGICAL HISTORY      laparoscopy adhesions right side x2   • OTHER SURGICAL HISTORY      drain mammosite area   • OTHER SURGICAL HISTORY  01/2010    pressure washed prosthesis area left jaw   • OTHER SURGICAL HISTORY      drain mammosite area right breast 09/2011   • RECONSTRUCTION BREAST W/ TRAM FLAP Right 02/2016   • ROTATOR CUFF REPAIR Left 09/2014   • TEMPOROMANDIBULAR JOINT ARTHROPLASTY Bilateral 1993    MULT. REPLACEMENTS LATER   • TONSILLECTOMY  1952   • TOTAL HIP ARTHROPLASTY Right 12/24/2021    Procedure: Right anterior total hip arthroplasty;  Surgeon: Dejan Lange MD;  Location: [facility];  Service: Orthopedics;  Laterality: Right;     Social History     Occupational History   • Not on file   Tobacco Use   • Smoking status: Never Smoker  "  • Smokeless tobacco: Never Used   Vaping Use   • Vaping Use: Never used   Substance and Sexual Activity   • Alcohol use: Yes     Alcohol/week: 2.0 standard drinks     Types: 2 Shots of liquor per week     Comment: 4 drinks per week   • Drug use: Never   • Sexual activity: Defer      Social History     Social History Narrative   • Not on file     Family History   Problem Relation Age of Onset   • Hypertension Other    • Cancer Other         breast   • Heart disease Mother    • Heart disease Father    • Asthma Father    • Heart disease Maternal Grandmother    • Heart disease Maternal Grandfather    • Heart disease Paternal Grandmother    • Heart disease Paternal Grandfather    • Malig Hyperthermia Neg Hx        Review of Systems  Constitutional: Negative for appetite change.   HENT: Negative.    Eyes: Negative.    Respiratory: Negative.    Cardiovascular: Negative.    Gastrointestinal: Negative.    Endocrine: Negative.    Genitourinary: Negative.    Musculoskeletal: See details of exam below.  Skin: Negative.    Allergic/Immunologic: Negative.    Hematological: Negative.    Psychiatric/Behavioral: Negative.          Wt Readings from Last 3 Encounters:   01/05/22 79.3 kg (174 lb 12.8 oz)   01/04/22 79.8 kg (176 lb)   12/24/21 80 kg (176 lb 5.9 oz)     Ht Readings from Last 3 Encounters:   01/05/22 167.6 cm (66\")   01/04/22 167.6 cm (65.98\")   12/24/21 167.6 cm (66\")     There is no height or weight on file to calculate BMI.  No height and weight on file for this encounter.  There were no vitals filed for this visit.    Physical Exam  Constitutional: Patient is oriented to person, place, and time. Appears well-developed and well-nourished.   HENT:   Head: Normocephalic and atraumatic.   Eyes: Conjunctivae and EOM are normal. Pupils are equal, round, and reactive to light.   Cardiovascular: Normal rate, regular rhythm, normal heart sounds and intact distal pulses.   Pulmonary/Chest: Effort normal and breath sounds " normal.   Musculoskeletal:   See detailed exam below   Neurological: Alert and oriented to person, place, and time. No sensory deficit. Coordination normal.   Skin: Skin is warm and dry. Capillary refill takes less than 2 seconds. No rash noted. No erythema.   Psychiatric: Patient has a normal mood and affect. Her behavior is normal. Judgment and thought content normal.   Nursing note and vitals reviewed.    Musculoskeletal:    Right hip. Patient is post op 7 week(s) from total hip arthoplasty, direct anterior. Incision is clean and healing well. Calf is soft and nontender. Homans sign is negative. Skin and soft tissues are appropriate for postoperative status of the patient. Hip flexion is 0-80 degrees, hip abduction is 0-30 degrees. No limb lenth discrepancy. Neurovascular status is intact. Patients gait is significantly improved. The pain relief is extremely dramatic for the patient. Dorsalis pedis and posterior tibial artery pulses palpable. Common peroneal function is well preserved. There is no evidence of cellulitis or erythema or deep seated joint infection.      Diagnostics:      Procedure:  Procedures    Assessment:   Diagnoses and all orders for this visit:    1. Spinal stenosis of lumbar region with neurogenic claudication (Primary)    2. Status post total hip replacement, right          Plan:   · Continue to follow the post total hip arthroplasty protocol for direct anterior approach with physical therapy.  · Use a cane for assistance with ambulation.  · Falls and dislocation precautions discussed with the patient.  · She is traveling down to [place] in the next couple of days and I have explained to her that she can continue with a home-based exercise program.  · , GI and dental procedure prophylaxis with antibiotics to prevent metastatic infection of the hip arthroplasty implants and the knee arthroplasty implants.  · Upon return to the office we will get x-rays of the hip and the knee and if  she is a candidate for revision knee surgery for the total knee arthroplasty I will be glad to offer her that service when she has fully recovered from the hip replacement surgery.  · Compression/brace   · Rest, ice, compression, and elevation (RICE) therapy  · OTC Tylenol 500-1000mg by mouth every 6 hours as needed for pain   · Follow up in 3 month(s)    Date of encounter: 02/03/2022   Dejan Lange MD     This visit has been rescheduled as a phone visit to comply with patient safety concerns in accordance with CDC recommendations. Total time of discussion was 25 minutes.

## 2022-02-16 ENCOUNTER — READMISSION MANAGEMENT (OUTPATIENT)
Dept: CALL CENTER | Facility: HOSPITAL | Age: 77
End: 2022-02-16

## 2022-02-16 NOTE — OUTREACH NOTE
Total Joint Month 2 Survey      Responses   [facility] patient discharged from? [place]   Does the patient have one of the following disease processes/diagnoses(primary or secondary)? Total Joint Replacement   Joint surgery performed? Hip   Month 2 attempt successful? Yes   Call start time 1254   Call end time 1258   Has the patient been back in either the hospital or Emergency Department since discharge? No   Has the patient kept scheduled appointments due by today? Yes   Is the patient still attending therapy sessions(either in the home or as an outpatient)? No   Has the patient fallen since discharge? No   What is the patient's perception of their functional status since discharge? Improving   Month 2 Call Completed? Yes   Wrap up additional comments PT reports hip is healing well. Pt's main concern is a knee. Pt ot have another knee replacement soon.           Joann Mancini RN

## 2022-02-28 RX ORDER — MECLIZINE HYDROCHLORIDE 25 MG/1
TABLET ORAL
Qty: 90 TABLET | Refills: 2 | Status: SHIPPED | OUTPATIENT
Start: 2022-02-28

## 2022-03-07 RX ORDER — ATORVASTATIN CALCIUM 20 MG/1
TABLET, FILM COATED ORAL
Qty: 90 TABLET | Refills: 3 | Status: SHIPPED | OUTPATIENT
Start: 2022-03-07 | End: 2023-03-02

## 2022-03-07 RX ORDER — ATENOLOL 25 MG/1
TABLET ORAL
Qty: 90 TABLET | Refills: 3 | Status: SHIPPED | OUTPATIENT
Start: 2022-03-07 | End: 2023-03-02

## 2022-03-21 ENCOUNTER — READMISSION MANAGEMENT (OUTPATIENT)
Dept: CALL CENTER | Facility: HOSPITAL | Age: 77
End: 2022-03-21

## 2022-03-22 NOTE — OUTREACH NOTE
"Total Joint Month 3 Survey    Flowsheet Row Responses   [facility] patient discharged from? [place]   Does the patient have one of the following disease processes/diagnoses(primary or secondary)? Total Joint Replacement   Joint surgery performed? Hip   Month 3 attempt successful? Yes   Call start time 1954   Call end time 1959   Has the patient been back in either the hospital or Emergency Department since discharge? No   Is the patient taking all medications as directed (includes completed medication regime)? Yes   Is the patient able to teach back alternate methods of pain control? Short, frequent activity   Has the patient kept scheduled appointments due by today? Yes   Is the patient still attending therapy sessions(either in the home or as an outpatient)? No   Has the patient fallen since discharge? Yes   If the patient has fallen, were there any injuries? No   Fall comments States she tripped over a stake last week and feel but no injuries that she is aware of.    What is the patient's perception of their functional status since discharge? New symptoms unrelated to diagnosis   If the patient is a current smoker, are they able to teach back resources for cessation? Not a smoker   Is the patient/caregiver able to teach back the hierarchy of who to call/visit for symptoms/problems? PCP, Specialist, Home health nurse, Urgent Care, ED, 911 Yes   Additional teach back comments States she is doing \"pretty good\".  She has some knee pain and had prior surgery on that knee and is going to follow up with ortho once she gets back in town.  States she stays in [facility] during the winter.  Hip has healed well and has soften up some.  She is riding her 3 wheel bike 3/4 of a mile and walking more.  No longer needing to use cane or walker.  Has already scheduled an appt with Ortho for when she gets back in [place]    Graduated Yes   Was the number of calls appropriate? Yes   Wrap up additional comments Denies " questions or needs at this time          TIMUR MELO - Licensed Nurse

## 2022-04-14 ENCOUNTER — OFFICE VISIT (OUTPATIENT)
Dept: ORTHOPEDIC SURGERY | Facility: CLINIC | Age: 77
End: 2022-04-14

## 2022-04-14 ENCOUNTER — HOSPITAL ENCOUNTER (OUTPATIENT)
Dept: GENERAL RADIOLOGY | Facility: HOSPITAL | Age: 77
Discharge: HOME | End: 2022-04-14
Admitting: ORTHOPAEDIC SURGERY

## 2022-04-14 VITALS — TEMPERATURE: 97.1 F | HEIGHT: 66 IN | WEIGHT: 172 LBS | BODY MASS INDEX: 27.64 KG/M2

## 2022-04-14 DIAGNOSIS — Z96.651 HISTORY OF TOTAL KNEE ARTHROPLASTY, RIGHT: ICD-10-CM

## 2022-04-14 DIAGNOSIS — Z96.651 STATUS POST TOTAL KNEE REPLACEMENT, RIGHT: ICD-10-CM

## 2022-04-14 DIAGNOSIS — M16.12 PRIMARY OSTEOARTHRITIS OF LEFT HIP: ICD-10-CM

## 2022-04-14 DIAGNOSIS — Z96.651 STATUS POST TOTAL KNEE REPLACEMENT, RIGHT: Primary | ICD-10-CM

## 2022-04-14 DIAGNOSIS — Z96.641 STATUS POST TOTAL HIP REPLACEMENT, RIGHT: Primary | ICD-10-CM

## 2022-04-14 PROCEDURE — 99213 OFFICE O/P EST LOW 20 MIN: CPT | Performed by: ORTHOPAEDIC SURGERY

## 2022-04-14 PROCEDURE — 73560 X-RAY EXAM OF KNEE 1 OR 2: CPT

## 2022-04-14 RX ORDER — MELOXICAM 15 MG/1
TABLET ORAL
COMMUNITY
Start: 2022-04-04 | End: 2022-07-01

## 2022-04-14 NOTE — PROGRESS NOTES
Chief Complaint  Follow-up of the Left Hip, Follow-up of the Right Hip, and Follow-up of the Right Knee    Subjective    History of Present Illness      Valery Baez is a 76 y.o. female who presents to [facility] for follow-up on right total hip arthroplasty and right total knee replacement from several years ago.  History of Present Illness the patient is doing extremely well following right total hip arthroplasty.  She sustained a stress fracture of the femoral neck and underwent right anterior hip replacement on Christmas Eve 2021.  There is no limb length discrepancy.  She does have some symptoms of radiculopathy.  We have discussed her symptoms related to low back pathology as well as hip replacement and her knee replacement as well.  She has had the right knee replacement performed 17 years ago.  There are some signs of early osteolysis and possible particle wear from the knee replacement but it is not symptomatic enough for the patient at this point.  She seems to be doing fairly well in terms of being active.  She is getting ready to travel to [place] and does stay involved with the riding a recumbent bike as well as involving herself and actively walking and possibly playing pickle ball as well.  The patient is doing well for her age of 76 in terms of mobility despite having had a right hip and right knee replacement  Pain Location:  RIGHT knee and RIGHT hip  Radiation: none  Quality: stabbing  Intensity/Severity: mild-moderate  Duration: several months  Progression of symptoms: yes, progressive worsening  Onset quality: gradual   Timing: intermittent  Aggravating Factors: going up and down stairs, rising after sitting  Alleviating Factors: rest  Previous Episodes: yes  Associated Symptoms: pain, decreased strength, numbness/tingling  ADLs Affected: ambulating, recreational activities/sports  Previous Treatment: prior surgery       Objective   Vital Signs:   Temp 97.1  "°F (36.2 °C)   Ht 167.6 cm (66\")   Wt 78 kg (172 lb)   BMI 27.76 kg/m²     Physical Exam  Physical Exam  Vitals signs and nursing note reviewed.   Constitutional:       Appearance: Normal appearance.   Pulmonary:      Effort: Pulmonary effort is normal.   Skin:     General: Skin is warm and dry.      Capillary Refill: Capillary refill takes less than 2 seconds.   Neurological:      General: No focal deficit present.      Mental Status: He is alert and oriented to person, place, and time. Mental status is at baseline.   Psychiatric:         Mood and Affect: Mood normal.         Behavior: Behavior normal.         Thought Content: Thought content normal.         Judgment: Judgment normal.     Ortho Exam   Right knee.The patient is status post total knee arthroplasty postoperative 17 year(s). Incision is clean. Calf is soft and nontender. Homans sign is negative. There is no clicking, popping or catching. Anterior and posterior drawer signs are negative.  There is no instability of the components. Appropriate amounts of swelling and bruising are noted. Dorsalis pedis and posterior tibial artery pulses are palpable. Common peroneal nerve function is well preserved. Range of motion is from 0-125 degrees of flexion. Gait is cautious but otherwise fairly normal. There is no evidence of a deep seated joint infection.     Right hip. Patient is post op 4.5 month(s) from total hip arthoplasty, direct anterior. Incision is clean and healing well. Calf is soft and nontender. Homans sign is negative. Skin and soft tissues are appropriate for postoperative status of the patient. Hip flexion is 0-90 degrees, hip abduction is 0-30 degrees. No limb lenth discrepancy. Neurovascular status is intact. Patients gait is significantly improved. The pain relief is extremely dramatic for the patient. Dorsalis pedis and posterior tibial artery pulses palpable. Common peroneal function is well preserved. There is no evidence of cellulitis or " erythema or deep seated joint infection.        Result Review :   The following data was reviewed by: Dejan Lange MD on 04/14/2022:  Radiologic studies - see below for interpretation  RIGHT knee xrays  weightbearing/standing ap/lateral views were ordered by Dejan Lange MD. Performed at [facility] on 04/14/2022. Images were independently viewed and interpreted by myself, my impression as follows:    right Knee X-Ray  Indication: Evaluation of implant position after total knee arthroplasty.  AP, Lateral views  Findings: Well-placed implants with some osteopenic bone.  Early wear of the polyethylene is noted but there is no bone loss or osteolysis.  no bony lesion  Soft tissues within normal limits  within normal limits joint spaces  Hardware appropriately positioned yes      yes prior studies available for comparison.        X-RAY was ordered and reviewed by Dejan Lange MD        Procedures           Assessment   Assessment and Plan    Diagnoses and all orders for this visit:    1. Status post total hip replacement, right (Primary)    2. History of total knee arthroplasty, right    3. Primary osteoarthritis of left hip          Follow Up   · Compression/brace  · Rest, ice, compression, and elevation (RICE) therapy  · Stretching and strengthening exercises  · OTC Tylenol 500-1000mg by mouth every 6 hours as needed for pain   · Follow up in 12 month(s)  • Patient was given instructions and counseling regarding her condition or for health maintenance advice. Please see specific information pulled into the AVS if appropriate.     Dejan Lange MD   Date of Encounter: 4/14/2022   .     EMR Dragon/Transcription disclaimer:  Much of this encounter note is an electronic transcription/translation of spoken language to printed text. The electronic translation of spoken language may permit erroneous, or at times, nonsensical words or phrases to be inadvertently transcribed; Although I have reviewed  the note for such errors, some may still exist.

## 2022-04-22 ENCOUNTER — OFFICE VISIT (OUTPATIENT)
Dept: FAMILY MEDICINE CLINIC | Age: 77
End: 2022-04-22

## 2022-04-22 VITALS
DIASTOLIC BLOOD PRESSURE: 67 MMHG | HEART RATE: 72 BPM | HEIGHT: 66 IN | BODY MASS INDEX: 28.16 KG/M2 | SYSTOLIC BLOOD PRESSURE: 101 MMHG | TEMPERATURE: 98.2 F | WEIGHT: 175.2 LBS

## 2022-04-22 DIAGNOSIS — H81.10 BENIGN PAROXYSMAL POSITIONAL VERTIGO, UNSPECIFIED LATERALITY: ICD-10-CM

## 2022-04-22 DIAGNOSIS — K58.0 IRRITABLE BOWEL SYNDROME WITH DIARRHEA: ICD-10-CM

## 2022-04-22 DIAGNOSIS — K21.9 GERD WITHOUT ESOPHAGITIS: ICD-10-CM

## 2022-04-22 DIAGNOSIS — R00.0 SINUS TACHYCARDIA: Primary | ICD-10-CM

## 2022-04-22 DIAGNOSIS — J45.40 MODERATE PERSISTENT ASTHMA, UNCOMPLICATED: ICD-10-CM

## 2022-04-22 PROBLEM — Z96.641 STATUS POST TOTAL HIP REPLACEMENT, RIGHT: Status: RESOLVED | Noted: 2022-01-04 | Resolved: 2022-04-22

## 2022-04-22 PROBLEM — Z96.651 HISTORY OF TOTAL KNEE ARTHROPLASTY, RIGHT: Status: ACTIVE | Noted: 2022-04-22

## 2022-04-22 PROBLEM — M16.12 PRIMARY OSTEOARTHRITIS OF LEFT HIP: Status: ACTIVE | Noted: 2022-04-22

## 2022-04-22 PROBLEM — L71.9 ROSACEA: Status: ACTIVE | Noted: 2022-04-22

## 2022-04-22 PROBLEM — Z96.651 HISTORY OF TOTAL KNEE ARTHROPLASTY, RIGHT: Status: RESOLVED | Noted: 2022-04-22 | Resolved: 2022-04-22

## 2022-04-22 PROBLEM — G47.33 OBSTRUCTIVE SLEEP APNEA: Status: ACTIVE | Noted: 2019-09-10

## 2022-04-22 PROCEDURE — 99214 OFFICE O/P EST MOD 30 MIN: CPT | Performed by: FAMILY MEDICINE

## 2022-04-22 NOTE — ASSESSMENT & PLAN NOTE
Asthma is improving with treatment.  The patient is experiencing monthly daytime asthma symptoms. She is experiencing no nighttime asthma symptoms.  Discussed monitoring symptoms and use of quick-relief medications and contacting us early in the course of exacerbations.

## 2022-04-22 NOTE — PROGRESS NOTES
"Chief Complaint  Hyperlipidemia    Subjective          Valery Baez presents to [facility]  --GERD IS WELL CONTROLLED ON CURRENT MEDS  --OCCASIONAL VERTIGO, IMPROVED WITH PRN MECLIZINE, OK AT THIS TIME  --IBS WITH DIARRHEA, NEG COLONOSCOPY < THREE YEARS AGO, PRN LOMOTIL HELPS  --ASTHMA IS DOING WELL WITH CURRENT INHALED AND PO. MEDS  --CONTINUES ON A BETA BLOCKER FOR SINUS TACHYCARDIA, DOING WELL        Allergies   Allergen Reactions   • Bee Venom Anaphylaxis   • Latex Hives   • Codeine Hives     And Derivatives   • Cyclobenzaprine Other (See Comments) and Unknown - High Severity     Experienced side effects - blurred vision, dry mouth, constipation       • Darvon [Propoxyphene] Nausea And Vomiting     CONFUSION.    • Ditropan [Oxybutynin] Itching   • Duricef [Cefadroxil] Itching and Nausea Only   • Erythromycin Hives     Also allergic to Duricef   • Hydrocodone Nausea And Vomiting and GI Intolerance   • Iodine Hives     Iodine scrubbing solutions /IVP Contrast Dyes     • Meperidine Nausea And Vomiting   • Methylprednisolone Unknown (See Comments)     CAUSES ITCHING AND FACIAL FLUSHING BUT PT \"CAN TAKE LOWER DOSES\"   • Minocycline Hives, Diarrhea and Nausea And Vomiting     Only with doses > 100mg BID         • Morphine And Related Hives and GI Intolerance     Derivatives   • Oxycodone-Acetaminophen Hives   • Talwin [Pentazocine] Hives   • Tramadol Hives and GI Intolerance     ULTRAM ER   • Tramadol Hcl Hives and Nausea And Vomiting   • Valium [Diazepam] Hives and Hallucinations     CONFUSION.    • Adhesive Tape Rash   • Bacitracin-Polymyxin B Rash   • Betadine [Povidone Iodine] Rash   • Contrast Dye Hives and Palpitations   • Daptomycin Itching and Hives   • Miconazole Rash   • Neomycin-Polymyxin-Gramicidin Rash   • Neosporin Af [Miconazole Nitrate] Rash   • Penicillins Hives, Swelling and Rash     As infant   • Silver Rash   • Sulfa Antibiotics Hives, Nausea And Vomiting " and Rash        Health Maintenance Due   Topic Date Due   • ZOSTER VACCINE (2 of 3) 12/04/2013   • HEPATITIS C SCREENING  Never done   • LIPID PANEL  04/30/2019        Current Outpatient Medications on File Prior to Visit   Medication Sig   • Acetaminophen (TYLENOL PO) 2 tablet AS NEEDED (route: oral)   • acetaminophen (TYLENOL) 325 MG tablet Take 2 tablets by mouth Every 4 (Four) Hours As Needed for Mild Pain .   • ARMOUR THYROID 30 MG tablet Take 30 mg by mouth Daily.   • aspirin (aspirin) 81 MG EC tablet Take 1 tablet by mouth Daily. Resume after taking Xarelto RX   • atenolol (TENORMIN) 25 MG tablet TAKE 1 TABLET EVERY AFTERNOON   • atorvastatin (LIPITOR) 20 MG tablet TAKE 1 TABLET DAILY   • cephalexin (KEFLEX) 500 MG capsule TAKE 1 CAPSULE DAILY (Patient taking differently: Take 500 mg by mouth Daily. STATES HAS BEEN ON FOR 10 YEARS TO SUPPRESS MRSA)   • Denosumab (PROLIA SC) Inject  under the skin into the appropriate area as directed See Admin Instructions. Every 6 months   11/4/21  WAS LAST DOSE   • diphenhydrAMINE (BENADRYL ALLERGY CHILDRENS) 12.5 MG chewable tablet Chew 25 mg 4 (Four) Times a Day As Needed for Allergies. FOR BEE STINGS ONLY   • diphenoxylate-atropine (LOMOTIL) 2.5-0.025 MG per tablet Take 1 tablet by mouth Daily.   • EPIPEN 2-TOMMY 0.3 MG/0.3ML solution auto-injector injection As Needed. FOR BEE STINGS   • fexofenadine (ALLEGRA) 180 MG tablet Take 180 mg by mouth Daily.   • fluticasone (FLONASE) 50 MCG/ACT nasal spray 2 sprays into the nostril(s) as directed by provider Every Night.   • guaiFENesin (MUCINEX PO) Take 400 mg by mouth 2 (Two) Times a Day.   • lansoprazole (PREVACID) 30 MG capsule Take 30 mg by mouth 2 (Two) Times a Day.   • levalbuterol (XOPENEX HFA) 45 MCG/ACT inhaler Inhale 1-2 puffs Every 4 (Four) Hours As Needed.   • Lifitegrast (Xiidra) 5 % ophthalmic solution Administer 1 drop to both eyes 2 (Two) Times a Day.   • meclizine (ANTIVERT) 25 MG tablet TAKE 1 TABLET THREE  TIMES A DAY AS NEEDED FOR DIZZINESS   • Melatonin 10 MG tablet Take 1 tablet by mouth Every Night.   • meloxicam (MOBIC) 15 MG tablet    • metroNIDAZOLE (METROGEL) 0.75 % gel Apply 1 application topically to the appropriate area as directed 2 (Two) Times a Day As Needed. ROASCEA   • montelukast (SINGULAIR) 10 MG tablet Take 10 mg by mouth Every Night.   • Multiple Vitamins-Minerals (MULTIVITAMIN ADULT PO) Take 1 tablet by mouth Every Night. HOLD FOR SURGERY   • MYRBETRIQ 25 MG tablet sustained-release 24 hour 24 hr tablet Take 25 mg by mouth Daily.   • ondansetron (Zofran) 4 MG tablet Take 1 tablet by mouth Every 6 (Six) Hours As Needed for Nausea or Vomiting. Indications: Nausea and Vomiting Following an Operation   • pilocarpine (SALAGEN) 5 MG tablet TAKE 1 TABLET FOUR TIMES A DAY (Patient taking differently: Take 5 mg by mouth 3 (Three) Times a Day. TAKES WITH MEALS, MAY TAKE ONE AT BEDTIME AS WELL IF NEEDED)   • POTASSIUM CHLORIDE PO Take 99 mg by mouth Every Night. OTC  HOLD FOR SURGERY   • PROBIOTIC PRODUCT PO Take 1 capsule by mouth 2 (Two) Times a Day.   • [DISCONTINUED] rivaroxaban (Xarelto) 10 MG tablet Take 1 tablet by mouth Daily. Indications: Post Surgical - Hip   • [DISCONTINUED] HYDROcodone-acetaminophen (Norco) 7.5-325 MG per tablet Take one by mouth every 4-6 hours prn pain  Indications: s/p right total hip arthroplasty     Current Facility-Administered Medications on File Prior to Visit   Medication   • Chlorhexidine Gluconate Cloth 2 % pads       Immunization History   Administered Date(s) Administered   • COVID-19 (MODERNA) 1st, 2nd, 3rd Dose Only 01/13/2021, 02/09/2021, 10/26/2021   • Fluad Quad 65+ 11/01/2021   • Fluzone High Dose =>65 Years ([facility] ONLY) 10/02/2017   • Fluzone Split Quad (Multi-dose) 09/30/2013   • Influenza Quad Vaccine (Inpatient) 09/30/2013   • Influenza TIV (IM) 01/09/2010, 10/25/2012   • Pneumococcal Conjugate 13-Valent (PCV13) 10/27/2015   • Pneumococcal Polysaccharide  "(PPSV23) 11/01/2009, 11/01/2013   • Pneumococcal, Unspecified 09/23/2008   • Tdap 11/02/2012, 08/06/2015   • Zostavax 09/30/2009, 10/09/2013       Review of Systems   Constitutional: Negative for activity change, appetite change, chills, fatigue and fever.   HENT: Negative for congestion, ear pain, rhinorrhea and sore throat.    Respiratory: Negative for cough and shortness of breath.    Cardiovascular: Negative for chest pain, palpitations and leg swelling.   Gastrointestinal: Negative for abdominal pain, constipation, diarrhea, nausea and vomiting.   Musculoskeletal: Negative for arthralgias and myalgias.   Neurological: Negative for headache.        Objective     /67 (BP Location: Left arm, Patient Position: Sitting)   Pulse 72   Temp 98.2 °F (36.8 °C) (Oral)   Ht 167.6 cm (66\")   Wt 79.5 kg (175 lb 3.2 oz)   BMI 28.28 kg/m²       Physical Exam  Vitals and nursing note reviewed.   Constitutional:       General: She is not in acute distress.     Appearance: Normal appearance.   Cardiovascular:      Rate and Rhythm: Normal rate and regular rhythm.      Heart sounds: Normal heart sounds. No murmur heard.  Pulmonary:      Effort: Pulmonary effort is normal.      Breath sounds: Normal breath sounds.   Abdominal:      Palpations: Abdomen is soft.      Tenderness: There is no abdominal tenderness.   Musculoskeletal:      Cervical back: Neck supple.      Right lower leg: No edema.      Left lower leg: No edema.   Lymphadenopathy:      Cervical: No cervical adenopathy.   Neurological:      General: No focal deficit present.      Mental Status: She is alert.      Cranial Nerves: No cranial nerve deficit.      Coordination: Coordination normal.      Gait: Gait normal.   Psychiatric:         Mood and Affect: Mood normal.         Behavior: Behavior normal.         Result Review :                             Assessment and Plan      Diagnoses and all orders for this visit:    1. Sinus tachycardia " (Primary)  Assessment & Plan:  IMPROVED WITH CURRENT TREATMENT, CONTINUE SAME, WILL REEVALUATE AT NEXT VISIT       2. Moderate persistent asthma, uncomplicated  Assessment & Plan:  Asthma is improving with treatment.  The patient is experiencing monthly daytime asthma symptoms. She is experiencing no nighttime asthma symptoms.  Discussed monitoring symptoms and use of quick-relief medications and contacting us early in the course of exacerbations.          3. Irritable bowel syndrome with diarrhea  Assessment & Plan:  IMPROVED WITH CURRENT TREATMENT, CONTINUE SAME, WILL REEVALUATE AT NEXT VISIT       4. Benign paroxysmal positional vertigo, unspecified laterality  Assessment & Plan:  CURRENTLY STABLE, WILL CONTINUE PRN MEDS FOR NOW       5. GERD without esophagitis  Assessment & Plan:  IMPROVED WITH CURRENT TREATMENT, CONTINUE SAME, WILL REEVALUATE AT NEXT VISIT             Follow Up     Return in about 4 months (around 8/22/2022).    Patient was given instructions and counseling regarding her condition or for health maintenance advice. Please see specific information pulled into the AVS if appropriate.

## 2022-04-26 ENCOUNTER — OFFICE VISIT (OUTPATIENT)
Dept: ORTHOPEDIC SURGERY | Facility: CLINIC | Age: 77
End: 2022-04-26

## 2022-04-26 VITALS — WEIGHT: 172 LBS | BODY MASS INDEX: 28.66 KG/M2 | TEMPERATURE: 97.5 F | HEIGHT: 65 IN

## 2022-04-26 DIAGNOSIS — M48.062 SPINAL STENOSIS OF LUMBAR REGION WITH NEUROGENIC CLAUDICATION: Primary | ICD-10-CM

## 2022-04-26 PROCEDURE — 72100 X-RAY EXAM L-S SPINE 2/3 VWS: CPT | Performed by: ORTHOPAEDIC SURGERY

## 2022-04-26 PROCEDURE — 99213 OFFICE O/P EST LOW 20 MIN: CPT | Performed by: ORTHOPAEDIC SURGERY

## 2022-04-26 NOTE — PROGRESS NOTES
Some recent knee pain.  Dr. Tobin has determined it is unlikely from her hip or knee.  She had L3 to sacral fusion 2-1/2 years ago and did pretty well.  She may have some stenosis above certainly in the foramina.  Her fusion appears solid on x-ray today compared to prior films.  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  I recommended epidural injections which they may give to a caudal approach since she had such a wide multilevel laminectomy in any event I would leave that up to the pain management specialist but we will schedule her for the and follow-up.

## 2022-05-04 ENCOUNTER — TELEPHONE (OUTPATIENT)
Dept: FAMILY MEDICINE CLINIC | Age: 77
End: 2022-05-04

## 2022-05-04 DIAGNOSIS — N28.9 ABNORMAL KIDNEY FUNCTION: Primary | ICD-10-CM

## 2022-05-04 NOTE — TELEPHONE ENCOUNTER
Pt called and said Dr Nash told her to hold her Prolia today because her kidney function was off.  She said he told her to f/u with Dr Smith.   Will call and talk to Dr Nash's office to get more information.

## 2022-05-04 NOTE — TELEPHONE ENCOUNTER
Called Dr Nash's office and was told she came in for her Prolia, she had lab work done and her GFR was 52 and has gone down to 40.  Dr Nash said to hold Prolia and f/u with Dr Smith.  They will fax us his office note and lab work for Dr Smith to review.

## 2022-05-09 ENCOUNTER — TELEPHONE (OUTPATIENT)
Dept: ORTHOPEDIC SURGERY | Facility: CLINIC | Age: 77
End: 2022-05-09

## 2022-05-09 RX ORDER — EPINEPHRINE 0.15 MG/.15ML
INJECTION SUBCUTANEOUS
Qty: 2 EACH | Refills: 1 | Status: SHIPPED | OUTPATIENT
Start: 2022-05-09 | End: 2022-10-10

## 2022-05-09 NOTE — TELEPHONE ENCOUNTER
Dr Smith said to check a BMP in one month from the lab done 05/04/22. Lab order placed, pt informed.

## 2022-05-09 NOTE — TELEPHONE ENCOUNTER
REFERRAL TO [facility] 05/09/2022 PATIENT CALLED TO ADVISE SHE TRIED SCHEDULING AGAIN AND WAS ADVISED, THEY STILL HAVEN'T RECEIVED THE REFERRAL - TO PLEASE REFAX  [phone].  PATIENT WAS ADVISED SOMEONE WOULD CALL HER WITH STATUS OF REFAXING.

## 2022-05-09 NOTE — TELEPHONE ENCOUNTER
Received lab from Dr Nash's office. (found in media). Pt calling for Dr Smith's advise as to what she should do?

## 2022-05-12 NOTE — TELEPHONE ENCOUNTER
PATIENT CALLED AND STATED THAT [facility] STILL HASN'T RECEIVED ORDER FOR HER.  I SPOKE WITH LAVINIA IN [place] OFFICE TO SEE WHAT I WOULD NEED TO PRINT FOR PATIENT SO THAT SHE COULD TAKE THE ORDERS DIRECTLY TO [facility] IN [place] (PER PATIENT'S REQUEST).  I HAVE GENERATED THE RELEASE FOR RECORDS AND PRINTED FOR PATIENT.  SHE WILL PICK THEM UP IN THE [place] OFFICE ON 5/13/22.  LAVINIA IS ALSO GOING TO RE FAX TO [facility] AND REACH OUT TO THE REPRESENTATIVE.

## 2022-05-18 ENCOUNTER — TELEPHONE (OUTPATIENT)
Dept: FAMILY MEDICINE CLINIC | Age: 77
End: 2022-05-18

## 2022-05-18 DIAGNOSIS — K58.0 IRRITABLE BOWEL SYNDROME WITH DIARRHEA: Primary | ICD-10-CM

## 2022-05-18 RX ORDER — DIPHENOXYLATE HYDROCHLORIDE AND ATROPINE SULFATE 2.5; .025 MG/1; MG/1
1 TABLET ORAL DAILY
Qty: 30 TABLET | Refills: 1 | Status: SHIPPED | OUTPATIENT
Start: 2022-05-18 | End: 2022-09-09

## 2022-05-18 RX ORDER — MIRABEGRON 25 MG/1
25 TABLET, FILM COATED, EXTENDED RELEASE ORAL DAILY
Qty: 30 TABLET | Refills: 1 | Status: SHIPPED | OUTPATIENT
Start: 2022-05-18 | End: 2022-06-29 | Stop reason: SDUPTHER

## 2022-05-18 NOTE — TELEPHONE ENCOUNTER
Caller: Valery Baez    Relationship: Self    Best call back number: [phone]    Requested Prescriptions:   Requested Prescriptions     Pending Prescriptions Disp Refills   • diphenoxylate-atropine (LOMOTIL) 2.5-0.025 MG per tablet  1     Sig: Take 1 tablet by mouth Daily.   • Myrbetriq 25 MG tablet sustained-release 24 hour 24 hr tablet 30 tablet      Sig: Take 1 tablet by mouth Daily.        Pharmacy where request should be sent:  [facility] - [address] - [phone] [facility] [phone] [phone]    Additional details provided by patient: PATIENT IS NEEDING AN EMERGENCY SUPPLY OF THESE MEDICATIONS SENT OVER TO THE [facility] AS WELL. SHE IS COMPLETELY OUT OF THESE MEDICATIONS AND IT TAKES 2 WEEKS FOR [facility] TO SEND THEM TO HER HOME.     Does the patient have less than a 3 day supply:  [x] Yes  [] No    Logan Peoples Rep   05/18/22 09:24 EDT

## 2022-05-18 NOTE — TELEPHONE ENCOUNTER
Rx Refill Note  Requested Prescriptions     Pending Prescriptions Disp Refills   • diphenoxylate-atropine (LOMOTIL) 2.5-0.025 MG per tablet  1     Sig: Take 1 tablet by mouth Daily.   • Myrbetriq 25 MG tablet sustained-release 24 hour 24 hr tablet 30 tablet      Sig: Take 1 tablet by mouth Daily.      Last office visit with prescribing clinician: 4/22/2022      Next office visit with prescribing clinician: 8/1/2022    Pt requested refills to be sent to [facility] and a 2 week supply to [facility].    Olivia Le LPN  05/18/22, 12:50 EDT

## 2022-05-24 ENCOUNTER — TELEPHONE (OUTPATIENT)
Dept: FAMILY MEDICINE CLINIC | Age: 77
End: 2022-05-24

## 2022-05-24 NOTE — TELEPHONE ENCOUNTER
Caller: Valery Baez    Relationship: Self    Best call back number: [phone]    What is the best time to reach you: ANY    Who are you requesting to speak with (clinical staff, provider,  specific staff member): CLINICAL STAFF    What was the call regarding: PATIENT CALLED STATING THAT SHE JUST WANTED JOSE TO KNOW THAT SHE GOT HER MEDICATION. SHE WOULD LIKE TO THANK HER FOR GETTING HER THE MEDICATION AND HOPES THAT THE MYRBETRIQ WILL IMPROVE HER KIDNEY FUNCTION.    Do you require a callback: NO

## 2022-06-01 ENCOUNTER — LAB (OUTPATIENT)
Dept: LAB | Facility: HOSPITAL | Age: 77
End: 2022-06-01

## 2022-06-01 DIAGNOSIS — N28.9 ABNORMAL KIDNEY FUNCTION: ICD-10-CM

## 2022-06-01 PROCEDURE — 36415 COLL VENOUS BLD VENIPUNCTURE: CPT

## 2022-06-01 PROCEDURE — 80048 BASIC METABOLIC PNL TOTAL CA: CPT

## 2022-06-02 LAB
ANION GAP SERPL CALCULATED.3IONS-SCNC: 13.7 MMOL/L (ref 5–15)
BUN SERPL-MCNC: 30 MG/DL (ref 8–23)
BUN/CREAT SERPL: 27.5 (ref 7–25)
CALCIUM SPEC-SCNC: 9.8 MG/DL (ref 8.6–10.5)
CHLORIDE SERPL-SCNC: 103 MMOL/L (ref 98–107)
CO2 SERPL-SCNC: 19.3 MMOL/L (ref 22–29)
CREAT SERPL-MCNC: 1.09 MG/DL (ref 0.57–1)
EGFRCR SERPLBLD CKD-EPI 2021: 53.1 ML/MIN/1.73
GLUCOSE SERPL-MCNC: 88 MG/DL (ref 65–99)
POTASSIUM SERPL-SCNC: 5.2 MMOL/L (ref 3.5–5.2)
SODIUM SERPL-SCNC: 136 MMOL/L (ref 136–145)

## 2022-06-03 ENCOUNTER — TELEPHONE (OUTPATIENT)
Dept: FAMILY MEDICINE CLINIC | Age: 77
End: 2022-06-03

## 2022-06-03 NOTE — TELEPHONE ENCOUNTER
She had her lab work checked at his office 05/04/22 and he told her to hold her Prolia due to abnormal kidney function and get it rechecked at her pcp.   Lab from 06/01/22 faxed to Dr Nash's office so he can advise her on her Prolia.

## 2022-06-03 NOTE — TELEPHONE ENCOUNTER
Caller: Valery Baez    Relationship: Self    Best call back number: [phone]    Who are you requesting to speak with (clinical staff, provider,  specific staff member): JOSE    What was the call regarding: PATIENT WOULD LIKE TO LET JOSE KNOW THAT HER RECENT LAB RESULTS NEED TO BE FAXED TO  DR WILMA VALERIO'S OFFICE TO SEE IF HER KIDNEY FUNCTION HAS APPROVED ENOUGH FOR HER PROLIA SHOT.

## 2022-06-22 RX ORDER — CEPHALEXIN 500 MG/1
CAPSULE ORAL
Qty: 90 CAPSULE | Refills: 3 | OUTPATIENT
Start: 2022-06-22

## 2022-06-29 ENCOUNTER — OFFICE VISIT (OUTPATIENT)
Dept: FAMILY MEDICINE CLINIC | Age: 77
End: 2022-06-29

## 2022-06-29 VITALS
DIASTOLIC BLOOD PRESSURE: 65 MMHG | HEART RATE: 83 BPM | WEIGHT: 175.6 LBS | HEIGHT: 65 IN | BODY MASS INDEX: 29.26 KG/M2 | SYSTOLIC BLOOD PRESSURE: 91 MMHG

## 2022-06-29 DIAGNOSIS — E03.9 HYPOTHYROIDISM, ACQUIRED: ICD-10-CM

## 2022-06-29 DIAGNOSIS — K21.9 GERD WITHOUT ESOPHAGITIS: ICD-10-CM

## 2022-06-29 DIAGNOSIS — A49.02 MRSA (METHICILLIN RESISTANT STAPHYLOCOCCUS AUREUS) INFECTION: ICD-10-CM

## 2022-06-29 DIAGNOSIS — N32.81 OVERACTIVE BLADDER: ICD-10-CM

## 2022-06-29 DIAGNOSIS — J45.40 MODERATE PERSISTENT ASTHMA, UNCOMPLICATED: Primary | ICD-10-CM

## 2022-06-29 PROCEDURE — 99214 OFFICE O/P EST MOD 30 MIN: CPT | Performed by: FAMILY MEDICINE

## 2022-06-29 RX ORDER — MIRABEGRON 25 MG/1
25 TABLET, FILM COATED, EXTENDED RELEASE ORAL DAILY
Qty: 90 TABLET | Refills: 3 | Status: SHIPPED | OUTPATIENT
Start: 2022-06-29

## 2022-06-29 RX ORDER — CEPHALEXIN 500 MG/1
500 CAPSULE ORAL DAILY
Qty: 90 CAPSULE | Refills: 3 | Status: SHIPPED | OUTPATIENT
Start: 2022-06-29

## 2022-06-29 NOTE — PROGRESS NOTES
"Chief Complaint  Hypothyroidism (Discuss having med filled here)    Subjective          Valery Baez presents to [facility]  --GERD IS WELL-CONTROLLED WITH THE PPI  --ASTHMA IS DOING WELL WITH CURRENT INHALED MEDS  --CONTINUES ON DAILY KEFLEX FOR MRSA COLONIZATION  --CONTINUES ON ARMOUR THYROID BUT HAS NOT HAD LABS FOR A WHILE  --THE MYRBETRIQ IS HELPING HER OAB QUITE WELL        Allergies   Allergen Reactions   • Bee Venom Anaphylaxis   • Latex Hives   • Codeine Hives     And Derivatives   • Cyclobenzaprine Other (See Comments) and Unknown - High Severity     Experienced side effects - blurred vision, dry mouth, constipation       • Darvon [Propoxyphene] Nausea And Vomiting     CONFUSION.    • Ditropan [Oxybutynin] Itching   • Duricef [Cefadroxil] Itching and Nausea Only   • Erythromycin Hives     Also allergic to Duricef   • Hydrocodone Nausea And Vomiting and GI Intolerance   • Iodine Hives     Iodine scrubbing solutions /IVP Contrast Dyes     • Meperidine Nausea And Vomiting   • Methylprednisolone Unknown (See Comments)     CAUSES ITCHING AND FACIAL FLUSHING BUT PT \"CAN TAKE LOWER DOSES\"   • Minocycline Hives, Diarrhea and Nausea And Vomiting     Only with doses > 100mg BID         • Morphine And Related Hives and GI Intolerance     Derivatives   • Oxycodone-Acetaminophen Hives   • Talwin [Pentazocine] Hives   • Tramadol Hives and GI Intolerance     ULTRAM ER   • Tramadol Hcl Hives and Nausea And Vomiting   • Valium [Diazepam] Hives and Hallucinations     CONFUSION.    • Adhesive Tape Rash   • Bacitracin-Polymyxin B Rash   • Betadine [Povidone Iodine] Rash   • Contrast Dye Hives and Palpitations   • Daptomycin Itching and Hives   • Miconazole Rash   • Neomycin-Polymyxin-Gramicidin Rash   • Neosporin Af [Miconazole Nitrate] Rash   • Penicillins Hives, Swelling and Rash     As infant   • Silver Rash   • Sulfa Antibiotics Hives, Nausea And Vomiting and Rash        Health " Maintenance Due   Topic Date Due   • HEPATITIS C SCREENING  Never done   • LIPID PANEL  04/30/2019   • ZOSTER VACCINE (3 of 3) 03/03/2022        Current Outpatient Medications on File Prior to Visit   Medication Sig   • acetaminophen (TYLENOL) 325 MG tablet Take 2 tablets by mouth Every 4 (Four) Hours As Needed for Mild Pain .   • ARMOUR THYROID 30 MG tablet Take 30 mg by mouth Daily.   • atenolol (TENORMIN) 25 MG tablet TAKE 1 TABLET EVERY AFTERNOON   • atorvastatin (LIPITOR) 20 MG tablet TAKE 1 TABLET DAILY   • Denosumab (PROLIA SC) Inject  under the skin into the appropriate area as directed See Admin Instructions. Every 6 months   11/4/21  WAS LAST DOSE   • diphenhydrAMINE (BENADRYL ALLERGY CHILDRENS) 12.5 MG chewable tablet Chew 25 mg 4 (Four) Times a Day As Needed for Allergies. FOR BEE STINGS ONLY   • EPINEPHrine 0.15 MG/0.15ML solution auto-injector injection INJECT AS NEEDED AS DIRECTED   • fexofenadine (ALLEGRA) 180 MG tablet Take 180 mg by mouth Daily.   • fluticasone (FLONASE) 50 MCG/ACT nasal spray 2 sprays into the nostril(s) as directed by provider Every Night.   • guaiFENesin (MUCINEX PO) Take 400 mg by mouth 2 (Two) Times a Day.   • lansoprazole (PREVACID) 30 MG capsule Take 30 mg by mouth 2 (Two) Times a Day.   • levalbuterol (XOPENEX HFA) 45 MCG/ACT inhaler Inhale 1-2 puffs Every 4 (Four) Hours As Needed.   • Lifitegrast (Xiidra) 5 % ophthalmic solution Administer 1 drop to both eyes 2 (Two) Times a Day.   • meclizine (ANTIVERT) 25 MG tablet TAKE 1 TABLET THREE TIMES A DAY AS NEEDED FOR DIZZINESS   • Melatonin 10 MG tablet Take 1 tablet by mouth Every Night.   • meloxicam (MOBIC) 15 MG tablet    • metroNIDAZOLE (METROGEL) 0.75 % gel Apply 1 application topically to the appropriate area as directed 2 (Two) Times a Day As Needed. ROASCEA   • montelukast (SINGULAIR) 10 MG tablet Take 10 mg by mouth Every Night.   • Multiple Vitamins-Minerals (MULTIVITAMIN ADULT PO) Take 1 tablet by mouth Every Night.  HOLD FOR SURGERY   • ondansetron (Zofran) 4 MG tablet Take 1 tablet by mouth Every 6 (Six) Hours As Needed for Nausea or Vomiting. Indications: Nausea and Vomiting Following an Operation   • pilocarpine (SALAGEN) 5 MG tablet TAKE 1 TABLET FOUR TIMES A DAY (Patient taking differently: Take 5 mg by mouth 3 (Three) Times a Day. TAKES WITH MEALS, MAY TAKE ONE AT BEDTIME AS WELL IF NEEDED)   • POTASSIUM CHLORIDE PO Take 99 mg by mouth Every Night. OTC  HOLD FOR SURGERY   • PROBIOTIC PRODUCT PO Take 1 capsule by mouth 2 (Two) Times a Day.   • [DISCONTINUED] Acetaminophen (TYLENOL PO) 2 tablet AS NEEDED (route: oral)   • [DISCONTINUED] cephalexin (KEFLEX) 500 MG capsule TAKE 1 CAPSULE DAILY (Patient taking differently: Take 500 mg by mouth Daily. STATES HAS BEEN ON FOR 10 YEARS TO SUPPRESS MRSA)   • [DISCONTINUED] Myrbetriq 25 MG tablet sustained-release 24 hour 24 hr tablet Take 1 tablet by mouth Daily.   • [DISCONTINUED] aspirin (aspirin) 81 MG EC tablet Take 1 tablet by mouth Daily. Resume after taking Xarelto RX     Current Facility-Administered Medications on File Prior to Visit   Medication   • Chlorhexidine Gluconate Cloth 2 % pads       Immunization History   Administered Date(s) Administered   • COVID-19 (MODERNA) 1st, 2nd, 3rd Dose Only 01/13/2021, 02/09/2021, 10/26/2021   • Fluad Quad 65+ 11/01/2021   • Fluzone High Dose =>65 Years ([facility] ONLY) 10/02/2017   • Fluzone Split Quad (Multi-dose) 09/30/2013   • Influenza Quad Vaccine (Inpatient) 09/30/2013   • Influenza TIV (IM) 01/09/2010, 10/25/2012   • Pneumococcal Conjugate 13-Valent (PCV13) 10/27/2015   • Pneumococcal Polysaccharide (PPSV23) 11/01/2009, 11/01/2013   • Pneumococcal, Unspecified 09/23/2008   • Tdap 11/02/2012, 08/06/2015   • Zostavax 09/30/2009, 10/09/2013       Review of Systems   Constitutional: Negative for activity change, appetite change, chills, fatigue and fever.   HENT: Negative for congestion, ear pain, rhinorrhea and sore throat.   "  Respiratory: Negative for cough and shortness of breath.    Cardiovascular: Negative for chest pain, palpitations and leg swelling.   Gastrointestinal: Negative for abdominal pain, constipation, diarrhea, nausea and vomiting.   Genitourinary: Negative for dysuria and hematuria.   Musculoskeletal: Negative for arthralgias and myalgias.   Neurological: Negative for headache.        Objective     BP 91/65 (BP Location: Left arm, Patient Position: Sitting)   Pulse 83   Ht 165.1 cm (65\")   Wt 79.7 kg (175 lb 9.6 oz)   BMI 29.22 kg/m²       Physical Exam  Vitals and nursing note reviewed.   Constitutional:       General: She is not in acute distress.     Appearance: Normal appearance.   Cardiovascular:      Rate and Rhythm: Normal rate and regular rhythm.      Heart sounds: Normal heart sounds. No murmur heard.  Pulmonary:      Effort: Pulmonary effort is normal.      Breath sounds: Normal breath sounds.   Abdominal:      Palpations: Abdomen is soft.      Tenderness: There is no abdominal tenderness.   Musculoskeletal:      Cervical back: Neck supple.      Right lower leg: No edema.      Left lower leg: No edema.   Lymphadenopathy:      Cervical: No cervical adenopathy.   Neurological:      General: No focal deficit present.      Mental Status: She is alert.      Cranial Nerves: No cranial nerve deficit.      Coordination: Coordination normal.      Gait: Gait normal.   Psychiatric:         Mood and Affect: Mood normal.         Behavior: Behavior normal.         Result Review :{Labs  Result Review  Imaging  Med Tab  Media :23}                             Assessment and Plan      Diagnoses and all orders for this visit:    1. Moderate persistent asthma, uncomplicated (Primary)  Assessment & Plan:  Asthma is improving with treatment.  The patient is experiencing monthly daytime asthma symptoms. She is experiencing no nighttime asthma symptoms.  Discussed monitoring symptoms and use of quick-relief medications and " contacting us early in the course of exacerbations.          2. Hypothyroidism, acquired  Assessment & Plan:  IMPROVED WITH CURRENT TREATMENT, CONTINUE SAME, WILL REEVALUATE AT NEXT VISIT   WILL RECHECK LABS AT NEXT VISIT       3. GERD without esophagitis  Assessment & Plan:  IMPROVED WITH CURRENT TREATMENT, CONTINUE SAME, WILL REEVALUATE AT NEXT VISIT       4. Overactive bladder  Assessment & Plan:  IMPROVED WITH CURRENT TREATMENT, CONTINUE SAME, WILL REEVALUATE AT NEXT VISIT       5. MRSA (methicillin resistant Staphylococcus aureus) infection    Other orders  -     cephalexin (KEFLEX) 500 MG capsule; Take 1 capsule by mouth Daily. STATES HAS BEEN ON FOR 10 YEARS TO SUPPRESS MRSA  Dispense: 90 capsule; Refill: 3  -     Myrbetriq 25 MG tablet sustained-release 24 hour 24 hr tablet; Take 1 tablet by mouth Daily.  Dispense: 90 tablet; Refill: 3          Follow Up     Return in about 3 months (around 9/29/2022) for Medicare Wellness.    Patient was given instructions and counseling regarding her condition or for health maintenance advice. Please see specific information pulled into the AVS if appropriate.

## 2022-06-29 NOTE — ASSESSMENT & PLAN NOTE
IMPROVED WITH CURRENT TREATMENT, CONTINUE SAME, WILL REEVALUATE AT NEXT VISIT   WILL RECHECK LABS AT NEXT VISIT

## 2022-07-01 RX ORDER — MELOXICAM 15 MG/1
TABLET ORAL
Qty: 90 TABLET | Refills: 3 | Status: SHIPPED | OUTPATIENT
Start: 2022-07-01

## 2022-07-06 RX ORDER — PILOCARPINE HYDROCHLORIDE 5 MG/1
TABLET, FILM COATED ORAL
Qty: 360 TABLET | Refills: 3 | Status: SHIPPED | OUTPATIENT
Start: 2022-07-06

## 2022-07-06 NOTE — TELEPHONE ENCOUNTER
Rx Refill Note  Requested Prescriptions     Pending Prescriptions Disp Refills   • pilocarpine (SALAGEN) 5 MG tablet [Pharmacy Med Name: PILOCARPINE HCL TABS 5MG] 360 tablet 3     Sig: TAKE 1 TABLET FOUR TIMES A DAY      Last office visit with prescribing clinician: 6/29/2022      Next office visit with prescribing clinician: 8/1/2022  }  Olivia Le LPN  07/06/22, 13:50 EDT

## 2022-07-25 ENCOUNTER — OFFICE VISIT (OUTPATIENT)
Dept: FAMILY MEDICINE CLINIC | Age: 77
End: 2022-07-25

## 2022-07-25 VITALS
SYSTOLIC BLOOD PRESSURE: 133 MMHG | DIASTOLIC BLOOD PRESSURE: 71 MMHG | HEART RATE: 66 BPM | BODY MASS INDEX: 29.66 KG/M2 | WEIGHT: 178 LBS | HEIGHT: 65 IN | OXYGEN SATURATION: 99 %

## 2022-07-25 DIAGNOSIS — L29.9 EAR ITCHING: Primary | ICD-10-CM

## 2022-07-25 PROCEDURE — 99213 OFFICE O/P EST LOW 20 MIN: CPT | Performed by: NURSE PRACTITIONER

## 2022-07-25 RX ORDER — HYDROCORTISONE AND ACETIC ACID 20.75; 10.375 MG/ML; MG/ML
4 SOLUTION AURICULAR (OTIC) 2 TIMES DAILY
Qty: 10 ML | Refills: 0 | Status: SHIPPED | OUTPATIENT
Start: 2022-07-25

## 2022-07-25 NOTE — PROGRESS NOTES
"Chief Complaint  Earache    Subjective        Valery Baez presents to [facility]  Earache   There is pain in the left ear. This is a recurrent problem. The current episode started in the past 7 days. The problem has been gradually worsening. There has been no fever. The patient is experiencing no pain. Associated symptoms include ear discharge. Pertinent negatives include no coughing, hearing loss or sore throat. She has tried nothing for the symptoms. The treatment provided no relief.       Objective   Vital Signs:  /71   Pulse 66   Ht 165.1 cm (65\")   Wt 80.7 kg (178 lb)   SpO2 99%   BMI 29.62 kg/m²   Estimated body mass index is 29.62 kg/m² as calculated from the following:    Height as of this encounter: 165.1 cm (65\").    Weight as of this encounter: 80.7 kg (178 lb).          Physical Exam  HENT:      Head: Normocephalic.      Right Ear: A middle ear effusion is present.      Left Ear: A middle ear effusion is present.      Nose: Nose normal.   Cardiovascular:      Rate and Rhythm: Normal rate.   Pulmonary:      Effort: Pulmonary effort is normal.   Skin:     General: Skin is warm and dry.   Neurological:      Mental Status: She is alert and oriented to person, place, and time.   Psychiatric:         Mood and Affect: Mood normal.        Result Review :                Assessment and Plan   Diagnoses and all orders for this visit:    1. Ear itching (Primary)  -     acetic acid-hydrocortisone (VOSOL-HC) 1-2 % otic solution; Administer 4 drops into both ears 2 (Two) Times a Day.  Dispense: 10 mL; Refill: 0             Follow Up   Return if symptoms worsen or fail to improve.  Patient was given instructions and counseling regarding her condition or for health maintenance advice. Please see specific information pulled into the AVS if appropriate.       "

## 2022-07-28 ENCOUNTER — TELEPHONE (OUTPATIENT)
Dept: FAMILY MEDICINE CLINIC | Age: 77
End: 2022-07-28

## 2022-07-28 DIAGNOSIS — D50.9 IRON DEFICIENCY ANEMIA, UNSPECIFIED IRON DEFICIENCY ANEMIA TYPE: ICD-10-CM

## 2022-07-28 DIAGNOSIS — E78.00 HIGH CHOLESTEROL: ICD-10-CM

## 2022-07-28 DIAGNOSIS — N32.81 OVERACTIVE BLADDER: ICD-10-CM

## 2022-07-28 DIAGNOSIS — E03.9 HYPOTHYROIDISM, ACQUIRED: Primary | ICD-10-CM

## 2022-07-28 DIAGNOSIS — K21.9 GERD WITHOUT ESOPHAGITIS: ICD-10-CM

## 2022-07-28 DIAGNOSIS — M35.00 SJOGREN'S SYNDROME, WITH UNSPECIFIED ORGAN INVOLVEMENT: ICD-10-CM

## 2022-07-28 NOTE — TELEPHONE ENCOUNTER
Caller: Valery Baez    Relationship: Self    Best call back number: [phone]     What orders are you requesting (i.e. lab or imaging): LABS    In what timeframe would the patient need to come in: ASAP    Where will you receive your lab/imaging services:     Additional notes: PATIENT WOULD LIKE TO HAVE LABS DONE BEFORE APPOINTMENT ON 8/1/22

## 2022-08-01 ENCOUNTER — OFFICE VISIT (OUTPATIENT)
Dept: FAMILY MEDICINE CLINIC | Age: 77
End: 2022-08-01

## 2022-08-01 ENCOUNTER — LAB (OUTPATIENT)
Dept: LAB | Facility: HOSPITAL | Age: 77
End: 2022-08-01

## 2022-08-01 VITALS
HEIGHT: 65 IN | BODY MASS INDEX: 29.72 KG/M2 | DIASTOLIC BLOOD PRESSURE: 69 MMHG | SYSTOLIC BLOOD PRESSURE: 108 MMHG | WEIGHT: 178.4 LBS | HEART RATE: 75 BPM

## 2022-08-01 DIAGNOSIS — D50.9 IRON DEFICIENCY ANEMIA, UNSPECIFIED IRON DEFICIENCY ANEMIA TYPE: ICD-10-CM

## 2022-08-01 DIAGNOSIS — Z00.00 MEDICARE ANNUAL WELLNESS VISIT, SUBSEQUENT: Primary | ICD-10-CM

## 2022-08-01 DIAGNOSIS — K21.9 GERD WITHOUT ESOPHAGITIS: ICD-10-CM

## 2022-08-01 DIAGNOSIS — E03.9 HYPOTHYROIDISM, ACQUIRED: ICD-10-CM

## 2022-08-01 DIAGNOSIS — E78.00 HIGH CHOLESTEROL: ICD-10-CM

## 2022-08-01 DIAGNOSIS — N32.81 OVERACTIVE BLADDER: ICD-10-CM

## 2022-08-01 LAB
ALBUMIN SERPL-MCNC: 4.3 G/DL (ref 3.5–5.2)
ALBUMIN/GLOB SERPL: 2.2 G/DL
ALP SERPL-CCNC: 79 U/L (ref 39–117)
ALT SERPL W P-5'-P-CCNC: 22 U/L (ref 1–33)
ANION GAP SERPL CALCULATED.3IONS-SCNC: 12 MMOL/L (ref 5–15)
AST SERPL-CCNC: 24 U/L (ref 1–32)
BACTERIA UR QL AUTO: ABNORMAL /HPF
BASOPHILS # BLD AUTO: 0.05 10*3/MM3 (ref 0–0.2)
BASOPHILS NFR BLD AUTO: 0.5 % (ref 0–1.5)
BILIRUB SERPL-MCNC: 0.2 MG/DL (ref 0–1.2)
BILIRUB UR QL STRIP: NEGATIVE
BUN SERPL-MCNC: 18 MG/DL (ref 8–23)
BUN/CREAT SERPL: 18.2 (ref 7–25)
CALCIUM SPEC-SCNC: 9.8 MG/DL (ref 8.6–10.5)
CHLORIDE SERPL-SCNC: 107 MMOL/L (ref 98–107)
CHOLEST SERPL-MCNC: 164 MG/DL (ref 0–200)
CLARITY UR: CLEAR
CO2 SERPL-SCNC: 22 MMOL/L (ref 22–29)
COLOR UR: YELLOW
CREAT SERPL-MCNC: 0.99 MG/DL (ref 0.57–1)
DEPRECATED RDW RBC AUTO: 47.3 FL (ref 37–54)
EGFRCR SERPLBLD CKD-EPI 2021: 59.6 ML/MIN/1.73
EOSINOPHIL # BLD AUTO: 0.28 10*3/MM3 (ref 0–0.4)
EOSINOPHIL NFR BLD AUTO: 2.8 % (ref 0.3–6.2)
ERYTHROCYTE [DISTWIDTH] IN BLOOD BY AUTOMATED COUNT: 13.1 % (ref 12.3–15.4)
GLOBULIN UR ELPH-MCNC: 2 GM/DL
GLUCOSE SERPL-MCNC: 92 MG/DL (ref 65–99)
GLUCOSE UR STRIP-MCNC: NEGATIVE MG/DL
HCT VFR BLD AUTO: 34.6 % (ref 34–46.6)
HDLC SERPL-MCNC: 64 MG/DL (ref 40–60)
HGB BLD-MCNC: 10.7 G/DL (ref 12–15.9)
HGB UR QL STRIP.AUTO: ABNORMAL
IMM GRANULOCYTES # BLD AUTO: 0.06 10*3/MM3 (ref 0–0.05)
IMM GRANULOCYTES NFR BLD AUTO: 0.6 % (ref 0–0.5)
IRON 24H UR-MRATE: 91 MCG/DL (ref 37–145)
IRON SATN MFR SERPL: 29 % (ref 20–50)
KETONES UR QL STRIP: NEGATIVE
LDLC SERPL CALC-MCNC: 76 MG/DL (ref 0–100)
LDLC/HDLC SERPL: 1.13 {RATIO}
LEUKOCYTE ESTERASE UR QL STRIP.AUTO: ABNORMAL
LYMPHOCYTES # BLD AUTO: 3.52 10*3/MM3 (ref 0.7–3.1)
LYMPHOCYTES NFR BLD AUTO: 34.9 % (ref 19.6–45.3)
MCH RBC QN AUTO: 30.3 PG (ref 26.6–33)
MCHC RBC AUTO-ENTMCNC: 30.9 G/DL (ref 31.5–35.7)
MCV RBC AUTO: 98 FL (ref 79–97)
MONOCYTES # BLD AUTO: 0.89 10*3/MM3 (ref 0.1–0.9)
MONOCYTES NFR BLD AUTO: 8.8 % (ref 5–12)
NEUTROPHILS NFR BLD AUTO: 5.3 10*3/MM3 (ref 1.7–7)
NEUTROPHILS NFR BLD AUTO: 52.4 % (ref 42.7–76)
NITRITE UR QL STRIP: NEGATIVE
PH UR STRIP.AUTO: 5.5 [PH] (ref 5–8)
PLATELET # BLD AUTO: 283 10*3/MM3 (ref 140–450)
PMV BLD AUTO: 11.8 FL (ref 6–12)
POTASSIUM SERPL-SCNC: 4.4 MMOL/L (ref 3.5–5.2)
PROT SERPL-MCNC: 6.3 G/DL (ref 6–8.5)
PROT UR QL STRIP: NEGATIVE
RBC # BLD AUTO: 3.53 10*6/MM3 (ref 3.77–5.28)
RBC # UR STRIP: ABNORMAL /HPF
REF LAB TEST METHOD: ABNORMAL
SODIUM SERPL-SCNC: 141 MMOL/L (ref 136–145)
SP GR UR STRIP: 1.01 (ref 1–1.03)
SQUAMOUS #/AREA URNS HPF: ABNORMAL /HPF
T3FREE SERPL-MCNC: 3.31 PG/ML (ref 2–4.4)
T4 SERPL-MCNC: 6.57 MCG/DL (ref 4.5–11.7)
TIBC SERPL-MCNC: 314 MCG/DL (ref 298–536)
TRANSFERRIN SERPL-MCNC: 211 MG/DL (ref 200–360)
TRIGL SERPL-MCNC: 138 MG/DL (ref 0–150)
TSH SERPL DL<=0.05 MIU/L-ACNC: 2.86 UIU/ML (ref 0.27–4.2)
UROBILINOGEN UR QL STRIP: ABNORMAL
VLDLC SERPL-MCNC: 24 MG/DL (ref 5–40)
WBC # UR STRIP: ABNORMAL /HPF
WBC NRBC COR # BLD: 10.1 10*3/MM3 (ref 3.4–10.8)

## 2022-08-01 PROCEDURE — 84436 ASSAY OF TOTAL THYROXINE: CPT

## 2022-08-01 PROCEDURE — 84466 ASSAY OF TRANSFERRIN: CPT

## 2022-08-01 PROCEDURE — 85025 COMPLETE CBC W/AUTO DIFF WBC: CPT

## 2022-08-01 PROCEDURE — 84481 FREE ASSAY (FT-3): CPT

## 2022-08-01 PROCEDURE — 80053 COMPREHEN METABOLIC PANEL: CPT

## 2022-08-01 PROCEDURE — 84443 ASSAY THYROID STIM HORMONE: CPT

## 2022-08-01 PROCEDURE — 83540 ASSAY OF IRON: CPT

## 2022-08-01 PROCEDURE — 80061 LIPID PANEL: CPT

## 2022-08-01 PROCEDURE — 1125F AMNT PAIN NOTED PAIN PRSNT: CPT | Performed by: FAMILY MEDICINE

## 2022-08-01 PROCEDURE — 1170F FXNL STATUS ASSESSED: CPT | Performed by: FAMILY MEDICINE

## 2022-08-01 PROCEDURE — 1159F MED LIST DOCD IN RCRD: CPT | Performed by: FAMILY MEDICINE

## 2022-08-01 PROCEDURE — G0439 PPPS, SUBSEQ VISIT: HCPCS | Performed by: FAMILY MEDICINE

## 2022-08-01 PROCEDURE — 81001 URINALYSIS AUTO W/SCOPE: CPT

## 2022-08-01 PROCEDURE — 36415 COLL VENOUS BLD VENIPUNCTURE: CPT

## 2022-08-01 NOTE — PROGRESS NOTES
The ABCs of the Annual Wellness Visit  Subsequent Medicare Wellness Visit    Chief Complaint   Patient presents with   • Medicare Wellness-subsequent      Subjective    History of Present Illness:  Valery Baez is a 76 y.o. female who presents for a Subsequent Medicare Wellness Visit.    The following portions of the patient's history were reviewed and   updated as appropriate: allergies, current medications, past family history, past medical history, past social history, past surgical history and problem list.    Compared to one year ago, the patient feels her physical   health is the same.    Compared to one year ago, the patient feels her mental   health is the same.    Recent Hospitalizations:  She was not admitted to the hospital during the last year.       Current Medical Providers:  Patient Care Team:  Tino Smith MD as PCP - General  Tino Smith MD as PCP - Family Medicine  Wilfrid Nash MD (Hematology and Oncology)    Outpatient Medications Prior to Visit   Medication Sig Dispense Refill   • acetaminophen (TYLENOL) 325 MG tablet Take 2 tablets by mouth Every 4 (Four) Hours As Needed for Mild Pain .     • acetic acid-hydrocortisone (VOSOL-HC) 1-2 % otic solution Administer 4 drops into both ears 2 (Two) Times a Day. 10 mL 0   • ARMOUR THYROID 30 MG tablet Take 30 mg by mouth Daily.     • atenolol (TENORMIN) 25 MG tablet TAKE 1 TABLET EVERY AFTERNOON 90 tablet 3   • atorvastatin (LIPITOR) 20 MG tablet TAKE 1 TABLET DAILY 90 tablet 3   • cephalexin (KEFLEX) 500 MG capsule Take 1 capsule by mouth Daily. STATES HAS BEEN ON FOR 10 YEARS TO SUPPRESS MRSA 90 capsule 3   • Denosumab (PROLIA SC) Inject  under the skin into the appropriate area as directed See Admin Instructions. Every 6 months   11/4/21  WAS LAST DOSE     • diphenhydrAMINE (BENADRYL ALLERGY CHILDRENS) 12.5 MG chewable tablet Chew 25 mg 4 (Four) Times a Day As Needed for Allergies. FOR BEE STINGS ONLY     •  EPINEPHrine 0.15 MG/0.15ML solution auto-injector injection INJECT AS NEEDED AS DIRECTED 2 each 1   • fexofenadine (ALLEGRA) 180 MG tablet Take 180 mg by mouth Daily.     • fluticasone (FLONASE) 50 MCG/ACT nasal spray 2 sprays into the nostril(s) as directed by provider Every Night.     • guaiFENesin (MUCINEX PO) Take 400 mg by mouth 2 (Two) Times a Day.     • lansoprazole (PREVACID) 30 MG capsule Take 30 mg by mouth 2 (Two) Times a Day.     • levalbuterol (XOPENEX HFA) 45 MCG/ACT inhaler Inhale 1-2 puffs Every 4 (Four) Hours As Needed.     • Lifitegrast (Xiidra) 5 % ophthalmic solution Administer 1 drop to both eyes 2 (Two) Times a Day.     • meclizine (ANTIVERT) 25 MG tablet TAKE 1 TABLET THREE TIMES A DAY AS NEEDED FOR DIZZINESS 90 tablet 2   • Melatonin 10 MG tablet Take 1 tablet by mouth Every Night.     • meloxicam (MOBIC) 15 MG tablet TAKE 1 TABLET DAILY 90 tablet 3   • metroNIDAZOLE (METROGEL) 0.75 % gel Apply 1 application topically to the appropriate area as directed 2 (Two) Times a Day As Needed. ROASCEA     • montelukast (SINGULAIR) 10 MG tablet Take 10 mg by mouth Every Night.     • Multiple Vitamins-Minerals (MULTIVITAMIN ADULT PO) Take 1 tablet by mouth Every Night. HOLD FOR SURGERY     • Myrbetriq 25 MG tablet sustained-release 24 hour 24 hr tablet Take 1 tablet by mouth Daily. 90 tablet 3   • ondansetron (Zofran) 4 MG tablet Take 1 tablet by mouth Every 6 (Six) Hours As Needed for Nausea or Vomiting. Indications: Nausea and Vomiting Following an Operation 40 tablet 1   • pilocarpine (SALAGEN) 5 MG tablet TAKE 1 TABLET FOUR TIMES A  tablet 3   • POTASSIUM CHLORIDE PO Take 99 mg by mouth Every Night. OTC  HOLD FOR SURGERY     • PROBIOTIC PRODUCT PO Take 1 capsule by mouth 2 (Two) Times a Day.       Facility-Administered Medications Prior to Visit   Medication Dose Route Frequency Provider Last Rate Last Admin   • Chlorhexidine Gluconate Cloth 2 % pads   Apply externally Take As Directed Nazario  MD Dejan           No opioid medication identified on active medication list. I have reviewed chart for other potential  high risk medication/s and harmful drug interactions in the elderly.          Aspirin is not on active medication list.  Aspirin use is not indicated based on review of current medical condition/s. Risk of harm outweighs potential benefits.  .    Patient Active Problem List   Diagnosis   • Spinal stenosis of lumbar region with neurogenic claudication   • Obstructive sleep apnea (CPAP)    • Iron deficiency anemia   • Sjogren's disease (HCC)   • Moderate persistent asthma, uncomplicated   • GERD without esophagitis   • Seasonal allergies   • MRSA (methicillin resistant Staphylococcus aureus) infection   • Medicare annual wellness visit, subsequent   • Hypothyroidism, acquired   • Postmenopausal osteoporosis   • High cholesterol   • Sinus tachycardia   • Overactive bladder   • Primary osteoarthritis of left hip   • Rosacea   • Irritable bowel syndrome with diarrhea   • Benign positional vertigo (mild, occasional)      Advance Care Planning  Advance Directive is not on file.  ACP discussion was held with the patient during this visit. Patient has an advance directive (not in EMR), copy requested.    Review of Systems   Constitutional: Negative for activity change, appetite change, chills, fatigue and fever.   HENT: Negative for congestion, ear pain, rhinorrhea and sore throat.    Eyes: Negative for discharge and visual disturbance.   Respiratory: Negative for cough and shortness of breath.    Cardiovascular: Negative for chest pain, palpitations and leg swelling.   Gastrointestinal: Negative for abdominal pain, diarrhea, nausea and vomiting.   Genitourinary: Negative for dysuria and hematuria.   Musculoskeletal: Negative for arthralgias and myalgias.   Psychiatric/Behavioral: Negative for dysphoric mood.        Objective    Vitals:    08/01/22 0916   BP: 108/69   BP Location: Left arm   Patient  "Position: Sitting   Pulse: 75   Weight: 80.9 kg (178 lb 6.4 oz)   Height: 165.1 cm (65\")   PainSc:   5   PainLoc: Back     Estimated body mass index is 29.69 kg/m² as calculated from the following:    Height as of this encounter: 165.1 cm (65\").    Weight as of this encounter: 80.9 kg (178 lb 6.4 oz).    BMI is >= 25 and <30. (Overweight) The following options were offered after discussion;: nutrition counseling/recommendations      Does the patient have evidence of cognitive impairment? No    Physical Exam  Vitals and nursing note reviewed.   Constitutional:       General: She is not in acute distress.     Appearance: Normal appearance.   HENT:      Right Ear: Tympanic membrane normal.      Left Ear: Tympanic membrane normal.      Mouth/Throat:      Pharynx: Oropharynx is clear.   Eyes:      Conjunctiva/sclera: Conjunctivae normal.   Cardiovascular:      Rate and Rhythm: Normal rate and regular rhythm.      Heart sounds: Normal heart sounds. No murmur heard.  Pulmonary:      Effort: Pulmonary effort is normal.      Breath sounds: Normal breath sounds.   Abdominal:      General: Bowel sounds are normal.      Palpations: Abdomen is soft.      Tenderness: There is no abdominal tenderness.   Musculoskeletal:      Cervical back: Neck supple.      Right lower leg: No edema.      Left lower leg: No edema.   Lymphadenopathy:      Cervical: No cervical adenopathy.   Neurological:      General: No focal deficit present.      Mental Status: She is alert.      Cranial Nerves: No cranial nerve deficit.      Coordination: Coordination normal.      Gait: Gait normal.   Psychiatric:         Mood and Affect: Mood normal.         Behavior: Behavior normal.                 HEALTH RISK ASSESSMENT    Smoking Status:  Social History     Tobacco Use   Smoking Status Never Smoker   Smokeless Tobacco Never Used     Alcohol Consumption:  Social History     Substance and Sexual Activity   Alcohol Use Yes   • Alcohol/week: 2.0 standard drinks "   • Types: 2 Shots of liquor per week    Comment: 4 drinks per week     Fall Risk Screen:    WILD Fall Risk Assessment was completed, and patient is at MODERATE risk for falls. Assessment completed on:8/1/2022    Depression Screening:  PHQ-2/PHQ-9 Depression Screening 8/1/2022   Retired PHQ-9 Total Score -   Retired Total Score -   Little Interest or Pleasure in Doing Things 0-->not at all   Feeling Down, Depressed or Hopeless 0-->not at all   PHQ-9: Brief Depression Severity Measure Score 0       Health Habits and Functional and Cognitive Screening:  Functional & Cognitive Status 8/1/2022   Do you have difficulty preparing food and eating? No   Do you have difficulty bathing yourself, getting dressed or grooming yourself? No   Do you have difficulty using the toilet? No   Do you have difficulty moving around from place to place? No   Do you have trouble with steps or getting out of a bed or a chair? No   Current Diet Well Balanced Diet   Dental Exam Up to date        Dental Exam Comment -   Eye Exam Up to date        Eye Exam Comment -   Exercise (times per week) 7 times per week   Current Exercises Include House Cleaning;Walking        Exercise Comment -   Do you need help using the phone?  No   Are you deaf or do you have serious difficulty hearing?  Yes   Do you need help with transportation? No   Do you need help shopping? No   Do you need help preparing meals?  No   Do you need help with housework?  No   Do you need help with laundry? No   Do you need help taking your medications? No   Do you need help managing money? No   Do you ever drive or ride in a car without wearing a seat belt? No   Have you felt unusual stress, anger or loneliness in the last month? No   Who do you live with? Spouse   If you need help, do you have trouble finding someone available to you? No   Do you have difficulty concentrating, remembering or making decisions? No       Age-appropriate Screening Schedule:  Refer to the list below  for future screening recommendations based on patient's age, sex and/or medical conditions. Orders for these recommended tests are listed in the plan section. The patient has been provided with a written plan.    Health Maintenance   Topic Date Due   • LIPID PANEL  04/30/2019   • DXA SCAN  09/30/2022   • INFLUENZA VACCINE  10/01/2022   • MAMMOGRAM  11/30/2023   • TDAP/TD VACCINES (3 - Td or Tdap) 08/06/2025   • ZOSTER VACCINE  Completed              Assessment & Plan   CMS Preventative Services Quick Reference  Risk Factors Identified During Encounter  Dementia/Memory   Depression/Dysphoria  Fall Risk-High or Moderate  Glaucoma or Family History of Glaucoma  Hearing Problem  The above risks/problems have been discussed with the patient.  Follow up actions/plans if indicated are seen below in the Assessment/Plan Section.  Pertinent information has been shared with the patient in the After Visit Summary.    Diagnoses and all orders for this visit:    1. Medicare annual wellness visit, subsequent (Primary)  Assessment & Plan:  ADVICE GIVEN RE:  SEATBELT USE, ALCOHOL USE, HEALTHY DIET, ROUTINE EYE AND DENTAL EXAM, ROUTINE VACCINATIONS, BREAST SELF-EXAMINATION        Follow Up:   Return in about 6 months (around 2/1/2023).     An After Visit Summary and PPPS were made available to the patient.

## 2022-08-01 NOTE — ASSESSMENT & PLAN NOTE
ADVICE GIVEN RE:  SEATBELT USE, ALCOHOL USE, HEALTHY DIET, ROUTINE EYE AND DENTAL EXAM, ROUTINE VACCINATIONS, BREAST SELF-EXAMINATION

## 2022-08-30 ENCOUNTER — TELEPHONE (OUTPATIENT)
Dept: ORTHOPEDIC SURGERY | Facility: CLINIC | Age: 77
End: 2022-08-30

## 2022-09-07 DIAGNOSIS — K58.0 IRRITABLE BOWEL SYNDROME WITH DIARRHEA: ICD-10-CM

## 2022-09-07 NOTE — TELEPHONE ENCOUNTER
Rx Refill Note  Requested Prescriptions     Pending Prescriptions Disp Refills   • diphenoxylate-atropine (LOMOTIL) 2.5-0.025 MG per tablet [Pharmacy Med Name: DIPHENOXYLATE HCL/ATROPINE TABS 2.5MG] 30 tablet 1     Sig: TAKE 1 TABLET DAILY      Last office visit with prescribing clinician: 8/1/2022      Next office visit with prescribing clinician: 1/4/2023     Lilo Erazo LPN  09/07/22, 11:41 EDT       -5/18/22#30, RF-1

## 2022-09-09 RX ORDER — DIPHENOXYLATE HYDROCHLORIDE AND ATROPINE SULFATE 2.5; .025 MG/1; MG/1
TABLET ORAL
Qty: 30 TABLET | Refills: 1 | Status: SHIPPED | OUTPATIENT
Start: 2022-09-09 | End: 2023-01-04 | Stop reason: SDUPTHER

## 2022-09-12 ENCOUNTER — TELEPHONE (OUTPATIENT)
Dept: ORTHOPEDIC SURGERY | Facility: CLINIC | Age: 77
End: 2022-09-12

## 2022-09-12 DIAGNOSIS — M25.562 CHRONIC PAIN OF LEFT KNEE: ICD-10-CM

## 2022-09-12 DIAGNOSIS — Z96.641 S/P HIP REPLACEMENT, RIGHT: Primary | ICD-10-CM

## 2022-09-12 DIAGNOSIS — Z96.651 HISTORY OF TOTAL KNEE ARTHROPLASTY, RIGHT: ICD-10-CM

## 2022-09-12 DIAGNOSIS — G89.29 CHRONIC PAIN OF LEFT KNEE: ICD-10-CM

## 2022-09-12 DIAGNOSIS — M79.605 PAIN IN BOTH LOWER EXTREMITIES: ICD-10-CM

## 2022-09-12 DIAGNOSIS — M79.604 PAIN IN BOTH LOWER EXTREMITIES: ICD-10-CM

## 2022-09-12 DIAGNOSIS — M16.12 PRIMARY OSTEOARTHRITIS OF LEFT HIP: ICD-10-CM

## 2022-09-12 NOTE — TELEPHONE ENCOUNTER
Caller: TACOS   Relationship to Patient: SELF     Phone Number: [phone]  Reason for Call: PATIENT CALLING ASKING IF THE SLEEVES ATHLETES WHERE ON THEIR LEGS WOULD HELP HER LEG PAIN

## 2022-10-10 RX ORDER — EPINEPHRINE 0.15 MG/.15ML
INJECTION SUBCUTANEOUS
Qty: 2 EACH | Refills: 1 | Status: SHIPPED | OUTPATIENT
Start: 2022-10-10 | End: 2023-01-13 | Stop reason: SDUPTHER

## 2022-10-12 ENCOUNTER — OFFICE VISIT (OUTPATIENT)
Dept: FAMILY MEDICINE CLINIC | Age: 77
End: 2022-10-12

## 2022-10-12 ENCOUNTER — TELEPHONE (OUTPATIENT)
Dept: FAMILY MEDICINE CLINIC | Age: 77
End: 2022-10-12

## 2022-10-12 VITALS
SYSTOLIC BLOOD PRESSURE: 112 MMHG | DIASTOLIC BLOOD PRESSURE: 69 MMHG | OXYGEN SATURATION: 96 % | HEART RATE: 71 BPM | WEIGHT: 178 LBS | TEMPERATURE: 98.6 F | BODY MASS INDEX: 29.66 KG/M2 | HEIGHT: 65 IN

## 2022-10-12 DIAGNOSIS — J01.00 ACUTE NON-RECURRENT MAXILLARY SINUSITIS: Primary | ICD-10-CM

## 2022-10-12 DIAGNOSIS — R09.81 NASAL CONGESTION: ICD-10-CM

## 2022-10-12 LAB
EXPIRATION DATE: NORMAL
FLUAV AG UPPER RESP QL IA.RAPID: NOT DETECTED
FLUBV AG UPPER RESP QL IA.RAPID: NOT DETECTED
INTERNAL CONTROL: NORMAL
Lab: NORMAL
SARS-COV-2 AG UPPER RESP QL IA.RAPID: NOT DETECTED

## 2022-10-12 PROCEDURE — 99213 OFFICE O/P EST LOW 20 MIN: CPT | Performed by: NURSE PRACTITIONER

## 2022-10-12 PROCEDURE — 87428 SARSCOV & INF VIR A&B AG IA: CPT | Performed by: NURSE PRACTITIONER

## 2022-10-12 RX ORDER — AZITHROMYCIN 250 MG/1
TABLET, FILM COATED ORAL
Qty: 10 TABLET | Refills: 0 | Status: SHIPPED | OUTPATIENT
Start: 2022-10-12 | End: 2022-12-29

## 2022-10-12 NOTE — PROGRESS NOTES
"Chief Complaint  Sinus Problem (Sx include: headache& congestion. )    Subjective        Valery Baez presents to [facility]  Sinus Problem  This is a new problem. The current episode started in the past 7 days. The problem has been gradually worsening since onset. Associated symptoms include congestion, coughing, headaches and sinus pressure. Pertinent negatives include no chills. Past treatments include nothing. The treatment provided no relief.       Objective   Vital Signs:  /69 (BP Location: Left arm, Patient Position: Sitting, Cuff Size: Adult)   Pulse 71   Temp 98.6 °F (37 °C) (Oral)   Ht 165.1 cm (65\")   Wt 80.7 kg (178 lb)   SpO2 96%   BMI 29.62 kg/m²   Estimated body mass index is 29.62 kg/m² as calculated from the following:    Height as of this encounter: 165.1 cm (65\").    Weight as of this encounter: 80.7 kg (178 lb).          Physical Exam  HENT:      Head: Normocephalic.      Right Ear: A middle ear effusion is present.      Left Ear: A middle ear effusion is present.      Nose: Congestion present.      Right Sinus: Maxillary sinus tenderness present.      Left Sinus: Maxillary sinus tenderness present.   Cardiovascular:      Rate and Rhythm: Normal rate and regular rhythm.   Pulmonary:      Effort: Pulmonary effort is normal. No respiratory distress.      Breath sounds: Normal breath sounds. No stridor. No wheezing, rhonchi or rales.   Skin:     General: Skin is warm and dry.   Neurological:      Mental Status: She is alert and oriented to person, place, and time.   Psychiatric:         Mood and Affect: Mood normal.        Result Review :                 Results for orders placed or performed in visit on 10/12/22   POCT SARS-CoV-2 Antigen ZHOU + Flu    Specimen: Swab   Result Value Ref Range    SARS Antigen Not Detected Not Detected, Presumptive Negative    Influenza A Antigen ZHOU Not Detected Not Detected    Influenza B Antigen ZHOU Not Detected " Not Detected    Internal Control Passed Passed    Lot Number [id number]     Expiration Date 1/28/23        Assessment and Plan {CC Problem List  Visit Diagnosis   ROS  Review (Popup)  Health Maintenance  Quality  BestPractice  Medications  SmartSets  SnapShot Encounters  Media :23}  Diagnoses and all orders for this visit:    1. Acute non-recurrent maxillary sinusitis (Primary)  -     azithromycin (Zithromax Z-Calderon) 250 MG tablet; Take 2 tablets by mouth on day 1, then 1 tablet daily on days 2-5  Dispense: 10 tablet; Refill: 0    2. Nasal congestion  -     POCT SARS-CoV-2 Antigen ZHOU + Flu             Follow Up   Return if symptoms worsen or fail to improve.  Patient was given instructions and counseling regarding her condition or for health maintenance advice. Please see specific information pulled into the AVS if appropriate.

## 2022-10-12 NOTE — TELEPHONE ENCOUNTER
Pharmacy Name:    [id number] - [place], [place] - [address] AT [facility] OF [address] [facility] - [phone]  - [phone] FX  [phone]    Pharmacy representative name: RAFIA    Pharmacy representative phone number: [phone]    What medication are you calling in regards to: ZPACK    What question does the pharmacy have: NEEDING TO CLARIFY INSTRUCTIONS FOR MEDICATION PRESCRIBED TODAY, 10/12/2022    Who is the provider that prescribed the medication: MIKA    Additional notes:   PHARMACY IS NEEDING CALL BACK.

## 2022-10-31 ENCOUNTER — CLINICAL SUPPORT (OUTPATIENT)
Dept: FAMILY MEDICINE CLINIC | Age: 77
End: 2022-10-31

## 2022-10-31 DIAGNOSIS — Z23 NEED FOR INFLUENZA VACCINATION: Primary | ICD-10-CM

## 2022-10-31 PROCEDURE — G0008 ADMIN INFLUENZA VIRUS VAC: HCPCS | Performed by: FAMILY MEDICINE

## 2022-10-31 PROCEDURE — 90662 IIV NO PRSV INCREASED AG IM: CPT | Performed by: FAMILY MEDICINE

## 2022-12-01 ENCOUNTER — TELEPHONE (OUTPATIENT)
Dept: ORTHOPEDIC SURGERY | Facility: CLINIC | Age: 77
End: 2022-12-01

## 2022-12-01 NOTE — TELEPHONE ENCOUNTER
Provider: DR. THIBODEAUX   Caller: TACOS MACDONALD  Relationship to Patient: SELF  Phone Number: [phone]  Reason for Call: PATIENT CALLED WANTING TO SCHEDULE APPOINTMENT FOR LUMBAR PAIN WITH DR. THIBODEAUX.. NO AVAILABILITY WITH  DR. THIBODEAUX AND UNABLE TO WT TO SCHEDULE WITH RYLIE BLAND. Lumbar 3 to sacral 1 laminectomy and fusion with instrumentation 09/09/19. PLEASE ADVISE.

## 2022-12-13 ENCOUNTER — TELEPHONE (OUTPATIENT)
Dept: FAMILY MEDICINE CLINIC | Age: 77
End: 2022-12-13

## 2022-12-13 DIAGNOSIS — H91.93 BILATERAL HEARING LOSS, UNSPECIFIED HEARING LOSS TYPE: Primary | ICD-10-CM

## 2022-12-13 NOTE — TELEPHONE ENCOUNTER
Talked to [facility], they need a referral for [facility] insurance on this pt.  She was actually seen on 12/07/22 for a annual audiological evaluation and the referral will need to be back dated.

## 2022-12-19 ENCOUNTER — OFFICE VISIT (OUTPATIENT)
Dept: FAMILY MEDICINE CLINIC | Age: 77
End: 2022-12-19

## 2022-12-19 VITALS
HEIGHT: 65 IN | HEART RATE: 73 BPM | BODY MASS INDEX: 29.62 KG/M2 | OXYGEN SATURATION: 96 % | DIASTOLIC BLOOD PRESSURE: 78 MMHG | SYSTOLIC BLOOD PRESSURE: 115 MMHG

## 2022-12-19 DIAGNOSIS — G89.29 CHRONIC LOW BACK PAIN WITH SCIATICA, SCIATICA LATERALITY UNSPECIFIED, UNSPECIFIED BACK PAIN LATERALITY: Primary | ICD-10-CM

## 2022-12-19 DIAGNOSIS — M54.40 CHRONIC LOW BACK PAIN WITH SCIATICA, SCIATICA LATERALITY UNSPECIFIED, UNSPECIFIED BACK PAIN LATERALITY: Primary | ICD-10-CM

## 2022-12-19 DIAGNOSIS — M54.16 CHRONIC RADICULAR LUMBAR PAIN: ICD-10-CM

## 2022-12-19 DIAGNOSIS — G89.29 CHRONIC RADICULAR LUMBAR PAIN: ICD-10-CM

## 2022-12-19 PROBLEM — M81.8 AGE-RELATED OSTEOPOROSIS WITHOUT FRACTURE: Status: ACTIVE | Noted: 2022-11-16

## 2022-12-19 PROCEDURE — 99214 OFFICE O/P EST MOD 30 MIN: CPT | Performed by: NURSE PRACTITIONER

## 2022-12-19 NOTE — PROGRESS NOTES
Assessment and Plan   Follow Up   Return for Follow up with PCP as recommended.      Diagnoses and all orders for this visit:    1. Chronic low back pain with sciatica, sciatica laterality unspecified, unspecified back pain laterality (Primary)  Comments:  will move forward with MRI  , continue discussion with pain management and follow up with PCPas scheduled  Orders:  -     MRI Lumbar Spine Without Contrast; Future    2. Chronic radicular lumbar pain  -     MRI Lumbar Spine Without Contrast; Future            No orders of the defined types were placed in this encounter.       There are no discontinued medications.           Chief Complaint  Valery Baez presents to [facility] for Back Pain (Patient complains of low back pain with trouble walking for 1 week, states she got back injections in 10/2022 and has not felt right since, states she followed up with [facility] pain &spine management states they are no longer doing back injection since they are not helping patient )    Subjective          History of Present Illness  Regarding low back pain - has been going to pain management since May -  Last injection October 25th and reports that injection felt different than previous.  Reports that since pain management her pain is worse.  Pain is lower back, bilateral hips and radiating down both legs.  Currently taking mobic daily, and tylenol   Pain is constant with minimal relief when lying flat or lounging in her recliner.  She reports that her legs over the past week has began to feel heavy and weak and is slowly worsening.  She reports that she had lumbar surgery 3 years ago fusion of L3-4-5-S1.  Dr. Vikas Couch was surgeon but has since retired.  She is seeking new MRI for updated imaging         Review of Systems    Objective     Vitals:    12/19/22 1534   BP: 115/78   BP Location: Right arm   Patient Position: Sitting   Cuff Size: Adult   Pulse: 73   SpO2: 96%  "  Height: 165.1 cm (65\")     Body mass index is 29.62 kg/m².     Physical Exam  Constitutional:       General: She is not in acute distress.     Appearance: Normal appearance.   HENT:      Head: Normocephalic.   Cardiovascular:      Rate and Rhythm: Normal rate and regular rhythm.   Pulmonary:      Effort: Pulmonary effort is normal.      Breath sounds: Normal breath sounds.   Musculoskeletal:      Lumbar back: Decreased range of motion. Positive right straight leg raise test and positive left straight leg raise test.   Neurological:      General: No focal deficit present.      Mental Status: She is alert and oriented to person, place, and time.   Psychiatric:         Mood and Affect: Mood normal.         Behavior: Behavior normal.         Result Review                         Allergies   Allergen Reactions   • Bee Venom Anaphylaxis   • Latex Hives   • Sulfa Antibiotics Hives, Nausea And Vomiting and Rash   • Codeine Hives     And Derivatives   • Cyclobenzaprine Other (See Comments) and Unknown - High Severity     Experienced side effects - blurred vision, dry mouth, constipation       • Darvon [Propoxyphene] Nausea And Vomiting     CONFUSION.    • Ditropan [Oxybutynin] Itching   • Duricef [Cefadroxil] Itching and Nausea Only   • Erythromycin Hives     Also allergic to Duricef   • Hydrocodone Nausea And Vomiting and GI Intolerance   • Iodine Hives     Iodine scrubbing solutions /IVP Contrast Dyes     • Meperidine Nausea And Vomiting   • Methylprednisolone Unknown (See Comments)     CAUSES ITCHING AND FACIAL FLUSHING BUT PT \"CAN TAKE LOWER DOSES\"   • Minocycline Hives, Diarrhea and Nausea And Vomiting     Only with doses > 100mg BID         • Morphine And Related Hives and GI Intolerance     Derivatives   • Oxycodone-Acetaminophen Hives   • Talwin [Pentazocine] Hives   • Tramadol Hives and GI Intolerance     ULTRAM ER   • Tramadol Hcl Hives and Nausea And Vomiting   • Valium [Diazepam] Hives and Hallucinations     " CONFUSION.    • Adhesive Tape Rash   • Bacitracin-Polymyxin B Rash   • Betadine [Povidone Iodine] Rash   • Contrast Dye Hives and Palpitations   • Daptomycin Itching and Hives   • Miconazole Rash   • Neomycin-Polymyxin-Gramicidin Rash   • Neosporin Af [Miconazole Nitrate] Rash   • Penicillins Hives, Swelling and Rash     As infant   • Silver Rash      Past Medical History:   Diagnosis Date   • Anemia     CHRONIC IRON DEFICIENT   • Anesthesia complication     STATES SHE IS VERY SLOW TO WAKE UP   • Asthma    • Basal cell carcinoma     FACE   • Cancer (HCC)     right breast cancer   • Chronic diarrhea    • Chronic kidney disease    • DDD (degenerative disc disease), lumbar    • Disease of thyroid gland    • Dry eye    • Elevated cholesterol    • Frequency of urination    • GERD (gastroesophageal reflux disease)    • Hard to intubate     REQUESTING TO SEE [facility] 8/22/19; PT HAS COMPLETE BILATERAL JAW PROSTESIS   • Headache     or migraines   • Heart palpitations    • History of breast cancer     RIGHT   • History of transfusion    • [place] (hard of hearing)     BILAT HEARING AID   • Hx of renal calculi    • Hypercholesteremia    • Limited mobility     RIGHT HIP   • Limited mobility     JAW   • MRSA (methicillin resistant Staphylococcus aureus)     LEFT JAW @[facility] JOINT, MULT. ,LAST 2013, TREATED AT [facility], infection control notified 8/24/16. 1300   • Osteoarthritis    • Osteoporosis    • PONV (postoperative nausea and vomiting)    • Right hip pain    • Rosacea    • Short of breath on exertion     WITH STAIRS ONLY   • Sleep apnea     C PAP   • Spinal headache    • Spinal stenosis    • Urgency of urination    • Vertigo     WAKES UP WITH THIS SOMETIMES   • Weakness     RIGHT HIP     Current Outpatient Medications   Medication Sig Dispense Refill   • acetaminophen (TYLENOL) 325 MG tablet Take 2 tablets by mouth Every 4 (Four) Hours As Needed for Mild Pain .     • acetic acid-hydrocortisone (VOSOL-HC) 1-2 % otic solution  Administer 4 drops into both ears 2 (Two) Times a Day. 10 mL 0   • ARMOUR THYROID 30 MG tablet Take 30 mg by mouth Daily.     • atenolol (TENORMIN) 25 MG tablet TAKE 1 TABLET EVERY AFTERNOON 90 tablet 3   • atorvastatin (LIPITOR) 20 MG tablet TAKE 1 TABLET DAILY 90 tablet 3   • cephalexin (KEFLEX) 500 MG capsule Take 1 capsule by mouth Daily. STATES HAS BEEN ON FOR 10 YEARS TO SUPPRESS MRSA 90 capsule 3   • Denosumab (PROLIA SC) Inject  under the skin into the appropriate area as directed See Admin Instructions. Every 6 months   11/4/21  WAS LAST DOSE     • diphenhydrAMINE (BENADRYL ALLERGY CHILDRENS) 12.5 MG chewable tablet Chew 25 mg 4 (Four) Times a Day As Needed for Allergies. FOR BEE STINGS ONLY     • diphenoxylate-atropine (LOMOTIL) 2.5-0.025 MG per tablet TAKE 1 TABLET DAILY 30 tablet 1   • EPINEPHrine 0.15 MG/0.15ML solution auto-injector injection INJECT AS NEEDED AS DIRECTED 2 each 1   • fexofenadine (ALLEGRA) 180 MG tablet Take 180 mg by mouth Daily.     • fluticasone (FLONASE) 50 MCG/ACT nasal spray 2 sprays into the nostril(s) as directed by provider Every Night.     • guaiFENesin (MUCINEX PO) Take 400 mg by mouth 2 (Two) Times a Day.     • lansoprazole (PREVACID) 30 MG capsule Take 30 mg by mouth 2 (Two) Times a Day.     • levalbuterol (XOPENEX HFA) 45 MCG/ACT inhaler Inhale 1-2 puffs Every 4 (Four) Hours As Needed.     • Lifitegrast (Xiidra) 5 % ophthalmic solution Administer 1 drop to both eyes 2 (Two) Times a Day.     • meclizine (ANTIVERT) 25 MG tablet TAKE 1 TABLET THREE TIMES A DAY AS NEEDED FOR DIZZINESS 90 tablet 2   • Melatonin 10 MG tablet Take 1 tablet by mouth Every Night.     • meloxicam (MOBIC) 15 MG tablet TAKE 1 TABLET DAILY 90 tablet 3   • metroNIDAZOLE (METROGEL) 0.75 % gel Apply 1 application topically to the appropriate area as directed 2 (Two) Times a Day As Needed. ROASCEA     • montelukast (SINGULAIR) 10 MG tablet Take 10 mg by mouth Every Night.     • Multiple Vitamins-Minerals  (MULTIVITAMIN ADULT PO) Take 1 tablet by mouth Every Night. HOLD FOR SURGERY     • Myrbetriq 25 MG tablet sustained-release 24 hour 24 hr tablet Take 1 tablet by mouth Daily. 90 tablet 3   • ondansetron (Zofran) 4 MG tablet Take 1 tablet by mouth Every 6 (Six) Hours As Needed for Nausea or Vomiting. Indications: Nausea and Vomiting Following an Operation 40 tablet 1   • pilocarpine (SALAGEN) 5 MG tablet TAKE 1 TABLET FOUR TIMES A  tablet 3   • POTASSIUM CHLORIDE PO Take 99 mg by mouth Every Night. OTC  HOLD FOR SURGERY     • PROBIOTIC PRODUCT PO Take 1 capsule by mouth 2 (Two) Times a Day.     • azithromycin (Zithromax Z-Calderon) 250 MG tablet Take 2 tablets by mouth on day 1, then 1 tablet daily on days 2-5 10 tablet 0     No current facility-administered medications for this visit.     Facility-Administered Medications Ordered in Other Visits   Medication Dose Route Frequency Provider Last Rate Last Admin   • Chlorhexidine Gluconate Cloth 2 % pads   Apply externally Take As Directed Dejan Lange MD         Past Surgical History:   Procedure Laterality Date   • ABSCESS DRAINAGE      abscess removed from left jaw x2   • APPENDECTOMY  1965   • AVULSION TOENAIL PLATE      6 REMOVED   • BILATERAL BREAST REDUCTION Left 08/29/2016    Procedure: LT BREAST REDUCTION ;  Surgeon: Luis Zambrano MD;  Location: [facility] OR;  Service:    • BREAST BIOPSY Bilateral    • CHOLECYSTECTOMY  09/2000   • COLONOSCOPY     • COLONOSCOPY  2019    NORMAL   • CYST REMOVAL  03/1984    eye cyst left lower lid   • CYST REMOVAL  01/2007    right upper thigh   • CYSTOSCOPY      x2  02/2013 & 04/2014   • CYSTOSCOPY      NUMEROUS   • D & C HYSTEROSCOPY     • HYSTERECTOMY  1971    LATER BSO   • KNEE ARTHROPLASTY Right 03/2005   • LAPAROSCOPIC SALPINGOOPHERECTOMY     • LUMBAR DISCECTOMY FUSION INSTRUMENTATION N/A 09/09/2019    Procedure: Lumbar 3 to sacral 1 laminectomy and fusion with instrumentation;  Surgeon: Vikas Couch MD;   Location: [facility] OR;  Service: Orthopedic Spine   • MANDIBLE SURGERY      UPPER AND LOWER JAW ADJUSTED WITH PLATES AND WIRES   • MASTECTOMY Right 03/2015   • MASTECTOMY PARTIAL / LUMPECTOMY Right 03/2009   • NIPPLE RECONSTRUCTION Right 08/29/2016    Procedure: RT NIPPLE RECONSTRUCTION;  Surgeon: Luis Zambrano MD;  Location: [facility] OR;  Service:    • OTHER SURGICAL HISTORY      laparoscopy adhesions right side x2   • OTHER SURGICAL HISTORY      drain mammosite area   • OTHER SURGICAL HISTORY  01/2010    pressure washed prosthesis area left jaw   • OTHER SURGICAL HISTORY      drain mammosite area right breast 09/2011   • RECONSTRUCTION BREAST W/ TRAM FLAP Right 02/2016   • ROTATOR CUFF REPAIR Left 09/2014   • TEMPOROMANDIBULAR JOINT ARTHROPLASTY Bilateral 1993    MULT. REPLACEMENTS LATER   • TONSILLECTOMY  1952   • TOTAL HIP ARTHROPLASTY Right 12/24/2021    Procedure: Right anterior total hip arthroplasty;  Surgeon: Dejan Lange MD;  Location: [facility] OR;  Service: Orthopedics;  Laterality: Right;      There are no preventive care reminders to display for this patient.   Immunization History   Administered Date(s) Administered   • COVID-19 (MODERNA) 1st, 2nd, 3rd Dose Only 01/13/2021, 02/09/2021, 10/26/2021   • COVID-19 (MODERNA) BIVALENT BOOSTER 12+YRS 10/04/2022   • COVID-19 (MODERNA) BOOSTER 05/09/2022   • Fluad Quad 65+ 11/01/2021   • Fluzone High Dose =>65 Years (Vaxcare ONLY) 10/02/2017, 11/01/2021   • Fluzone High-Dose 65+yrs 10/31/2022   • Fluzone Quad >6mos (Multi-dose) 09/30/2013   • Influenza Quad Vaccine (Inpatient) 09/30/2013   • Influenza TIV (IM) 01/09/2010, 10/25/2012   • Pneumococcal Conjugate 13-Valent (PCV13) 10/27/2015   • Pneumococcal Polysaccharide (PPSV23) 11/01/2009, 11/01/2013   • Pneumococcal, Unspecified 09/23/2008   • Shingrix 09/01/2021, 01/06/2022   • Tdap 11/02/2012, 08/06/2015   • Zostavax 09/30/2009, 10/09/2013

## 2022-12-27 ENCOUNTER — TELEPHONE (OUTPATIENT)
Dept: ORTHOPEDIC SURGERY | Facility: CLINIC | Age: 77
End: 2022-12-27

## 2022-12-27 ENCOUNTER — TELEPHONE (OUTPATIENT)
Dept: FAMILY MEDICINE CLINIC | Age: 77
End: 2022-12-27

## 2022-12-27 DIAGNOSIS — R29.898 WEAKNESS OF BOTH LOWER EXTREMITIES: ICD-10-CM

## 2022-12-27 DIAGNOSIS — M48.062 SPINAL STENOSIS OF LUMBAR REGION WITH NEUROGENIC CLAUDICATION: Primary | ICD-10-CM

## 2022-12-27 NOTE — TELEPHONE ENCOUNTER
"Patient had lumbar epidural injection 10/25/22, stated the injection \"felt weird.\" Patient states 3 weeks ago she woke up and couldn't walk, she was willing to wait until her appointment with our office to see VIOLETTE Brannon, however her appointment was cancelled due to [facility] emergency. Patient went to [facility] and saw a Nurse Practitioner who said she might need an MRI. Patient is leaving for [place] for 3 months next week. Has a facility in [place] she would like to have the lumbar MRI done at, wants order from Dr Couch for the imaging.  "

## 2022-12-27 NOTE — TELEPHONE ENCOUNTER
I reviewed her previous notes, x-rays and spoke personally with the patient.  She is having relative weakness but still ambulatory and worse after the epidurals in October, but with no worsening and perhaps some improvement since.  No bowel or bladder issues.  She saw her family physician who suggested MRI scan.  She was found to have moderate stenosis above the old L3-S1 fusion on CT scan obtained back last summer.  She is hoping to depart January 5 for [place] where she will remain for the winter.  I am going to go ahead and order an MRI and try to get the result before I leave Friday.  The patient knows I am retiring.  I spoke personally with Dr. Shaquille Merrill to whom I will relay the results.  I told Ms. Baez I could not promise we could have it together before her planned departure in January 5, nor could I promise a follow-up point person in [place] but we will go ahead and address her needs and see where to go from there.

## 2022-12-27 NOTE — TELEPHONE ENCOUNTER
Caller: Valery Baez    Relationship: Self    Best call back number: [phone]    What orders are you requesting (i.e. lab or imaging): MRI LOW BACK/LUMBAR SPINE    Where will you receive your lab/imaging services: [facility]    Additional notes: PATIENT HAS BEEN WAITING FOR THIS TO BE SCHEDULED AND SHE IS REQUESTING A CALL BACK ONCE THIS HAS BEEN SET UP.

## 2022-12-28 ENCOUNTER — TELEPHONE (OUTPATIENT)
Dept: ORTHOPEDIC SURGERY | Facility: CLINIC | Age: 77
End: 2022-12-28

## 2022-12-28 NOTE — TELEPHONE ENCOUNTER
----- Message from Vikas Couch MD sent at 12/28/2022  3:09 PM EST -----  I reviewed the MRI scan and there is moderate to severe stenosis at L2-3 above the previous fusion in part due to synovial cyst at that level.  Significant but not as bad as it could be.  (Her cord ends a good inch above that point and as she has no bowel or bladder or balance complaints .  Her relative weakness is chronic) all things considered its reasonable to go to [place].  I would break it up into couple of days trip.  If she can get the name of the contact she wishes to see I can drop a phone call but I cannot guarantee I am going to be able to get her a timely appointment on the other end.  Of course if there is any deterioration she can, and should, go to the ER there and the appropriate subspecialist would be contacted.  Meantime I spoke with Dr. Merrill and he has an appointment to see her in April when she returns.

## 2022-12-28 NOTE — TELEPHONE ENCOUNTER
Spoke with pt and she was informed of her results; she voiced understanding and has the CD of her MRI which she plans to take with her to [place].  She will follow up with Dr Merrill in April as scheduled.

## 2022-12-29 ENCOUNTER — TELEPHONE (OUTPATIENT)
Dept: ORTHOPEDIC SURGERY | Facility: CLINIC | Age: 77
End: 2022-12-29

## 2022-12-29 NOTE — TELEPHONE ENCOUNTER
Provider: CASSI THIBODEAUX    Caller: TACOS SANDERS  Relationship to Patient: SELF    Pharmacy: [facility]    Phone Number: [phone]    Reason for Call: PATIENT REQ TO SPEAK WITH CLINICAL STAFF TO DISCUSS USING VOLTAREN GEL ON HER BACK FOR PAIN- ALSO WANTS TO KNOW IF SHE SHOULD USE HER BACK BRACE FROM SX 3 YEARS AGO TO HELP WITH PAIN?  When was the patient last seen: 04/26/2022

## 2022-12-29 NOTE — TELEPHONE ENCOUNTER
Pt said that Dr Couch ordered it and she had it done yesterday.  She has already got her results.   She has an appt to see the neurologist Dr Merrill.

## 2023-01-04 ENCOUNTER — OFFICE VISIT (OUTPATIENT)
Dept: FAMILY MEDICINE CLINIC | Age: 78
End: 2023-01-04
Payer: MEDICARE

## 2023-01-04 VITALS
BODY MASS INDEX: 30.52 KG/M2 | SYSTOLIC BLOOD PRESSURE: 103 MMHG | DIASTOLIC BLOOD PRESSURE: 63 MMHG | HEIGHT: 65 IN | WEIGHT: 183.2 LBS | HEART RATE: 76 BPM

## 2023-01-04 DIAGNOSIS — K21.9 GERD WITHOUT ESOPHAGITIS: ICD-10-CM

## 2023-01-04 DIAGNOSIS — E78.00 HIGH CHOLESTEROL: ICD-10-CM

## 2023-01-04 DIAGNOSIS — M48.062 SPINAL STENOSIS OF LUMBAR REGION WITH NEUROGENIC CLAUDICATION: Primary | ICD-10-CM

## 2023-01-04 DIAGNOSIS — K58.0 IRRITABLE BOWEL SYNDROME WITH DIARRHEA: ICD-10-CM

## 2023-01-04 DIAGNOSIS — E03.9 HYPOTHYROIDISM, ACQUIRED: ICD-10-CM

## 2023-01-04 DIAGNOSIS — J45.40 MODERATE PERSISTENT ASTHMA, UNCOMPLICATED: ICD-10-CM

## 2023-01-04 PROBLEM — Z00.00 MEDICARE ANNUAL WELLNESS VISIT, SUBSEQUENT: Status: RESOLVED | Noted: 2021-07-30 | Resolved: 2023-01-04

## 2023-01-04 PROBLEM — M81.8 AGE-RELATED OSTEOPOROSIS WITHOUT FRACTURE: Status: RESOLVED | Noted: 2022-11-16 | Resolved: 2023-01-04

## 2023-01-04 PROCEDURE — 1159F MED LIST DOCD IN RCRD: CPT | Performed by: FAMILY MEDICINE

## 2023-01-04 PROCEDURE — 99214 OFFICE O/P EST MOD 30 MIN: CPT | Performed by: FAMILY MEDICINE

## 2023-01-04 RX ORDER — DIPHENOXYLATE HYDROCHLORIDE AND ATROPINE SULFATE 2.5; .025 MG/1; MG/1
1 TABLET ORAL DAILY
Qty: 90 TABLET | Refills: 0 | Status: SHIPPED | OUTPATIENT
Start: 2023-01-04 | End: 2023-01-20 | Stop reason: SDUPTHER

## 2023-01-04 NOTE — ASSESSMENT & PLAN NOTE
Lipid abnormalities are improving with treatment.  Nutritional counseling was provided.  Lipids will be reassessed in 6 months.

## 2023-01-04 NOTE — ASSESSMENT & PLAN NOTE
IMPROVED WITH CURRENT TREATMENT, CONTINUE SAME, WILL REEVALUATE AT NEXT VISIT STABLE ON CURRENT MEDICATION.  NOA REVIEWED.  THE CONTINUED USE OF A CONTROLLED SUBSTANCE IS NECESSARY FOR THIS PATIENT TO HAVE A NEAR NORMAL QUALITY OF LIFE AND WILL BE REVIEWED AT THE NEXT ROUTINE VISIT.

## 2023-01-04 NOTE — PROGRESS NOTES
Chief Complaint  Hypothyroidism    Subjective          Valery Baez presents to [facility]  History of Present Illness  --LAST TSH WAS OK ON CURRENT DOSE OF SYNTHROID  --CONTINUES ON ROUTINE LOMOTIL FOR IBS WITH DIARRHEA, STABLE  --CHRONIC LOW BACK PAIN S/P PRIOR FUSION.  RECENT MRI SHOWED SOME SPINAL STENOSIS.  CAN'T SEE HER NEW NEUROSURGEON FOR THREE MONTHS.  WANTS TO BE REFERRED TO ONE IN [place] WHERE SHE WILL BE STAYING FOR THE NEXT FEW MONTHS.  --HER BREATHING IS STABLE ON CURRENT INHALED MEDS  --LAST LIPIDS WERE OK, NO ISSUES WITH MEDS  --GERD IS WELL-CONTROLLED WITH THE PPI        Allergies   Allergen Reactions   • Bee Venom Anaphylaxis   • Latex Hives   • Sulfa Antibiotics Hives, Nausea And Vomiting and Rash   • Codeine Hives     And Derivatives   • Cyclobenzaprine Other (See Comments) and Unknown - High Severity     Experienced side effects - blurred vision, dry mouth, constipation       • Darvon [Propoxyphene] Nausea And Vomiting     CONFUSION.    • Ditropan [Oxybutynin] Itching   • Duricef [Cefadroxil] Itching and Nausea Only   • Erythromycin Hives     Also allergic to Duricef   • Hydrocodone Nausea And Vomiting and GI Intolerance   • Iodine Hives     Iodine scrubbing solutions /IVP Contrast Dyes     • Meperidine Nausea And Vomiting   • Methylprednisolone Unknown (See Comments)     CAUSES ITCHING AND FACIAL FLUSHING BUT PT \"CAN TAKE LOWER DOSES\"   • Minocycline Hives, Diarrhea and Nausea And Vomiting     Only with doses > 100mg BID         • Morphine And Related Hives and GI Intolerance     Derivatives   • Oxycodone-Acetaminophen Hives   • Talwin [Pentazocine] Hives   • Tramadol Hives and GI Intolerance     ULTRAM ER   • Tramadol Hcl Hives and Nausea And Vomiting   • Valium [Diazepam] Hives and Hallucinations     CONFUSION.    • Adhesive Tape Rash   • Bacitracin-Polymyxin B Rash   • Betadine [Povidone Iodine] Rash   • Contrast Dye Hives and Palpitations   •  Daptomycin Itching and Hives   • Miconazole Rash   • Neomycin-Polymyxin-Gramicidin Rash   • Neosporin Af [Miconazole Nitrate] Rash   • Penicillins Hives, Swelling and Rash     As infant   • Silver Rash        There are no preventive care reminders to display for this patient.     Current Outpatient Medications on File Prior to Visit   Medication Sig   • acetaminophen (TYLENOL) 325 MG tablet Take 2 tablets by mouth Every 4 (Four) Hours As Needed for Mild Pain .   • acetic acid-hydrocortisone (VOSOL-HC) 1-2 % otic solution Administer 4 drops into both ears 2 (Two) Times a Day.   • ARMOUR THYROID 30 MG tablet Take 30 mg by mouth Daily.   • atenolol (TENORMIN) 25 MG tablet TAKE 1 TABLET EVERY AFTERNOON   • atorvastatin (LIPITOR) 20 MG tablet TAKE 1 TABLET DAILY   • cephalexin (KEFLEX) 500 MG capsule Take 1 capsule by mouth Daily. STATES HAS BEEN ON FOR 10 YEARS TO SUPPRESS MRSA   • Denosumab (PROLIA SC) Inject  under the skin into the appropriate area as directed See Admin Instructions. Every 6 months   11/4/21  WAS LAST DOSE   • diphenhydrAMINE (BENADRYL ALLERGY CHILDRENS) 12.5 MG chewable tablet Chew 25 mg 4 (Four) Times a Day As Needed for Allergies. FOR BEE STINGS ONLY   • EPINEPHrine 0.15 MG/0.15ML solution auto-injector injection INJECT AS NEEDED AS DIRECTED   • fexofenadine (ALLEGRA) 180 MG tablet Take 180 mg by mouth Daily.   • fluticasone (FLONASE) 50 MCG/ACT nasal spray 2 sprays into the nostril(s) as directed by provider Every Night.   • guaiFENesin (MUCINEX PO) Take 400 mg by mouth 2 (Two) Times a Day.   • lansoprazole (PREVACID) 30 MG capsule Take 30 mg by mouth 2 (Two) Times a Day.   • levalbuterol (XOPENEX HFA) 45 MCG/ACT inhaler Inhale 1-2 puffs Every 4 (Four) Hours As Needed.   • Lifitegrast (Xiidra) 5 % ophthalmic solution Administer 1 drop to both eyes 2 (Two) Times a Day.   • meclizine (ANTIVERT) 25 MG tablet TAKE 1 TABLET THREE TIMES A DAY AS NEEDED FOR DIZZINESS   • Melatonin 10 MG tablet Take 1  tablet by mouth Every Night.   • meloxicam (MOBIC) 15 MG tablet TAKE 1 TABLET DAILY   • metroNIDAZOLE (METROGEL) 0.75 % gel Apply 1 application topically to the appropriate area as directed 2 (Two) Times a Day As Needed. ROASCEA   • montelukast (SINGULAIR) 10 MG tablet Take 10 mg by mouth Every Night.   • Multiple Vitamins-Minerals (MULTIVITAMIN ADULT PO) Take 1 tablet by mouth Every Night. HOLD FOR SURGERY   • Myrbetriq 25 MG tablet sustained-release 24 hour 24 hr tablet Take 1 tablet by mouth Daily.   • ondansetron (Zofran) 4 MG tablet Take 1 tablet by mouth Every 6 (Six) Hours As Needed for Nausea or Vomiting. Indications: Nausea and Vomiting Following an Operation   • pilocarpine (SALAGEN) 5 MG tablet TAKE 1 TABLET FOUR TIMES A DAY   • POTASSIUM CHLORIDE PO Take 99 mg by mouth Every Night. OTC  HOLD FOR SURGERY   • PROBIOTIC PRODUCT PO Take 1 capsule by mouth 2 (Two) Times a Day.   • [DISCONTINUED] diphenoxylate-atropine (LOMOTIL) 2.5-0.025 MG per tablet TAKE 1 TABLET DAILY     Current Facility-Administered Medications on File Prior to Visit   Medication   • Chlorhexidine Gluconate Cloth 2 % pads       Immunization History   Administered Date(s) Administered   • COVID-19 (MODERNA) 1st, 2nd, 3rd Dose Only 01/13/2021, 02/09/2021, 10/26/2021   • COVID-19 (MODERNA) BIVALENT BOOSTER 12+YRS 10/04/2022   • COVID-19 (MODERNA) BOOSTER 05/09/2022   • Fluad Quad 65+ 11/01/2021   • Fluzone High Dose =>65 Years (Vaxcare ONLY) 10/02/2017, 11/01/2021   • Fluzone High-Dose 65+yrs 10/31/2022   • Fluzone Quad >6mos (Multi-dose) 09/30/2013   • Influenza Quad Vaccine (Inpatient) 09/30/2013   • Influenza TIV (IM) 01/09/2010, 10/25/2012   • Pneumococcal Conjugate 13-Valent (PCV13) 10/27/2015   • Pneumococcal Polysaccharide (PPSV23) 11/01/2009, 11/01/2013   • Pneumococcal, Unspecified 09/23/2008   • Shingrix 09/01/2021, 01/06/2022   • Tdap 11/02/2012, 08/06/2015   • Zostavax 09/30/2009, 10/09/2013       Review of Systems    Constitutional: Negative for activity change, appetite change, chills, fatigue and fever.   HENT: Negative for congestion, ear pain, rhinorrhea and sore throat.    Respiratory: Negative for cough and shortness of breath.    Cardiovascular: Negative for chest pain, palpitations and leg swelling.   Gastrointestinal: Negative for abdominal pain, constipation, diarrhea, nausea and vomiting.   Musculoskeletal: Positive for arthralgias. Negative for myalgias.   Neurological: Negative for headache.        Objective     /63 (BP Location: Left arm, Patient Position: Sitting)   Pulse 76   Ht 165.1 cm (65\")   Wt 83.1 kg (183 lb 3.2 oz)   BMI 30.49 kg/m²       Physical Exam  Vitals and nursing note reviewed.   Constitutional:       General: She is not in acute distress.     Appearance: Normal appearance.   Cardiovascular:      Rate and Rhythm: Normal rate and regular rhythm.      Heart sounds: Normal heart sounds. No murmur heard.  Pulmonary:      Effort: Pulmonary effort is normal.      Breath sounds: Normal breath sounds.   Abdominal:      Palpations: Abdomen is soft.      Tenderness: There is no abdominal tenderness.   Musculoskeletal:      Cervical back: Neck supple.      Right lower leg: No edema.      Left lower leg: No edema.   Lymphadenopathy:      Cervical: No cervical adenopathy.   Neurological:      General: No focal deficit present.      Mental Status: She is alert.      Cranial Nerves: No cranial nerve deficit.      Coordination: Coordination normal.      Gait: Gait normal.   Psychiatric:         Mood and Affect: Mood normal.         Behavior: Behavior normal.         Result Review :                             Assessment and Plan      Diagnoses and all orders for this visit:    1. Spinal stenosis of lumbar region with neurogenic claudication (Primary)  -     Ambulatory Referral to Neurosurgery    2. Irritable bowel syndrome with diarrhea  Assessment & Plan:  IMPROVED WITH CURRENT TREATMENT, CONTINUE  SAME, WILL REEVALUATE AT NEXT VISIT STABLE ON CURRENT MEDICATION.  NOA REVIEWED.  THE CONTINUED USE OF A CONTROLLED SUBSTANCE IS NECESSARY FOR THIS PATIENT TO HAVE A NEAR NORMAL QUALITY OF LIFE AND WILL BE REVIEWED AT THE NEXT ROUTINE VISIT.    Orders:  -     diphenoxylate-atropine (LOMOTIL) 2.5-0.025 MG per tablet; Take 1 tablet by mouth Daily.  Dispense: 90 tablet; Refill: 0    3. Moderate persistent asthma, uncomplicated  Assessment & Plan:  Asthma is improving with treatment.  The patient is experiencing no daytime asthma symptoms. She is experiencing no nighttime asthma symptoms.  Discussed monitoring symptoms and use of quick-relief medications and contacting us early in the course of exacerbations.          4. Hypothyroidism, acquired  Assessment & Plan:  IMPROVED WITH CURRENT TREATMENT, CONTINUE SAME, WILL REEVALUATE AT NEXT VISIT       5. High cholesterol  Assessment & Plan:  Lipid abnormalities are improving with treatment.  Nutritional counseling was provided.  Lipids will be reassessed in 6 months.      6. GERD without esophagitis  Assessment & Plan:  IMPROVED WITH CURRENT TREATMENT, CONTINUE SAME, WILL REEVALUATE AT NEXT VISIT             Follow Up     Return in about 6 months (around 7/4/2023).    Patient was given instructions and counseling regarding her condition or for health maintenance advice. Please see specific information pulled into the AVS if appropriate.           Answers for HPI/ROS submitted by the patient on 1/2/2023  Please describe your symptoms.: Medicare  checkup  Have you had these symptoms before?: No  How long have you been having these symptoms?: 1-4 days  Please list any medications you are currently taking for this condition.: See chart  Please describe any probable cause for these symptoms. : None  What is the primary reason for your visit?: Other

## 2023-01-04 NOTE — ASSESSMENT & PLAN NOTE
Asthma is improving with treatment.  The patient is experiencing no daytime asthma symptoms. She is experiencing no nighttime asthma symptoms.  Discussed monitoring symptoms and use of quick-relief medications and contacting us early in the course of exacerbations.

## 2023-01-12 NOTE — TELEPHONE ENCOUNTER
Rx Refill Note  Requested Prescriptions      No prescriptions requested or ordered in this encounter      Last office visit with prescribing clinician: 2023   Next office visit with prescribing clinician: 7/10/2023     Pt requesting refill of her Epi Pen to be sent to [facility]  Last filled 10/10/22 to [facility] and the epi pens she has at home will  at the end of Feb.  Also the pharmacy told her that the epi pens that she currently has are child strength, not adult strength    Elenita Christian LPN  23, 13:20 EST

## 2023-01-13 NOTE — TELEPHONE ENCOUNTER
Did you want to change the strength since the pharmacy told her it was a malissa strength and she would have to take 2 at the same time?

## 2023-01-16 RX ORDER — EPINEPHRINE 0.15 MG/.15ML
1 INJECTION SUBCUTANEOUS ONCE
Qty: 2 EACH | Refills: 1 | Status: SHIPPED | OUTPATIENT
Start: 2023-01-16 | End: 2023-01-16

## 2023-01-17 RX ORDER — EPINEPHRINE 0.15 MG/.15ML
1 INJECTION SUBCUTANEOUS ONCE
Qty: 2 EACH | Refills: 1 | OUTPATIENT
Start: 2023-01-17 | End: 2023-01-17

## 2023-01-17 NOTE — TELEPHONE ENCOUNTER
Rx Refill Note  Requested Prescriptions     Pending Prescriptions Disp Refills   • EPINEPHrine 0.15 MG/0.15ML solution auto-injector injection 2 each 1     Sig: Inject 1 mL into the appropriate muscle as directed by prescriber 1 (One) Time for 1 dose.      Last office visit with prescribing clinician: 1/4/2023   Last telemedicine visit with prescribing clinician: 7/10/2023   Next office visit with prescribing clinician: 7/10/2023                         Would you like a call back once the refill request has been completed: [] Yes [] No    If the office needs to give you a call back, can they leave a voicemail: [] Yes [] No    Araceli Abdul LPN   01/17/23, 13:50 EST     Sent 1/16/23

## 2023-01-19 NOTE — TELEPHONE ENCOUNTER
Spoke with Margot at [facility] , change patient Epi Pen to an adult dose and not the child dose of 0.15mg

## 2023-01-20 ENCOUNTER — TELEPHONE (OUTPATIENT)
Dept: FAMILY MEDICINE CLINIC | Age: 78
End: 2023-01-20
Payer: MEDICARE

## 2023-01-20 DIAGNOSIS — K58.0 IRRITABLE BOWEL SYNDROME WITH DIARRHEA: ICD-10-CM

## 2023-01-20 RX ORDER — DIPHENOXYLATE HYDROCHLORIDE AND ATROPINE SULFATE 2.5; .025 MG/1; MG/1
1 TABLET ORAL DAILY
Qty: 90 TABLET | Refills: 0 | Status: SHIPPED | OUTPATIENT
Start: 2023-01-20 | End: 2023-02-08 | Stop reason: SDUPTHER

## 2023-01-20 NOTE — TELEPHONE ENCOUNTER
[facility] requested a refill of Triamcinolone cream, 0.1%,  80gm.  Did not see this on her med list, call pt to ask her about it.  She said to deny it she doesn't need it.   Denied.

## 2023-01-20 NOTE — TELEPHONE ENCOUNTER
Received a fax from [facility] that stated they received a electronic prescription on 01/04/23 for Diphenoxylate/ Atropine 2.5mg tablets, #90.  They shipped it on 01/05/23 via [facility]. The pt called them on 1/18/23 informing them she received the package ripped open with the medication missing and request a replacement.   Since this is a controlled substance they need a new prescription sent to them electronically.   Faxed paper sent to be scanned into Media for reference.

## 2023-01-26 DIAGNOSIS — K58.0 IRRITABLE BOWEL SYNDROME WITH DIARRHEA: ICD-10-CM

## 2023-01-26 RX ORDER — EPINEPHRINE 0.3 MG/.3ML
0.3 INJECTION SUBCUTANEOUS ONCE
Qty: 1 EACH | Refills: 3 | Status: SHIPPED | OUTPATIENT
Start: 2023-01-26 | End: 2023-01-26

## 2023-01-26 NOTE — TELEPHONE ENCOUNTER
Epi pen adult dose .03 mg was sent to [facility].  [facility] mail order is requesting order.

## 2023-02-08 DIAGNOSIS — K58.0 IRRITABLE BOWEL SYNDROME WITH DIARRHEA: ICD-10-CM

## 2023-02-08 RX ORDER — DIPHENOXYLATE HYDROCHLORIDE AND ATROPINE SULFATE 2.5; .025 MG/1; MG/1
1 TABLET ORAL DAILY
Qty: 14 TABLET | Refills: 0 | Status: SHIPPED | OUTPATIENT
Start: 2023-02-08

## 2023-02-08 RX ORDER — DIPHENOXYLATE HYDROCHLORIDE AND ATROPINE SULFATE 2.5; .025 MG/1; MG/1
1 TABLET ORAL DAILY
Qty: 90 TABLET | Refills: 0 | OUTPATIENT
Start: 2023-02-08

## 2023-02-08 RX ORDER — DIPHENOXYLATE HYDROCHLORIDE AND ATROPINE SULFATE 2.5; .025 MG/1; MG/1
1 TABLET ORAL DAILY
Qty: 90 TABLET | Refills: 0 | Status: SHIPPED | OUTPATIENT
Start: 2023-02-08

## 2023-02-08 NOTE — TELEPHONE ENCOUNTER
Caller: Jj Baez    Relationship: Emergency Contact    Best call back number: [phone]       Requested Prescriptions:   Requested Prescriptions      No prescriptions requested or ordered in this encounter    diphenoxylate-atropine (LOMOTIL) 2.5-0.025 MG per tablet  TAKES ONCE DAILY  14 PILLS    Pharmacy where request should be sent: [id number] - [place], [place] - [address]  AT [facility] OF [address]  [phone] [facility] [phone] FX     Additional details provided by patient: REQUESTING A 2 WEEK SUPPLY BE SENT TO LOCAL PHARMACY WHERE THEY ARE STAYING    PATIENT IS OUT OF MEDICATION    PLEASE ADVISE    Does the patient have less than a 3 day supply:  [x] Yes  [] No      Logan Echavarria Rep   02/08/23 15:08 EST

## 2023-02-08 NOTE — TELEPHONE ENCOUNTER
rx was sent to the wrong pharmacy, was sent to [facility] instead of [facility], can you please resend the rx to [facility]

## 2023-02-08 NOTE — TELEPHONE ENCOUNTER
Caller: Jj Baez    Relationship: Emergency Contact    Best call back number: [phone]     Best call back number:Requested Prescriptions:   Requested Prescriptions     Pending Prescriptions Disp Refills   • diphenoxylate-atropine (LOMOTIL) 2.5-0.025 MG per tablet 90 tablet 0     Sig: Take 1 tablet by mouth Daily.        Pharmacy where request should be sent: [facility] - [address] [phone] [facility] [phone]      Additional details provided by patient: PATIENT IS OUT OF MEDICATION - NEEDS 90 DAY SENT TO [facility]    Does the patient have less than a 3 day supply:  [x] Yes  [] No      Logan Echavarria Rep   02/08/23 14:59 EST

## 2023-02-08 NOTE — TELEPHONE ENCOUNTER
Pt is out of medication and is needing a 14 day supply sent to the [facility] in [place] until they return home.

## 2023-03-02 RX ORDER — ATORVASTATIN CALCIUM 20 MG/1
TABLET, FILM COATED ORAL
Qty: 90 TABLET | Refills: 1 | Status: SHIPPED | OUTPATIENT
Start: 2023-03-02

## 2023-03-02 RX ORDER — ATENOLOL 25 MG/1
TABLET ORAL
Qty: 90 TABLET | Refills: 1 | Status: SHIPPED | OUTPATIENT
Start: 2023-03-02

## 2023-03-02 NOTE — TELEPHONE ENCOUNTER
Rx Refill Note  Requested Prescriptions     Pending Prescriptions Disp Refills   • atorvastatin (LIPITOR) 20 MG tablet [Pharmacy Med Name: ATORVASTATIN TABS 20MG] 90 tablet 3     Sig: TAKE 1 TABLET DAILY   • atenolol (TENORMIN) 25 MG tablet [Pharmacy Med Name: ATENOLOL TABS 25MG] 90 tablet 3     Sig: TAKE 1 TABLET EVERY AFTERNOON      Last office visit with prescribing clinician: 1/4/2023     Return in about 6 months (around 7/4/2023).    Next office visit with prescribing       Araceli Abdul LPN  03/02/23, 08:51 EST     [facility] for an appt in July

## 2023-03-24 ENCOUNTER — TELEPHONE (OUTPATIENT)
Dept: ORTHOPEDIC SURGERY | Facility: CLINIC | Age: 78
End: 2023-03-24
Payer: MEDICARE

## 2023-03-24 NOTE — TELEPHONE ENCOUNTER
Caller: patient     Relationship to patient: self      Best call back number: [phone]    Chief complaint: left hip pain    Type of visit: MRI ORDER    Requested date: AFTER April 6TH      IAdditional notes: PT. IS IN [place]- WILL BE HOME AFTER April 6TH.   SHE IS HAVING LEFT HIP PAIN THAT IS SIMILAR TO WHAT SHE HAD IN THE RIGHT.   ASKING IF DR. PROCTOR WILL ORDER AN MRI TO CHECK FOR HAIRLINE FRACTURES.   PLEASE CALL PT. TO LET HER KNOW.

## 2023-03-24 NOTE — TELEPHONE ENCOUNTER
Patient has been informed that we are unable to order a MRI for the left hip without seeing her for the hip 1st. We must obtain an x-ray then Dr. Lange will determine if the MRI is medically necessary     Patient voiced understanding

## 2023-04-05 NOTE — PROGRESS NOTES
Patient ID: Valery Baez is a 77 y.o. female is being seen for consultation today at the request of Vikas Couch MD diagnosis Spinal stenosis of lumbar region with neurogenic claudication, Weakness of both lower extremities. MRI Lumbar Spine WO 12/27/23    Today, Valery Arevalo reports lower back pain with radiating pain down bilateral legs. She denies loss of bowel/bladder incontinence.  She reports use of heat with temporary relief. She reports physical therapy with lower back with mobility back but no other relief. She reports lumbar ablastion 3 weeks ago with moderate relief.    Subjective     The patient is here in regards to   Chief Complaint   Patient presents with   • Back Pain   • Leg Pain   • Extremity Weakness       History of Present Illness  Shaista underwent a spinal fusion and decompression from L3-S1 in 2019 with Dr. Couch.  She was having neurogenic claudication symptoms at the time and had excellent response to the surgery and recovered well.  In 2020 she has began having recurrent symptoms and proceeded with conservative management including physical therapy and epidural steroid injections.  This past October she did get another epidural steroid injection which she felt made her symptoms worse and so she has not had another one since then.  She has been participating with physical therapy and she has regained some mobility.  She feels that her symptoms came back this past December and she was unable to walk for a little while due to her pain.  This appears to be debilitating and has affected her activities of daily living.  She also thinks that she injured her left hip and may have fractured it and is scheduled to see an orthopedic surgeon tomorrow.  She also did get a radiofrequency ablation which she feels may have improved her pain recently as well.      While in the room and during my examination of the patient I wore a mask and eye protection.  I washed my hands before and after  this patient encounter.  The patient was also wearing a mask.    The following portions of the patient's history were reviewed and updated as appropriate: allergies, current medications, past family history, past medical history, past social history, past surgical history and problem list.    Review of Systems   Constitutional: Positive for activity change. Negative for chills and fever.   HENT: Negative.    Eyes: Negative.    Respiratory: Negative for cough, chest tightness and shortness of breath.    Cardiovascular: Negative for chest pain, palpitations and leg swelling.   Gastrointestinal: Negative for abdominal pain and constipation.   Endocrine: Negative.    Genitourinary: Negative for difficulty urinating and enuresis.   Musculoskeletal: Positive for back pain and gait problem.   Skin: Negative for rash.   Allergic/Immunologic: Negative.    Neurological: Positive for weakness. Negative for numbness (or tingling).   Hematological: Does not bruise/bleed easily.   Psychiatric/Behavioral: Positive for sleep disturbance.        Past Medical History:   Diagnosis Date   • Allergic    • Anemia     CHRONIC IRON DEFICIENT   • Anesthesia complication     STATES SHE IS VERY SLOW TO WAKE UP   • Asthma    • Basal cell carcinoma     FACE   • Cancer     right breast cancer   • Chronic diarrhea    • Chronic kidney disease    • DDD (degenerative disc disease), lumbar    • Disease of thyroid gland    • Dry eye    • Elevated cholesterol    • Frequency of urination    • GERD (gastroesophageal reflux disease)    • Hard to intubate     REQUESTING TO SEE [facility] 8/22/19; PT HAS COMPLETE BILATERAL JAW PROSTESIS   • Headache     or migraines   • Heart palpitations    • History of breast cancer     RIGHT   • History of transfusion    • [place] ([place] of hearing)     BILAT HEARING AID   • Hx of renal calculi    • Hypercholesteremia    • Limited mobility     RIGHT HIP   • Limited mobility     JAW   • Low back pain    • MRSA (methicillin resistant  "Staphylococcus aureus)     LEFT JAW @[facility] JOINT, MULT. ,LAST 2013, TREATED AT [facility], infection control notified 8/24/16. 1300   • Osteoarthritis    • Osteopenia     Prolia shot 11/4/22   • Osteoporosis    • PONV (postoperative nausea and vomiting)    • Right hip pain    • Rosacea    • Short of breath on exertion     WITH STAIRS ONLY   • Sleep apnea     C PAP   • Spinal headache    • Spinal stenosis    • Urgency of urination    • Vertigo     WAKES UP WITH THIS SOMETIMES   • Weakness     RIGHT HIP       Allergies   Allergen Reactions   • Bee Venom Anaphylaxis   • Latex Hives   • Sulfa Antibiotics Hives, Nausea And Vomiting and Rash   • Codeine Hives     And Derivatives   • Cyclobenzaprine Other (See Comments) and Unknown - High Severity     Experienced side effects - blurred vision, dry mouth, constipation       • Darvon [Propoxyphene] Nausea And Vomiting     CONFUSION.    • Ditropan [Oxybutynin] Itching   • Duricef [Cefadroxil] Itching and Nausea Only   • Erythromycin Hives     Also allergic to Duricef   • Hydrocodone Nausea And Vomiting and GI Intolerance   • Iodine Hives     Iodine scrubbing solutions /IVP Contrast Dyes     • Meperidine Nausea And Vomiting   • Methylprednisolone Unknown (See Comments)     CAUSES ITCHING AND FACIAL FLUSHING BUT PT \"CAN TAKE LOWER DOSES\"   • Minocycline Hives, Diarrhea and Nausea And Vomiting     Only with doses > 100mg BID         • Morphine And Related Hives and GI Intolerance     Derivatives   • Oxycodone-Acetaminophen Hives   • Talwin [Pentazocine] Hives   • Tramadol Hives and GI Intolerance     ULTRAM ER   • Tramadol Hcl Hives and Nausea And Vomiting   • Valium [Diazepam] Hives and Hallucinations     CONFUSION.    • Adhesive Tape Rash   • Bacitracin-Polymyxin B Rash   • Betadine [Povidone Iodine] Rash   • Contrast Dye (Echo Or Unknown Ct/Mr) Hives and Palpitations   • Daptomycin Itching and Hives   • Miconazole Rash   • Neomycin-Polymyxin-Gramicidin Rash   • " Neosporin Af [Miconazole Nitrate] Rash   • Penicillins Hives, Swelling and Rash     As infant   • Silver Rash       Family History   Problem Relation Age of Onset   • Hypertension Other    • Cancer Other         breast   • Heart disease Mother    • Arthritis Mother    • Hearing loss Mother    • Heart disease Father    • Asthma Father    • Arthritis Father    • Hearing loss Father    • Heart disease Maternal Grandmother    • Heart disease Maternal Grandfather    • Heart disease Paternal Grandmother    • Cancer Paternal Grandmother    • Heart disease Paternal Grandfather    • Malig Hyperthermia Neg Hx        Social History     Socioeconomic History   • Marital status:    Tobacco Use   • Smoking status: Never   • Smokeless tobacco: Never   Vaping Use   • Vaping Use: Never used   Substance and Sexual Activity   • Alcohol use: Yes     Alcohol/week: 4.0 standard drinks     Types: 4 Shots of liquor per week     Comment: 4 drinks per week   • Drug use: Never   • Sexual activity: Not Currently     Partners: Male     Birth control/protection: Hysterectomy, Same-sex partner       Past Surgical History:   Procedure Laterality Date   • ABSCESS DRAINAGE      abscess removed from left jaw x2   • ADENOIDECTOMY  1952   • APPENDECTOMY  1965   • AVULSION TOENAIL PLATE      6 REMOVED   • BILATERAL BREAST REDUCTION Left 08/29/2016    Procedure: LT BREAST REDUCTION ;  Surgeon: Luis Zambrano MD;  Location: [facility] OR;  Service:    • BREAST BIOPSY Bilateral    • CHOLECYSTECTOMY  09/2000   • COLONOSCOPY     • COLONOSCOPY  2019    NORMAL   • CYST REMOVAL  03/1984    eye cyst left lower lid   • CYST REMOVAL  01/2007    right upper thigh   • CYSTOSCOPY      x2  02/2013 & 04/2014   • CYSTOSCOPY      NUMEROUS   • D & C HYSTEROSCOPY     • HYSTERECTOMY  1971    LATER BSO   • JOINT REPLACEMENT     • KNEE ARTHROPLASTY Right 03/2005   • LAPAROSCOPIC SALPINGOOPHERECTOMY     • LUMBAR DISCECTOMY FUSION INSTRUMENTATION N/A 09/09/2019     Procedure: Lumbar 3 to sacral 1 laminectomy and fusion with instrumentation;  Surgeon: Vikas Couch MD;  Location: [facility];  Service: Orthopedic Spine   • MANDIBLE SURGERY      UPPER AND LOWER JAW ADJUSTED WITH PLATES AND WIRES   • MASTECTOMY Right 03/2015   • MASTECTOMY PARTIAL / LUMPECTOMY Right 03/2009   • NIPPLE RECONSTRUCTION Right 08/29/2016    Procedure: RT NIPPLE RECONSTRUCTION;  Surgeon: Luis Zambrano MD;  Location: [facility];  Service:    • OTHER SURGICAL HISTORY      laparoscopy adhesions right side x2   • OTHER SURGICAL HISTORY      drain mammosite area   • OTHER SURGICAL HISTORY  01/2010    pressure washed prosthesis area left jaw   • OTHER SURGICAL HISTORY      drain mammosite area right breast 09/2011   • RECONSTRUCTION BREAST W/ TRAM FLAP Right 02/2016   • ROTATOR CUFF REPAIR Left 09/2014   • TEMPOROMANDIBULAR JOINT ARTHROPLASTY Bilateral 1993    MULT. REPLACEMENTS LATER   • TONSILLECTOMY  1952   • TOTAL HIP ARTHROPLASTY Right 12/24/2021    Procedure: Right anterior total hip arthroplasty;  Surgeon: Dejan Lange MD;  Location: [facility];  Service: Orthopedics;  Laterality: Right;         Objective     Vitals:    04/12/23 0930   BP: 120/80   Pulse: 77   SpO2: 99%     Body mass index is 31.28 kg/m².    Physical Exam  Constitutional:       Appearance: Normal appearance.   HENT:      Head: Normocephalic and atraumatic.   Eyes:      Extraocular Movements: Extraocular movements intact.      Conjunctiva/sclera: Conjunctivae normal.      Pupils: Pupils are equal, round, and reactive to light.   Cardiovascular:      Rate and Rhythm: Normal rate and regular rhythm.      Pulses: Normal pulses.   Pulmonary:      Breath sounds: Normal breath sounds.   Abdominal:      Palpations: Abdomen is soft.   Musculoskeletal:         General: Normal range of motion.      Cervical back: Normal range of motion and neck supple.   Skin:     General: Skin is warm and dry.   Neurological:      Mental  Status: She is alert and oriented to person, place, and time.      Cranial Nerves: Cranial nerves 2-12 are intact.      Motor: Motor function is intact. No weakness or atrophy.      Coordination: Coordination is intact. Romberg sign negative. Romberg Test normal.      Gait: Gait is intact. Gait normal.      Deep Tendon Reflexes: Reflexes are normal and symmetric.      Reflex Scores:       Tricep reflexes are 2+ on the right side and 2+ on the left side.       Bicep reflexes are 2+ on the right side and 2+ on the left side.       Brachioradialis reflexes are 2+ on the right side and 2+ on the left side.       Patellar reflexes are 2+ on the right side and 2+ on the left side.       Achilles reflexes are 2+ on the right side and 2+ on the left side.  Psychiatric:         Speech: Speech normal.         Neurologic Exam     Mental Status   Oriented to person, place, and time.   Attention: normal. Concentration: normal.   Speech: speech is normal   Level of consciousness: alert    Cranial Nerves   Cranial nerves II through XII intact.     CN III, IV, VI   Pupils are equal, round, and reactive to light.    Motor Exam   Muscle bulk: normal  Overall muscle tone: normal    Strength   Strength 5/5 except as noted.     Sensory Exam   Light touch normal.     Gait, Coordination, and Reflexes     Gait  Gait: normal    Coordination   Romberg: negative    Reflexes   Reflexes 2+ except as noted.   Right brachioradialis: 2+  Left brachioradialis: 2+  Right biceps: 2+  Left biceps: 2+  Right triceps: 2+  Left triceps: 2+  Right patellar: 2+  Left patellar: 2+  Right achilles: 2+  Left achilles: 2+      Assessment & Plan   Independent Review of Radiographic Studies:      I personally reviewed the images from the following studies.    MR: MRI of the lumbar spine wo contrast was reviewed and shows Previous L3-S1 instrumented posterior lateral fusion with decompression.  L2-3 demonstrates significant reactive lordosis with degenerative  disc disease and disc bulge in addition to posterior ligamentous hypertrophy contributing to severe stenosis at the L2-3 level.    Assessment/Plan: I discussed with Shaista that due to her being fused straight from L3-S1, she would likely need more than just a 1 level adjacent segment fusion and I recommended that she see my partner Dr. Paredes who is comfortable with deformity spine surgery.    Medical Decision Making:      X-ray scoliosis         Diagnoses and all orders for this visit:    1. Lumbar spinal stenosis due to adjacent segment disease after fusion procedure (Primary)  -     XR Spine Scoliosis 2 or 3 Views    2. Acquired spinal deformity  -     XR Spine Scoliosis 2 or 3 Views    3. Spinal stenosis, lumbar region, with neurogenic claudication  -     XR Spine Scoliosis 2 or 3 Views             Patient Instructions/Recommendations:    Follow-up with Dr. Lena Merrill MD  04/12/23  10:00 EDT

## 2023-04-12 ENCOUNTER — TELEPHONE (OUTPATIENT)
Dept: NEUROSURGERY | Facility: CLINIC | Age: 78
End: 2023-04-12
Payer: MEDICARE

## 2023-04-12 ENCOUNTER — OFFICE VISIT (OUTPATIENT)
Dept: NEUROSURGERY | Facility: CLINIC | Age: 78
End: 2023-04-12
Payer: MEDICARE

## 2023-04-12 VITALS
HEART RATE: 77 BPM | DIASTOLIC BLOOD PRESSURE: 80 MMHG | BODY MASS INDEX: 31.28 KG/M2 | OXYGEN SATURATION: 99 % | WEIGHT: 188 LBS | SYSTOLIC BLOOD PRESSURE: 120 MMHG

## 2023-04-12 DIAGNOSIS — M51.36 LUMBAR SPINAL STENOSIS DUE TO ADJACENT SEGMENT DISEASE AFTER FUSION PROCEDURE: Primary | ICD-10-CM

## 2023-04-12 DIAGNOSIS — M48.062 SPINAL STENOSIS, LUMBAR REGION, WITH NEUROGENIC CLAUDICATION: ICD-10-CM

## 2023-04-12 DIAGNOSIS — M48.061 LUMBAR SPINAL STENOSIS DUE TO ADJACENT SEGMENT DISEASE AFTER FUSION PROCEDURE: Primary | ICD-10-CM

## 2023-04-12 DIAGNOSIS — M43.9 ACQUIRED SPINAL DEFORMITY: ICD-10-CM

## 2023-04-12 PROBLEM — Z98.1 LUMBAR SPINAL STENOSIS DUE TO ADJACENT SEGMENT DISEASE AFTER FUSION PROCEDURE: Status: ACTIVE | Noted: 2019-08-09

## 2023-04-12 PROBLEM — M51.369 LUMBAR SPINAL STENOSIS DUE TO ADJACENT SEGMENT DISEASE AFTER FUSION PROCEDURE: Status: ACTIVE | Noted: 2019-08-09

## 2023-04-12 PROCEDURE — 99203 OFFICE O/P NEW LOW 30 MIN: CPT | Performed by: NEUROLOGICAL SURGERY

## 2023-04-12 PROCEDURE — 1160F RVW MEDS BY RX/DR IN RCRD: CPT | Performed by: NEUROLOGICAL SURGERY

## 2023-04-12 PROCEDURE — 1159F MED LIST DOCD IN RCRD: CPT | Performed by: NEUROLOGICAL SURGERY

## 2023-04-12 RX ORDER — GABAPENTIN 300 MG/1
CAPSULE ORAL
COMMUNITY
Start: 2023-02-04

## 2023-04-12 RX ORDER — DESONIDE 0.5 MG/G
CREAM TOPICAL
COMMUNITY
Start: 2023-04-05

## 2023-04-13 ENCOUNTER — OFFICE VISIT (OUTPATIENT)
Dept: ORTHOPEDIC SURGERY | Facility: CLINIC | Age: 78
End: 2023-04-13
Payer: MEDICARE

## 2023-04-13 ENCOUNTER — HOSPITAL ENCOUNTER (OUTPATIENT)
Dept: GENERAL RADIOLOGY | Facility: HOSPITAL | Age: 78
Discharge: HOME | End: 2023-04-13
Admitting: ORTHOPAEDIC SURGERY
Payer: MEDICARE

## 2023-04-13 DIAGNOSIS — M25.552 LEFT HIP PAIN: Primary | ICD-10-CM

## 2023-04-13 DIAGNOSIS — M25.552 LEFT HIP PAIN: ICD-10-CM

## 2023-04-13 DIAGNOSIS — M16.12 PRIMARY OSTEOARTHRITIS OF LEFT HIP: ICD-10-CM

## 2023-04-13 DIAGNOSIS — Z96.641 STATUS POST TOTAL HIP REPLACEMENT, RIGHT: ICD-10-CM

## 2023-04-13 PROCEDURE — 99213 OFFICE O/P EST LOW 20 MIN: CPT | Performed by: ORTHOPAEDIC SURGERY

## 2023-04-13 PROCEDURE — 73502 X-RAY EXAM HIP UNI 2-3 VIEWS: CPT

## 2023-04-13 NOTE — PROGRESS NOTES
Chief Complaint  Pain of the Left Hip    Subjective    History of Present Illness      Valery Baez is a 77 y.o. female who presents to [facility] for follow-up on right total hip arthroplasty and persistent low back pain and left hip pain.  History of Present Illness this is a patient who underwent a right total hip replacement performed by me through direct anterior approach 16 months ago.  She has done extremely well postoperatively.  There is no neurovascular deficit.  She does not have a limb length discrepancy.  She states that she was very pleased with her outcome from hip surgery.  She has had chronic low back pain and in fact has had a fusion.  She continues to have a lot of pain with radiation into the hip, the buttock and the thigh.  The patient states that she has pain with cross body activity.  It is now her left hip that is bothering her.  Symptoms have been going on for about 2 months.  She is afraid that she may be developing a stress fracture along the femoral neck on the left side as she did several months ago on the right side.  The patient states that she was doing physical therapy at home when her hip started to hurt her and she is worried about an iatrogenic stress fracture of the femoral neck.  Pain Location: LEFT hip  Radiation: From the lumbar spine into the hip buttock and thigh.  Quality: dull, aching  Intensity/Severity: moderate  Duration: 3-4 weeks  Progression of symptoms: yes, progressive worsening  Onset quality: sudden  Timing: intermittent  Aggravating Factors: going up and down stairs, rising after sitting, squatting  Alleviating Factors: NSAIDs  Previous Episodes: none mentioned  Associated Symptoms: pain  ADLs Affected: ambulating  Previous Treatment: NSAIDs       Objective   Vital Signs:   There were no vitals taken for this visit.    Physical Exam  Physical Exam  Vitals signs and nursing note reviewed.   Constitutional:       Appearance:  Normal appearance.   Pulmonary:      Effort: Pulmonary effort is normal.   Skin:     General: Skin is warm and dry.      Capillary Refill: Capillary refill takes less than 2 seconds.   Neurological:      General: No focal deficit present.      Mental Status: He is alert and oriented to person, place, and time. Mental status is at baseline.   Psychiatric:         Mood and Affect: Mood normal.         Behavior: Behavior normal.         Thought Content: Thought content normal.         Judgment: Judgment normal.     Ortho Exam   Left SI JOINT: Mild tenderness is present dorsally over the SI joint. Figure of 4 sign is positive. There is mild spasm present in the erector spinae muscle. Flexion and extension are associated with discomfort. Deep tendon reflexes are intact and symmetrical on both lower extremities. Pain radiates into the buttock on extension of the hip. No evidence of cauda equina syndrome. No motor or sensory deficit is noted. There is no bowel or bladder involvement.  Left hip (gtb): Skin and soft tissues over the greater trochanteric bursa are tender upon palpation, along with IT band tenderness. Cross body adduction is negative. Internal and external rotations are bothersome and cause tenderness over the greater trochanter. There is no clinical deformity and no shortening. She does not have a limp upon walking. There is not piriformis tightness and there  is not capsular tightness with any rotation. Dorsalis pedis and posterior tibial artery pulses are palpable. Neurovascular status is intact and capillary refill is less than 2 seconds. Common peroneal nerve function is well preserved.    Right hip. Patient is post op 16 month(s) from total hip arthoplasty, direct anterior. Incision is clean and healing well. Calf is soft and nontender. Homans sign is negative. Skin and soft tissues are appropriate for postoperative status of the patient. Hip flexion is 0-90 degrees, hip abduction is 0-30 degrees. No  limb lenth discrepancy. Neurovascular status is intact. Patients gait is significantly improved. The pain relief is extremely dramatic for the patient. Dorsalis pedis and posterior tibial artery pulses palpable. Common peroneal function is well preserved. There is no evidence of cellulitis or erythema or deep seated joint infection.          Result Review :   The following data was reviewed by: Dejan Lange MD on 04/13/2023:      Previously obtained x-rays are reviewed.  Images show well-placed total hip arthroplasty on the right side.  A good press-fit profile is noted.  There is no periprosthetic fractures.  On the left hip I do not see any obvious signs of a stress fracture.  Avascular necrosis and an occult stress fracture cannot be ruled out with these plain films.  Some fusion hardware is noted in the lumbar spine per the patient's history.          Procedures           Assessment   Assessment and Plan    Diagnoses and all orders for this visit:    1. Left hip pain (Primary)  -     MRI Hip Left Without Contrast; Future    2. Primary osteoarthritis of left hip    3. Status post total hip replacement, right          Follow Up   · [employer] back [employer] exercise program with stretching and strengthening exercises.  · Calcium and vitamin D for bone health.  · , GI and dental procedure prophylaxis with antibiotics to prevent metastatic infection of the hip arthroplasty implants.  · We will go ahead and get an MRI of this hip for evaluation of intra-articular hip pathology such as a stress fracture.  · Rest, ice, compression, and elevation (RICE) therapy  · Stretching and strengthening exercises of the quads and the hamstrings.  · OTC Tylenol 500-1000mg by mouth every 6 hours as needed for pain   · Follow up in 6 week(s)  • Patient was given instructions and counseling regarding her condition or for health maintenance advice. Please see specific information pulled into the AVS if appropriate.     Dejan Lange MD    Date of Encounter: 4/13/2023       EMR Dragon/Transcription disclaimer:  Much of this encounter note is an electronic transcription/translation of spoken language to printed text. The electronic translation of spoken language may permit erroneous, or at times, nonsensical words or phrases to be inadvertently transcribed; Although I have reviewed the note for such errors, some may still exist.   Answers for HPI/ROS submitted by the patient on 4/7/2023  Please describe your symptoms.: Pain in left hip.  Have you had these symptoms before?: Yes  How long have you been having these symptoms?: Greater than 2 weeks  Please list any medications you are currently taking for this condition.: Tylenol  Please describe any probable cause for these symptoms. : Hairline fractures around the bridge bone from hip ball to thigh bone.  What is the primary reason for your visit?: Other

## 2023-04-18 ENCOUNTER — HOSPITAL ENCOUNTER (OUTPATIENT)
Dept: GENERAL RADIOLOGY | Facility: HOSPITAL | Age: 78
Discharge: HOME | End: 2023-04-18
Admitting: NEUROLOGICAL SURGERY
Payer: MEDICARE

## 2023-04-18 PROCEDURE — 72082 X-RAY EXAM ENTIRE SPI 2/3 VW: CPT

## 2023-04-19 ENCOUNTER — TELEPHONE (OUTPATIENT)
Dept: ORTHOPEDIC SURGERY | Facility: CLINIC | Age: 78
End: 2023-04-19

## 2023-04-19 NOTE — TELEPHONE ENCOUNTER
Caller: TACOS MACDONALD    Relationship to patient:SELF     Best call back number:[phone]    Patient is needing: STATUS OF MRI REQ/TO BE SCHEDULED

## 2023-04-21 NOTE — PROGRESS NOTES
"Subjective   History of Present Illness: Valery Baez is a 77 y.o. female is here today for follow-up for low back pain. Today patient reports continued low back pain into her left hip and leg. Dr Couch performed L3-S1 decompression and fusion on her about 4 years ago.  She initially improved, but then developed worsening back pain over the course of time.  She describes the pain as banding across to her low to mid back.  The pain is most severe when she is standing and walking and improves when she sits down or lies down.  She has undergone epidural steroid injections for this exacerbated the issue.  She has also done physical therapy with some limited improvement.  She did recently have a hip injury and is being worked up for this.  She may require surgery.  Patient denies any neck issues and no difficulty using her hands or dropping things    Chief Complaint   Patient presents with   • Back Pain     Follow up           Previous Treatment: Lumbar Ablation,Physical Therapy,     The following portions of the patient's history were reviewed and updated as appropriate: allergies, current medications, past family history, past medical history, past social history, past surgical history and problem list.    Review of Systems   Constitutional: Positive for activity change.   HENT: Negative.    Eyes: Negative.    Respiratory: Negative.    Cardiovascular: Negative.    Gastrointestinal: Negative.    Endocrine: Negative.    Genitourinary: Negative.    Musculoskeletal: Positive for arthralgias, back pain and myalgias.   Skin: Negative.    Allergic/Immunologic: Negative.    Neurological: Negative.    Hematological: Negative.    Psychiatric/Behavioral: Positive for sleep disturbance.       Objective     /81   Pulse 84   Ht 165.1 cm (65\")   Wt 83.2 kg (183 lb 6.4 oz)   SpO2 100%   BMI 30.52 kg/m²    Body mass index is 30.52 kg/m².  Vitals:    04/24/23 1338   PainSc:   8           Neurologic Exam     Mental " Status   Oriented to person, place, and time.     Motor Exam     Strength   Strength 5/5 throughout.     Sensory Exam   Light touch normal.       Assessment & Plan   Independent Review of Radiographic Studies:      I personally reviewed and interpreted the images from the following studies.    MRI lumbar spine: Moderate to severe degenerative changes and central and foraminal stenosis at L2-3 above the previous fusion.  Degenerative changes in the upper lumbar and thoracic spine without high-grade central or foraminal stenosis    Scoliosis x-ray: SVA is approximately 8 cm which is slightly elevated for patient of this age, however there is no significant PI to LL mismatch    Medical Decision Making:      Valery Baez is a 77 y.o. female status post L3-S1 decompression and fusion with adjacent segment disease and symptoms of neurogenic claudication,back Pain and radiculopathy.  Overall she is relatively well balanced on scoliosis x-ray, and I do not think that she requires a major operation for treatment of her adjacent segment disease.  We will plan for L2-3 LLIF.  However, the patient is being worked up for a hip issue which may be a fracture.  She is scheduled for MRI and follow-up with hip surgeon.  If it is determined that she needs surgery on her hip, she should proceed with this and we can do the lumbar surgery in the future.  Patient will follow-up with us to let us know whether she will proceed with surgery or whether she will follow-up with me 6 weeks after hip surgery.      Diagnoses and all orders for this visit:    1. Spinal stenosis, lumbar region, with neurogenic claudication (Primary)    2. Lumbar degenerative disc disease    3. Lumbar radiculopathy      No follow-ups on file.    This patient was examined wearing appropriate personal protective equipment.                      Dr. Aston Paredes IV    04/24/23  14:08 EDT

## 2023-04-24 ENCOUNTER — OFFICE VISIT (OUTPATIENT)
Dept: NEUROSURGERY | Facility: CLINIC | Age: 78
End: 2023-04-24
Payer: MEDICARE

## 2023-04-24 ENCOUNTER — TELEPHONE (OUTPATIENT)
Dept: ORTHOPEDIC SURGERY | Facility: CLINIC | Age: 78
End: 2023-04-24

## 2023-04-24 VITALS
SYSTOLIC BLOOD PRESSURE: 144 MMHG | HEIGHT: 65 IN | OXYGEN SATURATION: 100 % | HEART RATE: 84 BPM | DIASTOLIC BLOOD PRESSURE: 81 MMHG | BODY MASS INDEX: 30.56 KG/M2 | WEIGHT: 183.4 LBS

## 2023-04-24 DIAGNOSIS — M48.062 SPINAL STENOSIS, LUMBAR REGION, WITH NEUROGENIC CLAUDICATION: Primary | ICD-10-CM

## 2023-04-24 DIAGNOSIS — M54.16 LUMBAR RADICULOPATHY: ICD-10-CM

## 2023-04-24 DIAGNOSIS — M51.36 LUMBAR DEGENERATIVE DISC DISEASE: ICD-10-CM

## 2023-04-24 PROCEDURE — 1159F MED LIST DOCD IN RCRD: CPT | Performed by: NEUROLOGICAL SURGERY

## 2023-04-24 PROCEDURE — 99214 OFFICE O/P EST MOD 30 MIN: CPT | Performed by: NEUROLOGICAL SURGERY

## 2023-04-24 PROCEDURE — 1160F RVW MEDS BY RX/DR IN RCRD: CPT | Performed by: NEUROLOGICAL SURGERY

## 2023-04-24 NOTE — TELEPHONE ENCOUNTER
"  Caller: Valery Baez \"DOT\"    Relationship to patient: Self    Best call back number: [phone]    Chief complaint: LEFT HIP     Type of visit: MRI FOLLOW UP     Requested date: AFTER 05/04/2023    Additional notes:THE FIRST AVAILABLE IS 07/20/2023-      "

## 2023-04-30 PROBLEM — Z96.641 STATUS POST TOTAL HIP REPLACEMENT, RIGHT: Status: ACTIVE | Noted: 2023-04-30

## 2023-04-30 PROBLEM — M25.552 LEFT HIP PAIN: Status: ACTIVE | Noted: 2023-04-30

## 2023-05-04 ENCOUNTER — HOSPITAL ENCOUNTER (OUTPATIENT)
Dept: MRI IMAGING | Facility: HOSPITAL | Age: 78
Discharge: HOME | End: 2023-05-04
Payer: MEDICARE

## 2023-05-04 DIAGNOSIS — M25.552 LEFT HIP PAIN: ICD-10-CM

## 2023-05-04 PROCEDURE — 73721 MRI JNT OF LWR EXTRE W/O DYE: CPT

## 2023-05-08 ENCOUNTER — TELEPHONE (OUTPATIENT)
Dept: ORTHOPEDIC SURGERY | Facility: CLINIC | Age: 78
End: 2023-05-08
Payer: MEDICARE

## 2023-05-08 NOTE — TELEPHONE ENCOUNTER
Yes I did see the report.  However just making her a telephone encounter for Thursday please?  That way she can get her reports on the phone and not have to make a visit because she does not have a femoral neck fracture mostly a sacrum fracture which is nonoperative care.

## 2023-05-08 NOTE — TELEPHONE ENCOUNTER
I have sent the MRI to Dr. Lange for review and I have informed the patient. She does have an appointment on Thursday to discuss these results but she states she is having increased pain.     Patient is allergic to 30 things

## 2023-05-08 NOTE — TELEPHONE ENCOUNTER
PATIENT HAD AN MRI LAST WEEK     SHE IS REQUESTING A CALL BACK REGARDING RESULTS BECAUSE SHE IS IN EXCRUCIATING PAIN.

## 2023-05-11 ENCOUNTER — OFFICE VISIT (OUTPATIENT)
Dept: ORTHOPEDIC SURGERY | Facility: CLINIC | Age: 78
End: 2023-05-11
Payer: MEDICARE

## 2023-05-11 VITALS — HEIGHT: 66 IN | TEMPERATURE: 98.1 F | WEIGHT: 178 LBS | BODY MASS INDEX: 28.61 KG/M2

## 2023-05-11 DIAGNOSIS — S32.10XA CLOSED FRACTURE OF SACRUM AND COCCYX, INITIAL ENCOUNTER: ICD-10-CM

## 2023-05-11 DIAGNOSIS — M81.0 POSTMENOPAUSAL OSTEOPOROSIS: Primary | ICD-10-CM

## 2023-05-11 DIAGNOSIS — M25.552 LEFT HIP PAIN: ICD-10-CM

## 2023-05-11 DIAGNOSIS — S32.2XXA CLOSED FRACTURE OF SACRUM AND COCCYX, INITIAL ENCOUNTER: ICD-10-CM

## 2023-05-11 RX ORDER — CALCITONIN SALMON 200 [IU]/.09ML
1 SPRAY, METERED NASAL DAILY
Qty: 3.7 ML | Refills: 0 | Status: SHIPPED | OUTPATIENT
Start: 2023-05-11

## 2023-05-11 NOTE — PROGRESS NOTES
"Chief Complaint  Follow-up of the Left Hip    Subjective    History of Present Illness      Valery Baez is a 77 y.o. female who presents to [facility] for continued pain and discomfort in the left hip.  History of Present Illness this patient has previously had a right hip fracture which was a stress fracture and led to the performance of a total hip arthroplasty through direct anterior approach.  She did well from that surgical intervention.  Now she is continuing to have pain and discomfort on the posterior aspect of the hip and sacrum.  Initially I was concerned about a femoral neck fracture but it turns out that she has a sacral fracture.  This is clearly an insufficiency fracture.  She had initially tried physical therapy but that seems to have made things a little bit worse for her.  She is now undergoing Prolia injection therapy to help her with the osteoporosis.  I am also going to start her on some Miacalcin nasal spray.  I think she should be seen by the neurosurgical colleagues and see if she is a candidate for surgical stabilization of the fracture and the sacroiliac joint which is dominantly symptomatic for the patient and is affecting her quality of life in a negative fashion.  Pain Location: LEFT hip  Radiation: from the lumbar spine into the hip, buttock and thigh  Quality: dull, aching  Intensity/Severity: moderate  Duration: 7-8 weeks  Progression of symptoms: yes, progressive worsening  Onset quality: sudden  Timing: intermittent  Aggravating Factors: going up and down stairs, rising after sitting, squatting  Alleviating Factors: Tylenol, rest, stretching/PT/OT  Previous Episodes: yes  Associated Symptoms: pain, numbness/tingling  ADLs Affected: ambulating  Previous Treatment: NSAIDs       Objective   Vital Signs:   Temp 98.1 °F (36.7 °C)   Ht 167.6 cm (66\")   Wt 80.7 kg (178 lb)   BMI 28.73 kg/m²     Physical Exam  Physical Exam  Vitals signs and " nursing note reviewed.   Constitutional:       Appearance: Normal appearance.   Pulmonary:      Effort: Pulmonary effort is normal.   Skin:     General: Skin is warm and dry.      Capillary Refill: Capillary refill takes less than 2 seconds.   Neurological:      General: No focal deficit present.      Mental Status: He is alert and oriented to person, place, and time. Mental status is at baseline.   Psychiatric:         Mood and Affect: Mood normal.         Behavior: Behavior normal.         Thought Content: Thought content normal.         Judgment: Judgment normal.     Ortho Exam   Left SI JOINT: Mild tenderness is present dorsally over the SI joint. Figure of 4 sign is positive. There is mild spasm present in the erector spinae muscle. Flexion and extension are associated with discomfort. Deep tendon reflexes are intact and symmetrical on both lower extremities. Pain radiates into the buttock on extension of the hip. No evidence of cauda equina syndrome. No motor or sensory deficit is noted. There is no bowel or bladder involvement.    Right hip. Patient is post op several month(s) from total hip arthoplasty, direct anterior. Incision is clean and healing well. Calf is soft and nontender. Homans sign is negative. Skin and soft tissues are appropriate for postoperative status of the patient. Hip flexion is 0-90 degrees, hip abduction is 0-30 degrees. No limb lenth discrepancy. Neurovascular status is intact. Patients gait is significantly improved. The pain relief is extremely dramatic for the patient. Dorsalis pedis and posterior tibial artery pulses palpable. Common peroneal function is well preserved. There is no evidence of cellulitis or erythema or deep seated joint infection.          Result Review :   The following data was reviewed by: Dejan Lange MD on 05/11/2023:  Radiologic studies - see below for interpretation  Reviewed MRI report of left hip, performed at  [facility] on  05/04/2023, summary of impression below:      MRI reports are available in the office today.  There is evidence of an insufficiency type nondisplaced fracture involving the left and right sacral ala.  There is mild osteoarthritis of the left hip.  There is no evidence of a femoral neck fracture.  There is some tear of the greater trochanteric abductor muscles with bursitis.  None of these findings on the MRI appeared to be consistent with surgical options for the patient.      Procedures           Assessment   Assessment and Plan    Diagnoses and all orders for this visit:    1. Postmenopausal osteoporosis (Primary)  -     calcitonin, salmon, (MIACALCIN) 200 UNIT/ACT nasal spray; 1 spray by Alternating Nares route Daily.  Dispense: 3.7 mL; Refill: 0    2. Left hip pain    3. Closed fracture of sacrum and coccyx, initial encounter          Follow Up   · Miacalcin nasal spray 1 puff in each nostril every other day for osteopenic bone pain.  · Continue with the Prolia injections.  · Calcium and vitamin D for bone health.  · Referring the patient to neurosurgical colleagues Dr. Aston Paredes for possible surgical consideration for the sacral insufficiency fracture.  · Rest, ice, compression, and elevation (RICE) therapy  · Stretching and strengthening exercises of the flexors and extensors of the spine with a [employer] exercise program.  · Falls and dislocation precautions for the total hip arthroplasty discussed with the patient.  · , GI and dental procedure prophylaxis with antibiotics to prevent metastatic infection of the hip arthroplasty implants.  · OTC Tylenol 500-1000mg by mouth every 6 hours as needed for pain   · Follow up in 3 month(s)  • Patient was given instructions and counseling regarding her condition or for health maintenance advice. Please see specific information pulled into the AVS if appropriate.     Dejan Lange MD   Date of Encounter: 5/11/2023   Electronically signed by Dejan Lange  MD, 05/11/23, 2:45 PM EDT.     EMR Dragon/Transcription disclaimer:  Much of this encounter note is an electronic transcription/translation of spoken language to printed text. The electronic translation of spoken language may permit erroneous, or at times, nonsensical words or phrases to be inadvertently transcribed; Although I have reviewed the note for such errors, some may still exist.

## 2023-05-12 ENCOUNTER — TELEPHONE (OUTPATIENT)
Dept: NEUROSURGERY | Facility: CLINIC | Age: 78
End: 2023-05-12
Payer: MEDICARE

## 2023-05-12 NOTE — TELEPHONE ENCOUNTER
CALLER:  TACOS SANDERS    CONTACT NUMBER:  [phone]    CALL REGARDING:  PATIENT CALLED AND STATES SHE WAS SUPPOSED TO LET DR. ARCHER KNOW IF SHE WOULD HAVE HIP SURGERY OR BACK SURGERY FIRST.     PATIENT STATES THAT AFTER SEEING DR. PROCTOR HE STATES HER HIPS ARE NOT FRACTURED AND ITS HER SACROILIAC FRACTURE ON BOTH SIDES.  PATIENT WAS TOLD BY DR. PROCTOR TO CONTACT DR. ARCHER AND TO SEE WHAT HE WANTED TO DO ABOUT THIS    PATIENT STATES THAT Sunday SHE LEAVES FOR OUT OF TOWN AND WILL HAVE SPOTTY CELL SERVICE.  PATIENT STATES SHE IS WILLING TO SEE DR. ARCHER IN [place]    PLEASE CALL PATIENT  THANK YOU

## 2023-05-15 ENCOUNTER — TELEPHONE (OUTPATIENT)
Dept: FAMILY MEDICINE CLINIC | Age: 78
End: 2023-05-15
Payer: MEDICARE

## 2023-05-15 ENCOUNTER — PREP FOR SURGERY (OUTPATIENT)
Dept: OTHER | Facility: HOSPITAL | Age: 78
End: 2023-05-15
Payer: MEDICARE

## 2023-05-15 DIAGNOSIS — R79.9 ABNORMAL FINDING OF BLOOD CHEMISTRY, UNSPECIFIED: ICD-10-CM

## 2023-05-15 DIAGNOSIS — M48.062 SPINAL STENOSIS, LUMBAR REGION, WITH NEUROGENIC CLAUDICATION: Primary | ICD-10-CM

## 2023-05-15 DIAGNOSIS — R79.1 ABNORMAL COAGULATION PROFILE: ICD-10-CM

## 2023-05-15 NOTE — TELEPHONE ENCOUNTER
Received paper work from Dr Aston Paredes's neurosurgery office for medical clearance for lumbar spine surgery.  Pt will have pre op testing scheduled at the hospital by surgeon's office.   Will you sign her medical clearance or does she need an appt?

## 2023-05-15 NOTE — TELEPHONE ENCOUNTER
PATIENT CALLED BACK IN AND FEELS OTHER ISSUES HAVE NOT BEEN ADDRESSED BEFORE SURGERY - ATTEMPTED WT - PER NATIVIDAD CARLSON IS ON BREAK AND WILL CALL PATIENT BACK.    PLEASE CALL PATIENT TO ADDRESS OTHER QUESTIONS SHE HAS.     PATIENT IS OUT CAMPING IN CASE SHE DOES NOT ANSWER LEAVE A VOICEMAIL.      WILL BE BACK HOME ON 5/22/23      WANTS TO SEE DR. ARCHER BEFORE SURGERY - PATIENT IS ASKING IF SHE CAN BE SCHEDULED.     THANK YOU!

## 2023-05-15 NOTE — TELEPHONE ENCOUNTER
I called and left message on [employer]'s voicemail to call office and have sent a Mychart message.

## 2023-05-22 ENCOUNTER — TELEPHONE (OUTPATIENT)
Dept: NEUROSURGERY | Facility: CLINIC | Age: 78
End: 2023-05-22
Payer: MEDICARE

## 2023-05-23 NOTE — TELEPHONE ENCOUNTER
I called and spoke with Valery to schedule her surgery. Valery said she saw Dr Lange and its not her hips. Valery is wanting to know if you are going to do both areas. I told Valery the only case request I have is her lumbar area.

## 2023-05-24 ENCOUNTER — TELEPHONE (OUTPATIENT)
Dept: ORTHOPEDIC SURGERY | Facility: CLINIC | Age: 78
End: 2023-05-24
Payer: MEDICARE

## 2023-05-24 DIAGNOSIS — K58.0 IRRITABLE BOWEL SYNDROME WITH DIARRHEA: ICD-10-CM

## 2023-05-24 PROBLEM — S32.2XXA FX SACRUM/COCCYX-CLOSED: Status: ACTIVE | Noted: 2023-05-24

## 2023-05-24 PROBLEM — S32.10XA FX SACRUM/COCCYX-CLOSED: Status: ACTIVE | Noted: 2023-05-24

## 2023-05-24 NOTE — TELEPHONE ENCOUNTER
Caller: TACOS SANDERS     Relationship: SELF    Best call back number: [phone]    What form or medical record are you requesting: DR. PROCTOR'S RECOMMENDATION ON SX.     Who is requesting this form or medical record from you: DR. YUE ARCHER    How would you like to receive the form or medical records (pick-up, mail, fax):FAX  If fax, what is the fax number: [phone]    Timeframe paperwork needed: ASAP    Additional notes: IF THERE IS A BETTER WAY TO COMMUNICATE WITH OFFICE SINCE IN SAME MEDICAL GROUP THAT WILL WORK AS WELL.   PATIENT CALLED IN STATING BEFORE DR. ARCHER WILL SCHEDULE SX WANTS DR. MATIAS RECOMMENDATION.

## 2023-05-24 NOTE — TELEPHONE ENCOUNTER
Rx Refill Note  Requested Prescriptions     Pending Prescriptions Disp Refills   • diphenoxylate-atropine (LOMOTIL) 2.5-0.025 MG per tablet [Pharmacy Med Name: DIPHENOXYLATE HCL/ATROPINE TABS 2.5MG] 90 tablet 0     Sig: TAKE 1 TABLET DAILY      Last office visit with prescribing clinician: 1/4/2023   Last telemedicine visit with prescribing clinician: Visit date not found   Next office visit with prescribing clinician: 8/2/2023  Last refill sent: 02/08/23, #90    Olivia Le LPN  05/24/23, 11:44 EDT

## 2023-05-24 NOTE — TELEPHONE ENCOUNTER
"    Caller: Valery Baez \"DOT\"    Relationship to patient: Self    Patient is needing: PT STATED THEY ARE TRYING TO GET HER SCHEDULED FOR BACK AND SACROILIAC SX ON 6/8/23 BUT DR PROCTOR NEEDS TO SIGN OFF ON HER VISIT NOTES FROM 5/11/23            "

## 2023-05-25 ENCOUNTER — TELEPHONE (OUTPATIENT)
Dept: NEUROSURGERY | Facility: CLINIC | Age: 78
End: 2023-05-25

## 2023-05-25 DIAGNOSIS — S32.2XXA CLOSED FRACTURE OF SACRUM AND COCCYX, INITIAL ENCOUNTER: Primary | ICD-10-CM

## 2023-05-25 DIAGNOSIS — S32.10XA CLOSED FRACTURE OF SACRUM AND COCCYX, INITIAL ENCOUNTER: Primary | ICD-10-CM

## 2023-05-25 RX ORDER — DIPHENOXYLATE HYDROCHLORIDE AND ATROPINE SULFATE 2.5; .025 MG/1; MG/1
TABLET ORAL
Qty: 90 TABLET | Refills: 0 | Status: SHIPPED | OUTPATIENT
Start: 2023-05-25

## 2023-05-25 NOTE — TELEPHONE ENCOUNTER
CT pelvis placed and sent to scheduling.    ----- Message from Aston Paredes IV, MD sent at 5/25/2023  3:08 PM EDT -----  Pt has sacral insufficiency frxs. Needs a CT pelvis and f/u with me to determine best plan for surgery.

## 2023-05-25 NOTE — TELEPHONE ENCOUNTER
I called and spoke with Valery and let her know that Dr Paredes wanted to see her in clinic. I scheduled her an appointment for 6-2-23 @ 9 am.

## 2023-05-25 NOTE — TELEPHONE ENCOUNTER
Valery called back to see if we had Dr Lange records. I told her we did and we will send a message to Dr. Paredes and once he reviews them we will call and get her surgery scheduled. Patient is wanting to know if you will be doing both surgeries same day.

## 2023-05-31 ENCOUNTER — TELEPHONE (OUTPATIENT)
Dept: NEUROSURGERY | Facility: CLINIC | Age: 78
End: 2023-05-31

## 2023-05-31 NOTE — TELEPHONE ENCOUNTER
Called patient to let her know that Dr Paredes would like for her to get a Pelvic CT done before following up on Friday. She will call [facility] to see if she can get scheduled and call us back as we may need to reschedule her appointment.

## 2023-05-31 NOTE — PROGRESS NOTES
"Subjective   History of Present Illness: Valery Baez is a 77 y.o. female is here today for follow-up for back pain. Today patient reports low back pain traveling into bilateral legs.  Briefly, the patient complains of significant back pain as well as some pain radiating into her anterior thighs stopping above the knee.  Additionally the patient has significant pain in her buttocks and groin.  She was worked up for hip issues and discovered to have sacral insufficiency fractures.  Orthopedic surgeon did not think that her hip was the cause of her pain.  She is here for follow-up to discuss the new findings in her sacrum.    Chief Complaint   Patient presents with   • Back Pain     Follow up           Previous Treatment: Physical Therapy,Ablation,Tylenol,Mobic     The following portions of the patient's history were reviewed and updated as appropriate: allergies, current medications, past family history, past medical history, past social history, past surgical history and problem list.    Review of Systems   Constitutional: Positive for activity change.   HENT: Negative.    Eyes: Negative.    Respiratory: Negative.    Cardiovascular: Negative.    Gastrointestinal: Negative.    Endocrine: Negative.    Genitourinary: Negative.    Musculoskeletal: Positive for arthralgias, back pain and myalgias.   Skin: Negative.    Allergic/Immunologic: Negative.    Neurological: Negative.    Hematological: Negative.    Psychiatric/Behavioral: Positive for sleep disturbance.       Objective     /76   Pulse 64   Ht 167.6 cm (66\")   Wt 80.8 kg (178 lb 3.2 oz)   BMI 28.76 kg/m²    Body mass index is 28.76 kg/m².  Vitals:    06/02/23 0837   PainSc: 10-Worst pain ever           Neurologic Exam    Assessment & Plan   Independent Review of Radiographic Studies:      I personally reviewed and interpreted the images from the following studies.    CT pelvis: Patient has bilateral sacral insufficiency fractures that are " likely subacute.    Medical Decision Making:      Valery Baez is a 77 y.o. 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  She has exhausted all conservative measures including physical therapy and multiple injections without improvement.  We had planned to do an L2-3 LLIF for treatment of her adjacent segment disease, however with the sacral insufficiency fractures which I believe she is symptomatic from will change our surgical plan.  We will plan for L2-3 LLIF followed by bilateral placement of sacroiliac bolts to stabilize that joint and the fracture.  Notably, the patient's SVA is around 9 cm positive which is elevated for her age, however I do not think that doing a large revision operation and correction of deformity is prudent in her case.  She does understand that she could continue to have symptoms due to this deformity      Diagnoses and all orders for this visit:    1. Closed fracture of sacrum and coccyx, initial encounter (Primary)    2. Spinal stenosis, lumbar region, with neurogenic claudication    3. Lumbar degenerative disc disease    4. Lumbar radiculopathy      No follow-ups on file.    This patient was examined wearing appropriate personal protective equipment.                      Dr. Aston Paredes IV    06/02/23  09:39 EDT

## 2023-05-31 NOTE — TELEPHONE ENCOUNTER
PATIENT CALLED TO ADVISE SHE WAS ABLE TO HAVE CT SCHEDULED FOR TOMORROW AND CAN KEEP HER Friday APPT.

## 2023-06-01 ENCOUNTER — HOSPITAL ENCOUNTER (OUTPATIENT)
Dept: CT IMAGING | Facility: HOSPITAL | Age: 78
Discharge: HOME | End: 2023-06-01
Admitting: NEUROLOGICAL SURGERY

## 2023-06-01 DIAGNOSIS — S32.2XXA CLOSED FRACTURE OF SACRUM AND COCCYX, INITIAL ENCOUNTER: ICD-10-CM

## 2023-06-01 DIAGNOSIS — S32.10XA CLOSED FRACTURE OF SACRUM AND COCCYX, INITIAL ENCOUNTER: ICD-10-CM

## 2023-06-01 PROCEDURE — 72192 CT PELVIS W/O DYE: CPT

## 2023-06-02 ENCOUNTER — PREP FOR SURGERY (OUTPATIENT)
Dept: OTHER | Facility: HOSPITAL | Age: 78
End: 2023-06-02

## 2023-06-02 ENCOUNTER — OFFICE VISIT (OUTPATIENT)
Dept: NEUROSURGERY | Facility: CLINIC | Age: 78
End: 2023-06-02

## 2023-06-02 VITALS
WEIGHT: 178.2 LBS | HEIGHT: 66 IN | BODY MASS INDEX: 28.64 KG/M2 | HEART RATE: 64 BPM | SYSTOLIC BLOOD PRESSURE: 136 MMHG | DIASTOLIC BLOOD PRESSURE: 76 MMHG

## 2023-06-02 DIAGNOSIS — M54.16 LUMBAR RADICULOPATHY: ICD-10-CM

## 2023-06-02 DIAGNOSIS — S32.10XA CLOSED FRACTURE OF SACRUM AND COCCYX, INITIAL ENCOUNTER: Primary | ICD-10-CM

## 2023-06-02 DIAGNOSIS — M48.062 SPINAL STENOSIS, LUMBAR REGION, WITH NEUROGENIC CLAUDICATION: ICD-10-CM

## 2023-06-02 DIAGNOSIS — S32.2XXA CLOSED FRACTURE OF SACRUM AND COCCYX, INITIAL ENCOUNTER: Primary | ICD-10-CM

## 2023-06-02 DIAGNOSIS — R79.9 ABNORMAL FINDING OF BLOOD CHEMISTRY, UNSPECIFIED: Primary | ICD-10-CM

## 2023-06-02 DIAGNOSIS — M51.36 LUMBAR DEGENERATIVE DISC DISEASE: ICD-10-CM

## 2023-06-05 ENCOUNTER — TELEPHONE (OUTPATIENT)
Dept: FAMILY MEDICINE CLINIC | Age: 78
End: 2023-06-05

## 2023-06-05 ENCOUNTER — TELEPHONE (OUTPATIENT)
Dept: FAMILY MEDICINE CLINIC | Age: 78
End: 2023-06-05
Payer: MEDICARE

## 2023-06-05 ENCOUNTER — OFFICE VISIT (OUTPATIENT)
Dept: FAMILY MEDICINE CLINIC | Age: 78
End: 2023-06-05
Payer: MEDICARE

## 2023-06-05 VITALS
SYSTOLIC BLOOD PRESSURE: 117 MMHG | HEIGHT: 66 IN | HEART RATE: 77 BPM | WEIGHT: 179 LBS | TEMPERATURE: 97.8 F | OXYGEN SATURATION: 96 % | DIASTOLIC BLOOD PRESSURE: 76 MMHG | BODY MASS INDEX: 28.77 KG/M2

## 2023-06-05 DIAGNOSIS — H60.502 ACUTE OTITIS EXTERNA OF LEFT EAR, UNSPECIFIED TYPE: Primary | ICD-10-CM

## 2023-06-05 DIAGNOSIS — J30.9 ALLERGIC SINUSITIS: ICD-10-CM

## 2023-06-05 DIAGNOSIS — J34.89 SINUS PRESSURE: ICD-10-CM

## 2023-06-05 DIAGNOSIS — H60.502 ACUTE OTITIS EXTERNA OF LEFT EAR, UNSPECIFIED TYPE: ICD-10-CM

## 2023-06-05 DIAGNOSIS — J30.9 ALLERGIC SINUSITIS: Primary | ICD-10-CM

## 2023-06-05 PROBLEM — M54.16 LUMBAR RADICULOPATHY: Status: ACTIVE | Noted: 2023-06-05

## 2023-06-05 PROCEDURE — 1160F RVW MEDS BY RX/DR IN RCRD: CPT | Performed by: PHYSICIAN ASSISTANT

## 2023-06-05 PROCEDURE — 1159F MED LIST DOCD IN RCRD: CPT | Performed by: PHYSICIAN ASSISTANT

## 2023-06-05 PROCEDURE — 99213 OFFICE O/P EST LOW 20 MIN: CPT | Performed by: PHYSICIAN ASSISTANT

## 2023-06-05 PROCEDURE — 87428 SARSCOV & INF VIR A&B AG IA: CPT | Performed by: PHYSICIAN ASSISTANT

## 2023-06-05 RX ORDER — AZELASTINE 1 MG/ML
2 SPRAY, METERED NASAL 2 TIMES DAILY
Qty: 30 ML | Refills: 0 | Status: SHIPPED | OUTPATIENT
Start: 2023-06-05

## 2023-06-05 RX ORDER — OFLOXACIN 3 MG/ML
5 SOLUTION AURICULAR (OTIC) DAILY
Qty: 2 ML | Refills: 0 | Status: SHIPPED | OUTPATIENT
Start: 2023-06-05 | End: 2023-06-12

## 2023-06-05 RX ORDER — AZELASTINE 1 MG/ML
SPRAY, METERED NASAL
Qty: 90 ML | OUTPATIENT
Start: 2023-06-05

## 2023-06-05 RX ORDER — CIPROFLOXACIN/HYDROCORTISONE 0.2 %-1 %
3 SUSPENSION, DROPS(FINAL DOSAGE FORM)(ML) OTIC (EAR) 2 TIMES DAILY
Qty: 3 ML | Refills: 0 | Status: SHIPPED | OUTPATIENT
Start: 2023-06-05 | End: 2023-06-05 | Stop reason: ALTCHOICE

## 2023-06-05 NOTE — TELEPHONE ENCOUNTER
"  Caller: Valery Baez \"DOT\"    Relationship: Self    Best call back number: [phone]     Who are you requesting to speak with (clinical staff, provider,  specific staff member): MEDICAL STAFF    What was the call regarding: PATIENT WAS PRESCRIBED EAR DROP TODAY AFTER HER VISIT. SHE WOULD LIKE SOME CLARIFICATION ON THE DIRECTIONS. PLEASE CALL PATIENT TO ADVISE.  "

## 2023-06-05 NOTE — TELEPHONE ENCOUNTER
Ofloxin ear drops sent instead. If that is not covered, she should call her insurance company to determine what is allowed. She is up to date on her pneumonia vaccines according to her chart. I can send this question to Dr. Smith to see what he recommends. Thanks, [place]

## 2023-06-05 NOTE — TELEPHONE ENCOUNTER
Pt called to report the cipro eye gtts are not covered under insurance, please adv.   Pt also stated is it ok to get prevnar 20?

## 2023-06-05 NOTE — PATIENT INSTRUCTIONS
Shaista, thank you for letting me care for you today!  Your COVID and flu testing were negative today.  Your symptoms are consistent with an allergic sinusitis.  I have sent an additional nose spray for you to use to help manage this.  Please continue taking your other allergy medications as prescribed.  If your symptoms worsen or they fail to improve within 10 to 14 days please contact the office to schedule a follow-up visit. Please don't hesitate to reach out to the office if you have any further concerns.     Thanks,  Heather Bland PA-C

## 2023-06-05 NOTE — PROGRESS NOTES
"  Diagnoses and all orders for this visit:    1. Allergic sinusitis (Primary)  -     azelastine (ASTELIN) 0.1 % nasal spray; 2 sprays into the nostril(s) as directed by provider 2 (Two) Times a Day. Use in each nostril as directed  Dispense: 30 mL; Refill: 0    2. Acute otitis externa of left ear, unspecified type  -     ciprofloxacin-hydrocortisone (Cipro HC) 0.2-1 % otic suspension; Administer 3 drops into the left ear 2 (Two) Times a Day for 7 days.  Dispense: 3 mL; Refill: 0    3. Sinus pressure  -     POCT SARS-CoV-2 Antigen ZHOU + Flu            Subjective     CHIEF COMPLAINT    Chief Complaint   Patient presents with    Facial Pain     Sinus pressure x 2 days, runny nose, headache     Earache            History of Present Illness  This is a 77-year-old female presenting to the clinic with complaint of \"I think I have a sinus infection.\"  She reports sinus pain under her eyes and \"bad allergies.\"  She states she has bad allergies year-round but the sinus pain has been going on for the last couple of days.  She also has a mild headache and runny nose.  Additionally, she has had some ear pain and itching for the last week.  She wears hearing aids and states her ears always bother her but she has had some drainage out of the left ear recently.  Denies any fevers or chills, chest pain, and shortness of breath.  She has not had any known sick contacts recently.            Review of Systems   Constitutional:  Negative for chills, fatigue and fever.   HENT:  Positive for ear discharge, ear pain, rhinorrhea, sinus pressure and sinus pain (maxillary). Negative for sore throat.    Respiratory:  Positive for cough (\"bad allergies\"). Negative for shortness of breath and wheezing.    Cardiovascular:  Negative for chest pain.   Gastrointestinal:  Negative for abdominal pain, diarrhea, nausea and vomiting.   Musculoskeletal:  Negative for myalgias.   Skin:  Negative for rash.   Neurological:  Positive for headaches.      "     Past Medical History:   Diagnosis Date    Allergic     Anemia     CHRONIC IRON DEFICIENT    Anesthesia complication     STATES SHE IS VERY SLOW TO WAKE UP    Ankle sprain 1962    Arthritis of back 2018    Arthritis of neck 1988    Asthma     Basal cell carcinoma     FACE    Bursitis of hip 2022    Cancer     right breast cancer    Cervical disc disorder 2022    Chronic diarrhea     Chronic kidney disease     Clotting disorder     Bruise easily    CTS (carpal tunnel syndrome) 1978    DDD (degenerative disc disease), lumbar     Disease of thyroid gland     Dislocation of finger 1984    Dry eye     Elevated cholesterol     Fracture of ankle 1998    Fracture of hip 2021    Right hip replacement; same symptoms in left    Fracture of wrist 1968    Fracture, finger 1993    Fracture, foot 1971    Frequency of urination     Frozen shoulder     GERD (gastroesophageal reflux disease)     Hard to intubate     REQUESTING TO SEE [facility] 8/22/19; PT HAS COMPLETE BILATERAL JAW PROSTESIS    Headache     or migraines    Heart palpitations     Hip arthrosis 2022    History of breast cancer     RIGHT    History of transfusion     Inupiat (hard of hearing)     BILAT HEARING AID    Hx of renal calculi     Hypercholesteremia     Knee sprain 1965    Knee swelling 1966    Limited mobility     RIGHT HIP    Limited mobility     JAW    Low back pain     Low back strain 1957    Lumbosacral disc disease 2022    MRSA (methicillin resistant Staphylococcus aureus)     LEFT JAW @[facility] JOINT, MULT. ,LAST 2013, TREATED AT [facility], infection control notified 8/24/16. 1300    Neck strain 1984    Osteoarthritis     Osteopenia     Prolia shot 11/4/22    Osteoporosis     Periarthritis of shoulder 1999    PONV (postoperative nausea and vomiting)     Right hip pain     Rosacea     Rotator cuff syndrome 1999    Short of breath on exertion     WITH STAIRS ONLY    Sleep apnea     C PAP    Spinal headache     Spinal stenosis     Subluxation of patella 1979     Tear of meniscus of knee 1966    Urgency of urination     Vertigo     WAKES UP WITH THIS SOMETIMES    Weakness     RIGHT HIP    Wrist sprain 1966            Past Surgical History:   Procedure Laterality Date    ABSCESS DRAINAGE      abscess removed from left jaw x2    ADENOIDECTOMY  1952    ANKLE OPEN REDUCTION INTERNAL FIXATION  1998    APPENDECTOMY  1965    AVULSION TOENAIL PLATE      6 REMOVED    BACK SURGERY  9/19/19    BILATERAL BREAST REDUCTION Left 08/29/2016    Procedure: LT BREAST REDUCTION ;  Surgeon: Luis Zambrano MD;  Location: [facility] OR;  Service:     BREAST BIOPSY Bilateral     CARPAL TUNNEL RELEASE  1993    CHOLECYSTECTOMY  09/2000    COLONOSCOPY      COLONOSCOPY  2019    NORMAL    CYST REMOVAL  03/1984    eye cyst left lower lid    CYST REMOVAL  01/2007    right upper thigh    CYSTOSCOPY      x2  02/2013 & 04/2014    CYSTOSCOPY      NUMEROUS    D & C HYSTEROSCOPY      FOOT SURGERY      HAND SURGERY  1980    HYSTERECTOMY  1971    LATER BSO    JOINT REPLACEMENT      KNEE ARTHROPLASTY Right 03/2005    LAPAROSCOPIC SALPINGOOPHERECTOMY      LUMBAR DISCECTOMY FUSION INSTRUMENTATION N/A 09/09/2019    Procedure: Lumbar 3 to sacral 1 laminectomy and fusion with instrumentation;  Surgeon: Vikas Couch MD;  Location: [facility] OR;  Service: Orthopedic Spine    MANDIBLE SURGERY      UPPER AND LOWER JAW ADJUSTED WITH PLATES AND WIRES    MASTECTOMY Right 03/2015    MASTECTOMY PARTIAL / LUMPECTOMY Right 03/2009    NIPPLE RECONSTRUCTION Right 08/29/2016    Procedure: RT NIPPLE RECONSTRUCTION;  Surgeon: Luis Zambrano MD;  Location: [facility] OR;  Service:     OTHER SURGICAL HISTORY      laparoscopy adhesions right side x2    OTHER SURGICAL HISTORY      drain mammosite area    OTHER SURGICAL HISTORY  01/2010    pressure washed prosthesis area left jaw    OTHER SURGICAL HISTORY      drain mammosite area right breast 09/2011    RECONSTRUCTION BREAST W/ TRAM FLAP Right 02/2016    ROTATOR CUFF REPAIR  Left 2014    SHOULDER SURGERY      SPINAL FUSION  2019    TEMPOROMANDIBULAR JOINT ARTHROPLASTY Bilateral 1993    MULT. REPLACEMENTS LATER    TONSILLECTOMY      TOTAL HIP ARTHROPLASTY Right 2021    Procedure: Right anterior total hip arthroplasty;  Surgeon: Dejan Lange MD;  Location: [facility] OR;  Service: Orthopedics;  Laterality: Right;    WRIST SURGERY              Family History   Problem Relation Age of Onset    Hypertension Other     Cancer Other         breast    Heart disease Mother     Arthritis Mother     Hearing loss Mother     Anesthesia problems Mother     Osteoporosis Mother     Heart disease Father     Asthma Father     Arthritis Father     Hearing loss Father     Heart disease Maternal Grandmother     Cancer Maternal Grandmother     Heart disease Maternal Grandfather     Heart disease Paternal Grandmother     Cancer Paternal Grandmother     Heart disease Paternal Grandfather     Rheumatologic disease Paternal Aunt     Early death Brother          at 53, Sudden Death Syndrome    Malig Hyperthermia Neg Hx             Social History     Socioeconomic History    Marital status:    Tobacco Use    Smoking status: Never    Smokeless tobacco: Never   Vaping Use    Vaping Use: Never used   Substance and Sexual Activity    Alcohol use: Yes     Alcohol/week: 4.0 standard drinks     Types: 4 Shots of liquor per week     Comment: 4 drinks per week    Drug use: Never    Sexual activity: Not Currently     Partners: Male     Birth control/protection: Hysterectomy, Same-sex partner            Allergies   Allergen Reactions    Bee Venom Anaphylaxis    Latex Hives    Sulfa Antibiotics Hives, Nausea And Vomiting and Rash    Codeine Hives     And Derivatives    Cyclobenzaprine Other (See Comments) and Unknown - High Severity     Experienced side effects - blurred vision, dry mouth, constipation        Darvon [Propoxyphene] Nausea And Vomiting     CONFUSION.     Ditropan [Oxybutynin] Itching  "   Duricef [Cefadroxil] Itching and Nausea Only    Erythromycin Hives     Also allergic to Duricef    Hydrocodone Nausea And Vomiting and GI Intolerance    Iodine Hives     Iodine scrubbing solutions /IVP Contrast Dyes      Meperidine Nausea And Vomiting    Methylprednisolone Unknown (See Comments)     CAUSES ITCHING AND FACIAL FLUSHING BUT PT \"CAN TAKE LOWER DOSES\"    Minocycline Hives, Diarrhea and Nausea And Vomiting     Only with doses > 100mg BID          Morphine And Related Hives and GI Intolerance     Derivatives    Oxycodone-Acetaminophen Hives    Talwin [Pentazocine] Hives    Tramadol Hives and GI Intolerance     ULTRAM ER    Tramadol Hcl Hives and Nausea And Vomiting    Valium [Diazepam] Hives and Hallucinations     CONFUSION.     Adhesive Tape Rash    Bacitracin-Polymyxin B Rash    Betadine [Povidone Iodine] Rash    Contrast Dye (Echo Or Unknown Ct/Mr) Hives and Palpitations    Daptomycin Itching and Hives    Miconazole Rash    Neomycin-Polymyxin-Gramicidin Rash    Neosporin Af [Miconazole Nitrate] Rash    Penicillins Hives, Swelling and Rash     As infant    Silver Rash            Current Outpatient Medications on File Prior to Visit   Medication Sig Dispense Refill    acetaminophen (TYLENOL) 325 MG tablet Take 2 tablets by mouth Every 4 (Four) Hours As Needed for Mild Pain .      ARMOUR THYROID 30 MG tablet Take 1 tablet by mouth Daily.      atenolol (TENORMIN) 25 MG tablet TAKE 1 TABLET EVERY AFTERNOON 90 tablet 1    atorvastatin (LIPITOR) 20 MG tablet TAKE 1 TABLET DAILY 90 tablet 1    calcitonin, salmon, (MIACALCIN) 200 UNIT/ACT nasal spray 1 spray by Alternating Nares route Daily. 3.7 mL 0    cephalexin (KEFLEX) 500 MG capsule Take 1 capsule by mouth Daily. STATES HAS BEEN ON FOR 10 YEARS TO SUPPRESS MRSA 90 capsule 3    Denosumab (PROLIA SC) Inject  under the skin into the appropriate area as directed See Admin Instructions. Every 6 months   11/4/21  WAS LAST DOSE      diphenhydrAMINE (BENADRYL " ALLERGY CHILDRENS) 12.5 MG chewable tablet Chew 2 tablets 4 (Four) Times a Day As Needed for Allergies. FOR BEE STINGS ONLY      fexofenadine (ALLEGRA) 180 MG tablet Take 1 tablet by mouth Daily.      fluticasone (FLONASE) 50 MCG/ACT nasal spray 2 sprays into the nostril(s) as directed by provider Every Night.      guaiFENesin (MUCINEX PO) Take 400 mg by mouth 2 (Two) Times a Day.      lansoprazole (PREVACID) 30 MG capsule Take 1 capsule by mouth 2 (Two) Times a Day.      levalbuterol (XOPENEX HFA) 45 MCG/ACT inhaler Inhale 1-2 puffs Every 4 (Four) Hours As Needed.      Lifitegrast (Xiidra) 5 % ophthalmic solution Administer 1 drop to both eyes 2 (Two) Times a Day.      meclizine (ANTIVERT) 25 MG tablet TAKE 1 TABLET THREE TIMES A DAY AS NEEDED FOR DIZZINESS 90 tablet 2    Melatonin 10 MG tablet Take 1 tablet by mouth Every Night.      meloxicam (MOBIC) 15 MG tablet TAKE 1 TABLET DAILY 90 tablet 3    metroNIDAZOLE (METROGEL) 0.75 % gel Apply 1 application topically to the appropriate area as directed 2 (Two) Times a Day As Needed. ROASCEA      montelukast (SINGULAIR) 10 MG tablet Take 1 tablet by mouth Every Night.      Multiple Vitamins-Minerals (MULTIVITAMIN ADULT PO) Take 1 tablet by mouth Every Night. HOLD FOR SURGERY      Myrbetriq 25 MG tablet sustained-release 24 hour 24 hr tablet Take 1 tablet by mouth Daily. 90 tablet 3    pilocarpine (SALAGEN) 5 MG tablet TAKE 1 TABLET FOUR TIMES A  tablet 3    POTASSIUM CHLORIDE PO Take 99 mg by mouth Every Night. OTC  HOLD FOR SURGERY      PROBIOTIC PRODUCT PO Take 1 capsule by mouth 2 (Two) Times a Day.      EPINEPHrine (EPIPEN) 0.3 MG/0.3ML solution auto-injector injection Inject 0.3 mL into the appropriate muscle as directed by prescriber 1 (One) Time for 1 dose. 1 each 3    [DISCONTINUED] acetic acid-hydrocortisone (VOSOL-HC) 1-2 % otic solution Administer 4 drops into both ears 2 (Two) Times a Day. (Patient not taking: Reported on 6/5/2023) 10 mL 0  "    Current Facility-Administered Medications on File Prior to Visit   Medication Dose Route Frequency Provider Last Rate Last Admin    Chlorhexidine Gluconate Cloth 2 % pads   Apply externally Take As Directed Dejan Lange MD                /76 (BP Location: Left arm, Patient Position: Sitting, Cuff Size: Adult)   Pulse 77   Temp 97.8 °F (36.6 °C) (Oral)   Ht 167.6 cm (65.98\")   Wt 81.2 kg (179 lb)   SpO2 96%   BMI 28.91 kg/m²          Objective     Physical Exam  Vitals and nursing note reviewed.   Constitutional:       General: She is not in acute distress.     Appearance: Normal appearance.   HENT:      Head: Normocephalic and atraumatic.      Right Ear: Tympanic membrane, ear canal and external ear normal. A middle ear effusion is present.      Left Ear: Tympanic membrane, ear canal and external ear normal. Drainage present.      Nose: No congestion or rhinorrhea.      Mouth/Throat:      Mouth: Mucous membranes are moist.      Pharynx: Oropharynx is clear. No posterior oropharyngeal erythema.   Eyes:      Extraocular Movements: Extraocular movements intact.      Conjunctiva/sclera: Conjunctivae normal.      Pupils: Pupils are equal, round, and reactive to light.   Cardiovascular:      Rate and Rhythm: Normal rate and regular rhythm.      Heart sounds: Normal heart sounds.   Pulmonary:      Effort: Pulmonary effort is normal. No respiratory distress.      Breath sounds: Normal breath sounds. No wheezing.   Musculoskeletal:      Cervical back: Normal range of motion. No rigidity.   Skin:     General: Skin is warm and dry.   Neurological:      Mental Status: She is alert and oriented to person, place, and time.   Psychiatric:         Mood and Affect: Mood normal.         Behavior: Behavior normal.            Procedures                    Lab Results (last 24 hours)       Procedure Component Value Units Date/Time    POCT SARS-CoV-2 Antigen ZHOU + Flu [[id number]] Collected: 06/05/23 0937    Specimen: " Swab Updated: 06/05/23 0937     SARS Antigen Not Detected     Influenza A Antigen ZHOU Not Detected     Influenza B Antigen ZHOU Not Detected     Internal Control Passed     Lot Number [id number]     Expiration Date 12/20/23                  No Radiology Exams Resulted Within Past 24 Hours             BMI is >= 25 and <30. (Overweight) The following options were offered after discussion;: weight loss educational material (shared in after visit summary)        Diagnoses and all orders for this visit:    1. Allergic sinusitis (Primary)  -     azelastine (ASTELIN) 0.1 % nasal spray; 2 sprays into the nostril(s) as directed by provider 2 (Two) Times a Day. Use in each nostril as directed  Dispense: 30 mL; Refill: 0    2. Acute otitis externa of left ear, unspecified type  -     ciprofloxacin-hydrocortisone (Cipro HC) 0.2-1 % otic suspension; Administer 3 drops into the left ear 2 (Two) Times a Day for 7 days.  Dispense: 3 mL; Refill: 0    3. Sinus pressure  -     POCT SARS-CoV-2 Antigen ZHOU + Flu                           FOR FULL DISCHARGE INSTRUCTIONS/COMMENTS/HANDOUTS please see the   AVS

## 2023-06-06 RX ORDER — MIRABEGRON 25 MG/1
TABLET, FILM COATED, EXTENDED RELEASE ORAL
Qty: 90 TABLET | Refills: 3 | Status: SHIPPED | OUTPATIENT
Start: 2023-06-06

## 2023-07-13 ENCOUNTER — OFFICE VISIT (OUTPATIENT)
Dept: ORTHOPEDIC SURGERY | Facility: CLINIC | Age: 78
End: 2023-07-13
Payer: MEDICARE

## 2023-07-13 VITALS — HEIGHT: 66 IN | WEIGHT: 175 LBS | TEMPERATURE: 98.1 F | BODY MASS INDEX: 28.12 KG/M2

## 2023-07-13 DIAGNOSIS — M25.552 LEFT HIP PAIN: ICD-10-CM

## 2023-07-13 DIAGNOSIS — M54.16 LUMBAR RADICULOPATHY: Primary | ICD-10-CM

## 2023-07-13 DIAGNOSIS — Z96.641 STATUS POST TOTAL HIP REPLACEMENT, RIGHT: ICD-10-CM

## 2023-07-13 PROCEDURE — 99213 OFFICE O/P EST LOW 20 MIN: CPT | Performed by: ORTHOPAEDIC SURGERY

## 2023-07-25 ENCOUNTER — TELEPHONE (OUTPATIENT)
Dept: NEUROSURGERY | Facility: CLINIC | Age: 78
End: 2023-07-25
Payer: MEDICARE

## 2023-07-25 NOTE — TELEPHONE ENCOUNTER
Patient called the office today asking when she can resume her medication that she had to stop a week prior to surgery. I spoke with Angelina and informed the patient that they will tell her after surgery when to resume her medications. Patient verbalized understanding.

## 2023-07-26 ENCOUNTER — HOSPITAL ENCOUNTER (OUTPATIENT)
Dept: CT IMAGING | Facility: HOSPITAL | Age: 78
Discharge: HOME | End: 2023-07-26
Admitting: NEUROLOGICAL SURGERY
Payer: MEDICARE

## 2023-07-26 ENCOUNTER — ANESTHESIA EVENT (OUTPATIENT)
Dept: PERIOP | Facility: HOSPITAL | Age: 78
DRG: 460 | End: 2023-07-26
Payer: MEDICARE

## 2023-07-26 DIAGNOSIS — M51.36 LUMBAR DEGENERATIVE DISC DISEASE: ICD-10-CM

## 2023-07-26 DIAGNOSIS — M51.36 LUMBAR SPINAL STENOSIS DUE TO ADJACENT SEGMENT DISEASE AFTER FUSION PROCEDURE: ICD-10-CM

## 2023-07-26 DIAGNOSIS — M54.16 LUMBAR RADICULOPATHY: Primary | ICD-10-CM

## 2023-07-26 DIAGNOSIS — M48.062 SPINAL STENOSIS, LUMBAR REGION, WITH NEUROGENIC CLAUDICATION: ICD-10-CM

## 2023-07-26 DIAGNOSIS — M48.061 LUMBAR SPINAL STENOSIS DUE TO ADJACENT SEGMENT DISEASE AFTER FUSION PROCEDURE: ICD-10-CM

## 2023-07-26 DIAGNOSIS — M54.16 LUMBAR RADICULOPATHY: ICD-10-CM

## 2023-07-26 PROCEDURE — 72131 CT LUMBAR SPINE W/O DYE: CPT

## 2023-07-27 ENCOUNTER — HOSPITAL ENCOUNTER (INPATIENT)
Facility: HOSPITAL | Age: 78
LOS: 1 days | Discharge: HOME | DRG: 460 | End: 2023-07-28
Attending: NEUROLOGICAL SURGERY | Admitting: NEUROLOGICAL SURGERY
Payer: MEDICARE

## 2023-07-27 ENCOUNTER — APPOINTMENT (OUTPATIENT)
Dept: GENERAL RADIOLOGY | Facility: HOSPITAL | Age: 78
DRG: 460 | End: 2023-07-27
Payer: MEDICARE

## 2023-07-27 ENCOUNTER — ANESTHESIA (OUTPATIENT)
Dept: PERIOP | Facility: HOSPITAL | Age: 78
DRG: 460 | End: 2023-07-27
Payer: MEDICARE

## 2023-07-27 DIAGNOSIS — Z98.1 S/P LUMBAR SPINAL FUSION: Primary | ICD-10-CM

## 2023-07-27 DIAGNOSIS — S32.2XXK: ICD-10-CM

## 2023-07-27 DIAGNOSIS — M51.36 LUMBAR SPINAL STENOSIS DUE TO ADJACENT SEGMENT DISEASE AFTER FUSION PROCEDURE: ICD-10-CM

## 2023-07-27 DIAGNOSIS — S32.10XK: ICD-10-CM

## 2023-07-27 DIAGNOSIS — M54.16 LUMBAR RADICULOPATHY: ICD-10-CM

## 2023-07-27 DIAGNOSIS — M48.061 LUMBAR SPINAL STENOSIS DUE TO ADJACENT SEGMENT DISEASE AFTER FUSION PROCEDURE: ICD-10-CM

## 2023-07-27 DIAGNOSIS — M51.36 LUMBAR DEGENERATIVE DISC DISEASE: ICD-10-CM

## 2023-07-27 PROBLEM — M51.369 LUMBAR DEGENERATIVE DISC DISEASE: Status: ACTIVE | Noted: 2023-07-27

## 2023-07-27 LAB
BASOPHILS # BLD AUTO: 0 10*3/MM3 (ref 0–0.2)
BASOPHILS NFR BLD AUTO: 0.2 % (ref 0–1.5)
DEPRECATED RDW RBC AUTO: 43.3 FL (ref 37–54)
EOSINOPHIL # BLD AUTO: 0 10*3/MM3 (ref 0–0.4)
EOSINOPHIL NFR BLD AUTO: 0 % (ref 0.3–6.2)
ERYTHROCYTE [DISTWIDTH] IN BLOOD BY AUTOMATED COUNT: 13.2 % (ref 12.3–15.4)
HCT VFR BLD AUTO: 29.7 % (ref 34–46.6)
HGB BLD-MCNC: 9.7 G/DL (ref 12–15.9)
LYMPHOCYTES # BLD AUTO: 1.1 10*3/MM3 (ref 0.7–3.1)
LYMPHOCYTES NFR BLD AUTO: 8.3 % (ref 19.6–45.3)
MCH RBC QN AUTO: 30.9 PG (ref 26.6–33)
MCHC RBC AUTO-ENTMCNC: 32.8 G/DL (ref 31.5–35.7)
MCV RBC AUTO: 94.3 FL (ref 79–97)
MONOCYTES # BLD AUTO: 1 10*3/MM3 (ref 0.1–0.9)
MONOCYTES NFR BLD AUTO: 7.7 % (ref 5–12)
NEUTROPHILS NFR BLD AUTO: 11.2 10*3/MM3 (ref 1.7–7)
NEUTROPHILS NFR BLD AUTO: 83.8 % (ref 42.7–76)
NRBC BLD AUTO-RTO: 0 /100 WBC (ref 0–0.2)
PLATELET # BLD AUTO: 264 10*3/MM3 (ref 140–450)
PMV BLD AUTO: 9.6 FL (ref 6–12)
RBC # BLD AUTO: 3.15 10*6/MM3 (ref 3.77–5.28)
WBC NRBC COR # BLD: 13.4 10*3/MM3 (ref 3.4–10.8)

## 2023-07-27 PROCEDURE — 25010000002 MAGNESIUM SULFATE PER 500 MG OF MAGNESIUM: Performed by: NURSE ANESTHETIST, CERTIFIED REGISTERED

## 2023-07-27 PROCEDURE — 80048 BASIC METABOLIC PNL TOTAL CA: CPT | Performed by: NEUROLOGICAL SURGERY

## 2023-07-27 PROCEDURE — C1713 ANCHOR/SCREW BN/BN,TIS/BN: HCPCS | Performed by: NEUROLOGICAL SURGERY

## 2023-07-27 PROCEDURE — 27279 ARTHRD SI JT PLMT TARTCLR DV: CPT | Performed by: NEUROLOGICAL SURGERY

## 2023-07-27 PROCEDURE — 25010000002 SUCCINYLCHOLINE PER 20 MG: Performed by: NURSE ANESTHETIST, CERTIFIED REGISTERED

## 2023-07-27 PROCEDURE — 72202 X-RAY EXAM SI JOINTS 3/> VWS: CPT

## 2023-07-27 PROCEDURE — 25010000002 VANCOMYCIN HCL 1.25 G RECONSTITUTED SOLUTION 1 EACH VIAL: Performed by: NEUROLOGICAL SURGERY

## 2023-07-27 PROCEDURE — 0QH104Z INSERTION OF INTERNAL FIXATION DEVICE INTO SACRUM, OPEN APPROACH: ICD-10-PCS | Performed by: NEUROLOGICAL SURGERY

## 2023-07-27 PROCEDURE — 0SB20ZZ EXCISION OF LUMBAR VERTEBRAL DISC, OPEN APPROACH: ICD-10-PCS | Performed by: NEUROLOGICAL SURGERY

## 2023-07-27 PROCEDURE — 22853 INSJ BIOMECHANICAL DEVICE: CPT | Performed by: NEUROLOGICAL SURGERY

## 2023-07-27 PROCEDURE — C1776 JOINT DEVICE (IMPLANTABLE): HCPCS | Performed by: NEUROLOGICAL SURGERY

## 2023-07-27 PROCEDURE — 25010000002 PROPOFOL 200 MG/20ML EMULSION: Performed by: NURSE ANESTHETIST, CERTIFIED REGISTERED

## 2023-07-27 PROCEDURE — 85025 COMPLETE CBC W/AUTO DIFF WBC: CPT | Performed by: NEUROLOGICAL SURGERY

## 2023-07-27 PROCEDURE — 72100 X-RAY EXAM L-S SPINE 2/3 VWS: CPT

## 2023-07-27 PROCEDURE — 22845 INSERT SPINE FIXATION DEVICE: CPT | Performed by: NEUROLOGICAL SURGERY

## 2023-07-27 PROCEDURE — 22558 ARTHRD ANT NTRBD MIN DSC LUM: CPT | Performed by: NEUROLOGICAL SURGERY

## 2023-07-27 PROCEDURE — 76000 FLUOROSCOPY <1 HR PHYS/QHP: CPT

## 2023-07-27 PROCEDURE — 25010000002 KETOROLAC TROMETHAMINE PER 15 MG: Performed by: ANESTHESIOLOGY

## 2023-07-27 PROCEDURE — 25010000002 SUGAMMADEX 200 MG/2ML SOLUTION: Performed by: NURSE ANESTHETIST, CERTIFIED REGISTERED

## 2023-07-27 PROCEDURE — 25010000002 DIPHENHYDRAMINE PER 50 MG: Performed by: NURSE ANESTHETIST, CERTIFIED REGISTERED

## 2023-07-27 PROCEDURE — 8E0WXBZ COMPUTER ASSISTED PROCEDURE OF TRUNK REGION: ICD-10-PCS | Performed by: NEUROLOGICAL SURGERY

## 2023-07-27 PROCEDURE — 25010000002 MIDAZOLAM PER 1 MG: Performed by: NURSE ANESTHETIST, CERTIFIED REGISTERED

## 2023-07-27 PROCEDURE — 61783 SCAN PROC SPINAL: CPT | Performed by: NEUROLOGICAL SURGERY

## 2023-07-27 PROCEDURE — 0SG00A0 FUSION OF LUMBAR VERTEBRAL JOINT WITH INTERBODY FUSION DEVICE, ANTERIOR APPROACH, ANTERIOR COLUMN, OPEN APPROACH: ICD-10-PCS | Performed by: NEUROLOGICAL SURGERY

## 2023-07-27 PROCEDURE — 25010000002 DEXAMETHASONE PER 1 MG: Performed by: NURSE ANESTHETIST, CERTIFIED REGISTERED

## 2023-07-27 PROCEDURE — 25010000002 PHENYLEPHRINE 10 MG/ML SOLUTION 5 ML VIAL: Performed by: NURSE ANESTHETIST, CERTIFIED REGISTERED

## 2023-07-27 DEVICE — ELSA SPACER 20 X 50MM, 8-15MM, 6°
Type: IMPLANTABLE DEVICE | Site: SPINE LUMBAR | Status: FUNCTIONAL
Brand: ELSA

## 2023-07-27 DEVICE — SELF DRILLING SCREW, VARIABLE ANGLE 5.5MM, 35MM
Type: IMPLANTABLE DEVICE | Site: SPINE LUMBAR | Status: FUNCTIONAL
Brand: INDEPENDENCE

## 2023-07-27 DEVICE — ALLOGRFT FIBR OSTEOAMP SELECT 2.5CC: Type: IMPLANTABLE DEVICE | Site: SPINE LUMBAR | Status: FUNCTIONAL

## 2023-07-27 DEVICE — DEV CONTRL TISS STRATAFIX SPIRAL MNCRYL UD 3/0 PLS 30CM: Type: IMPLANTABLE DEVICE | Site: SPINE LUMBAR | Status: FUNCTIONAL

## 2023-07-27 DEVICE — FLOSEAL HEMOSTATIC MATRIX, 10ML
Type: IMPLANTABLE DEVICE | Site: SPINE LUMBAR | Status: FUNCTIONAL
Brand: FLOSEAL HEMOSTATIC MATRIX

## 2023-07-27 DEVICE — KWIRE MARS3VL THRD BLNT EA/2: Type: IMPLANTABLE DEVICE | Site: SPINE LUMBAR | Status: FUNCTIONAL

## 2023-07-27 DEVICE — IMPLANTABLE DEVICE: Type: IMPLANTABLE DEVICE | Site: SPINE LUMBAR | Status: FUNCTIONAL

## 2023-07-27 DEVICE — I-FACTOR PUTTY, 2.5CC SYRINGE
Type: IMPLANTABLE DEVICE | Site: SPINE LUMBAR | Status: FUNCTIONAL
Brand: I-FACTOR PEPTIDE ENHANCED BONE GRAFT

## 2023-07-27 DEVICE — ALLOGRAFT BONE OSTEOAMP SELECT FLO 5CC: Type: IMPLANTABLE DEVICE | Site: SPINE LUMBAR | Status: FUNCTIONAL

## 2023-07-27 RX ORDER — DIPHENHYDRAMINE HCL 25 MG
25 CAPSULE ORAL NIGHTLY PRN
Status: DISCONTINUED | OUTPATIENT
Start: 2023-07-27 | End: 2023-07-28 | Stop reason: HOSPADM

## 2023-07-27 RX ORDER — SODIUM CHLORIDE, SODIUM LACTATE, POTASSIUM CHLORIDE, CALCIUM CHLORIDE 600; 310; 30; 20 MG/100ML; MG/100ML; MG/100ML; MG/100ML
INJECTION, SOLUTION INTRAVENOUS CONTINUOUS PRN
Status: DISCONTINUED | OUTPATIENT
Start: 2023-07-27 | End: 2023-07-27 | Stop reason: SURG

## 2023-07-27 RX ORDER — FAMOTIDINE 10 MG/ML
INJECTION, SOLUTION INTRAVENOUS AS NEEDED
Status: DISCONTINUED | OUTPATIENT
Start: 2023-07-27 | End: 2023-07-27 | Stop reason: SURG

## 2023-07-27 RX ORDER — ONDANSETRON 2 MG/ML
4 INJECTION INTRAMUSCULAR; INTRAVENOUS EVERY 6 HOURS PRN
Status: DISCONTINUED | OUTPATIENT
Start: 2023-07-27 | End: 2023-07-28 | Stop reason: HOSPADM

## 2023-07-27 RX ORDER — ATENOLOL 25 MG/1
25 TABLET ORAL DAILY
Status: DISCONTINUED | OUTPATIENT
Start: 2023-07-27 | End: 2023-07-28 | Stop reason: HOSPADM

## 2023-07-27 RX ORDER — SUCCINYLCHOLINE CHLORIDE 20 MG/ML
INJECTION INTRAMUSCULAR; INTRAVENOUS AS NEEDED
Status: DISCONTINUED | OUTPATIENT
Start: 2023-07-27 | End: 2023-07-27 | Stop reason: SURG

## 2023-07-27 RX ORDER — ONDANSETRON 2 MG/ML
4 INJECTION INTRAMUSCULAR; INTRAVENOUS ONCE AS NEEDED
Status: DISCONTINUED | OUTPATIENT
Start: 2023-07-27 | End: 2023-07-27 | Stop reason: HOSPADM

## 2023-07-27 RX ORDER — AZELASTINE 1 MG/ML
2 SPRAY, METERED NASAL 2 TIMES DAILY
Status: DISCONTINUED | OUTPATIENT
Start: 2023-07-27 | End: 2023-07-28 | Stop reason: HOSPADM

## 2023-07-27 RX ORDER — SCOLOPAMINE TRANSDERMAL SYSTEM 1 MG/1
1 PATCH, EXTENDED RELEASE TRANSDERMAL ONCE
Status: COMPLETED | OUTPATIENT
Start: 2023-07-27 | End: 2023-07-28

## 2023-07-27 RX ORDER — METHOCARBAMOL 500 MG/1
1000 TABLET, FILM COATED ORAL 4 TIMES DAILY PRN
Status: DISCONTINUED | OUTPATIENT
Start: 2023-07-27 | End: 2023-07-28 | Stop reason: HOSPADM

## 2023-07-27 RX ORDER — CALCITONIN SALMON 200 [IU]/.09ML
1 SPRAY, METERED NASAL DAILY
Status: DISCONTINUED | OUTPATIENT
Start: 2023-07-27 | End: 2023-07-28 | Stop reason: HOSPADM

## 2023-07-27 RX ORDER — MONTELUKAST SODIUM 10 MG/1
10 TABLET ORAL NIGHTLY
Status: DISCONTINUED | OUTPATIENT
Start: 2023-07-27 | End: 2023-07-28 | Stop reason: HOSPADM

## 2023-07-27 RX ORDER — LIDOCAINE HYDROCHLORIDE 20 MG/ML
INJECTION, SOLUTION EPIDURAL; INFILTRATION; INTRACAUDAL; PERINEURAL AS NEEDED
Status: DISCONTINUED | OUTPATIENT
Start: 2023-07-27 | End: 2023-07-27 | Stop reason: SURG

## 2023-07-27 RX ORDER — SODIUM CHLORIDE 9 MG/ML
40 INJECTION, SOLUTION INTRAVENOUS AS NEEDED
Status: DISCONTINUED | OUTPATIENT
Start: 2023-07-27 | End: 2023-07-28 | Stop reason: HOSPADM

## 2023-07-27 RX ORDER — BISACODYL 10 MG
10 SUPPOSITORY, RECTAL RECTAL DAILY PRN
Status: DISCONTINUED | OUTPATIENT
Start: 2023-07-27 | End: 2023-07-28 | Stop reason: HOSPADM

## 2023-07-27 RX ORDER — LIDOCAINE HYDROCHLORIDE AND EPINEPHRINE 10; 10 MG/ML; UG/ML
INJECTION, SOLUTION INFILTRATION; PERINEURAL AS NEEDED
Status: DISCONTINUED | OUTPATIENT
Start: 2023-07-27 | End: 2023-07-27 | Stop reason: HOSPADM

## 2023-07-27 RX ORDER — THYROID 30 MG/1
30 TABLET ORAL DAILY
Status: DISCONTINUED | OUTPATIENT
Start: 2023-07-27 | End: 2023-07-28 | Stop reason: HOSPADM

## 2023-07-27 RX ORDER — SODIUM CHLORIDE, SODIUM LACTATE, POTASSIUM CHLORIDE, CALCIUM CHLORIDE 600; 310; 30; 20 MG/100ML; MG/100ML; MG/100ML; MG/100ML
1000 INJECTION, SOLUTION INTRAVENOUS CONTINUOUS
Status: DISCONTINUED | OUTPATIENT
Start: 2023-07-27 | End: 2023-07-28

## 2023-07-27 RX ORDER — DIPHENHYDRAMINE HYDROCHLORIDE 50 MG/ML
INJECTION INTRAMUSCULAR; INTRAVENOUS AS NEEDED
Status: DISCONTINUED | OUTPATIENT
Start: 2023-07-27 | End: 2023-07-27 | Stop reason: SURG

## 2023-07-27 RX ORDER — ONDANSETRON 4 MG/1
4 TABLET, FILM COATED ORAL EVERY 6 HOURS PRN
Status: DISCONTINUED | OUTPATIENT
Start: 2023-07-27 | End: 2023-07-28 | Stop reason: HOSPADM

## 2023-07-27 RX ORDER — HYDRALAZINE HYDROCHLORIDE 20 MG/ML
5 INJECTION INTRAMUSCULAR; INTRAVENOUS
Status: DISCONTINUED | OUTPATIENT
Start: 2023-07-27 | End: 2023-07-27 | Stop reason: HOSPADM

## 2023-07-27 RX ORDER — ATORVASTATIN CALCIUM 20 MG/1
20 TABLET, FILM COATED ORAL NIGHTLY
Status: DISCONTINUED | OUTPATIENT
Start: 2023-07-27 | End: 2023-07-28 | Stop reason: HOSPADM

## 2023-07-27 RX ORDER — ALBUTEROL SULFATE 2.5 MG/3ML
0.62 SOLUTION RESPIRATORY (INHALATION) EVERY 6 HOURS PRN
Status: DISCONTINUED | OUTPATIENT
Start: 2023-07-27 | End: 2023-07-28 | Stop reason: HOSPADM

## 2023-07-27 RX ORDER — NALOXONE HCL 0.4 MG/ML
0.4 VIAL (ML) INJECTION
Status: DISCONTINUED | OUTPATIENT
Start: 2023-07-27 | End: 2023-07-28 | Stop reason: HOSPADM

## 2023-07-27 RX ORDER — CELECOXIB 200 MG/1
200 CAPSULE ORAL ONCE
Status: COMPLETED | OUTPATIENT
Start: 2023-07-27 | End: 2023-07-27

## 2023-07-27 RX ORDER — LABETALOL HYDROCHLORIDE 5 MG/ML
5 INJECTION, SOLUTION INTRAVENOUS
Status: DISCONTINUED | OUTPATIENT
Start: 2023-07-27 | End: 2023-07-27 | Stop reason: HOSPADM

## 2023-07-27 RX ORDER — DOCUSATE SODIUM 100 MG/1
100 CAPSULE, LIQUID FILLED ORAL 2 TIMES DAILY PRN
Status: DISCONTINUED | OUTPATIENT
Start: 2023-07-27 | End: 2023-07-28 | Stop reason: HOSPADM

## 2023-07-27 RX ORDER — DIPHENHYDRAMINE HYDROCHLORIDE 50 MG/ML
12.5 INJECTION INTRAMUSCULAR; INTRAVENOUS ONCE AS NEEDED
Status: DISCONTINUED | OUTPATIENT
Start: 2023-07-27 | End: 2023-07-27 | Stop reason: HOSPADM

## 2023-07-27 RX ORDER — SODIUM CHLORIDE 0.9 % (FLUSH) 0.9 %
10 SYRINGE (ML) INJECTION EVERY 12 HOURS SCHEDULED
Status: DISCONTINUED | OUTPATIENT
Start: 2023-07-27 | End: 2023-07-28 | Stop reason: HOSPADM

## 2023-07-27 RX ORDER — HYDROCODONE BITARTRATE AND ACETAMINOPHEN 5; 325 MG/1; MG/1
1 TABLET ORAL EVERY 4 HOURS PRN
Status: DISCONTINUED | OUTPATIENT
Start: 2023-07-27 | End: 2023-07-28 | Stop reason: HOSPADM

## 2023-07-27 RX ORDER — DIPHENHYDRAMINE HYDROCHLORIDE 50 MG/ML
12.5 INJECTION INTRAMUSCULAR; INTRAVENOUS
Status: DISCONTINUED | OUTPATIENT
Start: 2023-07-27 | End: 2023-07-27 | Stop reason: HOSPADM

## 2023-07-27 RX ORDER — ACETAMINOPHEN 325 MG/1
650 TABLET ORAL EVERY 8 HOURS
Status: DISCONTINUED | OUTPATIENT
Start: 2023-07-27 | End: 2023-07-28 | Stop reason: HOSPADM

## 2023-07-27 RX ORDER — PROPOFOL 10 MG/ML
INJECTION, EMULSION INTRAVENOUS AS NEEDED
Status: DISCONTINUED | OUTPATIENT
Start: 2023-07-27 | End: 2023-07-27 | Stop reason: SURG

## 2023-07-27 RX ORDER — MIDAZOLAM HYDROCHLORIDE 1 MG/ML
INJECTION INTRAMUSCULAR; INTRAVENOUS AS NEEDED
Status: DISCONTINUED | OUTPATIENT
Start: 2023-07-27 | End: 2023-07-27 | Stop reason: SURG

## 2023-07-27 RX ORDER — KETOROLAC TROMETHAMINE 30 MG/ML
15 INJECTION, SOLUTION INTRAMUSCULAR; INTRAVENOUS ONCE
Status: COMPLETED | OUTPATIENT
Start: 2023-07-27 | End: 2023-07-27

## 2023-07-27 RX ORDER — ENOXAPARIN SODIUM 100 MG/ML
40 INJECTION SUBCUTANEOUS DAILY
Status: DISCONTINUED | OUTPATIENT
Start: 2023-07-28 | End: 2023-07-28 | Stop reason: HOSPADM

## 2023-07-27 RX ORDER — KETAMINE HCL IN NACL, ISO-OSM 100MG/10ML
SYRINGE (ML) INJECTION AS NEEDED
Status: DISCONTINUED | OUTPATIENT
Start: 2023-07-27 | End: 2023-07-27 | Stop reason: SURG

## 2023-07-27 RX ORDER — EPHEDRINE SULFATE 5 MG/ML
5 INJECTION INTRAVENOUS ONCE AS NEEDED
Status: DISCONTINUED | OUTPATIENT
Start: 2023-07-27 | End: 2023-07-27 | Stop reason: HOSPADM

## 2023-07-27 RX ORDER — IPRATROPIUM BROMIDE AND ALBUTEROL SULFATE 2.5; .5 MG/3ML; MG/3ML
3 SOLUTION RESPIRATORY (INHALATION) ONCE AS NEEDED
Status: DISCONTINUED | OUTPATIENT
Start: 2023-07-27 | End: 2023-07-27 | Stop reason: HOSPADM

## 2023-07-27 RX ORDER — ROCURONIUM BROMIDE 10 MG/ML
INJECTION, SOLUTION INTRAVENOUS AS NEEDED
Status: DISCONTINUED | OUTPATIENT
Start: 2023-07-27 | End: 2023-07-27 | Stop reason: SURG

## 2023-07-27 RX ORDER — ACETAMINOPHEN 500 MG
1000 TABLET ORAL ONCE
Status: COMPLETED | OUTPATIENT
Start: 2023-07-27 | End: 2023-07-27

## 2023-07-27 RX ORDER — MAGNESIUM SULFATE HEPTAHYDRATE 500 MG/ML
INJECTION, SOLUTION INTRAMUSCULAR; INTRAVENOUS AS NEEDED
Status: DISCONTINUED | OUTPATIENT
Start: 2023-07-27 | End: 2023-07-27 | Stop reason: SURG

## 2023-07-27 RX ORDER — PILOCARPINE HYDROCHLORIDE 5 MG/1
5 TABLET, FILM COATED ORAL 4 TIMES DAILY
Status: DISCONTINUED | OUTPATIENT
Start: 2023-07-27 | End: 2023-07-28 | Stop reason: HOSPADM

## 2023-07-27 RX ORDER — DEXMEDETOMIDINE HYDROCHLORIDE 100 UG/ML
INJECTION, SOLUTION INTRAVENOUS AS NEEDED
Status: DISCONTINUED | OUTPATIENT
Start: 2023-07-27 | End: 2023-07-27 | Stop reason: SURG

## 2023-07-27 RX ORDER — SODIUM CHLORIDE 0.9 % (FLUSH) 0.9 %
10 SYRINGE (ML) INJECTION AS NEEDED
Status: DISCONTINUED | OUTPATIENT
Start: 2023-07-27 | End: 2023-07-28 | Stop reason: HOSPADM

## 2023-07-27 RX ORDER — PANTOPRAZOLE SODIUM 40 MG/1
40 TABLET, DELAYED RELEASE ORAL DAILY
Status: DISCONTINUED | OUTPATIENT
Start: 2023-07-28 | End: 2023-07-28 | Stop reason: HOSPADM

## 2023-07-27 RX ORDER — CETIRIZINE HYDROCHLORIDE 10 MG/1
10 TABLET ORAL DAILY
Status: DISCONTINUED | OUTPATIENT
Start: 2023-07-27 | End: 2023-07-28 | Stop reason: HOSPADM

## 2023-07-27 RX ORDER — NALOXONE HCL 0.4 MG/ML
0.4 VIAL (ML) INJECTION AS NEEDED
Status: DISCONTINUED | OUTPATIENT
Start: 2023-07-27 | End: 2023-07-27 | Stop reason: HOSPADM

## 2023-07-27 RX ORDER — DEXAMETHASONE SODIUM PHOSPHATE 4 MG/ML
INJECTION, SOLUTION INTRA-ARTICULAR; INTRALESIONAL; INTRAMUSCULAR; INTRAVENOUS; SOFT TISSUE AS NEEDED
Status: DISCONTINUED | OUTPATIENT
Start: 2023-07-27 | End: 2023-07-27 | Stop reason: SURG

## 2023-07-27 RX ADMIN — DEXMEDETOMIDINE HYDROCHLORIDE 2 MCG: 100 INJECTION, SOLUTION INTRAVENOUS at 10:20

## 2023-07-27 RX ADMIN — ACETAMINOPHEN 1000 MG: 500 TABLET, FILM COATED ORAL at 07:05

## 2023-07-27 RX ADMIN — CETIRIZINE HYDROCHLORIDE 10 MG: 10 TABLET, FILM COATED ORAL at 14:12

## 2023-07-27 RX ADMIN — SUGAMMADEX 200 MG: 100 INJECTION, SOLUTION INTRAVENOUS at 08:19

## 2023-07-27 RX ADMIN — DEXMEDETOMIDINE HYDROCHLORIDE 2 MCG: 100 INJECTION, SOLUTION INTRAVENOUS at 08:25

## 2023-07-27 RX ADMIN — PROPOFOL 150 MCG/KG/MIN: 10 INJECTION, EMULSION INTRAVENOUS at 07:43

## 2023-07-27 RX ADMIN — SODIUM CHLORIDE, SODIUM LACTATE, POTASSIUM CHLORIDE, AND CALCIUM CHLORIDE: .6; .31; .03; .02 INJECTION, SOLUTION INTRAVENOUS at 07:32

## 2023-07-27 RX ADMIN — MAGNESIUM SULFATE HEPTAHYDRATE 0.4 G: 500 INJECTION, SOLUTION INTRAMUSCULAR; INTRAVENOUS at 09:00

## 2023-07-27 RX ADMIN — SODIUM CHLORIDE, SODIUM LACTATE, POTASSIUM CHLORIDE, CALCIUM CHLORIDE: 600; 310; 30; 20 INJECTION, SOLUTION INTRAVENOUS at 07:55

## 2023-07-27 RX ADMIN — LEVOTHYROXINE, LIOTHYRONINE 30 MG: 19; 4.5 TABLET ORAL at 15:47

## 2023-07-27 RX ADMIN — DEXMEDETOMIDINE HYDROCHLORIDE 2 MCG: 100 INJECTION, SOLUTION INTRAVENOUS at 10:09

## 2023-07-27 RX ADMIN — PHENYLEPHRINE HYDROCHLORIDE 0.3 MCG/KG/MIN: 10 INJECTION INTRAVENOUS at 08:16

## 2023-07-27 RX ADMIN — MAGNESIUM SULFATE HEPTAHYDRATE 0.4 G: 500 INJECTION, SOLUTION INTRAMUSCULAR; INTRAVENOUS at 09:48

## 2023-07-27 RX ADMIN — AZELASTINE HYDROCHLORIDE 2 SPRAY: 137 SPRAY, METERED NASAL at 20:48

## 2023-07-27 RX ADMIN — MIDAZOLAM 1 MG: 1 INJECTION INTRAMUSCULAR; INTRAVENOUS at 07:30

## 2023-07-27 RX ADMIN — DEXAMETHASONE SODIUM PHOSPHATE 12 MG: 4 INJECTION, SOLUTION INTRAMUSCULAR; INTRAVENOUS at 07:58

## 2023-07-27 RX ADMIN — DEXMEDETOMIDINE HYDROCHLORIDE 2 MCG: 100 INJECTION, SOLUTION INTRAVENOUS at 10:18

## 2023-07-27 RX ADMIN — DEXMEDETOMIDINE HYDROCHLORIDE 2 MCG: 100 INJECTION, SOLUTION INTRAVENOUS at 10:12

## 2023-07-27 RX ADMIN — DEXMEDETOMIDINE HYDROCHLORIDE 2 MCG: 100 INJECTION, SOLUTION INTRAVENOUS at 08:35

## 2023-07-27 RX ADMIN — MAGNESIUM SULFATE HEPTAHYDRATE 0.4 G: 500 INJECTION, SOLUTION INTRAMUSCULAR; INTRAVENOUS at 09:35

## 2023-07-27 RX ADMIN — PILOCARPINE HYDROCHLORIDE 5 MG: 5 TABLET, FILM COATED ORAL at 20:48

## 2023-07-27 RX ADMIN — SODIUM CHLORIDE, POTASSIUM CHLORIDE, SODIUM LACTATE AND CALCIUM CHLORIDE 1000 ML: 600; 310; 30; 20 INJECTION, SOLUTION INTRAVENOUS at 07:07

## 2023-07-27 RX ADMIN — DEXMEDETOMIDINE HYDROCHLORIDE 2 MCG: 100 INJECTION, SOLUTION INTRAVENOUS at 08:29

## 2023-07-27 RX ADMIN — Medication 25 MG: at 08:35

## 2023-07-27 RX ADMIN — MAGNESIUM SULFATE HEPTAHYDRATE 0.2 G: 500 INJECTION, SOLUTION INTRAMUSCULAR; INTRAVENOUS at 08:14

## 2023-07-27 RX ADMIN — Medication 10 MG: at 10:03

## 2023-07-27 RX ADMIN — ROCURONIUM BROMIDE 25 MG: 10 INJECTION, SOLUTION INTRAVENOUS at 07:45

## 2023-07-27 RX ADMIN — FAMOTIDINE 20 MG: 10 INJECTION INTRAVENOUS at 07:27

## 2023-07-27 RX ADMIN — DEXMEDETOMIDINE HYDROCHLORIDE 2 MCG: 100 INJECTION, SOLUTION INTRAVENOUS at 10:15

## 2023-07-27 RX ADMIN — LIDOCAINE HYDROCHLORIDE 80 MG: 20 INJECTION, SOLUTION EPIDURAL; INFILTRATION; INTRACAUDAL; PERINEURAL at 07:37

## 2023-07-27 RX ADMIN — SUCCINYLCHOLINE CHLORIDE 100 MG: 20 INJECTION, SOLUTION INTRAMUSCULAR; INTRAVENOUS at 07:37

## 2023-07-27 RX ADMIN — CALCITONIN SALMON 1 SPRAY: 200 SPRAY, METERED NASAL at 15:48

## 2023-07-27 RX ADMIN — MONTELUKAST 10 MG: 10 TABLET, FILM COATED ORAL at 20:48

## 2023-07-27 RX ADMIN — ATORVASTATIN CALCIUM 20 MG: 20 TABLET, FILM COATED ORAL at 20:48

## 2023-07-27 RX ADMIN — PILOCARPINE HYDROCHLORIDE 5 MG: 5 TABLET, FILM COATED ORAL at 14:12

## 2023-07-27 RX ADMIN — Medication 10 ML: at 20:48

## 2023-07-27 RX ADMIN — ROCURONIUM BROMIDE 5 MG: 10 INJECTION, SOLUTION INTRAVENOUS at 07:37

## 2023-07-27 RX ADMIN — DEXMEDETOMIDINE HYDROCHLORIDE 2 MCG: 100 INJECTION, SOLUTION INTRAVENOUS at 08:47

## 2023-07-27 RX ADMIN — ATENOLOL 25 MG: 25 TABLET ORAL at 14:12

## 2023-07-27 RX ADMIN — MIRABEGRON 25 MG: 25 TABLET, FILM COATED, EXTENDED RELEASE ORAL at 15:47

## 2023-07-27 RX ADMIN — PROPOFOL 150 MG: 10 INJECTION, EMULSION INTRAVENOUS at 07:37

## 2023-07-27 RX ADMIN — PROPOFOL 30 MG: 10 INJECTION, EMULSION INTRAVENOUS at 07:49

## 2023-07-27 RX ADMIN — MAGNESIUM SULFATE HEPTAHYDRATE 0.4 G: 500 INJECTION, SOLUTION INTRAMUSCULAR; INTRAVENOUS at 09:27

## 2023-07-27 RX ADMIN — DEXMEDETOMIDINE HYDROCHLORIDE 2 MCG: 100 INJECTION, SOLUTION INTRAVENOUS at 08:39

## 2023-07-27 RX ADMIN — DIPHENHYDRAMINE HYDROCHLORIDE 25 MG: 50 INJECTION, SOLUTION INTRAMUSCULAR; INTRAVENOUS at 08:08

## 2023-07-27 RX ADMIN — CELECOXIB 200 MG: 200 CAPSULE ORAL at 07:05

## 2023-07-27 RX ADMIN — ACETAMINOPHEN 650 MG: 325 TABLET, FILM COATED ORAL at 14:12

## 2023-07-27 RX ADMIN — Medication 15 MG: at 09:13

## 2023-07-27 RX ADMIN — PROPOFOL 50 MG: 10 INJECTION, EMULSION INTRAVENOUS at 07:42

## 2023-07-27 RX ADMIN — ACETAMINOPHEN 650 MG: 325 TABLET, FILM COATED ORAL at 22:29

## 2023-07-27 RX ADMIN — SCOPALAMINE 1 PATCH: 1 PATCH, EXTENDED RELEASE TRANSDERMAL at 07:05

## 2023-07-27 RX ADMIN — KETOROLAC TROMETHAMINE 15 MG: 30 INJECTION, SOLUTION INTRAMUSCULAR; INTRAVENOUS at 11:35

## 2023-07-27 RX ADMIN — MIDAZOLAM 1 MG: 1 INJECTION INTRAMUSCULAR; INTRAVENOUS at 07:28

## 2023-07-27 RX ADMIN — MAGNESIUM SULFATE HEPTAHYDRATE 0.2 G: 500 INJECTION, SOLUTION INTRAMUSCULAR; INTRAVENOUS at 08:19

## 2023-07-27 RX ADMIN — VANCOMYCIN HYDROCHLORIDE 1250 MG: 1.25 INJECTION, POWDER, LYOPHILIZED, FOR SOLUTION INTRAVENOUS at 07:40

## 2023-07-27 NOTE — OP NOTE
LUMBAR OBLIQUE LATERAL INTERBODY FUSION WITH NEURO ROBOT  Procedure Report    Patient Name:  Valery Baez  YOB: 1945    Date of Surgery:  7/27/2023     Indications: 77-year-old female status post L3-S1 decompression and fusion with severe adjacent segment disease at L2-3 as well as sacral stress fractures.  Patient underwent extensive conservative measures without improvement.  Given findings on imaging patient was taken for L2-3 LLIF for stabilization and decompression at this level as well as bilateral SI joint fusion and stabilization of sacral fractures.  Patient understood the risks of surgery including bleeding infection CSF leak nerve damage spinal cord injury vascular injury internal organ injury including bowel injury pseudoarthrosis hardware failure need for future operation including more extensive surgery with extension of fusion into the thoracic spine and she agreed to undergo the procedure.  Also notably, on preoperative CT scan it was evident that the patient has bilateral pedicle fractures at L2 which appear chronic.  Looking back retrospectively at most recent MRI from approximately 6 months ago there may have been fractures at that time but there is no increased signal thus indicating that they must be chronic.  I believe that stabilization of the L2-3 level will allow further healing of these fractures and stabilization.    Pre-op Diagnosis:   Lumbar radiculopathy [M54.16]       Post-Op Diagnosis Codes:     * Lumbar radiculopathy [M54.16]    Procedure/CPT® Codes:      Procedure(s):  Lumbar 2-3 lateral retroperitoneal approach for discectomy for arthrodesis  L2-3 placement of globus expandable interbody cage  L2-3 anterior instrumentation with globus plate and screw  Use of neuro navigation and robotic technology  Bilateral minimally invasive sacroiliac joint fusion        Spinal Surgery Levels Completed:1 Level      Staff:  Surgeon(s):  Aston Paredes IV, MD Serak, John  MD GELN    Assistant: Amelia Pena CSA    Anesthesia: General    Estimated Blood Loss:  50cc    Implants:    Implant Name Type Inv. Item Serial No.  Lot No. LRB No. Used Action   KT SEAL HEMOS ABS FLOSEAL MATRX FAST/PREP 10ML - [id number] Implant KT SEAL HEMOS ABS FLOSEAL MATRX FAST/PREP 10ML  [facility] [id number] N/A 2 Implanted   DEV CONTRL TISS STRATAFIX SPIRAL MNCRYL UD 3/0 PLS 30CM - [id number] Implant DEV CONTRL TISS STRATAFIX SPIRAL MNCRYL UD 3/0 PLS 30CM  [facility]  DIV OF J AND J [id number] N/A 1 Implanted   SPACR EXP CARSON LIF SEG/LORD 6DEG [id number] - [id number] Implant SPACR EXP CARSON LIF SEG/LORD 6DEG [id number]  [employer] . N/A 1 Implanted   SCRW BONE ALIF INDEPENDENCEMIS VA SLF/DRL 5.5X35MM - [id number] Implant SCRW BONE ALIF INDEPENDENCEMIS VA SLF/DRL 5.5X35MM  [employer] . N/A 1 Implanted   KWIRE [id number] THRD BLNT EA/2 - [id number] Implant KWIRE [id number] THRD BLNT EA/2  [employer] . N/A 1 Implanted   DEV FUSN/FIX PELV IFUSE/TORQ 3D/PRNT FUL/THRD POR [id number] - [id number] Implant DEV FUSN/FIX PELV IFUSE/TORQ 3D/PRNT FUL/THRD POR [id number]  [employer] [id number] Right 1 Implanted   DEV FUSN/FIX PELV IFUSE/TORQ 3D/PRNT FUL/THRD POR [id number] - [id number] Implant DEV FUSN/FIX PELV IFUSE/TORQ 3D/PRNT FUL/THRD POR [id number]  [employer] [id number] Right 1 Implanted   DEV FUSN/FIX PELV IFUSE/TORQ 3D/PRNT FUL/THRD POR [id number] - [id number] Implant DEV FUSN/FIX PELV IFUSE/TORQ 3D/PRNT FUL/THRD POR [id number]  [employer] [id number] Left 1 Implanted   GRFT BONE IFACTOR PUTTY 2.5ML - [id number] Implant GRFT BONE IFACTOR PUTTY 2.5ML  [employer] [id number] N/A 1 Implanted   DEV FUSN/FIX PELV IFUSE/TORQ 3D/PRNT FUL/THRD POR [id number] - [id number] Implant DEV FUSN/FIX PELV IFUSE/TORQ 3D/PRNT FUL/THRD POR [id number]  [employer] [id number] Left 1 Implanted   DEV CONTRL TISS STRATAFIX SPIRAL MNCRYL UD 3/0 PLS 30CM - [id number] Implant DEV CONTRL TISS STRATAFIX SPIRAL MNCRYL UD 3/0 PLS 30CM   [employer] [facility]  DIV OF J AND J [id number] Left 1 Implanted   DEV CONTRL TISS STRATAFIX SPIRAL MNCRYL UD 3/0 PLS 30CM - [id number] Implant DEV CONTRL TISS STRATAFIX SPIRAL MNCRYL UD 3/0 PLS 30CM  [employer] [facility]  DIV OF J AND J [id number] Right 1 Implanted       Specimen:          None        Findings: None    Complications: None    Description of Procedure: Patient was brought to the OR and placed under general endotracheal anesthesia.  She was placed in the lateral position left side up with all pressure points padded and securely taped to the bed.  Patient was prepped and draped in usual fashion. DRB was placed into the iliac crest and x-rays were taken to orient the Formis[employer]ous robot and its neuro navigation software to the patient.  Prior to surgery, preoperative CT scan was used to plan the location of the interbody spacer.  Using neuro navigation, incision was planned over the L2-3 disc space and along the flank.  Incision was made carried down to the deep dermal layer and fat layers with monopolar cautery.  The distal end of a floating rib was blocking the trajectory to the disc space.  Given this subperiosteal dissection with Penfield 1 was used to dissect around the rib and detach the neuro venous plexus.  The distal end of the floating rib was then removed with Le[place]ell.  The retroperitoneal space was entered and the retroperitoneal contents and peritoneum were swept anteriorly with blunt dissection and finger dissection.  Hand-held retractor was held by my assistant to expose the iliopsoas muscle.  The iliopsoas muscle was opened at the level of the L2-3 disc space which was identified using neuro navigation guidance with blunt dissection.  Dilators were passed over the disc space under neuronavigational guidance and a globus lateral retractor system was placed over the L2-3 disc space under neuronavigational guidance.  Retractor system was opened.  Muscle tissue surrounding the retractor blades was  stimulated with no evidence of immediately adjacent nerve roots.  Disc base was then opened with a 15 blade.  A Cifuentes was passed through the disc space to the contralateral side opening the contralateral annulus under neuronavigational guidance.  A series of endplate scraping devices and curettes were used to remove soft disc and cartilaginous endplate.  Pituitary rongeurs were used to remove remaining soft disc and disc material from the disc space.  Globus expandable interbody spacer was then passed into the disc space under neuronavigational guidance and expanded.  Anterior instrumentation was performed with globus plate and screw.  The interbody spacer was backfilled with iFactor and allograft.  Hemostasis was achieved with bipolar cautery and Gelfoam powder.  The wound was thoroughly irrigated.  Retractor system was removed.  Final x-rays demonstrate good positioning of hardware.  Attention was turned to closure.  The fascia was closed in a watertight fashion with 0 Vicryl sutures, the deep dermal layer was closed with 2-0 Vicryl sutures and the skin was closed with a running subcuticular Monocryl.  Dermabond was placed over the wound.  Patient was then turned into the prone position on a Johny table.  Lateral x-ray was used to plan incisions bilaterally.  Incisions were planned just below the sacral alar line along the mid body of the sacrum on lateral x-ray.  Patient was prepped and draped in usual fashion.  Incision was made on the right side as planned and carried down to the deep dermal layer and fat layers with monopolar cautery.  A pin was passed across the SI joint utilizing lateral, inlet and outlet view fluoroscopy.  The pin was placed below the sacral alar line across the SI joint ruminating just medial to the S1 foramen.  A SI bone screw was then passed over the pin and across the joint space including across the fracture which was lateral to the foramen terminating just medial to the foramen.   Same series of steps were undertaken again in approximately the level of S2.  The pin was passed between the S1 and the S2 foramen and the screw was again passed across the joint space across the fracture and terminated between the S1 and S2 foramen.  Final x-rays demonstrate good positioning of the hardware.  Same series of steps were undertaken on the left side with placement of SI bone screws across the SI joint and the fracture at approximately the levels of S1 and the S2.  Care was taken to avoid the foramen.  Final x-rays again demonstrate good positioning of all hardware.  Wounds were then thoroughly irrigated.  Fascia was closed with 0 Vicryl sutures, the deep dermal layer was closed with 2-0 Vicryl sutures and the skin was closed with a running subcuticular Monocryl.  Dermabond was placed over the wound.  There were no changes in neuro monitoring throughout the case.                Assistant: Amelia Pena CSA  was responsible for performing the following activities: Retraction, Suction, Irrigation, Suturing, Closing, and Placing Dressing and their skilled assistance was necessary for the success of this case.    Aston Paredes IV, MD     Date: 7/27/2023  Time: 10:40 EDT

## 2023-07-27 NOTE — ANESTHESIA PROCEDURE NOTES
Airway  Urgency: elective    Date/Time: 7/27/2023 7:37 AM  End Time:7/27/2023 7:52 AM  Difficult airway    General Information and Staff    Patient location during procedure: OR  Anesthesiologist: Saad Lincoln MD    Indications and Patient Condition  Indications for airway management: airway protection    Preoxygenated: yes  MILS maintained throughout  Mask difficulty assessment: 1 - vent by mask    Final Airway Details  Final airway type: endotracheal airway      Successful airway: ETT  Cuffed: yes   Intubation method: glidescope used.  Facilitating devices/methods: intubating stylet  Endotracheal tube insertion site: oral  Blade: Glidescope  Blade size: 3  ETT size (mm): 6.5  Cormack-Lehane Classification: grade IIb - view of arytenoids or posterior of glottis only  Placement verified by: chest auscultation and capnometry   Measured from: lips  ETT/EBT  to lips (cm): 21  Number of attempts at approach: 3 or more  Assessment: lips, teeth, and gum same as pre-op and atraumatic intubation    Additional Comments  Sivi, easy bvm, attempt with Quintana 3 by Lissa stacy, limited view and unable to pass eschmann, lma placed, oral pharynx suctioned, attempt by Salazar polo with bronchoscope, unable to pass through cords, attempt by Salazar with Glidescope mac 3 successful with glidescope stylet, appears atraumatic, teeth unchanged, minimal movement to jaw and neck throughout induction. Sats never below 98% throughout.

## 2023-07-27 NOTE — ANESTHESIA POSTPROCEDURE EVALUATION
Patient: Valery Baez    Procedure Summary       Date: 07/27/23 Room / Location: [facility] OR 12 / [facility] MAIN OR    Anesthesia Start: 0732 Anesthesia Stop: 1047    Procedures:       Lumbar 2-3 lateral lumbar interbody fusion with neuro robot (Spine Lumbar)      Bilateral sacroiliac joint fusion (Bilateral: Spine Lumbar) Diagnosis:       Lumbar radiculopathy      (Lumbar radiculopathy [M54.16])    Surgeons: Aston Paredes IV, MD Provider: Saad Lincoln MD    Anesthesia Type: general, ERAS Protocol ASA Status: 3            Anesthesia Type: general, ERAS Protocol    Vitals  Vitals Value Taken Time   /58 07/27/23 1145   Temp 97.4 °F (36.3 °C) 07/27/23 1145   Pulse 73 07/27/23 1146   Resp 14 07/27/23 1145   SpO2 97 % 07/27/23 1146   Vitals shown include unvalidated device data.        Post Anesthesia Care and Evaluation    Patient location during evaluation: PACU  Patient participation: complete - patient participated  Level of consciousness: awake  Pain scale: See nurse's notes for pain score.  Pain management: adequate    Airway patency: patent  Anesthetic complications: No anesthetic complications  PONV Status: none  Cardiovascular status: acceptable  Respiratory status: acceptable and spontaneous ventilation  Hydration status: acceptable    Comments: Patient seen and examined postoperatively; vital signs stable; SpO2 greater than or equal to 90%; cardiopulmonary status stable; nausea/vomiting adequately controlled; pain adequately controlled; no apparent anesthesia complications; patient discharged from anesthesia care when discharge criteria were met

## 2023-07-27 NOTE — ANESTHESIA PREPROCEDURE EVALUATION
Anesthesia Evaluation     history of anesthetic complications:  PONV difficult airway  NPO Solid Status: > 8 hours  NPO Liquid Status: > 8 hours           Airway   Mallampati: III  TM distance: >3 FB  Neck ROM: full  Possible difficult intubation and Small opening  Dental - normal exam     Pulmonary - normal exam   (+) asthma,shortness of breath, sleep apnea  Cardiovascular - normal exam    (+) hyperlipidemia      Neuro/Psych  (+) headaches, dizziness/light headedness, weakness, numbness  GI/Hepatic/Renal/Endo    (+) GERD, renal disease, thyroid problem hypothyroidism    Musculoskeletal     Abdominal  - normal exam    Bowel sounds: normal.   Substance History      OB/GYN          Other   arthritis,                   Anesthesia Plan    ASA 3     general and ERAS Protocol   total IV anesthesia  intravenous induction     Anesthetic plan, risks, benefits, and alternatives have been provided, discussed and informed consent has been obtained with: patient.  Pre-procedure education provided  Plan discussed with CRNA.    CODE STATUS:

## 2023-07-27 NOTE — CONSULTS
[facility] Medicine Services   Consult Note    Patient Name: Valery Baez  : 1945  MRN: [id number]  Primary Care Physician:  Tino Smith MD  Referring Physician: Aston Paredes IV, MD  Date of admission: 2023  Date and Time of Care: 2023    Inpatient Hospitalist Consult  Consult performed by: Corazon Martino MD  Consult ordered by: Aston Paredes IV, MD        Subjective      Reason for Consult/ Chief Complaint: Medical consult for postoperative comanagement of chronic medical diseases    Consult Requested By: Aston Paredes MD    History of Present Illness: Valery Baez is a 77 y.o. female with history of asthma, iron deficiency, irritable bowel syndrome, GERD, hypothyroidism, hyperlipidemia, Sjogren's syndrome, prediabetes, unspecified tachyarrhythmia and lumbar radiculopathy who is day 0 status post lumbar decompressive surgery.  Medical pedicle consultation requested to assist with managing chronic medical diseases.  I met patient postoperatively in her room.  Stated that she was feeling cold.  Reported no other immediate concerns.    ROS feels cold.  Denies headache, dizziness, nausea, cough, shortness of breath, chest pain, vomiting, abdominal pain, malaise, fever or chills.  At least 14 systems reviewed, pertinent information as stated    Personal History     Past Medical History:   Diagnosis Date    Allergic     Anemia     CHRONIC IRON DEFICIENT    Anesthesia complication     STATES SHE IS VERY SLOW TO WAKE UP    Anesthesia complication     pt has artifical jaw joints upper and lower on both sides--- this makes intubation difficult    Ankle sprain     Arthritis of back 2018    Arthritis of neck 1988    Asthma     Basal cell carcinoma     FACE    Bursitis of hip     Cancer     right breast cancer    Cervical disc disorder     Chronic diarrhea     Chronic kidney disease     Clotting disorder     Bruise easily    CTS (carpal tunnel  syndrome) 1978    DDD (degenerative disc disease), lumbar     Disease of thyroid gland     Dislocation of finger 1984    Dry eye     Elevated cholesterol     Fracture of ankle 1998    Fracture of hip 2021    Right hip replacement; same symptoms in left    Fracture of wrist 1968    Fracture, finger 1993    Fracture, foot 1971    Frequency of urination     Frozen shoulder     GERD (gastroesophageal reflux disease)     Hard to intubate     REQUESTING TO SEE AA 8/22/19; PT HAS COMPLETE BILATERAL JAW PROSTESIS    Headache     or migraines    Heart palpitations     Hip arthrosis 2022    History of breast cancer     RIGHT    History of transfusion     [place] (hard of hearing)     BILAT HEARING AID    Hx of renal calculi     Hypercholesteremia     Irregular heart beat     mid afternoon   pt takes atenolol    Knee sprain 1965    Knee swelling 1966    Limited mobility     RIGHT HIP    Limited mobility     JAW    Low back pain     Low back strain 1957    Lumbosacral disc disease 2022    MRSA (methicillin resistant Staphylococcus aureus)     LEFT JAW @TMJ JOINT, MULT. ,LAST 2013, TREATED AT [facility], infection control notified 8/24/16. 1300    MSSA (methicillin susceptible Staphylococcus aureus)     Neck strain 1984    Osteoarthritis     Osteopenia     Prolia shot 11/4/22    Osteoporosis     Periarthritis of shoulder 1999    PONV (postoperative nausea and vomiting)     Right hip pain     Rosacea     Rotator cuff syndrome 1999    Short of breath on exertion     WITH STAIRS ONLY    Sleep apnea     C PAP    Spinal headache     Spinal stenosis     Subluxation of patella 1979    Tear of meniscus of knee 1966    Urgency of urination     Vertigo     WAKES UP WITH THIS SOMETIMES    Weakness     RIGHT HIP    Wrist sprain 1966       Past Surgical History:   Procedure Laterality Date    ABSCESS DRAINAGE      abscess removed from left jaw x2    ADENOIDECTOMY  1952    ANKLE OPEN REDUCTION INTERNAL FIXATION  1998    APPENDECTOMY   1965    AVULSION TOENAIL PLATE      6 REMOVED    BACK SURGERY  9/19/19    BILATERAL BREAST REDUCTION Left 08/29/2016    Procedure: LT BREAST REDUCTION ;  Surgeon: Luis Zambrano MD;  Location: [facility];  Service:     BREAST BIOPSY Bilateral     CARPAL TUNNEL RELEASE  1993    CHOLECYSTECTOMY  09/2000    COLONOSCOPY      COLONOSCOPY  2019    NORMAL    CYST REMOVAL  03/1984    eye cyst left lower lid    CYST REMOVAL  01/2007    right upper thigh    CYSTOSCOPY      x2  02/2013 & 04/2014    CYSTOSCOPY      NUMEROUS    D & C HYSTEROSCOPY      FOOT SURGERY      HAND SURGERY  1980    HYSTERECTOMY  1971    LATER BSO    JOINT REPLACEMENT  2021    right hip    KNEE ARTHROPLASTY Right 03/2005    LAPAROSCOPIC SALPINGOOPHERECTOMY      LUMBAR DISCECTOMY FUSION INSTRUMENTATION N/A 09/09/2019    Procedure: Lumbar 3 to sacral 1 laminectomy and fusion with instrumentation;  Surgeon: Vikas Couch MD;  Location: [facility];  Service: Orthopedic Spine    MANDIBLE SURGERY      UPPER AND LOWER JAW ADJUSTED WITH PLATES AND WIRES    MASTECTOMY Right 03/2015    MASTECTOMY PARTIAL / LUMPECTOMY Right 03/2009    NIPPLE RECONSTRUCTION Right 08/29/2016    Procedure: RT NIPPLE RECONSTRUCTION;  Surgeon: Luis Zambrano MD;  Location: [facility] OR;  Service:     OTHER SURGICAL HISTORY      laparoscopy adhesions right side x2    OTHER SURGICAL HISTORY      drain mammosite area    OTHER SURGICAL HISTORY  01/2010    pressure washed prosthesis area left jaw    OTHER SURGICAL HISTORY      drain mammosite area right breast 09/2011    RECONSTRUCTION BREAST W/ TRAM FLAP Right 02/2016    ROTATOR CUFF REPAIR Left 09/2014    SHOULDER SURGERY      SPINAL FUSION  2019    TEMPOROMANDIBULAR JOINT ARTHROPLASTY Bilateral 1993    MULT. REPLACEMENTS LATER    TONSILLECTOMY  1952    TOTAL HIP ARTHROPLASTY Right 12/24/2021    Procedure: Right anterior total hip arthroplasty;  Surgeon: Dejan Lange MD;  Location: [facility];  Service: Orthopedics;   Laterality: Right;    WRIST SURGERY         Family History: family history includes Anesthesia problems in her mother; Arthritis in her father and mother; Asthma in her father; Cancer in her maternal grandmother, paternal grandmother, and another family member; Early death in her brother; Hearing loss in her father and mother; Heart disease in her father, maternal grandfather, maternal grandmother, mother, paternal grandfather, and paternal grandmother; Hypertension in an other family member; Osteoporosis in her mother; Rheumatologic disease in her paternal aunt. Otherwise pertinent FHx was reviewed and not pertinent to current issue.    Social History:  reports that she has never smoked. She has never used smokeless tobacco. She reports current alcohol use of about 4.0 standard drinks per week. She reports that she does not use drugs.    Home Medications:   Denosumab, EPINEPHrine, Lifitegrast, Melatonin, Mirabegron ER, Potassium Chloride, Probiotic Product, Thyroid, acetaminophen, atenolol, atorvastatin, azelastine, calcitonin (salmon), cephalexin, diphenhydrAMINE, fexofenadine, fluticasone, guaiFENesin, lansoprazole, levalbuterol, meclizine, meloxicam, metroNIDAZOLE, montelukast, multivitamin with minerals, and pilocarpine    Allergies:  Allergies   Allergen Reactions    Bee Venom Anaphylaxis    Latex Hives    Sulfa Antibiotics Hives, Nausea And Vomiting and Rash    Codeine Hives     And Derivatives    Cyclobenzaprine Other (See Comments) and Unknown - High Severity     Experienced side effects - blurred vision, dry mouth, constipation        Darvon [Propoxyphene] Nausea And Vomiting     CONFUSION.     Ditropan [Oxybutynin] Itching    Duricef [Cefadroxil] Itching and Nausea Only    Erythromycin Hives     Also allergic to Duricef    Hydrocodone Nausea And Vomiting and GI Intolerance    Iodine Hives     Iodine scrubbing solutions /IVP Contrast Dyes      Meperidine Nausea And Vomiting    Methylprednisolone Unknown  "(See Comments)     CAUSES ITCHING AND FACIAL FLUSHING BUT PT \"CAN TAKE LOWER DOSES\"    Minocycline Hives, Diarrhea and Nausea And Vomiting     Only with doses > 100mg BID          Morphine And Related Hives and GI Intolerance     Derivatives    Oxycodone-Acetaminophen Hives    Talwin [Pentazocine] Hives    Tramadol Hives and GI Intolerance     ULTRAM ER    Tramadol Hcl Hives and Nausea And Vomiting    Valium [Diazepam] Hives and Hallucinations     CONFUSION.     Adhesive Tape Rash    Bacitracin-Polymyxin B Rash    Betadine [Povidone Iodine] Rash    Contrast Dye (Echo Or Unknown Ct/Mr) Hives and Palpitations    Daptomycin Itching and Hives    Miconazole Rash    Neomycin-Polymyxin-Gramicidin Rash    Neosporin Af [Miconazole Nitrate] Rash    Penicillins Hives, Swelling and Rash     As infant    Silver Rash         Objective      Vitals:  Temp:  [96.7 °F (35.9 °C)-98.3 °F (36.8 °C)] 97.4 °F (36.3 °C)  Heart Rate:  [69-83] 74  Resp:  [13-27] 14  BP: (105-137)/(46-83) 127/83  Flow (L/min):  [3] 3    Physical Exam   Pleasant female resting in supine position, not in distress, spouse at bedside  Mental status: Alert and oriented x3  Head: Normocephalic and atraumatic  Eyes: Nonicteric, EOMI  Oropharynx: Moist mucous membranes, no thrush  CVS: Regular heart sounds, no gallops  Respiration: Unlabored breathing, no wheezes, no rales  GI: Soft, nondistended, nontender, normal bowel sounds  Extremity: No leg edema, no gross deformities  Skin: Normal color and temperature  Psychiatry: Normal affect, appropriate behavior  Neurology: No facial droop, normal speech    Result Review    Result Review:  I have personally reviewed the results from the time of this admission to 7/27/2023 14:49 EDT and agree with these findings:  [x]  Laboratory  []  Microbiology  [x]  Radiology  []  EKG/Telemetry   []  Cardiology/Vascular   []  Pathology  [x]  Old records  []  Other:      Assessment & Plan        Active Hospital Problems:  Active " Hospital Problems    Diagnosis     **Lumbar radiculopathy     Lumbar degenerative disc disease      Plan:   Lumbar radiculopathy POD 0 lumbar decompressive surgery  -Management per neurosurgery    Moderate persistent asthma  -Not currently in exacerbation  -Continue inhaled bronchodilators(Xopenex preferred because of paroxysmal tachyarrhythmia)    Acquired hypothyroidism  -Continue levothyroxine    Paroxysmal tachycardia  -She is taking atenolol for this condition, not for essential hypertension  -Patient states she experiences unspecified tachycardic episodes every afternoon, and so low-dose atenolol is used to control tachyarrhythmia)    GERD without esophagitis  -Continue PPI    Thank you for inviting us to participate in the care of this patient  Signature: Electronically signed by Corazon Martino MD, 07/27/23, 14:49 EDT.  [facility] Hospitalist Team

## 2023-07-27 NOTE — H&P
History of Present Illness: Valery Baez is a 77 y.o. female who complains of significant back pain as well as some pain radiating into her anterior thighs stopping above the knee.  Additionally the patient has significant pain in her buttocks and groin.  She was worked up for hip issues and discovered to have sacral insufficiency fractures.  Orthopedic surgeon did not think that her hip was the cause of her pain.  She has tried injections and physical therapy without improvement.          Chief Complaint   Patient presents with    Back Pain       Follow up             Previous Treatment: Physical Therapy,Ablation,Tylenol,Mobic      The following portions of the patient's history were reviewed and updated as appropriate: allergies, current medications, past family history, past medical history, past social history, past surgical history and problem list.     Review of Systems  Constitutional: Positive for activity change.  HENT: Negative.    Eyes: Negative.    Respiratory: Negative.    Cardiovascular: Negative.    Gastrointestinal: Negative.    Endocrine: Negative.    Genitourinary: Negative.    Musculoskeletal: Positive for arthralgias, back pain and myalgias.  Skin: Negative.    Allergic/Immunologic: Negative.    Neurological: Negative.    Hematological: Negative.    Psychiatric/Behavioral: Positive for sleep disturbance.               Objective     Neurologic Exam   Mental Status   Oriented to person, place, and time.     Motor Exam      Strength   Strength 5/5 throughout.     Sensory Exam   Light touch normal.         Assessment & Plan     Medical Decision Making:       Valery Baez is a 77 y.o. 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  She has exhausted all conservative measures including physical therapy and multiple injections without improvement.  We had planned to do an L2-3 LLIF for treatment of  her adjacent segment disease, however with the sacral insufficiency fractures which I believe she is symptomatic from will change our surgical plan.  We will plan for L2-3 LLIF followed by bilateral placement of sacroiliac bolts to stabilize that joint and the fracture.  Notably, the patient's SVA is around 9 cm positive which is elevated for her age, however I do not think that doing a large revision operation and correction of deformity is prudent in her case.  She does understand that she could continue to have symptoms due to this deformity, and she may require bigger revision surgery in the future should her symptoms not improve.  She understands the risks of surgery as well as increased risk due to medical comorbidities including osteopenia basal cell carcinoma breast cancer sleep apnea among many other medical comorbidities and she is agreed to undergo the procedure.

## 2023-07-27 NOTE — PLAN OF CARE
Goal Outcome Evaluation:  Plan of Care Reviewed With: patient           Outcome Evaluation: New admission from surgery

## 2023-07-28 ENCOUNTER — READMISSION MANAGEMENT (OUTPATIENT)
Dept: CALL CENTER | Facility: HOSPITAL | Age: 78
End: 2023-07-28
Payer: MEDICARE

## 2023-07-28 ENCOUNTER — APPOINTMENT (OUTPATIENT)
Dept: GENERAL RADIOLOGY | Facility: HOSPITAL | Age: 78
DRG: 460 | End: 2023-07-28
Payer: MEDICARE

## 2023-07-28 VITALS
RESPIRATION RATE: 18 BRPM | BODY MASS INDEX: 28.16 KG/M2 | WEIGHT: 175.2 LBS | OXYGEN SATURATION: 99 % | DIASTOLIC BLOOD PRESSURE: 58 MMHG | HEART RATE: 63 BPM | SYSTOLIC BLOOD PRESSURE: 132 MMHG | TEMPERATURE: 98.5 F | HEIGHT: 66 IN

## 2023-07-28 LAB
ANION GAP SERPL CALCULATED.3IONS-SCNC: 14 MMOL/L (ref 5–15)
BUN SERPL-MCNC: 25 MG/DL (ref 8–23)
BUN/CREAT SERPL: 25.5 (ref 7–25)
CALCIUM SPEC-SCNC: 8.1 MG/DL (ref 8.6–10.5)
CHLORIDE SERPL-SCNC: 103 MMOL/L (ref 98–107)
CO2 SERPL-SCNC: 16 MMOL/L (ref 22–29)
CREAT SERPL-MCNC: 0.98 MG/DL (ref 0.57–1)
EGFRCR SERPLBLD CKD-EPI 2021: 59.9 ML/MIN/1.73
GLUCOSE SERPL-MCNC: 136 MG/DL (ref 65–99)
POTASSIUM SERPL-SCNC: 4.7 MMOL/L (ref 3.5–5.2)
SODIUM SERPL-SCNC: 133 MMOL/L (ref 136–145)

## 2023-07-28 PROCEDURE — 72100 X-RAY EXAM L-S SPINE 2/3 VWS: CPT

## 2023-07-28 PROCEDURE — 97161 PT EVAL LOW COMPLEX 20 MIN: CPT

## 2023-07-28 PROCEDURE — 99024 POSTOP FOLLOW-UP VISIT: CPT

## 2023-07-28 RX ORDER — HYDROCODONE BITARTRATE AND ACETAMINOPHEN 5; 325 MG/1; MG/1
1 TABLET ORAL EVERY 4 HOURS PRN
Qty: 42 TABLET | Refills: 0 | Status: SHIPPED | OUTPATIENT
Start: 2023-07-28 | End: 2023-08-02

## 2023-07-28 RX ORDER — METHOCARBAMOL 500 MG/1
500 TABLET, FILM COATED ORAL 4 TIMES DAILY PRN
Qty: 56 TABLET | Refills: 0 | Status: SHIPPED | OUTPATIENT
Start: 2023-07-28 | End: 2023-08-02

## 2023-07-28 RX ADMIN — LEVOTHYROXINE, LIOTHYRONINE 30 MG: 19; 4.5 TABLET ORAL at 09:22

## 2023-07-28 RX ADMIN — Medication 10 ML: at 09:21

## 2023-07-28 RX ADMIN — ATENOLOL 25 MG: 25 TABLET ORAL at 09:21

## 2023-07-28 RX ADMIN — AZELASTINE HYDROCHLORIDE 2 SPRAY: 137 SPRAY, METERED NASAL at 09:22

## 2023-07-28 RX ADMIN — MIRABEGRON 25 MG: 25 TABLET, FILM COATED, EXTENDED RELEASE ORAL at 09:21

## 2023-07-28 RX ADMIN — ACETAMINOPHEN 650 MG: 325 TABLET, FILM COATED ORAL at 07:16

## 2023-07-28 RX ADMIN — PANTOPRAZOLE SODIUM 40 MG: 40 TABLET, DELAYED RELEASE ORAL at 09:21

## 2023-07-28 RX ADMIN — CETIRIZINE HYDROCHLORIDE 10 MG: 10 TABLET, FILM COATED ORAL at 09:21

## 2023-07-28 RX ADMIN — CALCITONIN SALMON 1 SPRAY: 200 SPRAY, METERED NASAL at 09:22

## 2023-07-28 RX ADMIN — PILOCARPINE HYDROCHLORIDE 5 MG: 5 TABLET, FILM COATED ORAL at 07:16

## 2023-07-28 NOTE — THERAPY EVALUATION
Patient Name: Valery Baez  : 1945    MRN: [id number]                              Today's Date: 2023       Admit Date: 2023    Visit Dx:     ICD-10-CM ICD-9-CM   1. S/P lumbar spinal fusion  Z98.1 V45.4   2. Lumbar radiculopathy  M54.16 724.4   3. Lumbar spinal stenosis due to adjacent segment disease after fusion procedure  M48.061 724.02    M51.36 722.52   4. Closed fracture of sacrum and coccyx with nonunion, subsequent encounter  S32.10XK 733.82    S32.2XXK    5. Lumbar degenerative disc disease  M51.36 722.52     Patient Active Problem List   Diagnosis    Lumbar spinal stenosis due to adjacent segment disease after fusion procedure    Obstructive sleep apnea (CPAP)     Iron deficiency anemia    Sjogren's disease    Moderate persistent asthma, uncomplicated    GERD without esophagitis    Seasonal allergies    MRSA (methicillin resistant Staphylococcus aureus) infection    Hypothyroidism, acquired    Postmenopausal osteoporosis    High cholesterol    Sinus tachycardia    Overactive bladder    Primary osteoarthritis of left hip    Rosacea    Irritable bowel syndrome with diarrhea    Benign positional vertigo (mild, occasional)     Acquired spinal deformity    Status post total hip replacement, right    Left hip pain    Fx sacrum/coccyx-closed    Lumbar radiculopathy    Lumbar degenerative disc disease     Past Medical History:   Diagnosis Date    Allergic     Anemia     CHRONIC IRON DEFICIENT    Anesthesia complication     STATES SHE IS VERY SLOW TO WAKE UP    Anesthesia complication     pt has artifical jaw joints upper and lower on both sides--- this makes intubation difficult    Ankle sprain     Arthritis of back 2018    Arthritis of neck 1988    Asthma     Basal cell carcinoma     FACE    Bursitis of hip     Cancer     right breast cancer    Cervical disc disorder     Chronic diarrhea     Chronic kidney disease     Clotting disorder     Bruise easily    CTS (carpal  tunnel syndrome) 1978    DDD (degenerative disc disease), lumbar     Disease of thyroid gland     Dislocation of finger 1984    Dry eye     Elevated cholesterol     Fracture of ankle 1998    Fracture of hip 2021    Right hip replacement; same symptoms in left    Fracture of wrist 1968    Fracture, finger 1993    Fracture, foot 1971    Frequency of urination     Frozen shoulder     GERD (gastroesophageal reflux disease)     Hard to intubate     REQUESTING TO SEE [facility] 8/22/19; PT HAS COMPLETE BILATERAL JAW PROSTESIS    Headache     or migraines    Heart palpitations     Hip arthrosis 2022    History of breast cancer     RIGHT    History of transfusion     [place] (hard of hearing)     BILAT HEARING AID    Hx of renal calculi     Hypercholesteremia     Irregular heart beat     mid afternoon   pt takes atenolol    Knee sprain 1965    Knee swelling 1966    Limited mobility     RIGHT HIP    Limited mobility     JAW    Low back pain     Low back strain 1957    Lumbosacral disc disease 2022    MRSA (methicillin resistant Staphylococcus aureus)     LEFT JAW @[facility] JOINT, MULT. ,LAST 2013, TREATED AT [facility], infection control notified 8/24/16. 1300    MSSA (methicillin susceptible Staphylococcus aureus)     Neck strain 1984    Osteoarthritis     Osteopenia     Prolia shot 11/4/22    Osteoporosis     Periarthritis of shoulder 1999    PONV (postoperative nausea and vomiting)     Right hip pain     Rosacea     Rotator cuff syndrome 1999    Short of breath on exertion     WITH STAIRS ONLY    Sleep apnea     C PAP    Spinal headache     Spinal stenosis     Subluxation of patella 1979    Tear of meniscus of knee 1966    Urgency of urination     Vertigo     WAKES UP WITH THIS SOMETIMES    Weakness     RIGHT HIP    Wrist sprain 1966     Past Surgical History:   Procedure Laterality Date    ABSCESS DRAINAGE      abscess removed from left jaw x2    ADENOIDECTOMY  1952    ANKLE OPEN REDUCTION INTERNAL FIXATION  1998    APPENDECTOMY   1965    AVULSION TOENAIL PLATE      6 REMOVED    BACK SURGERY  9/19/19    BILATERAL BREAST REDUCTION Left 08/29/2016    Procedure: LT BREAST REDUCTION ;  Surgeon: Luis Zambrano MD;  Location: [facility];  Service:     BREAST BIOPSY Bilateral     CARPAL TUNNEL RELEASE  1993    CHOLECYSTECTOMY  09/2000    COLONOSCOPY      COLONOSCOPY  2019    NORMAL    CYST REMOVAL  03/1984    eye cyst left lower lid    CYST REMOVAL  01/2007    right upper thigh    CYSTOSCOPY      x2  02/2013 & 04/2014    CYSTOSCOPY      NUMEROUS    D & C HYSTEROSCOPY      FOOT SURGERY      HAND SURGERY  1980    HYSTERECTOMY  1971    LATER BSO    JOINT REPLACEMENT  2021    right hip    KNEE ARTHROPLASTY Right 03/2005    LAPAROSCOPIC SALPINGOOPHERECTOMY      LUMBAR DISCECTOMY FUSION INSTRUMENTATION N/A 09/09/2019    Procedure: Lumbar 3 to sacral 1 laminectomy and fusion with instrumentation;  Surgeon: Vikas Couch MD;  Location: [facility];  Service: Orthopedic Spine    MANDIBLE SURGERY      UPPER AND LOWER JAW ADJUSTED WITH PLATES AND WIRES    MASTECTOMY Right 03/2015    MASTECTOMY PARTIAL / LUMPECTOMY Right 03/2009    NIPPLE RECONSTRUCTION Right 08/29/2016    Procedure: RT NIPPLE RECONSTRUCTION;  Surgeon: Luis Zambrano MD;  Location: [facility] OR;  Service:     OTHER SURGICAL HISTORY      laparoscopy adhesions right side x2    OTHER SURGICAL HISTORY      drain mammosite area    OTHER SURGICAL HISTORY  01/2010    pressure washed prosthesis area left jaw    OTHER SURGICAL HISTORY      drain mammosite area right breast 09/2011    RECONSTRUCTION BREAST W/ TRAM FLAP Right 02/2016    ROTATOR CUFF REPAIR Left 09/2014    SHOULDER SURGERY      SPINAL FUSION  2019    TEMPOROMANDIBULAR JOINT ARTHROPLASTY Bilateral 1993    MULT. REPLACEMENTS LATER    TONSILLECTOMY  1952    TOTAL HIP ARTHROPLASTY Right 12/24/2021    Procedure: Right anterior total hip arthroplasty;  Surgeon: Dejan Lange MD;  Location: [facility];  Service: Orthopedics;   Laterality: Right;    WRIST SURGERY        General Information       Row Name 07/28/23 1127          Physical Therapy Time and Intention    Document Type evaluation (P)   -TB     Mode of Treatment physical therapy (P)   -TB       Row Name 07/28/23 1127          General Information    Patient Profile Reviewed yes (P)   -TB     Prior Level of Function bed mobility;ADL's;transfer;all household mobility;gait (P)   mod I ambulation using rollator  -TB     Existing Precautions/Restrictions brace worn when out of bed;spinal;other (see comments);LSO (P)   latex allergy  -TB     Barriers to Rehab none identified (P)   -TB       Row Name 07/28/23 1127          Living Environment    People in Home spouse (P)   -TB       Row Name 07/28/23 1127          Home Main Entrance    Number of Stairs, Main Entrance three (P)   -TB     Stair Railings, Main Entrance railings safe and in good condition (P)   -TB       Row Name 07/28/23 1127          Stairs Within Home, Primary    Number of Stairs, Within Home, Primary other (see comments) (P)   13  -TB     Stair Railings, Within Home, Primary railings safe and in good condition (P)   -TB     Stairs Comment, Within Home, Primary pt report she will only navigate steps when  is home so he can help (P)   -TB       Row Name 07/28/23 1127          Cognition    Orientation Status (Cognition) oriented x 4 (P)   -TB       Row Name 07/28/23 1127          Safety Issues, Functional Mobility    Safety Issues Affecting Function (Mobility) friction/shear risk (P)   -TB     Impairments Affecting Function (Mobility) balance;coordination;postural/trunk control;pain;range of motion (ROM) (P)   -TB               User Key  (r) = Recorded By, (t) = Taken By, (c) = Cosigned By      Initials Name Provider Type    TB Mago France, PT Student PT Student                   Mobility       Row Name 07/28/23 1130          Bed Mobility    Bed Mobility bed mobility (all) activities;rolling left (P)   -TB     All  Activities, Coleman (Bed Mobility) modified independence (P)   -TB     Rolling Left Coleman (Bed Mobility) modified independence (P)   -TB     Comment, (Bed Mobility) log roll L, sidelying to sit EOB (P)   -TB       Row Name 07/28/23 1130          Sit-Stand Transfer    Sit-Stand Coleman (Transfers) standby assist (P)   -TB     Assistive Device (Sit-Stand Transfers) other (see comments) (P)   rollator  -TB     Comment, (Sit-Stand Transfer) pt demonstrated good safety awareness by checking the brakes on the rollator before standing up; donned LSO prior to ambulating (P)   -TB       Row Name 07/28/23 1130          Gait/Stairs (Locomotion)    Coleman Level (Gait) contact guard (P)   -TB     Assistive Device (Gait) other (see comments) (P)   rollator; LSO  -TB     Distance in Feet (Gait) 15+ 15 (P)   pt ambulated to bathroom and then to transport chair as she needed to be taken down for x-ray  -TB     Bilateral Gait Deviations forward flexed posture (P)   -TB               User Key  (r) = Recorded By, (t) = Taken By, (c) = Cosigned By      Initials Name Provider Type    TB Mago [place], PT Student PT Student                   Obj/Interventions       Row Name 07/28/23 1133          Range of Motion Comprehensive    General Range of Motion bilateral upper extremity ROM WFL;bilateral lower extremity ROM WFL (P)   -TB       Row Name 07/28/23 1133          Strength Comprehensive (MMT)    General Manual Muscle Testing (MMT) Assessment no strength deficits identified (P)   -TB     Comment, General Manual Muscle Testing (MMT) Assessment WFLs (P)   -TB       Row Name 07/28/23 1133          Balance    Balance Assessment sitting static balance;sitting dynamic balance;standing static balance;standing dynamic balance (P)   -TB     Static Sitting Balance independent (P)   -TB     Dynamic Sitting Balance modified independence (P)   -TB     Position, Sitting Balance sitting edge of bed (P)   -TB     Static Standing  Balance standby assist (P)   -TB     Dynamic Standing Balance contact guard (P)   -TB     Position/Device Used, Standing Balance supported;other (see comments) (P)   rollator  -TB               User Key  (r) = Recorded By, (t) = Taken By, (c) = Cosigned By      Initials Name Provider Type    TB Mago [place], PT Student PT Student                   Goals/Plan    No documentation.                  Clinical Impression       Row Name 07/28/23 1134          Pain    Pain Intervention(s) Repositioned;Emotional support;Ambulation/increased activity (P)   -TB     Additional Documentation Pain Scale: FACES Pre/Post-Treatment (Group) (P)   -TB       Row Name 07/28/23 1134          Pain Scale: FACES Pre/Post-Treatment    Pain: FACES Scale, Pretreatment 2-->hurts little bit (P)   -TB     Posttreatment Pain Rating 2-->hurts little bit (P)   -TB     Pain Location - back (P)   -TB       Row Name 07/28/23 1134          Plan of Care Review    Plan of Care Reviewed With patient (P)   -TB     Outcome Evaluation 77 y.o. female adm to [facility] on 7/27/23 s/p L2-3 LLIF. At baseline pt lives w/ her  in a multi level home w/ 3 TARAH. She reports being indep w/ bed mobility, ADLs and mod I w/ ambulation using a rollator. Pt reports  assists sometimes w/ making meals and doing things around the house. She reports he will help her navigate stairs when she gets home and be w/ her to assist. Today pt was A&O x4. PT reviewed spinal precautions w/ the pt and log rolling before exiting the bed. She performed bed mobility mod I and was able to assist w/ donning her LSO brace prior to ambulation. She was SBA using her rollator for sit to stand transfer and CGA ambulating 15 + 15 ft. She performed prasad care mod I. Pt is safe to return home w/ assist from her  at d/c. PT will sign off. (P)   -TB       Row Name 07/28/23 113          Therapy Assessment/Plan (PT)    Criteria for Skilled Interventions Met (PT) no;no problems identified  which require skilled intervention (P)   -TB     Therapy Frequency (PT) evaluation only (P)   -TB       Row Name 07/28/23 1134          Vital Signs    O2 Delivery Pre Treatment room air (P)   -TB     O2 Delivery Intra Treatment room air (P)   -TB     O2 Delivery Post Treatment room air (P)   -TB     Pre Patient Position Supine (P)   -TB     Intra Patient Position Standing (P)   -TB     Post Patient Position Sitting (P)   -TB       Row Name 07/28/23 1134          Positioning and Restraints    Pre-Treatment Position in bed (P)   -TB     Post Treatment Position other (P)   transport chair to be taken down for x-ray  -TB     Other Position with other staff (P)   -TB               User Key  (r) = Recorded By, (t) = Taken By, (c) = Cosigned By      Initials Name Provider Type    TB Mago [place], PT Student PT Student                   Outcome Measures       Row Name 07/28/23 1143 07/28/23 0840       How much help from another person do you currently need...    Turning from your back to your side while in flat bed without using bedrails? 4 (P)   -TB 3  -BC    Moving from lying on back to sitting on the side of a flat bed without bedrails? 4 (P)   -TB 3  -BC    Moving to and from a bed to a chair (including a wheelchair)? 4 (P)   -TB 3  -BC    Standing up from a chair using your arms (e.g., wheelchair, bedside chair)? 3 (P)   -TB 3  -BC    Climbing 3-5 steps with a railing? 3 (P)   -TB 2  -BC    To walk in hospital room? 4 (P)   -TB 3  -BC    AM-PAC 6 Clicks Score (PT) 22 (P)   -TB 17  -BC    Highest level of mobility 7 --> Walked 25 feet or more (P)   -TB 5 --> Static standing  -BC      Row Name 07/28/23 1143          Functional Assessment    Outcome Measure Options AM-PAC 6 Clicks Basic Mobility (PT) (P)   -TB               User Key  (r) = Recorded By, (t) = Taken By, (c) = Cosigned By      Initials Name Provider Type    BC Velia Givens LPN Licensed Nurse    TB Mago [place], PT Student PT Student                                  Physical Therapy Education       Title: PT OT SLP Therapies (Done)       Topic: Physical Therapy (Done)       Point: Mobility training (Done)       Learning Progress Summary             Patient Acceptance, E,TB, VU,DU by TB at 7/28/2023 1143                                         User Key       Initials Effective Dates Name Provider Type Discipline    TB 05/09/23 -  Mago France, PT Student PT Student PT                  PT Recommendation and Plan     Plan of Care Reviewed With: (P) patient  Outcome Evaluation: (P) 77 y.o. female adm to [facility] on 7/27/23 s/p L2-3 LLIF. At baseline pt lives w/ her  in a multi level home w/ 3 TARAH. She reports being indep w/ bed mobility, ADLs and mod I w/ ambulation using a rollator. Pt reports  assists sometimes w/ making meals and doing things around the house. She reports he will help her navigate stairs when she gets home and be w/ her to assist. Today pt was A&O x4. PT reviewed spinal precautions w/ the pt and log rolling before exiting the bed. She performed bed mobility mod I and was able to assist w/ donning her LSO brace prior to ambulation. She was SBA using her rollator for sit to stand transfer and CGA ambulating 15 + 15 ft. She performed prasad care mod I. Pt is safe to return home w/ assist from her  at d/c. PT will sign off.     Time Calculation:   PT Evaluation Complexity  History, PT Evaluation Complexity: (P) 1-2 personal factors and/or comorbidities  Examination of Body Systems (PT Eval Complexity): (P) 1-2 elements  Clinical Presentation (PT Evaluation Complexity): (P) evolving  Clinical Decision Making (PT Evaluation Complexity): (P) low complexity  Overall Complexity (PT Evaluation Complexity): (P) low complexity     PT Charges       Row Name 07/28/23 1144             Time Calculation    Start Time 1034 (P)   -TB      Stop Time 1045 (P)   -TB      Time Calculation (min) 11 min (P)   -TB      PT Received On 07/28/23 (P)   -TB          Time Calculation- PT    Total Timed Code Minutes- PT 0 minute(s) (P)   -TB                User Key  (r) = Recorded By, (t) = Taken By, (c) = Cosigned By      Initials Name Provider Type    TB Mago [place], PT Student PT Student                  Therapy Charges for Today       Code Description Service Date Service Provider Modifiers Qty    [id number] HC PT EVAL LOW COMPLEXITY 2 7/28/2023 Mago France, PT Student GP 1            PT G-Codes  Outcome Measure Options: (P) AM-PAC 6 Clicks Basic Mobility (PT)  AM-PAC 6 Clicks Score (PT): (P) 22  PT Discharge Summary  Anticipated Discharge Disposition (PT): (P) home with assist  Reason for Discharge: (P) At baseline function    RUBENS Vargas  7/28/2023

## 2023-07-28 NOTE — PLAN OF CARE
Goal Outcome Evaluation:  Pt able to voice wants and needs. Pt is an assist x1 with walker when ambulating to the bathroom. Pt is resting in bed with the call light within reach and the bed alarm is set. Ongoing care continues.

## 2023-07-28 NOTE — DISCHARGE INSTRUCTIONS
LUMBAR FUSION  Post-op Guide    Dr. Aston Paredes MD                                                              POST-OP   ACTIVITY GUIDE     DAY OF SURGERY   We want you to get up and Move!    Getting out of bed soon after surgery will speed your recovery!    GOAL:  Out of bed 2 hours after awaking from surgery for your first walk  You may require assistance from nurse  A walker for stability may be used initially but briefly  Short frequent walks (5min) every 2 hours while in hospital  Engage core when getting in and out of bed   Use smart movement strategies when changing positions  Practice DEEP BREATHING while you walk  3-6 count inhale and exhale  Rely on ORAL pain medications NOT IV       ADVANTAGES:  Prevent blood clots in the legs  Prevents pneumonia through promoting deep breathing  Prevents back muscles from stiffening - will decrease pain   You CANNOT walk too much  Oral pain meds have better steady control of pain     POST-OP DAY #1   Diet / Activity / Pain Control / GO HOME    Assessments by Physical Therapy (PT) and Occupational Therapy (OT)    GOAL  Review proper spine mechanics/ restrictions  Walking up and down stairs is GOOD   PT / OT will assess when you are safe to go home  Activities of Daily Living - okay to shower, dress, navigate around house  Surgical Incision needs to be covered in the shower unless otherwise advised by Dr. Paredes  No baths or hot tubs 6 weeks or until incision closed without scab.               Criteria for Discharge Home:  Cleared by PT / OT   Cleared by the Medicine Service  Adequate pain control with or without medications  Tolerating food and Liquids  Ability to urinate  Having a bowel movement IS NOT a discharge requirement unless there is a medical issue as per hospitalist   Depends on pain control, support at home, mobility    Occasionally some patients with extra care needs continue their recovery at a local rehabilitation facility (e.g. patients with minimal  home social support, additional medical needs). Hospital based  will assist with rehab placement.     ACTIVITY GUIDELINES    Careful with the BLT's for 3 months !    Bending  Engage Core when changing positions   Use smart movement strategies - Squat, kneel, support yourself using arms  Bending with bad form once or twice is NOT a problem  Surgical hardware won't break from just bending / getting out of bed  Repetitive poor bending form can loosen hardware over time  Not using core to bend can “strain” back muscles    Lifting  General weight limit for first 12 weeks is a maximum of 20-40 lbs   Anything that causes “strain” should not be lifted  Coughing, sneezing, vomiting, straining with bowel movements will not damage your surgical hardware  Lap top, gallon of milk, purse okay  Luggage, large backpack, heavy grocery bag, furniture - not okay  Lift things close to body - limit reaching to pick things up    Twisting  Turn hips, shoulders and spine as one unit  Avoid reaching across the body  Activate core during more complex movements  Remember it is repetitive poor spine mechanics not solitary actions which can harm surgical hardware placement          Driving    - Okay to consider during first 2 weeks after surgery   - MUST meet following requirements:    -You must be OFF narcotics and not under their influence     - You must be a SAFE  yourself.     - Patients may be considered to be UNSAFE if they are:     -Distracted by their pain     -Unable to sit comfortably for the required length of the journey     -Unable to use mirrors safely because of restrictions or surgical pain      ACTIVITY - FIRST 2 WEEKS:    Low Impact Aerobic Exercise   5-30min 2 times perday EVERY DAY  Walking, elliptical, stairs and/or recumbent bike  Slow safe pace, okay to do intervals (walk 4 min rest 1 min x 3-5 sets)  No hiking, speed walking or carrying more than 20lbs   FOCUS ON POSTURE, DEEP BREATHING (6 count) AND  CORE ACTIVATION    Physical Therapy  Will start after your first post-operative office visit (2 weeks)  It's Important, So, YES it's Mandatory  2 times per week x 12 weeks  We can recommend a Physical Therapist near your home  PT should help guide your core exercise program  Home exercises and low impact aerobic work should be done 4-5x per week in addition to PT   My Post-op Core Stabilization Program is the static core program we recommend your physical therapist take you though  Additional modalities are balance and light resistance band training     RETURNING TO WORK     The timescale for Return to Work will vary from patient-to-patient and depends mainly on the nature of your specific surgery and the type of work / activity you wish to re-commence.  General Guidelines:    Can work from home if needed within the first week or so of surgery  Do not work for longer than 30-45 minutes at a time without a break (standing and walking around)    Sedentary jobs (deskwork, etc)   Typically within 1-6 weeks  depends on particulars of job  driving distance   stress, etc  light duty  <10lbs weight limit, Minimal BLT - okay  If you have the option, sometimes returning to work alternate days (i.e Mon-Wed-Fri) for 1-2 weeks assists with the transition back to work.     Manual Labor  3-6 months - varies per case  depends on intensity and weight limit  must have exhibited a strong core with Level 3 or higher on core program or a note from PT reflecting a strong core        RECREATIONAL SPORTS & ACTIVITIES     After 2 weeks  swimming    After 6 weeks   hiking (no Pack, light grade); biking with upright, flat back posture    After 3 months   Must be cleared by Dr Paredes and PT  Biking, jogging, resistance training, climbing, Pilates, sports specific drills, body weight interval training, golf, tennis  Core level 3 completed  Start slow and build up slowly   Do NOT go back FULL SPEED right away     After 6 moths - 1 year  Skiing,  horseback riding, ATV riding, snow mobile, etc                  Sports - Non-Contact  Minimum 12 weeks  Must have core level 3 or greater  Must have completed aerobic and resistance training   Must have successfully done sports specific drill and been asymptomatic    Sports - Contact   Varies from sport to sport  Minimum 6 Months  Must exhibit strong Core Level 4 or 5   Must exhibit signs of fusion on CT   Must have completed non-contact  sports specific drills without any symptoms      Lifelong recommendations:  Warm up before EVERY activity 5-10 minutes using Recumbent bike, Stair Master, elliptical , speed walk  Core exercises:   3 -5 x /week - forever!  10 minutes  Should follow warm up prior to activity / sport  Always know your core level

## 2023-07-28 NOTE — CASE MANAGEMENT/SOCIAL WORK
Case Management Discharge Note      Final Note: HOME         Selected Continued Care - Discharged on 7/28/2023 Admission date: 7/27/2023 - Discharge disposition: Home or Self Care                  Transportation Services  Private: Car    Final Discharge Disposition Code: 01 - home or self-care

## 2023-07-28 NOTE — PROGRESS NOTES
[facility] Medicine Services   Daily Progress Note    Patient Name: Valery Baez  : 1945  MRN: [id number]  Primary Care Physician:  Tino Smith MD  Date of admission: 2023  Date and Time of Service: 2023    Brief clinical summary:  Valery Baez is a 77 y.o. female with history of asthma, iron deficiency, irritable bowel syndrome, GERD, hypothyroidism, hyperlipidemia, Sjogren's syndrome, prediabetes, unspecified tachyarrhythmia and lumbar radiculopathy who is status post lumbar decompressive surgery on .  Medical consultation was requested to assist with managing chronic medical diseases.  Postoperative course has been uneventful    Subjective      Patient resting in bed.  Reports mild chest congestion. No cough, sputum, shortness of breath, chest pain, wheezing, fever or chills.  Oral intake is improved.  She has been up and walking to the bathroom a few times.  Reports no other concerns    Objective      Vitals:   Temp:  [96.7 °F (35.9 °C)-99.2 °F (37.3 °C)] 98.5 °F (36.9 °C)  Heart Rate:  [59-83] 63  Resp:  [13-27] 18  BP: (105-154)/(46-83) 132/58  Flow (L/min):  [3] 3    Physical Exam   Pleasant female resting in supine position, not in distress  Mental status: Alert and oriented x3  CVS: Regular heart sounds, no gallops  Respiration: Unlabored breathing, no wheezes, no rales  GI: Soft, nondistended, nontender, normal bowel sounds  Extremity: No leg edema, no gross deformities  Skin: Normal color and temperature  Psychiatry: Normal affect, appropriate behavior  Neurology: No facial droop, normal speech     Result Review    Result Review:  I have personally reviewed the results from the time of this admission to 2023 09:10 EDT and agree with these findings:  [x]  Laboratory  []  Microbiology  []  Radiology  []  EKG/Telemetry   []  Cardiology/Vascular   []  Pathology  []  Old records  []  Other:    Wounds (last 24 hours)       LDA Wound        Row Name 07/28/23 0840 07/28/23 0400 07/28/23 0000       Wound 07/27/23 0823 Left flank Incision    Wound - Properties Group Placement Date: 07/27/23 -LT Placement Time: 0823 -LT Side: Left  -LT Location: flank  -LT Primary Wound Type: Incision  -LT    Dressing Appearance dry;intact  -BC dry;intact  -LC dry;intact  -LC    Closure TIFFANY  -BC TIFFANY  -LC TIFFANY  -LC    Base dressing in place, unable to visualize  -BC dressing in place, unable to visualize  -LC dressing in place, unable to visualize  -LC    Retired Wound - Properties Group Placement Date: 07/27/23 -LT Placement Time: 0823 -LT Side: Left  -LT Location: flank  -LT Primary Wound Type: Incision  -LT    Retired Wound - Properties Group Date first assessed: 07/27/23 -LT Time first assessed: 0823 -LT Side: Left  -LT Location: flank  -LT Primary Wound Type: Incision  -LT       Wound 07/27/23 0952 Right posterior hip Incision    Wound - Properties Group Placement Date: 07/27/23 -LT Placement Time: 0952 -LT Side: Right  -LT Orientation: posterior  -LT Location: hip  -LT Primary Wound Type: Incision  -LT    Dressing Appearance dry;intact  -BC dry;intact  -LC dry;intact  -LC    Closure TIFFANY  -BC TIFFANY  -LC TIFFANY  -LC    Base dressing in place, unable to visualize  -BC dressing in place, unable to visualize  -LC dressing in place, unable to visualize  -LC    Retired Wound - Properties Group Placement Date: 07/27/23 -LT Placement Time: 0952 -LT Side: Right  -LT Orientation: posterior  -LT Location: hip  -LT Primary Wound Type: Incision  -LT    Retired Wound - Properties Group Date first assessed: 07/27/23 -LT Time first assessed: 0952 -LT Side: Right  -LT Location: hip  -LT Primary Wound Type: Incision  -LT       Wound 07/27/23 0952 Left posterior hip Incision    Wound - Properties Group Placement Date: 07/27/23 -LT Placement Time: 0952 -LT Side: Left  -LT Orientation: posterior  -LT Location: hip  -LT Primary Wound Type: Incision  -LT    Dressing Appearance  dry;intact  -BC dry;intact  -LC dry;intact  -LC    Closure TIFFANY  -BC TIFFANY  -LC TIFFANY  -LC    Base dressing in place, unable to visualize  -BC dressing in place, unable to visualize  -LC dressing in place, unable to visualize  -LC    Retired Wound - Properties Group Placement Date: 07/27/23 -LT Placement Time: 0952 -LT Side: Left  -LT Orientation: posterior  -LT Location: hip  -LT Primary Wound Type: Incision  -LT    Retired Wound - Properties Group Date first assessed: 07/27/23 -LT Time first assessed: 0952  -LT Side: Left  -LT Location: hip  -LT Primary Wound Type: Incision  -LT      Row Name 07/27/23 2047 07/27/23 1241 07/27/23 1140       Wound 07/27/23 0823 Left flank Incision    Wound - Properties Group Placement Date: 07/27/23 -LT Placement Time: 0823 -LT Side: Left  -LT Location: flank  -LT Primary Wound Type: Incision  -LT    Dressing Appearance dry;intact;no drainage  -LC -- dry;intact;no drainage  -JT    Closure TIFFANY  -LC -- TIFFANY  -JT    Base dressing in place, unable to visualize  -LC dressing in place, unable to visualize  -AS --    Retired Wound - Properties Group Placement Date: 07/27/23 -LT Placement Time: 0823 -LT Side: Left  -LT Location: flank  -LT Primary Wound Type: Incision  -LT    Retired Wound - Properties Group Date first assessed: 07/27/23 -LT Time first assessed: 0823 -LT Side: Left  -LT Location: flank  -LT Primary Wound Type: Incision  -LT       Wound 07/27/23 0952 Right posterior hip Incision    Wound - Properties Group Placement Date: 07/27/23  -LT Placement Time: 0952 -LT Side: Right  -LT Orientation: posterior  -LT Location: hip  -LT Primary Wound Type: Incision  -LT    Dressing Appearance dry;intact;no drainage  -LC -- --    Closure TIFFANY  -LC -- TIFFANY  -JT    Base dressing in place, unable to visualize  -LC dressing in place, unable to visualize  -AS --    Retired Wound - Properties Group Placement Date: 07/27/23 -LT Placement Time: 0952  -LT Side: Right  -LT Orientation: posterior   -LT Location: hip  -LT Primary Wound Type: Incision  -LT    Retired Wound - Properties Group Date first assessed: 07/27/23 -LT Time first assessed: 0952  -LT Side: Right  -LT Location: hip  -LT Primary Wound Type: Incision  -LT       Wound 07/27/23 0952 Left posterior hip Incision    Wound - Properties Group Placement Date: 07/27/23  -LT Placement Time: 0952 -LT Side: Left  -LT Orientation: posterior  -LT Location: hip  -LT Primary Wound Type: Incision  -LT    Dressing Appearance dry;intact;no drainage  -LC -- --    Closure TIFFANY  -LC -- TIFFANY  -JT    Base dressing in place, unable to visualize  -LC dressing in place, unable to visualize  -AS --    Retired Wound - Properties Group Placement Date: 07/27/23  -LT Placement Time: 0952 -LT Side: Left  -LT Orientation: posterior  -LT Location: hip  -LT Primary Wound Type: Incision  -LT    Retired Wound - Properties Group Date first assessed: 07/27/23  -LT Time first assessed: 0952  -LT Side: Left  -LT Location: hip  -LT Primary Wound Type: Incision  -LT      Row Name 07/27/23 1125 07/27/23 1110 07/27/23 1055       Wound 07/27/23 0823 Left flank Incision    Wound - Properties Group Placement Date: 07/27/23 -LT Placement Time: 0823 -LT Side: Left  -LT Location: flank  -LT Primary Wound Type: Incision  -LT    Dressing Appearance dry;intact;no drainage  -JT dry;intact;no drainage  -JT dry;intact;no drainage  -JT    Closure TIFFANY  -JT TIFFANY  -JT TIFFANY  -JT    Retired Wound - Properties Group Placement Date: 07/27/23  -LT Placement Time: 0823 -LT Side: Left  -LT Location: flank  -LT Primary Wound Type: Incision  -LT    Retired Wound - Properties Group Date first assessed: 07/27/23  -LT Time first assessed: 0823 -LT Side: Left  -LT Location: flank  -LT Primary Wound Type: Incision  -LT       Wound 07/27/23 0952 Right posterior hip Incision    Wound - Properties Group Placement Date: 07/27/23  -LT Placement Time: 0952 -LT Side: Right  -LT Orientation: posterior  -LT Location: hip   -LT Primary Wound Type: Incision  -LT    Dressing Appearance -- dry;intact;no drainage  -JT --    Closure TIFFANY  -JT TIFFANY  -JT --    Retired Wound - Properties Group Placement Date: 07/27/23  -LT Placement Time: 0952  -LT Side: Right  -LT Orientation: posterior  -LT Location: hip  -LT Primary Wound Type: Incision  -LT    Retired Wound - Properties Group Date first assessed: 07/27/23  -LT Time first assessed: 0952  -LT Side: Right  -LT Location: hip  -LT Primary Wound Type: Incision  -LT       Wound 07/27/23 0952 Left posterior hip Incision    Wound - Properties Group Placement Date: 07/27/23  -LT Placement Time: 0952  -LT Side: Left  -LT Orientation: posterior  -LT Location: hip  -LT Primary Wound Type: Incision  -LT    Dressing Appearance -- dry;intact;no drainage  -JT --    Closure TIFFANY  -JT TIFFANY  -JT --    Retired Wound - Properties Group Placement Date: 07/27/23  -LT Placement Time: 0952  -LT Side: Left  -LT Orientation: posterior  -LT Location: hip  -LT Primary Wound Type: Incision  -LT    Retired Wound - Properties Group Date first assessed: 07/27/23  -LT Time first assessed: 0952  -LT Side: Left  -LT Location: hip  -LT Primary Wound Type: Incision  -LT      Row Name 07/27/23 1052 07/27/23 1042 07/27/23 1004       Wound 07/27/23 0823 Left flank Incision    Wound - Properties Group Placement Date: 07/27/23  -LT Placement Time: 0823  -LT Side: Left  -LT Location: flank  -LT Primary Wound Type: Incision  -LT    Dressing Appearance -- dry;intact;no drainage  -JT --    Closure -- TIFFANY  -JT Liquid skin adhesive;Adhesive bandage  exofin, covaderm  -LT    Retired Wound - Properties Group Placement Date: 07/27/23  -LT Placement Time: 0823  -LT Side: Left  -LT Location: flank  -LT Primary Wound Type: Incision  -LT    Retired Wound - Properties Group Date first assessed: 07/27/23  -LT Time first assessed: 0823  -LT Side: Left  -LT Location: flank  -LT Primary Wound Type: Incision  -LT       Wound 07/27/23 0952 Right posterior  hip Incision    Wound - Properties Group Placement Date: 07/27/23  -LT Placement Time: 0952 -LT Side: Right  -LT Orientation: posterior  -LT Location: hip  -LT Primary Wound Type: Incision  -LT    Dressing Appearance dry;intact;no drainage  -JT -- --    Closure Liquid skin adhesive;Adhesive bandage  exofin, covaderm  -LT -- --    Retired Wound - Properties Group Placement Date: 07/27/23  -LT Placement Time: 0952  -LT Side: Right  -LT Orientation: posterior  -LT Location: hip  -LT Primary Wound Type: Incision  -LT    Retired Wound - Properties Group Date first assessed: 07/27/23  -LT Time first assessed: 0952 -LT Side: Right  -LT Location: hip  -LT Primary Wound Type: Incision  -LT       Wound 07/27/23 0952 Left posterior hip Incision    Wound - Properties Group Placement Date: 07/27/23  -LT Placement Time: 0952 -LT Side: Left  -LT Orientation: posterior  -LT Location: hip  -LT Primary Wound Type: Incision  -LT    Dressing Appearance dry;intact;no drainage  -JT -- --    Closure Liquid skin adhesive;Adhesive bandage  exofin, covaderm  -LT -- --    Retired Wound - Properties Group Placement Date: 07/27/23  -LT Placement Time: 0952 -LT Side: Left  -LT Orientation: posterior  -LT Location: hip  -LT Primary Wound Type: Incision  -LT    Retired Wound - Properties Group Date first assessed: 07/27/23  -LT Time first assessed: 0952 -LT Side: Left  -LT Location: hip  -LT Primary Wound Type: Incision  -LT              User Key  (r) = Recorded By, (t) = Taken By, (c) = Cosigned By      Initials Name Provider Type    LT Carey Jackson, RN Registered Nurse    Mary Gaitan RN Registered Nurse    JClaudine Hebert RN Registered Nurse    Velia Santiago LPN Licensed Nurse    Michelle Pacheco LPN Licensed Nurse                      Assessment & Plan          acetaminophen, 650 mg, Oral, Q8H  atenolol, 25 mg, Oral, Daily  atorvastatin, 20 mg, Oral, Nightly  azelastine, 2 spray, Each Nare, BID  calcitonin  (salmon), 1 spray, Alternating Nares, Daily  cetirizine, 10 mg, Oral, Daily  enoxaparin, 40 mg, Subcutaneous, Daily  Mirabegron ER, 25 mg, Oral, Daily  montelukast, 10 mg, Oral, Nightly  pantoprazole, 40 mg, Oral, Daily  pilocarpine, 5 mg, Oral, 4x Daily  sodium chloride, 10 mL, Intravenous, Q12H  Thyroid, 30 mg, Oral, Daily       lactated ringers, 1,000 mL, Last Rate: 1,000 mL (07/27/23 0707)         Active Hospital Problems:  Active Hospital Problems    Diagnosis     **Lumbar radiculopathy     Lumbar degenerative disc disease      Plan:     Lumbar radiculopathy, POD 1 lumbar decompressive surgery  -Management per neurosurgery  -She is tolerating oral intake, will DC IV fluid     Moderate persistent asthma  -Not currently in exacerbation  -Continue inhaled bronchodilators(Xopenex preferred because of paroxysmal tachyarrhythmia)     Acquired hypothyroidism  -Continue levothyroxine     Paroxysmal tachycardia  -She is taking atenolol for this condition, not for essential hypertension  -Patient states she experiences unspecified tachycardic episodes every afternoon, and so low-dose atenolol is used to control tachyarrhythmia)     GERD without esophagitis  -Continue PPI    DVT prophylaxis:  Medical DVT prophylaxis orders are present.    CODE STATUS:         Disposition:  I expect patient to be discharged within 24 hours      Electronically signed by Corazon Martino MD, 07/28/23, 09:10 EDT.  [facility] Hospitalist Team

## 2023-07-28 NOTE — OUTREACH NOTE
Prep Survey      Flowsheet Row Responses   [facility] patient discharged from? [facility]   Is LACE score < 7 ? No   Eligibility [facility]   Hospital [facility]   Date of Admission 07/27/23   Date of Discharge 07/28/23   Discharge Disposition Home or Self Care   Discharge diagnosis Lumbar 2-3 lateral lumbar interbody fusion   Does the patient have one of the following disease processes/diagnoses(primary or secondary)? General Surgery   Does the patient have Home health ordered? No   Is there a DME ordered? No   Prep survey completed? Yes            Sheree WILSON - Registered Nurse

## 2023-07-28 NOTE — PLAN OF CARE
Goal Outcome Evaluation:  Plan of Care Reviewed With: (P) patient           Outcome Evaluation: (P) 77 y.o. female adm to [facility] on 7/27/23 s/p L2-3 LLIF. At baseline pt lives w/ her  in a multi level home w/ 3 TARAH. She reports being indep w/ bed mobility, ADLs and mod I w/ ambulation using a rollator. Pt reports  assists sometimes w/ making meals and doing things around the house. She reports he will help her navigate stairs when she gets home and be w/ her to assist. Today pt was A&O x4. PT reviewed spinal precautions w/ the pt and log rolling before exiting the bed. She performed bed mobility mod I and was able to assist w/ donning her LSO brace prior to ambulation. She was SBA using her rollator for sit to stand transfer and CGA ambulating 15 + 15 ft. She performed prasad care mod I. Pt is safe to return home w/ assist from her  at d/c. PT will sign off.      Anticipated Discharge Disposition (PT): (P) home with assist

## 2023-07-28 NOTE — PLAN OF CARE
Goal Outcome Evaluation:  Plan of Care Reviewed With: patient        Progress: improving     Pt is dc home.

## 2023-07-31 ENCOUNTER — TELEPHONE (OUTPATIENT)
Dept: NEUROSURGERY | Facility: CLINIC | Age: 78
End: 2023-07-31
Payer: MEDICARE

## 2023-07-31 ENCOUNTER — TRANSITIONAL CARE MANAGEMENT TELEPHONE ENCOUNTER (OUTPATIENT)
Dept: CALL CENTER | Facility: HOSPITAL | Age: 78
End: 2023-07-31
Payer: MEDICARE

## 2023-07-31 DIAGNOSIS — Z98.1 S/P SPINAL FUSION: Primary | ICD-10-CM

## 2023-07-31 NOTE — PROGRESS NOTES
"Chief Complaint  Follow-up of the Left Hand    Subjective    History of Present Illness      Valery Baez is a 77 y.o. female who presents to [facility] for follow-up on lumbar spine pain and bilateral sacroiliac joint pain.  History of Present Illness this patient has multiple orthopedic issues.  She has had bilateral total knee arthroplasty performed by Dr. Robb 18 years ago.  She does have some pain and discomfort with the knee but it really does not bother her.  She has had a right total hip replacement performed by me a year and a half ago through direct anterior approach.  She did very well with that hip replacement surgery.  At this point her left hip and SI joint are bothering her significantly.  She is actually scheduled to have a surgery with Dr. Aston Paredes who is a neurosurgeon.  I have reassured her that she does not really need a left hip arthroplasty.  She also has a progressive valgus deformity of the left knee.  The knee is bothering her significantly and she wants to defer knee replacement surgery as long as possible.  Pain Location: LEFT hip  Radiation:  From the lumbar spine into the hip buttock and thigh.  Quality: dull, aching  Intensity/Severity: moderate  Duration:  2-3 months  Progression of symptoms: yes, progressive worsening  Onset quality: gradual   Timing: intermittent  Aggravating Factors: kneeling, rising after sitting  Alleviating Factors: NSAIDs  Previous Episodes: yes  Associated Symptoms: pain, swelling  ADLs Affected: ambulating, recreational activities/sports  Previous Treatment: NSAIDs       Objective   Vital Signs:   Temp 98.1 °F (36.7 °C)   Ht 167.6 cm (66\")   Wt 79.4 kg (175 lb)   BMI 28.25 kg/m²     Physical Exam  Physical Exam  Vitals signs and nursing note reviewed.   Constitutional:       Appearance: Normal appearance.   Pulmonary:      Effort: Pulmonary effort is normal.   Skin:     General: Skin is warm and dry.      Capillary " Refill: Capillary refill takes less than 2 seconds.   Neurological:      General: No focal deficit present.      Mental Status: He is alert and oriented to person, place, and time. Mental status is at baseline.   Psychiatric:         Mood and Affect: Mood normal.         Behavior: Behavior normal.         Thought Content: Thought content normal.         Judgment: Judgment normal.     Ortho Exam   Left knee (valgus). Positive grind test. Pain and tenderness is present over the lateral aspect of the knee. Patient has some tenderness and irritability of the common peroneal nerve. Lateral joint line is exquisitely painful and tender. Patella is tending to track a little bit laterally. There is thickening of the synovial membrane. Range of motion in flexion is 0-110 degrees. Dorsalis pedis and posterior tibial artery pulses are palpable. Common peroneal nerve function is well preserved. Gait is cautious and antalgic. The patient has valgus orientation of the knee with a higher degree of external rotation than the contralateral side.There is fullness and tenderness in the Popliteal fossa. Getting out of a seated position is painful for the patient.     Right knee.The patient is status post total knee arthroplasty postoperative 18 year(s). Incision is clean. Calf is soft and nontender. Homans sign is negative. There is no clicking, popping or catching. Anterior and posterior drawer signs are negative.  There is no instability of the components. Appropriate amounts of swelling and bruising are noted. Dorsalis pedis and posterior tibial artery pulses are palpable. Common peroneal nerve function is well preserved. Range of motion is from 0-110 degrees of flexion. Gait is cautious but otherwise fairly normal. There is no evidence of a deep seated joint infection.  Right hip. Patient is post op 1.5 year(s) from total hip arthoplasty, direct anterior. Incision is clean and healing well. Calf is soft and nontender. Homans sign is  negative. Skin and soft tissues are appropriate for postoperative status of the patient. Hip flexion is 0-90 degrees, hip abduction is 0-30 degrees. No limb lenth discrepancy. Neurovascular status is intact. Patients gait is significantly improved. The pain relief is extremely dramatic for the patient. Dorsalis pedis and posterior tibial artery pulses palpable. Common peroneal function is well preserved. There is no evidence of cellulitis or erythema or deep seated joint infection.            Result Review :  The following data was reviewed by: Dejan Lange MD on 07/13/2023:      X-rays obtained today show well-placed total hip arthroplasty.  There is a good press-fit profile without any subsidence of the implants.  No periprosthetic fractures are noted.          Procedures           Assessment   Assessment and Plan    Diagnoses and all orders for this visit:    1. Lumbar radiculopathy (Primary)    2. Status post total hip replacement, right    3. Left hip pain          Follow Up   Compression/brace to the knee to prevent it from buckling and giving out.  Intra-articular steroid injection and viscosupplementation injection to the left knee discussed with the patient.  Calcium and vitamin D for bone health.  Glucosamine, chondroitin and turmeric for cartilage health.  , GI and dental procedure prophylaxis with antibiotics to prevent metastatic infection of the hip and knee arthroplasty implants.  She will eventually need a left total knee replacement and she will let me know when she is ready for that form of intervention.  As far as a revision surgery on the right knee she might eventually need that because of polyethylene wear but we will try to defer that for as long as possible.  Rest, ice, compression, and elevation (RICE) therapy  Stretching and strengthening exercises of the quads and the hamstrings.  OTC Tylenol 500-1000mg by mouth every 6 hours as needed for pain   Follow up in 3 month(s)  Patient was  given instructions and counseling regarding her condition or for health maintenance advice. Please see specific information pulled into the AVS if appropriate.     Dejan Lange MD   Date of Encounter: 7/13/2023   Electronically signed by Dejan Lange MD, 07/30/23, 10:45 PM EDT.     EMR Dragon/Transcription disclaimer:  Much of this encounter note is an electronic transcription/translation of spoken language to printed text. The electronic translation of spoken language may permit erroneous, or at times, nonsensical words or phrases to be inadvertently transcribed; Although I have reviewed the note for such errors, some may still exist.

## 2023-08-02 ENCOUNTER — OFFICE VISIT (OUTPATIENT)
Dept: FAMILY MEDICINE CLINIC | Age: 78
End: 2023-08-02
Payer: MEDICARE

## 2023-08-02 VITALS
SYSTOLIC BLOOD PRESSURE: 116 MMHG | HEART RATE: 84 BPM | BODY MASS INDEX: 27.83 KG/M2 | DIASTOLIC BLOOD PRESSURE: 69 MMHG | HEIGHT: 66 IN | WEIGHT: 173.2 LBS

## 2023-08-02 DIAGNOSIS — J45.40 MODERATE PERSISTENT ASTHMA, UNCOMPLICATED: ICD-10-CM

## 2023-08-02 DIAGNOSIS — M54.50 ACUTE MIDLINE LOW BACK PAIN WITHOUT SCIATICA: ICD-10-CM

## 2023-08-02 DIAGNOSIS — K21.9 GERD WITHOUT ESOPHAGITIS: ICD-10-CM

## 2023-08-02 DIAGNOSIS — E78.00 HIGH CHOLESTEROL: ICD-10-CM

## 2023-08-02 DIAGNOSIS — E03.9 HYPOTHYROIDISM, ACQUIRED: ICD-10-CM

## 2023-08-02 DIAGNOSIS — Z00.00 MEDICARE ANNUAL WELLNESS VISIT, SUBSEQUENT: Primary | ICD-10-CM

## 2023-08-02 PROBLEM — M25.552 LEFT HIP PAIN: Status: RESOLVED | Noted: 2023-04-30 | Resolved: 2023-08-02

## 2023-08-02 PROBLEM — Z98.1 LUMBAR SPINAL STENOSIS DUE TO ADJACENT SEGMENT DISEASE AFTER FUSION PROCEDURE: Status: RESOLVED | Noted: 2019-08-09 | Resolved: 2023-08-02

## 2023-08-02 PROBLEM — M51.36 LUMBAR SPINAL STENOSIS DUE TO ADJACENT SEGMENT DISEASE AFTER FUSION PROCEDURE: Status: RESOLVED | Noted: 2019-08-09 | Resolved: 2023-08-02

## 2023-08-02 PROBLEM — M54.16 LUMBAR RADICULOPATHY: Status: RESOLVED | Noted: 2023-06-05 | Resolved: 2023-08-02

## 2023-08-02 PROBLEM — S32.2XXA FX SACRUM/COCCYX-CLOSED: Status: RESOLVED | Noted: 2023-05-24 | Resolved: 2023-08-02

## 2023-08-02 PROBLEM — M43.9 ACQUIRED SPINAL DEFORMITY: Status: RESOLVED | Noted: 2023-04-12 | Resolved: 2023-08-02

## 2023-08-02 PROBLEM — M16.12 PRIMARY OSTEOARTHRITIS OF LEFT HIP: Status: RESOLVED | Noted: 2022-04-22 | Resolved: 2023-08-02

## 2023-08-02 PROBLEM — Z96.641 STATUS POST TOTAL HIP REPLACEMENT, RIGHT: Status: RESOLVED | Noted: 2023-04-30 | Resolved: 2023-08-02

## 2023-08-02 PROBLEM — M51.369 LUMBAR SPINAL STENOSIS DUE TO ADJACENT SEGMENT DISEASE AFTER FUSION PROCEDURE: Status: RESOLVED | Noted: 2019-08-09 | Resolved: 2023-08-02

## 2023-08-02 PROBLEM — S32.10XA FX SACRUM/COCCYX-CLOSED: Status: RESOLVED | Noted: 2023-05-24 | Resolved: 2023-08-02

## 2023-08-02 PROBLEM — D50.9 IRON DEFICIENCY ANEMIA: Status: RESOLVED | Noted: 2019-09-10 | Resolved: 2023-08-02

## 2023-08-02 PROBLEM — M48.061 LUMBAR SPINAL STENOSIS DUE TO ADJACENT SEGMENT DISEASE AFTER FUSION PROCEDURE: Status: RESOLVED | Noted: 2019-08-09 | Resolved: 2023-08-02

## 2023-08-02 PROBLEM — M51.36 LUMBAR DEGENERATIVE DISC DISEASE: Status: RESOLVED | Noted: 2023-07-27 | Resolved: 2023-08-02

## 2023-08-02 PROBLEM — M51.369 LUMBAR DEGENERATIVE DISC DISEASE: Status: RESOLVED | Noted: 2023-07-27 | Resolved: 2023-08-02

## 2023-08-02 PROCEDURE — 1159F MED LIST DOCD IN RCRD: CPT | Performed by: FAMILY MEDICINE

## 2023-08-02 PROCEDURE — 1160F RVW MEDS BY RX/DR IN RCRD: CPT | Performed by: FAMILY MEDICINE

## 2023-08-02 PROCEDURE — G0439 PPPS, SUBSEQ VISIT: HCPCS | Performed by: FAMILY MEDICINE

## 2023-08-02 PROCEDURE — 1125F AMNT PAIN NOTED PAIN PRSNT: CPT | Performed by: FAMILY MEDICINE

## 2023-08-02 PROCEDURE — 1170F FXNL STATUS ASSESSED: CPT | Performed by: FAMILY MEDICINE

## 2023-08-02 PROCEDURE — 99214 OFFICE O/P EST MOD 30 MIN: CPT | Performed by: FAMILY MEDICINE

## 2023-08-02 RX ORDER — METAXALONE 400 MG/1
400 TABLET ORAL 3 TIMES DAILY PRN
Qty: 21 TABLET | Refills: 1 | Status: SHIPPED | OUTPATIENT
Start: 2023-08-02

## 2023-08-03 ENCOUNTER — TELEPHONE (OUTPATIENT)
Dept: NEUROSURGERY | Facility: CLINIC | Age: 78
End: 2023-08-03
Payer: MEDICARE

## 2023-08-05 RX ORDER — PILOCARPINE HYDROCHLORIDE 5 MG/1
TABLET, FILM COATED ORAL
Qty: 360 TABLET | Refills: 3 | Status: SHIPPED | OUTPATIENT
Start: 2023-08-05

## 2023-08-05 RX ORDER — MELOXICAM 15 MG/1
TABLET ORAL
Qty: 90 TABLET | Refills: 3 | Status: SHIPPED | OUTPATIENT
Start: 2023-08-05

## 2023-08-07 ENCOUNTER — READMISSION MANAGEMENT (OUTPATIENT)
Dept: CALL CENTER | Facility: HOSPITAL | Age: 78
End: 2023-08-07
Payer: MEDICARE

## 2023-08-07 NOTE — PROGRESS NOTES
Subjective   History of Present Illness: Valery Baez is a 77 y.o. female is here today for a post op follow-up.  Patient underwent an L2-L3 lateral lumbar interbody fusion as well as bilateral SI joint fusion with Dr. Paredes on 7/27/2023.  Patient doing well postoperatively from her surgery.  She has significant relief from her presurgical symptoms.  She does have mild postsurgical pain that is well-tolerated.  She has been doing home physical therapy and did have some questions regarding PT exercises for the SI joint fusion.  She reports no fever, chills, shortness of air, chest pain, leg swelling, leg weakness, incisional drainage, incisional redness, incisional swelling.    History of Present Illness    The following portions of the patient's history were reviewed and updated as appropriate: allergies, current medications, past family history, past medical history, past social history, past surgical history, and problem list.    Review of Systems   Musculoskeletal:  Positive for back pain.   All other systems reviewed and are negative.    Objective     ./77   Pulse 80   Wt 78 kg (172 lb)   BMI 27.76 kg/mý    Body mass index is 27.76 kg/mý.    Vitals:    08/11/23 0903   PainSc:   5          Physical Exam  Neurologic Exam  Bilateral SI incisions are well-approximated with no redness, no swelling palpated or seen and no drainage.  Left lateral lumbar incision is well-approximated with surgical glue still adhered.  No drainage, no obvious swelling seen or palpated no drainage noted.  Left sided navigation stab incision well-approximated with no swelling, drainage or redness.    Assessment & Plan   Independent Review of Radiographic Studies:      I personally reviewed the images from the following studies.    No new imaging    Medical Decision Making:      Valery Baezis a 77 y.o. female presenting to clinic today for her scheduled postop follow-up.  Patient underwent an L2-L3 LLIF and  bilateral SI joint fusion with Dr. Paredes on 7/27/2023.  Patient doing well postoperatively with expected incisional soreness.  Her incisions are well-approximated with no signs symptoms of fluid collection or infection.  Postoperative exercise plan for the SI joint fusion was discussed I told her is normally left up to the physical therapist discretion.  I did give her a handout regarding expected postoperative exercises for the SI joint fusion.    Patient should continue with their postop follow-up discharge instructions with avoidance of significant bending or twisting maneuvers.  Weight limitations of 15 to 20 pounds.  Patient is able to shower freely.  Patient should avoid submersing incisional area in water  for another 4 to 5 weeks.  Patient should wean any narcotic medication therapy if  not already done so and continue to utilize over-the-counter Tylenol and/or  muscle relaxers.  Patient should be cautious of not utilizing over 4 g of acetaminophen a day. I discussed the next steps in patient's postoperative recovery including a course of physical therapy. Physical therapy can help strengthen and stretch the muscles around the joints.  It is also beneficial in strengthening the core muscles.  I highly recommend that patient be diligent with attending and working with physical therapy for optimal postoperative recovery.   Patient will undergo follow-up CT scanning in 10 weeks and subsequently follow-up with Dr. Paredes for a 3 month post operative evaluation with review of imaging.  Patient is to call the office in the interim with any questions or concerns.Pt was amendable to recommendations and verbalized understanding.  0          Diagnoses and all orders for this visit:    1. S/P lumbar fusion (Primary)  -     CT Lumbar Spine Without Contrast; Future    2. S/P fusion of sacroiliac joint      Return in about 10 weeks (around 10/20/2023) for Postop follow-up.    This patient was examined wearing appropriate  personal protective equipment.     Valery Baez  reports that she has never smoked. She has never used smokeless tobacco..     Body mass index is 27.76 kg/mý.            Patient's blood pressure was reviewed.  Recommendations for  a low-salt diet and exercise to maintain/improve BP in addition to taking any presribed medications.    Advance Care Planning   ACP discussion was held with the patient during this visit. Patient has an advance directive (not in EMR), copy requested.         VIOLETTE Arshad  08/11/23  09:43 EDT

## 2023-08-07 NOTE — OUTREACH NOTE
General Surgery Week 2 Survey      Flowsheet Row Responses   [facility] patient discharged from? [facility]   Does the patient have one of the following disease processes/diagnoses(primary or secondary)? General Surgery   Week 2 attempt successful? Yes   Call start time 1720   Call end time 1722   Discharge diagnosis Lumbar 2-3 lateral lumbar interbody fusion   Meds reviewed with patient/caregiver? Yes   Is the patient having any side effects they believe may be caused by any medication additions or changes? No   Does the patient have all medications related to this admission filled (includes all antibiotics, pain medications, etc.) Yes   Is the patient taking all medications as directed (includes completed medication regime)? Yes   Does the patient have a follow up appointment scheduled with their surgeon? Yes   Has the patient kept scheduled appointments due by today? Yes   Psychosocial issues? No   Did the patient receive a copy of their discharge instructions? Yes   Nursing interventions Reviewed instructions with patient   What is the patient's perception of their health status since discharge? Improving   Nursing interventions Nurse provided patient education   Is the patient /caregiver able to teach back basic post-op care? Drive as instructed by MD in discharge instructions, Take showers only when approved by MD-sponge bathe until then, No tub bath, swimming, or hot tub until instructed by MD, Lifting as instructed by MD in discharge instructions, Keep incision areas clean,dry and protected   Is the patient/caregiver able to teach back signs and symptoms of incisional infection? Increased redness, swelling or pain at the incisonal site, Increased drainage or bleeding, Incisional warmth, Pus or odor from incision, Fever   Is the patient/caregiver able to teach back steps to recovery at home? Set small, achievable goals for return to baseline health, Rest and rebuild strength, gradually increase activity,  Make a list of questions for surgeon's appointment   If the patient is a current smoker, are they able to teach back resources for cessation? Not a smoker   Is the patient/caregiver able to teach back the hierarchy of who to call/visit for symptoms/problems? PCP, Specialist, Home health nurse, Urgent Care, ED, 911 Yes   Week 2 call completed? Yes   Is the patient interested in additional calls from an ambulatory ? No   Would this patient benefit from a Referral to [facility]? No   Wrap up additional comments pt stated she is doing  ok.   Call end time 1722            OCTAVIO LEES - Registered Nurse

## 2023-08-08 NOTE — PROGRESS NOTES
"Enter Query Response Below      Query Response: SI pinning, no packing             If applicable, please update the problem list.     Patient: Valery Baez \"Shaista\"        : 1945  Account: [id number]           Admit Date: 2023        How to Respond to this query:       a. Click New Note     b. Answer query within the yellow box.                c. Update the Problem List, if applicable.      If you have any questions about this query contact me at: [email or web address]     Dr. Paredes    77-year-old female admitted 23 for \"Lumbar 2-3 lateral retroperitoneal approach for discectomy for arthrodesis\" and \"Bilateral minimally invasive sacroiliac joint fusion\" per procedure noted on op note.  The body of the op note states \" A SI bone screw was then passed over the pin and across the joint space including across the fracture which was lateral to the foramen terminating just medial to the foramen.  Same series of steps were undertaken again in approximately the level of S2.  The pin was passed between the S1 and the S2 foramen and the screw was again passed across the joint space across the fracture and terminated between the S1 and S2 foramen.  Final x-rays demonstrate good positioning of the hardware.  Same series of steps were undertaken on the left side with placement of SI bone screws across the SI joint and the fracture at approximately the levels of S1 and the S2.   Care was taken to avoid the foramen.  Final x-rays again demonstrate good positioning of all hardware\".    Please clarify the following:  SI joint fusion paced with allograft  SI joint fusion packed with autograft  SI joint fusion packed with synthetic substitute  SI pinning only, no packing  Other- specify_____  Unable to determine    By submitting this query, we are merely seeking further clarification of documentation to accurately reflect all conditions that you are monitoring, evaluating, treating or that extend the " hospitalization or utilize additional resources of care. Please utilize your independent clinical judgment when addressing the question(s) above.     This query and your response, once completed, will be entered into the legal medical record.    Sincerely,  Sendy TURPIN, RN, CCDS  Clinical Documentation Integrity Program

## 2023-08-11 ENCOUNTER — OFFICE VISIT (OUTPATIENT)
Dept: NEUROSURGERY | Facility: CLINIC | Age: 78
End: 2023-08-11
Payer: MEDICARE

## 2023-08-11 VITALS
HEART RATE: 80 BPM | DIASTOLIC BLOOD PRESSURE: 77 MMHG | SYSTOLIC BLOOD PRESSURE: 114 MMHG | BODY MASS INDEX: 27.76 KG/M2 | WEIGHT: 172 LBS

## 2023-08-11 DIAGNOSIS — Z98.1 S/P FUSION OF SACROILIAC JOINT: ICD-10-CM

## 2023-08-11 DIAGNOSIS — Z98.1 S/P LUMBAR FUSION: Primary | ICD-10-CM

## 2023-08-11 PROCEDURE — 99024 POSTOP FOLLOW-UP VISIT: CPT | Performed by: NURSE PRACTITIONER

## 2023-08-11 PROCEDURE — 1160F RVW MEDS BY RX/DR IN RCRD: CPT | Performed by: NURSE PRACTITIONER

## 2023-08-11 PROCEDURE — 1159F MED LIST DOCD IN RCRD: CPT | Performed by: NURSE PRACTITIONER

## 2023-08-15 ENCOUNTER — OFFICE VISIT (OUTPATIENT)
Dept: FAMILY MEDICINE CLINIC | Age: 78
End: 2023-08-15
Payer: MEDICARE

## 2023-08-15 ENCOUNTER — HOSPITAL ENCOUNTER (OUTPATIENT)
Dept: GENERAL RADIOLOGY | Facility: HOSPITAL | Age: 78
Discharge: HOME | End: 2023-08-15
Admitting: NURSE PRACTITIONER
Payer: MEDICARE

## 2023-08-15 ENCOUNTER — TELEPHONE (OUTPATIENT)
Dept: FAMILY MEDICINE CLINIC | Age: 78
End: 2023-08-15

## 2023-08-15 ENCOUNTER — TELEPHONE (OUTPATIENT)
Dept: NEUROSURGERY | Facility: CLINIC | Age: 78
End: 2023-08-15
Payer: MEDICARE

## 2023-08-15 VITALS
HEART RATE: 85 BPM | SYSTOLIC BLOOD PRESSURE: 124 MMHG | DIASTOLIC BLOOD PRESSURE: 82 MMHG | BODY MASS INDEX: 27.76 KG/M2 | TEMPERATURE: 97.7 F | OXYGEN SATURATION: 98 % | HEIGHT: 66 IN

## 2023-08-15 DIAGNOSIS — K59.00 CONSTIPATION, UNSPECIFIED CONSTIPATION TYPE: ICD-10-CM

## 2023-08-15 DIAGNOSIS — R10.30 LOWER ABDOMINAL PAIN: Primary | ICD-10-CM

## 2023-08-15 DIAGNOSIS — R10.30 LOWER ABDOMINAL PAIN: ICD-10-CM

## 2023-08-15 DIAGNOSIS — R11.0 NAUSEA: ICD-10-CM

## 2023-08-15 PROCEDURE — 74018 RADEX ABDOMEN 1 VIEW: CPT

## 2023-08-15 PROCEDURE — 1159F MED LIST DOCD IN RCRD: CPT | Performed by: NURSE PRACTITIONER

## 2023-08-15 PROCEDURE — 1160F RVW MEDS BY RX/DR IN RCRD: CPT | Performed by: NURSE PRACTITIONER

## 2023-08-15 PROCEDURE — 99213 OFFICE O/P EST LOW 20 MIN: CPT | Performed by: NURSE PRACTITIONER

## 2023-08-15 RX ORDER — ONDANSETRON 4 MG/1
4 TABLET, ORALLY DISINTEGRATING ORAL EVERY 8 HOURS PRN
Qty: 15 TABLET | Refills: 0 | Status: SHIPPED | OUTPATIENT
Start: 2023-08-15

## 2023-08-15 RX ORDER — SUCRALFATE 1 G/1
1 TABLET ORAL 4 TIMES DAILY
Qty: 40 TABLET | Refills: 1 | Status: SHIPPED | OUTPATIENT
Start: 2023-08-15 | End: 2023-08-15 | Stop reason: SDUPTHER

## 2023-08-15 RX ORDER — SUCRALFATE 1 G/1
1 TABLET ORAL 4 TIMES DAILY
Qty: 40 TABLET | Refills: 1 | Status: SHIPPED | OUTPATIENT
Start: 2023-08-15

## 2023-08-15 NOTE — TELEPHONE ENCOUNTER
"  Caller: Valery Baez \"DOT\"    Relationship: Self    Best call back number: [phone]     What is the best time to reach you: ANY    Who are you requesting to speak with (clinical staff, provider,  specific staff member): CLINICAL      What was the call regarding: EXTREME ABDOMINAL PAIN WITH ONLY PASSING CLEAR SLIME, REPORTS HAS TRIED FLEET ENEMAS, STOOL SOFTNERS, GAS X. REPORTS LAST BOWEL MOVEMENT ALMOST A WEEK AGO, HAD BACK SURGERY ON 07/27    REQUESTS CALL BACK ON WHAT TO DO OR TRY NEXT    PLEASE ADVISE        " Chief complaint: F/u hypertension with BP Check    History:  Dao Jhaveri is a 68 y.o. male who presents today for BP check for hypertension.    OBJECTIVE:  BP (!) 86/58 (BP Location: Right arm, Patient Position: Sitting, Cuff Size: Adult)   Pulse 101   Resp 16   SpO2 96%     Assessment/Plan    Diagnoses and all orders for this visit:    1. Essential hypertension (Primary)        Patient presented to the office today for a BP check.  Patient's BP is 86/58 in the office today.  Patient informed to continue taking amlodipine, chlorthalidone, lisinopril, and metoprolol daily as prescribed.  Patient informed to stop taking hydralazine three times daily and only take one tablet as needed if systolic number is greater than 160 and/or diastolic number is greater than 90.  Patient verbalized understanding of instructions.  Patient was given written instructions as well. Patient informed to keep a log of his blood pressure and bring the log with him to his appointment next week.  Patient informed to return on 11/22/22 for a BP recheck or sooner if problems arise.           Sola Quinn, APRN  11/16/2022

## 2023-08-15 NOTE — TELEPHONE ENCOUNTER
Pt said he last bowel movement was last Wednesday 08/09/23. Having severe abd pain. She said she has tried taking several different things.   Advised her she would either need an appt to be seen if she is having severe abd pain or she would need to go to the ER.  She said she has an appt with Dr ARCELIA Travis the general surgeon tomorrow about this.  Offered a same day appt here with one of our acute care providers and she said she would call back if she needs appt.

## 2023-08-15 NOTE — TELEPHONE ENCOUNTER
Patient called the office today stating that Saturday she started having severe intestinal cramping and pain with either diarrhea or major constipation. She has no fever or no pain or redness at the incision site. Her surgery was 7/27/23. She is very concerned for the symptoms she is having. I informed her that if she starts to experience worsening pain or bladder or bowel incontinence that she will need to go to the ER. I also told her that I will send a message over to Dr. Paredes and give her a call as soon as I hear something back. Patient verbalized understanding.

## 2023-08-15 NOTE — PROGRESS NOTES
"Chief Complaint  Abdominal Pain (Sx started Saturday ) and Constipation    Subjective    Patient is in today with complaints of lower abdominal discomfort that began on Saturday.  Patient reports that she is having a lot of cramping, and activity in her bowel.  She reports having constipation, but then this morning she is having mucousy diarrhea.  Patient recently had back surgery, and was having muscle spasms, and has been placed on a muscle relaxer which she thinks may be related.  She reports a slight nausea when she eats, and cannot eat very much at this time.  Denies fever, chills, or any other symptoms at this time.        Valery Baez presents to [facility]  Abdominal Pain  This is a new problem. The current episode started in the past 7 days. The problem has been waxing and waning. The pain is located in the RLQ, LLQ and suprapubic region. The quality of the pain is cramping. The abdominal pain does not radiate. Associated symptoms include constipation, diarrhea and nausea. Pertinent negatives include no dysuria, fever or vomiting. The pain is aggravated by eating. The pain is relieved by Bowel movements. Treatments tried: Fleet enema, gas x. The treatment provided mild relief.     Objective   Vital Signs:  /82 (BP Location: Right arm, Patient Position: Sitting, Cuff Size: Adult)   Pulse 85   Temp 97.7 øF (36.5 øC) (Oral)   Ht 167.6 cm (66\")   SpO2 98% Comment: room air  BMI 27.76 kg/mý   Estimated body mass index is 27.76 kg/mý as calculated from the following:    Height as of this encounter: 167.6 cm (66\").    Weight as of 8/11/23: 78 kg (172 lb).               Physical Exam  Cardiovascular:      Rate and Rhythm: Normal rate.   Pulmonary:      Effort: Pulmonary effort is normal.   Abdominal:      General: Bowel sounds are increased. There is no distension.      Palpations: Abdomen is soft.      Tenderness: There is no abdominal tenderness.   Skin:     " General: Skin is warm and dry.   Neurological:      Mental Status: She is alert and oriented to person, place, and time.   Psychiatric:         Mood and Affect: Mood normal.      Result Review :          PROCEDURE:  XR ABDOMEN KUB     COMPARISON: None     INDICATIONS:  GENERAL ABDOMEN PAIN CONSTIPATION/DIARRHEA X 3 DAYS     FINDINGS:          A nonspecific bowel gas pattern is observed. A small stool burden is seen throughout the colon. No   pathologic calcifications are identified. No acute osseous abnormalities are seen.  Postoperative   changes are noted.     IMPRESSION:                 1. Nonspecific bowel gas pattern. A small stool burden is seen throughout the colon.            Assessment and Plan   Diagnoses and all orders for this visit:    1. Lower abdominal pain (Primary)  Comments:  KUB to rule out obstruction, denies urinary symptoms, may be related to recent surgery, follow-up with surgeon as needed.  Orders:  -     XR Abdomen KUB; Future  -     Discontinue: sucralfate (Carafate) 1 g tablet; Take 1 tablet by mouth 4 (Four) Times a Day.  Dispense: 40 tablet; Refill: 1  -     sucralfate (Carafate) 1 g tablet; Take 1 tablet by mouth 4 (Four) Times a Day.  Dispense: 40 tablet; Refill: 1    2. Constipation, unspecified constipation type  -     XR Abdomen KUB; Future    3. Nausea  Comments:  Wake diet increase to regular as tolerated  Orders:  -     ondansetron ODT (ZOFRAN-ODT) 4 MG disintegrating tablet; Place 1 tablet on the tongue Every 8 (Eight) Hours As Needed for Nausea.  Dispense: 15 tablet; Refill: 0             Follow Up   Return if symptoms worsen or fail to improve.  Patient was given instructions and counseling regarding her condition or for health maintenance advice. Please see specific information pulled into the AVS if appropriate.

## 2023-08-15 NOTE — TELEPHONE ENCOUNTER
Patient called back asking if Dr. Paredes has sent a message back. I informed her that he recommends her to talk to her PCP due to not thinking it is related to surgery. Patient verbalized understanding.

## 2023-08-17 ENCOUNTER — TELEPHONE (OUTPATIENT)
Dept: NEUROSURGERY | Facility: CLINIC | Age: 78
End: 2023-08-17
Payer: MEDICARE

## 2023-08-17 NOTE — TELEPHONE ENCOUNTER
Patient called the office and wants to know if she can use Voltaren Gel or an  Herbal supplement Lona balm (Francensense and Barry). I told her I will send a message over to Dr. Paredes, as I do know that Dr. Paredes does not allow pill form of NSAID'S for 3 months post fusion but I will ask about the Gel. She uses it on her hands for her arthritis.

## 2023-08-25 ENCOUNTER — TELEPHONE (OUTPATIENT)
Dept: FAMILY MEDICINE CLINIC | Age: 78
End: 2023-08-25
Payer: MEDICARE

## 2023-08-25 NOTE — TELEPHONE ENCOUNTER
Called pt because I do not see imodium on her medication list.  She said she thought Dr Smith has been filling it, she said she has been getting it for a long time.  She is not at home at the moment but will check her bottle when she gets there and let us know who prescribes it.

## 2023-08-25 NOTE — TELEPHONE ENCOUNTER
"    Caller: Valery Baez \"DOT\"    Relationship: Self    Best call back number: [phone]    Requested Prescriptions:   Requested Prescriptions      No prescriptions requested or ordered in this encounter    [facility]     Pharmacy where request should be sent: [facility] [address] [phone]  - [phone] FX     Last office visit with prescribing clinician: 8/2/2023   Last telemedicine visit with prescribing clinician: Visit date not found   Next office visit with prescribing clinician: 12/14/2023     Additional details provided by patient: PATIENT NEEDS A NEW PRESCRIPTION SENT TO PHARMACY WITH 90 REFILLS EACH TIME THIS IS NOT ALWAYS AUTOMATICALLY REFILLED.    THEREFORE, PATIENT WILL NEED A 10 DAY SUPPLY OF THIS MEDICATION SENT TO [facility] ON [place], [place]  PHONE [phone] UNTIL HER OTHER MEDICATION ARRIVES BY MAIL. IF YOU HAVE QUESTIONS, PLEASE CALL PATIENT AND ADVISE.    Does the patient have less than a 3 day supply:  [x] Yes  [] No    Would you like a call back once the refill request has been completed: [] Yes [] No    If the office needs to give you a call back, can they leave a voicemail: [] Yes [] No    Logan Saunders   08/25/23 09:43 EDT           "

## 2023-08-25 NOTE — TELEPHONE ENCOUNTER
diphenoxylate-atropine (LOMOTIL) 2.5-0.025 MG per tablet   Pt called back and said it was sent by Dr Smith on 02/08/2023 to [facility] in [place].  That was for a short supply, she normally uses [facility] mail order.       Diphenoxylate-atropine (lomotil) 2.5-0.025mg one tablet daily.    Requesting a #10 day supply to local [facility].    Requesting a #90 day supply to mail order [facility].

## 2023-08-26 DIAGNOSIS — K58.0 IRRITABLE BOWEL SYNDROME WITH DIARRHEA: Primary | ICD-10-CM

## 2023-08-26 RX ORDER — DIPHENOXYLATE HYDROCHLORIDE AND ATROPINE SULFATE 2.5; .025 MG/1; MG/1
1 TABLET ORAL 4 TIMES DAILY PRN
Qty: 60 TABLET | Refills: 1 | Status: SHIPPED | OUTPATIENT
Start: 2023-08-26

## 2023-08-28 NOTE — TELEPHONE ENCOUNTER
Pt said she was notified by [facility] that Diphenoxylate-atropine (lomotil) 2.5-0.025mg one tablet daily. Is unavailable at this time.  They will hold the prescription and notify her when is becomes available.   Pt is asking if Dr Smith could send a prescription to [facility] to cover until [facility] can get it?

## 2023-08-29 DIAGNOSIS — K58.0 IRRITABLE BOWEL SYNDROME WITH DIARRHEA: ICD-10-CM

## 2023-08-29 NOTE — TELEPHONE ENCOUNTER
Rx Refill Note  Requested Prescriptions     Pending Prescriptions Disp Refills    atenolol (TENORMIN) 25 MG tablet [Pharmacy Med Name: ATENOLOL TABS 25MG] 90 tablet 3     Sig: TAKE 1 TABLET EVERY AFTERNOON    atorvastatin (LIPITOR) 20 MG tablet [Pharmacy Med Name: ATORVASTATIN TABS 20MG] 90 tablet 3     Sig: TAKE 1 TABLET DAILY      Last office visit with prescribing clinician: 8/2/2023   Last telemedicine visit with prescribing clinician: Visit date not found   Next office visit with prescribing clinician: 12/14/2023  [facility]MG-CoA Reductase Inhibitors (Statins) Protocol Failed  Basic Metabolic Panel (07/27/2023 23:11)   Lipid panel (08/01/2022 07:52)       Olivia Le LPN  08/29/23, 09:49 EDT

## 2023-08-30 RX ORDER — ATENOLOL 25 MG/1
TABLET ORAL
Qty: 90 TABLET | Refills: 3 | Status: SHIPPED | OUTPATIENT
Start: 2023-08-30

## 2023-08-30 RX ORDER — ATORVASTATIN CALCIUM 20 MG/1
TABLET, FILM COATED ORAL
Qty: 90 TABLET | Refills: 3 | Status: SHIPPED | OUTPATIENT
Start: 2023-08-30

## 2023-08-31 RX ORDER — DIPHENOXYLATE HYDROCHLORIDE AND ATROPINE SULFATE 2.5; .025 MG/1; MG/1
1 TABLET ORAL 4 TIMES DAILY PRN
Qty: 60 TABLET | Refills: 1 | Status: SHIPPED | OUTPATIENT
Start: 2023-08-31

## 2023-09-05 ENCOUNTER — TELEPHONE (OUTPATIENT)
Dept: FAMILY MEDICINE CLINIC | Age: 78
End: 2023-09-05
Payer: MEDICARE

## 2023-09-05 DIAGNOSIS — K58.0 IRRITABLE BOWEL SYNDROME WITH DIARRHEA: ICD-10-CM

## 2023-09-05 RX ORDER — DIPHENOXYLATE HYDROCHLORIDE AND ATROPINE SULFATE 2.5; .025 MG/1; MG/1
1 TABLET ORAL 4 TIMES DAILY PRN
Qty: 60 TABLET | Refills: 1 | OUTPATIENT
Start: 2023-09-05

## 2023-09-05 NOTE — TELEPHONE ENCOUNTER
"    Caller: Valery Baez \"DOT\"    Relationship: Self    Best call back number: [phone]    Requested Prescriptions:   Requested Prescriptions     Pending Prescriptions Disp Refills    diphenoxylate-atropine (Lomotil) 2.5-0.025 MG per tablet 60 tablet 1     Sig: Take 1 tablet by mouth 4 (Four) Times a Day As Needed for Diarrhea.        Pharmacy where request should be sent: [id number] - [place], [place] - [address] AT [facility] OF [address]  - [phone] [facility] [phone] FX     Last office visit with prescribing clinician: 8/2/2023   Last telemedicine visit with prescribing clinician: Visit date not found   Next office visit with prescribing clinician: 12/14/2023     Additional details provided by patient: PATIENT STATED [facility] CAN'T GET THIS MEDICATION IN SHE NEEDS IT SENT TO [facility] INSTEAD    Does the patient have less than a 3 day supply:  [] Yes  [x] No      Logan Peoples Rep   09/05/23 11:31 EDT             "

## 2023-09-20 ENCOUNTER — OFFICE VISIT (OUTPATIENT)
Dept: FAMILY MEDICINE CLINIC | Age: 78
End: 2023-09-20
Payer: MEDICARE

## 2023-09-20 VITALS
OXYGEN SATURATION: 100 % | DIASTOLIC BLOOD PRESSURE: 67 MMHG | HEIGHT: 66 IN | SYSTOLIC BLOOD PRESSURE: 143 MMHG | WEIGHT: 173 LBS | TEMPERATURE: 98.1 F | HEART RATE: 63 BPM | BODY MASS INDEX: 27.8 KG/M2

## 2023-09-20 DIAGNOSIS — T63.301A SPIDER BITE WOUND, ACCIDENTAL OR UNINTENTIONAL, INITIAL ENCOUNTER: Primary | ICD-10-CM

## 2023-09-20 RX ORDER — TRIAMCINOLONE ACETONIDE 0.25 MG/G
1 OINTMENT TOPICAL 2 TIMES DAILY
Qty: 80 G | Refills: 0 | Status: SHIPPED | OUTPATIENT
Start: 2023-09-20

## 2023-09-20 RX ORDER — DOXYCYCLINE HYCLATE 50 MG/1
50 CAPSULE ORAL 2 TIMES DAILY
Qty: 14 CAPSULE | Refills: 0 | Status: SHIPPED | OUTPATIENT
Start: 2023-09-20 | End: 2023-09-27

## 2023-09-20 NOTE — PROGRESS NOTES
"Chief Complaint  Insect Bite ((Spider) located on right forearm; sx started 2 days ago ; she states she is having redness and itching )    Subjective        Valery Baez presents to [facility]  Insect Bite  This is a new problem. The current episode started in the past 7 days. The problem has been gradually worsening. Associated symptoms include a fever. Pertinent negatives include no chills, fatigue or myalgias.     Objective   Vital Signs:  /67 (BP Location: Left arm, Patient Position: Sitting, Cuff Size: Adult)   Pulse 63   Temp 98.1 °F (36.7 °C) (Oral)   Ht 167.6 cm (66\")   Wt 78.5 kg (173 lb)   SpO2 100% Comment: room air  BMI 27.92 kg/m²   Estimated body mass index is 27.92 kg/m² as calculated from the following:    Height as of this encounter: 167.6 cm (66\").    Weight as of this encounter: 78.5 kg (173 lb).               Physical Exam  Cardiovascular:      Rate and Rhythm: Normal rate.   Pulmonary:      Effort: Pulmonary effort is normal.   Skin:     General: Skin is warm and dry.      Findings: Erythema present.          Neurological:      Mental Status: She is alert and oriented to person, place, and time.   Psychiatric:         Mood and Affect: Mood normal.      Result Review :                   Assessment and Plan   Diagnoses and all orders for this visit:    1. Spider bite wound, accidental or unintentional, initial encounter (Primary)  Comments:  F/U for worsening, fever, chills or general unwell feeling  Orders:  -     doxycycline (VIBRAMYCIN) 50 MG capsule; Take 1 capsule by mouth 2 (Two) Times a Day for 7 days.  Dispense: 14 capsule; Refill: 0  -     triamcinolone (KENALOG) 0.025 % ointment; Apply 1 application  topically to the appropriate area as directed 2 (Two) Times a Day.  Dispense: 80 g; Refill: 0             Follow Up   Return if symptoms worsen or fail to improve.  Patient was given instructions and counseling regarding her condition " or for health maintenance advice. Please see specific information pulled into the AVS if appropriate.

## 2023-10-13 ENCOUNTER — CLINICAL SUPPORT (OUTPATIENT)
Dept: FAMILY MEDICINE CLINIC | Age: 78
End: 2023-10-13
Payer: MEDICARE

## 2023-10-13 ENCOUNTER — HOSPITAL ENCOUNTER (OUTPATIENT)
Dept: CT IMAGING | Facility: HOSPITAL | Age: 78
Discharge: HOME | End: 2023-10-13
Admitting: NURSE PRACTITIONER
Payer: MEDICARE

## 2023-10-13 DIAGNOSIS — Z23 NEED FOR INFLUENZA VACCINATION: ICD-10-CM

## 2023-10-13 DIAGNOSIS — Z23 NEED FOR COVID-19 VACCINE: Primary | ICD-10-CM

## 2023-10-13 DIAGNOSIS — Z98.1 S/P LUMBAR FUSION: ICD-10-CM

## 2023-10-13 PROCEDURE — 72131 CT LUMBAR SPINE W/O DYE: CPT

## 2023-10-25 NOTE — PROGRESS NOTES
"Subjective   History of Present Illness: Valery Baez is a 77 y.o. female is here today for a post op follow-up from surgery on 7/27/23. Today patient reports of bilateral hip pain.  Overall she is seeing some improvement in pain but continues to have significant pain into the groin area and the SI joint area.  She has completed physical therapy.  No other radiating pain or significant back pain.    Chief Complaint   Patient presents with    Back Pain          Previous treatment:  Mobic, Tylenol    Previous neurosurgery:     Previous injections:     The following portions of the patient's history were reviewed and updated as appropriate: allergies, current medications, past family history, past medical history, past social history, past surgical history, and problem list.    Review of Systems   Constitutional:  Positive for activity change.   HENT: Negative.     Eyes: Negative.    Respiratory: Negative.     Cardiovascular: Negative.    Gastrointestinal: Negative.    Endocrine: Negative.    Genitourinary: Negative.    Musculoskeletal:  Positive for arthralgias, back pain and gait problem.   Skin: Negative.    Allergic/Immunologic: Negative.    Neurological:  Negative for weakness and numbness.   Hematological: Negative.    Psychiatric/Behavioral:  Negative for sleep disturbance.        Objective      /74 (BP Location: Left arm, Patient Position: Sitting, Cuff Size: Adult)   Pulse 62   Resp 18   Ht 167.6 cm (66\")   Wt 78.9 kg (174 lb)   BMI 28.08 kg/m²    Body mass index is 28.08 kg/m².  Vitals:    10/30/23 1252   PainSc:   5   PainLoc: Back           Neurologic Exam    Assessment & Plan   Independent Review of Radiographic Studies:      I personally reviewed and interpreted the images from the following studies.    CT lumbar spine: Hardware intact and in good positioning with developing fusion across disc space at L2-3    Medical Decision Making:      Valery Baez is a 77 y.o. 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  Notably the patient has been on Mobic for decades for her arthritis, and she has been off of it for over 3 months now.  I am okay with her restarting her Mobic.  She should also continue to wear her bone stimulator.  Hopefully the readdition of Mobic causes some improvement of her overall pain.  Patient is headed to [place] for the winter and so I will see her back when she returns in April.      Diagnoses and all orders for this visit:    1. S/P lumbar fusion (Primary)    2. S/P fusion of sacroiliac joint      No follow-ups on file.    This patient was examined wearing appropriate personal protective equipment.                      Dr. Aston Paredes IV    10/30/23  13:19 EDT

## 2023-10-30 ENCOUNTER — OFFICE VISIT (OUTPATIENT)
Dept: NEUROSURGERY | Facility: CLINIC | Age: 78
End: 2023-10-30
Payer: MEDICARE

## 2023-10-30 VITALS
WEIGHT: 174 LBS | HEART RATE: 62 BPM | SYSTOLIC BLOOD PRESSURE: 112 MMHG | BODY MASS INDEX: 27.97 KG/M2 | HEIGHT: 66 IN | RESPIRATION RATE: 18 BRPM | DIASTOLIC BLOOD PRESSURE: 74 MMHG

## 2023-10-30 DIAGNOSIS — Z98.1 S/P FUSION OF SACROILIAC JOINT: ICD-10-CM

## 2023-10-30 DIAGNOSIS — Z98.1 S/P LUMBAR FUSION: Primary | ICD-10-CM

## 2023-10-30 PROCEDURE — 1159F MED LIST DOCD IN RCRD: CPT | Performed by: NEUROLOGICAL SURGERY

## 2023-10-30 PROCEDURE — 99024 POSTOP FOLLOW-UP VISIT: CPT | Performed by: NEUROLOGICAL SURGERY

## 2023-10-30 PROCEDURE — 1160F RVW MEDS BY RX/DR IN RCRD: CPT | Performed by: NEUROLOGICAL SURGERY

## 2023-11-09 DIAGNOSIS — K58.0 IRRITABLE BOWEL SYNDROME WITH DIARRHEA: ICD-10-CM

## 2023-11-09 RX ORDER — THYROID 30 MG/1
30 TABLET ORAL DAILY
Qty: 90 TABLET | Refills: 3 | Status: SHIPPED | OUTPATIENT
Start: 2023-11-09

## 2023-11-09 NOTE — TELEPHONE ENCOUNTER
Rx Refill Note  Requested Prescriptions     Pending Prescriptions Disp Refills    [place] Thyroid 30 MG tablet 90 tablet 0     Sig: Take 1 tablet by mouth Daily.    EPINEPHrine (EPIPEN) 0.3 MG/0.3ML solution auto-injector injection 1 each 0     Sig: Inject 0.3 mL into the appropriate muscle as directed by prescriber 1 (One) Time for 1 dose.    diphenoxylate-atropine (Lomotil) 2.5-0.025 MG per tablet 60 tablet 1     Sig: Take 1 tablet by mouth 4 (Four) Times a Day As Needed for Diarrhea.      Last office visit with prescribing clinician: 2023     Next office visit with prescribing clinician: 2023    [place] thyroid Last filled 17 historical provider     Epipen Last filled 23 #1 3 refills states it is     Lomotil Last filled 23 #60 1 refill    Araceli Abdul LPN  23, 09:22 EST

## 2023-11-10 RX ORDER — EPINEPHRINE 0.3 MG/.3ML
0.3 INJECTION SUBCUTANEOUS ONCE
Qty: 1 EACH | Refills: 1 | Status: SHIPPED | OUTPATIENT
Start: 2023-11-10 | End: 2023-11-10

## 2023-11-10 RX ORDER — DIPHENOXYLATE HYDROCHLORIDE AND ATROPINE SULFATE 2.5; .025 MG/1; MG/1
1 TABLET ORAL 4 TIMES DAILY PRN
Qty: 60 TABLET | Refills: 1 | Status: SHIPPED | OUTPATIENT
Start: 2023-11-10

## 2023-11-21 ENCOUNTER — TELEPHONE (OUTPATIENT)
Dept: NEUROSURGERY | Facility: CLINIC | Age: 78
End: 2023-11-21
Payer: MEDICARE

## 2023-11-21 DIAGNOSIS — Z98.1 S/P LUMBAR FUSION: Primary | ICD-10-CM

## 2023-11-21 NOTE — TELEPHONE ENCOUNTER
Patient called and stated that her back pain is worsening and that she was a 9 out of 10 pain level on the Left side of her back and the right side was about a 6 or a 7 pain level. She stated that the Mobic has helped a little but she's concerned about the pain that's worsening. She did also state that she is trying to walk as much as she can,but it's not helping either.   Please Advise.

## 2023-11-28 ENCOUNTER — HOSPITAL ENCOUNTER (OUTPATIENT)
Dept: GENERAL RADIOLOGY | Facility: HOSPITAL | Age: 78
Discharge: HOME | End: 2023-11-28
Admitting: NEUROLOGICAL SURGERY
Payer: MEDICARE

## 2023-11-28 DIAGNOSIS — Z98.1 S/P LUMBAR FUSION: ICD-10-CM

## 2023-11-28 PROCEDURE — 72100 X-RAY EXAM L-S SPINE 2/3 VWS: CPT

## 2023-11-30 DIAGNOSIS — K58.0 IRRITABLE BOWEL SYNDROME WITH DIARRHEA: ICD-10-CM

## 2023-11-30 RX ORDER — DIPHENOXYLATE HYDROCHLORIDE AND ATROPINE SULFATE 2.5; .025 MG/1; MG/1
TABLET ORAL
Qty: 60 TABLET | Refills: 1 | Status: SHIPPED | OUTPATIENT
Start: 2023-11-30

## 2023-11-30 NOTE — TELEPHONE ENCOUNTER
Rx Refill Note  Requested Prescriptions     Pending Prescriptions Disp Refills    diphenoxylate-atropine (LOMOTIL) 2.5-0.025 MG per tablet [Pharmacy Med Name: DIPHENOXYLATE HCL/ATROPINE TABS 2.5MG] 60 tablet 1     Sig: TAKE 1 TABLET FOUR TIMES A DAY AS NEEDED FOR DIARRHEA      Last office visit with prescribing clinician: 8/2/2023   Last telemedicine visit with prescribing clinician: Visit date not found   Next office visit with prescribing clinician: 12/14/2023  Last refill sent: 11/10/2023, #60 to mail order.  Mail order does 90 days supplies.     Olivia Le LPN  11/30/23, 10:16 EST

## 2023-12-08 ENCOUNTER — OFFICE VISIT (OUTPATIENT)
Dept: NEUROSURGERY | Facility: CLINIC | Age: 78
End: 2023-12-08
Payer: MEDICARE

## 2023-12-08 VITALS
BODY MASS INDEX: 27.97 KG/M2 | OXYGEN SATURATION: 98 % | TEMPERATURE: 97 F | WEIGHT: 174 LBS | HEART RATE: 74 BPM | HEIGHT: 66 IN | RESPIRATION RATE: 18 BRPM

## 2023-12-08 DIAGNOSIS — Z98.1 S/P FUSION OF SACROILIAC JOINT: ICD-10-CM

## 2023-12-08 DIAGNOSIS — Z98.1 S/P LUMBAR FUSION: Primary | ICD-10-CM

## 2023-12-14 ENCOUNTER — HOSPITAL ENCOUNTER (OUTPATIENT)
Dept: GENERAL RADIOLOGY | Facility: HOSPITAL | Age: 78
Discharge: HOME | End: 2023-12-14
Admitting: FAMILY MEDICINE
Payer: MEDICARE

## 2023-12-14 ENCOUNTER — OFFICE VISIT (OUTPATIENT)
Dept: FAMILY MEDICINE CLINIC | Age: 78
End: 2023-12-14
Payer: MEDICARE

## 2023-12-14 VITALS
WEIGHT: 179 LBS | HEIGHT: 66 IN | BODY MASS INDEX: 28.77 KG/M2 | DIASTOLIC BLOOD PRESSURE: 74 MMHG | HEART RATE: 62 BPM | SYSTOLIC BLOOD PRESSURE: 117 MMHG

## 2023-12-14 DIAGNOSIS — J45.40 MODERATE PERSISTENT ASTHMA, UNCOMPLICATED: Primary | ICD-10-CM

## 2023-12-14 DIAGNOSIS — K21.9 GERD WITHOUT ESOPHAGITIS: ICD-10-CM

## 2023-12-14 DIAGNOSIS — E78.00 HIGH CHOLESTEROL: ICD-10-CM

## 2023-12-14 DIAGNOSIS — E03.9 HYPOTHYROIDISM, ACQUIRED: ICD-10-CM

## 2023-12-14 DIAGNOSIS — M25.532 ACUTE PAIN OF LEFT WRIST: ICD-10-CM

## 2023-12-14 DIAGNOSIS — M25.552 ACUTE PAIN OF LEFT HIP: ICD-10-CM

## 2023-12-14 PROBLEM — Z00.00 MEDICARE ANNUAL WELLNESS VISIT, SUBSEQUENT: Status: RESOLVED | Noted: 2021-07-30 | Resolved: 2023-12-14

## 2023-12-14 PROCEDURE — 73110 X-RAY EXAM OF WRIST: CPT

## 2023-12-14 PROCEDURE — 1159F MED LIST DOCD IN RCRD: CPT | Performed by: FAMILY MEDICINE

## 2023-12-14 PROCEDURE — 73502 X-RAY EXAM HIP UNI 2-3 VIEWS: CPT

## 2023-12-14 PROCEDURE — 99214 OFFICE O/P EST MOD 30 MIN: CPT | Performed by: FAMILY MEDICINE

## 2023-12-14 PROCEDURE — 1160F RVW MEDS BY RX/DR IN RCRD: CPT | Performed by: FAMILY MEDICINE

## 2023-12-14 RX ORDER — ISOSORBIDE MONONITRATE 30 MG/1
30 TABLET, EXTENDED RELEASE ORAL DAILY
COMMUNITY

## 2023-12-15 NOTE — PROGRESS NOTES
"Chief Complaint  Hypothyroidism (Follow up)    Subjective          Valery Baez presents to [facility]  History of Present Illness  --last lipids were ok, no issues with meds  --breathing is stable on current inhaled meds  --gerd is well controlled with the ppi  --tsh was ok on current dose of synthroid  --fell four days afo, pain in left wrist and left hip        Allergies   Allergen Reactions    Bee Venom Anaphylaxis    Latex Hives    Sulfa Antibiotics Hives, Nausea And Vomiting and Rash    Codeine Hives     And Derivatives    Cyclobenzaprine Other (See Comments) and Unknown - High Severity     Experienced side effects - blurred vision, dry mouth, constipation        Darvon [Propoxyphene] Nausea And Vomiting     CONFUSION.     Ditropan [Oxybutynin] Itching    Duricef [Cefadroxil] Itching and Nausea Only    Erythromycin Hives     Also allergic to Duricef    Hydrocodone Nausea And Vomiting and GI Intolerance    Iodine Hives     Iodine scrubbing solutions /IVP Contrast Dyes      Meperidine Nausea And Vomiting    Methylprednisolone Unknown (See Comments)     CAUSES ITCHING AND FACIAL FLUSHING BUT PT \"CAN TAKE LOWER DOSES\"    Minocycline Hives, Diarrhea and Nausea And Vomiting     Only with doses > 100mg BID          Morphine And Related Hives and GI Intolerance     Derivatives    Oxycodone-Acetaminophen Hives    Talwin [Pentazocine] Hives    Tramadol Hives and GI Intolerance     ULTRAM ER    Tramadol Hcl Hives and Nausea And Vomiting    Valium [Diazepam] Hives and Hallucinations     CONFUSION.     Methocarbamol Hallucinations    Adhesive Tape Rash    Bacitracin-Polymyxin B Rash    Betadine [Povidone Iodine] Rash    Contrast Dye (Echo Or Unknown Ct/Mr) Hives and Palpitations    Daptomycin Itching and Hives    Miconazole Rash    Neomycin-Polymyxin-Gramicidin Rash    Neosporin Af [Miconazole Nitrate] Rash    Penicillins Hives, Swelling and Rash     As infant    Silver Rash    "     Health Maintenance Due   Topic Date Due    LIPID PANEL  08/01/2023        Current Outpatient Medications on File Prior to Visit   Medication Sig    acetaminophen (TYLENOL) 325 MG tablet Take 2 tablets by mouth Every 4 (Four) Hours As Needed for Mild Pain .    [place] Thyroid 30 MG tablet Take 1 tablet by mouth Daily.    atenolol (TENORMIN) 25 MG tablet TAKE 1 TABLET EVERY AFTERNOON    atorvastatin (LIPITOR) 20 MG tablet TAKE 1 TABLET DAILY    cephalexin (KEFLEX) 500 MG capsule Take 1 capsule by mouth Daily.    Denosumab (PROLIA SC) Inject  under the skin into the appropriate area as directed See Admin Instructions. Every 6 months   last shot nov 30, 2023    diphenhydrAMINE (BENADRYL ALLERGY CHILDRENS) 12.5 MG chewable tablet Chew 2 tablets 4 (Four) Times a Day As Needed for Allergies. FOR BEE STINGS ONLY    diphenoxylate-atropine (LOMOTIL) 2.5-0.025 MG per tablet TAKE 1 TABLET FOUR TIMES A DAY AS NEEDED FOR DIARRHEA    EPINEPHrine (EPIPEN) 0.3 MG/0.3ML solution auto-injector injection Inject 0.3 mL into the appropriate muscle as directed by prescriber 1 (One) Time for 1 dose.    fexofenadine (ALLEGRA) 180 MG tablet Take 1 tablet by mouth Daily.    fluticasone (FLONASE) 50 MCG/ACT nasal spray 2 sprays into the nostril(s) as directed by provider Every Night.    guaiFENesin (MUCINEX PO) Take 400 mg by mouth 2 (Two) Times a Day.    isosorbide mononitrate (IMDUR) 30 MG 24 hr tablet Take 1 tablet by mouth Daily.    lansoprazole (PREVACID) 30 MG capsule Take 1 capsule by mouth 2 (Two) Times a Day.    levalbuterol (XOPENEX HFA) 45 MCG/ACT inhaler Inhale 1-2 puffs Every 4 (Four) Hours As Needed.    Lifitegrast (Xiidra) 5 % ophthalmic solution Administer 1 drop to both eyes 2 (Two) Times a Day.    meclizine (ANTIVERT) 25 MG tablet TAKE 1 TABLET THREE TIMES A DAY AS NEEDED FOR DIZZINESS    Melatonin 10 MG tablet Take 1 tablet by mouth Every Night.    meloxicam (MOBIC) 15 MG tablet TAKE 1 TABLET DAILY    metaxalone  (SKELAXIN) 400 MG tablet Take 1 tablet by mouth 3 (Three) Times a Day As Needed (MUSCLE SPASMS).    metroNIDAZOLE (METROGEL) 0.75 % gel Apply 1 application topically to the appropriate area as directed 2 (Two) Times a Day As Needed. ROASCEA    montelukast (SINGULAIR) 10 MG tablet Take 1 tablet by mouth Every Night.    Multiple Vitamins-Minerals (MULTIVITAMIN ADULT PO) Take 1 tablet by mouth Every Night. HOLD FOR SURGERY    Myrbetriq 25 MG tablet sustained-release 24 hour 24 hr tablet TAKE 1 TABLET DAILY    pilocarpine (SALAGEN) 5 MG tablet TAKE 1 TABLET FOUR TIMES A DAY    POTASSIUM CHLORIDE PO Take 99 mg by mouth Every Night. OTC  HOLD FOR SURGERY    PROBIOTIC PRODUCT PO Take 1 capsule by mouth 2 (Two) Times a Day.     Current Facility-Administered Medications on File Prior to Visit   Medication    Chlorhexidine Gluconate Cloth 2 % pads       Immunization History   Administered Date(s) Administered    ABRYSVO (RSV, 60+ or pregnant women 32-36 wks) 11/08/2023    COVID-19 (MODERNA) 1st,2nd,3rd Dose Monovalent 01/13/2021, 02/09/2021, 10/26/2021    COVID-19 (MODERNA) BIVALENT 12+YRS 10/04/2022    COVID-19 (MODERNA) Monovalent Original Booster 05/09/2022    COVID-19 F23 (PFIZER) 12YRS+ (COMIRNATY) 10/13/2023    Fluad Quad 65+ 11/01/2021    Fluzone High Dose =>65 Years ([employer] ONLY) 10/02/2017, 11/01/2021    Fluzone High-Dose 65+yrs 10/31/2022, 10/13/2023    Fluzone Quad >6mos (Multi-dose) 09/30/2013    Influenza Quad Vaccine (Inpatient) 09/30/2013    Influenza TIV (IM) 01/09/2010, 10/25/2012    Pneumococcal Conjugate 13-Valent (PCV13) 10/27/2015    Pneumococcal Polysaccharide (PPSV23) 11/01/2009, 11/01/2013    Pneumococcal, Unspecified 09/23/2008    Shingrix 09/01/2021, 01/06/2022    Tdap 11/02/2012, 08/06/2015    Zostavax 09/30/2009, 10/09/2013       Review of Systems   Constitutional:  Negative for activity change, appetite change, chills, fatigue and fever.   HENT:  Negative for congestion, ear pain, rhinorrhea and  "sore throat.    Respiratory:  Negative for cough and shortness of breath.    Cardiovascular:  Negative for chest pain, palpitations and leg swelling.   Gastrointestinal:  Negative for abdominal pain, constipation, diarrhea, nausea and vomiting.   Musculoskeletal:  Positive for arthralgias. Negative for myalgias.   Neurological:  Negative for headache.        Objective     /74 (BP Location: Left arm, Patient Position: Sitting)   Pulse 62   Ht 167.6 cm (66\")   Wt 81.2 kg (179 lb)   BMI 28.89 kg/m²       Physical Exam  Vitals and nursing note reviewed.   Constitutional:       General: She is not in acute distress.     Appearance: Normal appearance.   Cardiovascular:      Rate and Rhythm: Normal rate and regular rhythm.      Heart sounds: Normal heart sounds. No murmur heard.  Pulmonary:      Effort: Pulmonary effort is normal.      Breath sounds: Normal breath sounds.   Abdominal:      Palpations: Abdomen is soft.      Tenderness: There is no abdominal tenderness.   Musculoskeletal:      Cervical back: Neck supple.      Right lower leg: No edema.      Left lower leg: No edema.      Comments: FULL ROM OF LEFT HIP AND WRIST.  TENDER AT LEFT GREATER TROCHANTER AND RADIAL ASPECT OF LEFT WRIST    Lymphadenopathy:      Cervical: No cervical adenopathy.   Neurological:      General: No focal deficit present.      Mental Status: She is alert.      Cranial Nerves: No cranial nerve deficit.      Coordination: Coordination normal.      Gait: Gait normal.   Psychiatric:         Mood and Affect: Mood normal.         Behavior: Behavior normal.         Result Review :                             Assessment and Plan      Diagnoses and all orders for this visit:    1. Moderate persistent asthma, uncomplicated (Primary)  Assessment & Plan:  Asthma is improving with treatment.  The patient is experiencing no daytime asthma symptoms. She is experiencing no nighttime asthma symptoms.  Discussed monitoring symptoms and use of " quick-relief medications and contacting us early in the course of exacerbations.          2. Acute pain of left wrist  -     XR Wrist 3+ View Left    3. Acute pain of left hip  -     XR Hip With or Without Pelvis 2 - 3 View Left    4. GERD without esophagitis  Assessment & Plan:  IMPROVED WITH CURRENT TREATMENT, CONTINUE SAME, WILL REEVALUATE AT NEXT VISIT       5. High cholesterol  Assessment & Plan:  Lipid abnormalities are improving with treatment.  Nutritional counseling was provided.  Lipids will be reassessed in 6 months.      6. Hypothyroidism, acquired  Assessment & Plan:  IMPROVED WITH CURRENT TREATMENT, CONTINUE SAME, WILL REEVALUATE AT NEXT VISIT               Follow Up     Return in about 6 months (around 6/14/2024).    Patient was given instructions and counseling regarding her condition or for health maintenance advice. Please see specific information pulled into the AVS if appropriate.

## 2024-01-16 DIAGNOSIS — K58.0 IRRITABLE BOWEL SYNDROME WITH DIARRHEA: ICD-10-CM

## 2024-01-16 NOTE — TELEPHONE ENCOUNTER
Rx Refill Note  Requested Prescriptions     Pending Prescriptions Disp Refills    diphenoxylate-atropine (LOMOTIL) 2.5-0.025 MG per tablet [Pharmacy Med Name: DIPHENOXYLATE HCL/ATROPINE TABS 2.5MG] 60 tablet 1     Sig: TAKE 1 TABLET FOUR TIMES A DAY AS NEEDED FOR DIARRHEA      Last office visit with prescribing clinician: 12/14/2023   Last telemedicine visit with prescribing clinician: Visit date not found   Next office visit with prescribing clinician: 6/13/2024       Lilo Erazo LPN  01/16/24, 14:34 EST    -11/30/23 #60, RF-1 TAKES QID

## 2024-01-17 RX ORDER — DIPHENOXYLATE HYDROCHLORIDE AND ATROPINE SULFATE 2.5; .025 MG/1; MG/1
TABLET ORAL
Qty: 60 TABLET | Refills: 1 | Status: SHIPPED | OUTPATIENT
Start: 2024-01-17

## 2024-03-21 ENCOUNTER — TELEPHONE (OUTPATIENT)
Dept: FAMILY MEDICINE CLINIC | Age: 79
End: 2024-03-21
Payer: MEDICARE

## 2024-03-29 ENCOUNTER — TELEPHONE (OUTPATIENT)
Dept: FAMILY MEDICINE CLINIC | Age: 79
End: 2024-03-29
Payer: MEDICARE

## 2024-03-29 RX ORDER — THYROID 30 MG/1
30 TABLET ORAL DAILY
Qty: 30 TABLET | Refills: 0 | Status: SHIPPED | OUTPATIENT
Start: 2024-03-29

## 2024-03-29 NOTE — TELEPHONE ENCOUNTER
Patient is out of town for 2 weeks and left her medicine at home. Sent in a 30 day for her to [facility] requested

## 2024-03-29 NOTE — TELEPHONE ENCOUNTER
"    Caller: Valery Baez \"DOT\"    Relationship: Self    Best call back number:     [phone] (Mobile)       Requested Prescriptions:     [place] Thyroid 30 MG tablet  30 mg, Daily          Pharmacy where request should be sent: [id number] - [place], [place] - [address] AT [facility] OF [address] - [phone] [facility] [phone] FX     Last office visit with prescribing clinician: 12/14/2023   Last telemedicine visit with prescribing clinician: Visit date not found   Next office visit with prescribing clinician: 6/13/2024     Additional details provided by patient: PATIENT IS OUT OF MEDICATION    Does the patient have less than a 3 day supply:  [x] Yes  [] No    Would you like a call back once the refill request has been completed: [] Yes [] No    If the office needs to give you a call back, can they leave a voicemail: [] Yes [] No    Logan Esteban Rep   03/29/24 11:50 EDT       "

## 2024-04-12 NOTE — PROGRESS NOTES
"Subjective   History of Present Illness: Valery Baez is a 78 y.o. female is here today for follow-up. Today patient reports pain in her left buttock.  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  She has worsening pain deep in her buttock with a feeling of popping.  The pain occurs when she is moving her hip in certain ways.  She also feels as though the hip may give out from underneath her when she is standing or walking for a long period of time    Chief Complaint   Patient presents with    Back Pain          Previous treatment:     Previous neurosurgery:  L2-3 LLIF with bilateral SIJF done on 7/27/2023     Previous injections:     The following portions of the patient's history were reviewed and updated as appropriate: allergies, current medications, past family history, past medical history, past social history, past surgical history, and problem list.    Review of Systems   Constitutional:  Positive for activity change.   HENT: Negative.     Eyes: Negative.    Respiratory: Negative.     Cardiovascular: Negative.    Gastrointestinal: Negative.    Endocrine: Negative.    Genitourinary: Negative.    Musculoskeletal:  Positive for arthralgias, back pain and myalgias.   Skin: Negative.    Allergic/Immunologic: Negative.    Neurological:  Positive for weakness (left leg).   Hematological: Negative.    Psychiatric/Behavioral: Negative.         Objective      /77   Pulse 74   Ht 167.6 cm (66\")   Wt 82 kg (180 lb 12.8 oz)   BMI 29.18 kg/m²    Body mass index is 29.18 kg/m².  Vitals:    04/15/24 0904   PainSc:   7           Neurologic Exam    Assessment & Plan   Independent Review of Radiographic Studies:      I personally reviewed and interpreted the images from the following studies.    No new image    Medical Decision Making:      Valery Baez is a 78 y.o. female status post LLIF and bilateral SI joint fusion for sacral " insufficiency fractures.  Her current symptoms sound like they are likely at an intrinsic hip issue.  She is going to be evaluated by her orthopedic surgeon in the next couple weeks.  If he does not feel that this is hip related, we will get a CT scan of her pelvis to check on her fractures and instrumentation.  Otherwise I will see her back as needed.      Diagnoses and all orders for this visit:    1. S/P lumbar fusion (Primary)    2. S/P fusion of sacroiliac joint      No follow-ups on file.    This patient was examined wearing appropriate personal protective equipment.                      Dr. Aston Paredes IV    04/15/24  09:20 EDT

## 2024-04-15 ENCOUNTER — OFFICE VISIT (OUTPATIENT)
Dept: NEUROSURGERY | Facility: CLINIC | Age: 79
End: 2024-04-15
Payer: MEDICARE

## 2024-04-15 VITALS
BODY MASS INDEX: 29.06 KG/M2 | HEART RATE: 74 BPM | DIASTOLIC BLOOD PRESSURE: 77 MMHG | HEIGHT: 66 IN | SYSTOLIC BLOOD PRESSURE: 128 MMHG | WEIGHT: 180.8 LBS

## 2024-04-15 DIAGNOSIS — Z98.1 S/P LUMBAR FUSION: Primary | ICD-10-CM

## 2024-04-15 DIAGNOSIS — Z98.1 S/P FUSION OF SACROILIAC JOINT: ICD-10-CM

## 2024-04-15 PROCEDURE — 1160F RVW MEDS BY RX/DR IN RCRD: CPT | Performed by: NEUROLOGICAL SURGERY

## 2024-04-15 PROCEDURE — 1159F MED LIST DOCD IN RCRD: CPT | Performed by: NEUROLOGICAL SURGERY

## 2024-04-15 PROCEDURE — 99212 OFFICE O/P EST SF 10 MIN: CPT | Performed by: NEUROLOGICAL SURGERY

## 2024-04-23 RX ORDER — THYROID 30 MG/1
30 TABLET ORAL DAILY
Qty: 30 TABLET | Refills: 0 | Status: SHIPPED | OUTPATIENT
Start: 2024-04-23

## 2024-04-23 RX ORDER — THYROID 30 MG/1
30 TABLET ORAL DAILY
Qty: 30 TABLET | Refills: 0 | OUTPATIENT
Start: 2024-04-23

## 2024-04-23 NOTE — TELEPHONE ENCOUNTER
T4 (08/01/2022 07:52)  T3, free (08/01/2022 07:52)  TSH (08/01/2022 07:52)  Last thyroid lab work done in 2022.

## 2024-04-23 NOTE — TELEPHONE ENCOUNTER
"Caller: Valery Baez \"DOT\"    Relationship: Self    Best call back number: [phone]     Requested Prescriptions:   Requested Prescriptions     Pending Prescriptions Disp Refills    [place] Thyroid 30 MG tablet 30 tablet 0     Sig: Take 1 tablet by mouth Daily.        Pharmacy where request should be sent: [id number] - [place], [place] - [address] AT [facility] [address] [facility] - [phone] [facility] [phone] FX     Last office visit with prescribing clinician: 12/14/2023   Last telemedicine visit with prescribing clinician: Visit date not found   Next office visit with prescribing clinician: 6/13/2024     Additional details provided by patient: PATIENT HAS 5 PILLS LEFT AND IS LEAVING TOWN THURSDAY FOR A FEW DAYS. [facility] TOLD PATIENT THEY WOULD CALL WHEN THEY HAVE IT BACK IN STOCK AND THEY HAVE NOT CALLED.     Does the patient have less than a 3 day supply:  [] Yes  [x] No    Would you like a call back once the refill request has been completed: [] Yes [] No    If the office needs to give you a call back, can they leave a voicemail: [] Yes [] No    Logan Casillas Rep   04/23/24 10:12 EDT             "

## 2024-05-02 RX ORDER — LANSOPRAZOLE 30 MG/1
30 CAPSULE, DELAYED RELEASE ORAL 2 TIMES DAILY
Qty: 180 CAPSULE | Refills: 3 | Status: SHIPPED | OUTPATIENT
Start: 2024-05-02

## 2024-05-10 ENCOUNTER — OFFICE VISIT (OUTPATIENT)
Dept: FAMILY MEDICINE CLINIC | Age: 79
End: 2024-05-10
Payer: MEDICARE

## 2024-05-10 VITALS
HEIGHT: 66 IN | DIASTOLIC BLOOD PRESSURE: 73 MMHG | SYSTOLIC BLOOD PRESSURE: 138 MMHG | BODY MASS INDEX: 28.54 KG/M2 | WEIGHT: 177.6 LBS | TEMPERATURE: 98.3 F | HEART RATE: 52 BPM | OXYGEN SATURATION: 99 %

## 2024-05-10 DIAGNOSIS — J06.9 ACUTE URI: Primary | ICD-10-CM

## 2024-05-10 PROCEDURE — 1159F MED LIST DOCD IN RCRD: CPT | Performed by: NURSE PRACTITIONER

## 2024-05-10 PROCEDURE — 87428 SARSCOV & INF VIR A&B AG IA: CPT | Performed by: NURSE PRACTITIONER

## 2024-05-10 PROCEDURE — 1125F AMNT PAIN NOTED PAIN PRSNT: CPT | Performed by: NURSE PRACTITIONER

## 2024-05-10 PROCEDURE — 99213 OFFICE O/P EST LOW 20 MIN: CPT | Performed by: NURSE PRACTITIONER

## 2024-05-10 PROCEDURE — 1160F RVW MEDS BY RX/DR IN RCRD: CPT | Performed by: NURSE PRACTITIONER

## 2024-05-10 RX ORDER — BENZONATATE 200 MG/1
200 CAPSULE ORAL 3 TIMES DAILY PRN
Qty: 30 CAPSULE | Refills: 0 | Status: SHIPPED | OUTPATIENT
Start: 2024-05-10 | End: 2024-05-20

## 2024-05-10 RX ORDER — AZITHROMYCIN 250 MG/1
TABLET, FILM COATED ORAL
Qty: 6 TABLET | Refills: 0 | Status: SHIPPED | OUTPATIENT
Start: 2024-05-10

## 2024-05-10 RX ORDER — TIRBANIBULIN 10 MG/G
OINTMENT TOPICAL
COMMUNITY
Start: 2024-04-18 | End: 2024-05-10

## 2024-05-22 ENCOUNTER — TELEPHONE (OUTPATIENT)
Dept: FAMILY MEDICINE CLINIC | Age: 79
End: 2024-05-22
Payer: MEDICARE

## 2024-05-22 NOTE — TELEPHONE ENCOUNTER
Left message her vm confirming that I got her vm about her knee replacement surgery.  Ask her who is her surgeon?  Just call back and let us know and I will inform Dr Smith.

## 2024-05-22 NOTE — TELEPHONE ENCOUNTER
"  Caller: Valery Baez \"DOT\"    Relationship: Self    Best call back number: [phone]    What was the call regarding: PATIENT STATES HER ORTHOPEDIC SURGERY ON HER KNEE WILL BE PERFORMED BY DR PROCTOR AT [facility]. PATIENT STATES DR CUELLAR IS A NEUROSURGEON AND HE DID HER BACK SURGERY.   "

## 2024-05-22 NOTE — TELEPHONE ENCOUNTER
"  Caller: Valery Baez \"DOT\"    Relationship: Self    Best call back number: [phone]    What is the best time to reach you: ANYTIME    Who are you requesting to speak with (clinical staff, provider,  specific staff member): DR. ROSEMARIE CANDELARIO OR NURSE     What was the call regarding: PATIENT IS CALLING STATING THAT SHE WILL BE HAVING LEFT KNEE REPLACEMENT SURGERY ON JUNE 4TH, JUST WANTED PCP TO KNOW.     "

## 2024-05-31 RX ORDER — MIRABEGRON 25 MG/1
25 TABLET, FILM COATED, EXTENDED RELEASE ORAL DAILY
Qty: 90 TABLET | Refills: 1 | Status: SHIPPED | OUTPATIENT
Start: 2024-05-31

## 2024-06-06 ENCOUNTER — OFFICE VISIT (OUTPATIENT)
Dept: FAMILY MEDICINE CLINIC | Age: 79
End: 2024-06-06
Payer: MEDICARE

## 2024-06-06 VITALS
BODY MASS INDEX: 28.67 KG/M2 | HEIGHT: 66 IN | HEART RATE: 97 BPM | DIASTOLIC BLOOD PRESSURE: 80 MMHG | SYSTOLIC BLOOD PRESSURE: 117 MMHG

## 2024-06-06 DIAGNOSIS — R11.0 NAUSEA: ICD-10-CM

## 2024-06-06 DIAGNOSIS — M35.00 SJOGREN'S SYNDROME, WITH UNSPECIFIED ORGAN INVOLVEMENT: ICD-10-CM

## 2024-06-06 DIAGNOSIS — M17.12 PRIMARY OSTEOARTHRITIS OF LEFT KNEE: Primary | ICD-10-CM

## 2024-06-06 PROCEDURE — 1125F AMNT PAIN NOTED PAIN PRSNT: CPT | Performed by: FAMILY MEDICINE

## 2024-06-06 PROCEDURE — 99495 TRANSJ CARE MGMT MOD F2F 14D: CPT | Performed by: FAMILY MEDICINE

## 2024-06-06 PROCEDURE — 1160F RVW MEDS BY RX/DR IN RCRD: CPT | Performed by: FAMILY MEDICINE

## 2024-06-06 PROCEDURE — 1111F DSCHRG MED/CURRENT MED MERGE: CPT | Performed by: FAMILY MEDICINE

## 2024-06-06 PROCEDURE — 1159F MED LIST DOCD IN RCRD: CPT | Performed by: FAMILY MEDICINE

## 2024-06-06 RX ORDER — ONDANSETRON 4 MG/1
4 TABLET, FILM COATED ORAL
COMMUNITY
Start: 2024-06-04

## 2024-06-06 NOTE — PROGRESS NOTES
"Transitional Care Follow Up Visit  Subjective     Valery Baez is a 78 y.o. female who presents for a transitional care management visit.    Within 48 business hours after discharge our office contacted her via telephone to coordinate her care and needs.      I reviewed and discussed the details of that call along with the discharge summary, hospital problems, inpatient lab results, inpatient diagnostic studies, and consultation reports with Valery.     Current outpatient and discharge medications have been reconciled for the patient.  Reviewed by: Tino Smith MD          7/28/2023     7:54 PM   Date of TCM Phone Call   [facility]   Date of Admission 7/27/2023   Date of Discharge 7/28/2023   Discharge Disposition Home or Self Care     Risk for Readmission (LACE) No data recorded    History of Present Illness  --IS S/P A LEFT TKR LAST WEEK.  DOING WELL SO FAR EXCEPT FOR SOME NAUSEA, 4 MG OF ZOFRAN DOES NOT HELP.  ALBE TO EAT AND DRINK OK, NO CHANGE IN BOWEL HABITS OR ABDOMINAL PAIN.       Course During Hospital Stay:  IMPROVED      The following portions of the patient's history were reviewed and updated as appropriate: allergies, current medications, past family history, past medical history, past social history, past surgical history, and problem list.    Review of Systems   Constitutional:  Positive for fatigue. Negative for chills and fever.   HENT:  Negative for congestion, ear pain and rhinorrhea.    Respiratory:  Negative for cough and shortness of breath.    Cardiovascular:  Negative for chest pain, palpitations and leg swelling.   Gastrointestinal:  Negative for abdominal pain and vomiting.   Musculoskeletal:  Negative for arthralgias and myalgias.   Neurological:  Negative for headaches.       Objective   /80 (BP Location: Left arm, Patient Position: Sitting)   Pulse 97   Ht 167.6 cm (66\")   BMI 28.67 kg/m²   Physical Exam  Vitals and nursing note reviewed. "   Constitutional:       General: She is not in acute distress.     Appearance: Normal appearance.   Cardiovascular:      Rate and Rhythm: Normal rate and regular rhythm.      Heart sounds: Normal heart sounds. No murmur heard.  Pulmonary:      Effort: Pulmonary effort is normal.      Breath sounds: Normal breath sounds.   Abdominal:      Palpations: Abdomen is soft.      Tenderness: There is no abdominal tenderness.   Musculoskeletal:      Cervical back: Neck supple.      Right lower leg: No edema.      Left lower leg: No edema.   Lymphadenopathy:      Cervical: No cervical adenopathy.   Neurological:      General: No focal deficit present.      Mental Status: She is alert.      Cranial Nerves: No cranial nerve deficit.      Coordination: Coordination normal.      Gait: Gait normal.   Psychiatric:         Mood and Affect: Mood normal.         Behavior: Behavior normal.         Assessment & Plan   Diagnoses and all orders for this visit:    1. Primary osteoarthritis of left knee (Primary)  Comments:  AVAILABLE RECORDS REVIEWED, OBSERVE AS IMPROVED, PLANS PER ORTHO AND P.T.    2. Nausea  Comments:  PROBABLY RELATED TO RECENT ANAESTHESIA AND THE PAIN.  MAY TAKE 8 MG OF THE ZOFRAN IF NEEDED.  LET US KNOW IF GETTING WORSE OR IT PERSISTS    3. Sjogren's syndrome, with unspecified organ involvement  Comments:  STABLE CURRENTLY

## 2024-06-10 ENCOUNTER — TELEPHONE (OUTPATIENT)
Dept: FAMILY MEDICINE CLINIC | Age: 79
End: 2024-06-10
Payer: MEDICARE

## 2024-06-10 NOTE — TELEPHONE ENCOUNTER
".    Caller: Valery Baez \"DOT\"    Relationship: Self    Best call back number: [phone]    What medication are you requesting: URINARY TRACT INFECTION MEDICATION     What are your current symptoms: CHILLS, SHAKES, URINATION FREQUENCY BUT ISSUES GOING TO THE RESTROOM     How long have you been experiencing symptoms: 3-4 DAYS    Have you had these symptoms before:    [x] Yes  [] No    Have you been treated for these symptoms before:   [x] Yes  [] No    If a prescription is needed, what is your preferred pharmacy and phone number:        [id number] - [place], [place] - [address] AT [facility] [address]  - [phone]  - [phone]  [phone]     "

## 2024-06-11 NOTE — TELEPHONE ENCOUNTER
Pt informed, she said she went to [facility] ER last night.  She said she was dx with a yeast infection and a low grade UTI.   F/u with Dr Smith on 06/12/24.

## 2024-06-12 ENCOUNTER — OFFICE VISIT (OUTPATIENT)
Dept: FAMILY MEDICINE CLINIC | Age: 79
End: 2024-06-12
Payer: MEDICARE

## 2024-06-12 VITALS
DIASTOLIC BLOOD PRESSURE: 69 MMHG | HEIGHT: 66 IN | SYSTOLIC BLOOD PRESSURE: 100 MMHG | WEIGHT: 171.6 LBS | BODY MASS INDEX: 27.58 KG/M2 | HEART RATE: 104 BPM

## 2024-06-12 DIAGNOSIS — N30.00 ACUTE CYSTITIS WITHOUT HEMATURIA: Primary | ICD-10-CM

## 2024-06-12 PROCEDURE — 1125F AMNT PAIN NOTED PAIN PRSNT: CPT | Performed by: FAMILY MEDICINE

## 2024-06-12 PROCEDURE — 99213 OFFICE O/P EST LOW 20 MIN: CPT | Performed by: FAMILY MEDICINE

## 2024-06-12 PROCEDURE — G2211 COMPLEX E/M VISIT ADD ON: HCPCS | Performed by: FAMILY MEDICINE

## 2024-06-12 PROCEDURE — 1160F RVW MEDS BY RX/DR IN RCRD: CPT | Performed by: FAMILY MEDICINE

## 2024-06-12 PROCEDURE — 1159F MED LIST DOCD IN RCRD: CPT | Performed by: FAMILY MEDICINE

## 2024-06-12 RX ORDER — CIPROFLOXACIN 500 MG/1
500 TABLET, FILM COATED ORAL
COMMUNITY
Start: 2024-06-10 | End: 2024-06-17

## 2024-06-12 NOTE — PROGRESS NOTES
"Chief Complaint  Hospital Follow Up Visit (Patient went to [facility] ER on 6-10-24 for UTI, prescribed cipro)    Subjective          Valery Arevalo Jenniferenrique presents to [facility]  History of Present Illness  --FIVE DAYS AGO DEVELOPED DYSURIA, FREQUENCY, URGENCY.  WENT TO THE ER AND DIAGNOSED WITH A UTI AND STARTED ON ABX.  FEELSING S/W BETTER.          Allergies   Allergen Reactions    Bee Venom Anaphylaxis    Latex Hives    Sulfa Antibiotics Hives, Nausea And Vomiting and Rash    Sulfate Hives, Nausea And Vomiting and Rash     Other reaction(s): Not available    Codeine Hives     And Derivatives    Cyclobenzaprine Other (See Comments) and Unknown - High Severity     Experienced side effects - blurred vision, dry mouth, constipation        Darvon [Propoxyphene] Nausea And Vomiting     CONFUSION.     Ditropan [Oxybutynin] Itching    Duricef [Cefadroxil] Itching and Nausea Only    Erythromycin Hives     Also allergic to Duricef    Hydrocodone Nausea And Vomiting and GI Intolerance    Iodine Hives     Iodine scrubbing solutions /IVP Contrast Dyes      Meperidine Nausea And Vomiting    Methylprednisolone Unknown (See Comments)     CAUSES ITCHING AND FACIAL FLUSHING BUT PT \"CAN TAKE LOWER DOSES\"    Minocycline Hives, Diarrhea and Nausea And Vomiting     Only with doses > 100mg BID          Morphine And Codeine Hives and GI Intolerance     Derivatives    Oxycodone-Acetaminophen Hives    Talwin [Pentazocine] Hives    Tramadol Hives and GI Intolerance     ULTRAM ER    Tramadol Hcl Hives and Nausea And Vomiting    Valium [Diazepam] Hives and Hallucinations     CONFUSION.     Methocarbamol Hallucinations    Adhesive Tape Rash    Bacitracin-Polymyxin B Rash    Betadine [Povidone Iodine] Rash    Contrast Dye (Echo Or Unknown Ct/Mr) Hives and Palpitations    Daptomycin Itching and Hives    Eliquis [Apixaban] Rash    Miconazole Rash    Neomycin-Polymyxin-Gramicidin Rash    Neosporin Af " [Miconazole Nitrate] Rash    Penicillins Hives, Swelling and Rash     As infant    Silver Rash        Health Maintenance Due   Topic Date Due    LIPID PANEL  08/01/2023    COVID-19 Vaccine (7 - 2023-24 season) 02/13/2024    ANNUAL WELLNESS VISIT  08/02/2024        Current Outpatient Medications on File Prior to Visit   Medication Sig    acetaminophen (TYLENOL) 325 MG tablet Take 2 tablets by mouth Every 4 (Four) Hours As Needed for Mild Pain . (Patient taking differently: Take 2 tablets by mouth Every 4 (Four) Hours As Needed for Mild Pain. 500mg)    [place] Thyroid 30 MG tablet Take 1 tablet by mouth Daily.    atenolol (TENORMIN) 25 MG tablet TAKE 1 TABLET EVERY AFTERNOON    atorvastatin (LIPITOR) 20 MG tablet TAKE 1 TABLET DAILY    ciprofloxacin (CIPRO) 500 MG tablet Take 1 tablet by mouth.    Denosumab (PROLIA SC) Inject  under the skin into the appropriate area as directed See Admin Instructions. Every 6 months   last shot nov 30, 2023    diphenhydrAMINE (BENADRYL ALLERGY CHILDRENS) 12.5 MG chewable tablet Chew 2 tablets 4 (Four) Times a Day As Needed for Allergies. FOR BEE STINGS ONLY    diphenoxylate-atropine (LOMOTIL) 2.5-0.025 MG per tablet TAKE 1 TABLET FOUR TIMES A DAY AS NEEDED FOR DIARRHEA    fexofenadine (ALLEGRA) 180 MG tablet Take 1 tablet by mouth Daily.    fluticasone (FLONASE) 50 MCG/ACT nasal spray 2 sprays into the nostril(s) as directed by provider Every Night.    guaiFENesin (MUCINEX PO) Take 400 mg by mouth 2 (Two) Times a Day.    isosorbide mononitrate (IMDUR) 30 MG 24 hr tablet Take 1 tablet by mouth Daily.    lansoprazole (PREVACID) 30 MG capsule Take 1 capsule by mouth 2 (Two) Times a Day.    levalbuterol (XOPENEX HFA) 45 MCG/ACT inhaler Inhale 1-2 puffs Every 4 (Four) Hours As Needed.    Lifitegrast (Xiidra) 5 % ophthalmic solution Administer 1 drop to both eyes 2 (Two) Times a Day.    meclizine (ANTIVERT) 25 MG tablet TAKE 1 TABLET THREE TIMES A DAY AS NEEDED FOR DIZZINESS    Melatonin 10  MG tablet Take 1 tablet by mouth Every Night.    meloxicam (MOBIC) 15 MG tablet TAKE 1 TABLET DAILY    metroNIDAZOLE (METROGEL) 0.75 % gel Apply 1 application topically to the appropriate area as directed 2 (Two) Times a Day As Needed. ROASCEA    montelukast (SINGULAIR) 10 MG tablet Take 1 tablet by mouth Every Night.    Multiple Vitamins-Minerals (MULTIVITAMIN ADULT PO) Take 1 tablet by mouth Every Night. HOLD FOR SURGERY    Myrbetriq 25 MG tablet sustained-release 24 hour 24 hr tablet Take 1 tablet by mouth Daily.    ondansetron (ZOFRAN) 4 MG tablet Take 1 tablet by mouth.    pilocarpine (SALAGEN) 5 MG tablet TAKE 1 TABLET FOUR TIMES A DAY    POTASSIUM CHLORIDE PO Take 99 mg by mouth Every Night. OTC  HOLD FOR SURGERY    PROBIOTIC PRODUCT PO Take 1 capsule by mouth 2 (Two) Times a Day.    EPINEPHrine (EPIPEN) 0.3 MG/0.3ML solution auto-injector injection Inject 0.3 mL into the appropriate muscle as directed by prescriber 1 (One) Time for 1 dose.     Current Facility-Administered Medications on File Prior to Visit   Medication    Chlorhexidine Gluconate Cloth 2 % pads       Immunization History   Administered Date(s) Administered    ABRYSVO (RSV, 60+ or pregnant women 32-36 wks) 11/08/2023    COVID-19 (MODERNA) 1st,2nd,3rd Dose Monovalent 01/13/2021, 02/09/2021, 10/26/2021    COVID-19 (MODERNA) BIVALENT 12+YRS 10/04/2022    COVID-19 (MODERNA) Monovalent Original Booster 05/09/2022    COVID-19 F23 (PFIZER) 12YRS+ (COMIRNATY) 10/13/2023    Fluad Quad 65+ 11/01/2021    Fluzone High Dose =>65 Years ([employer] ONLY) 10/02/2017, 11/01/2021    Fluzone High-Dose 65+yrs 10/31/2022, 10/13/2023    Fluzone Quad >6mos (Multi-dose) 09/30/2013    Influenza Quad Vaccine (Inpatient) 09/30/2013    Influenza TIV (IM) 01/09/2010, 10/25/2012    Pneumococcal Conjugate 13-Valent (PCV13) 10/27/2015    Pneumococcal Polysaccharide (PPSV23) 11/01/2009, 11/01/2013    Pneumococcal, Unspecified 09/23/2008    Shingrix 09/01/2021, 01/06/2022     "Tdap 11/02/2012, 08/06/2015    Zostavax 09/30/2009, 10/09/2013       Review of Systems   Constitutional:  Negative for activity change, appetite change, chills, fatigue and fever.   HENT:  Negative for congestion, ear pain, rhinorrhea and sore throat.    Respiratory:  Negative for cough and shortness of breath.    Cardiovascular:  Negative for chest pain, palpitations and leg swelling.   Gastrointestinal:  Negative for abdominal pain, constipation, diarrhea, nausea and vomiting.   Genitourinary:  Positive for dysuria.   Musculoskeletal:  Negative for arthralgias and myalgias.   Neurological:  Negative for headache.        Objective     /69 (BP Location: Left arm, Patient Position: Sitting)   Pulse 104   Ht 167.6 cm (66\")   Wt 77.8 kg (171 lb 9.6 oz)   BMI 27.70 kg/m²       Physical Exam  Vitals and nursing note reviewed.   Constitutional:       General: She is not in acute distress.     Appearance: Normal appearance.   Cardiovascular:      Rate and Rhythm: Normal rate and regular rhythm.      Heart sounds: Normal heart sounds. No murmur heard.  Pulmonary:      Effort: Pulmonary effort is normal.      Breath sounds: Normal breath sounds.   Abdominal:      Palpations: Abdomen is soft.      Tenderness: There is no abdominal tenderness.   Musculoskeletal:      Cervical back: Neck supple.      Right lower leg: No edema.      Left lower leg: No edema.   Lymphadenopathy:      Cervical: No cervical adenopathy.   Neurological:      General: No focal deficit present.      Mental Status: She is alert.      Cranial Nerves: No cranial nerve deficit.      Coordination: Coordination normal.      Gait: Gait normal.   Psychiatric:         Mood and Affect: Mood normal.         Behavior: Behavior normal.         Result Review :                             Assessment and Plan      Diagnoses and all orders for this visit:    1. Acute cystitis without hematuria (Primary)  Comments:  OBSERVE AS IMPROVING.  FLUIDS--REST.  FINISH " ABX.  CONSIDER FURTHER WORKUP IF SYMPTOMS PERSIST OR WORSEN            Follow Up     Return if symptoms worsen or fail to improve.    Patient was given instructions and counseling regarding her condition or for health maintenance advice. Please see specific information pulled into the AVS if appropriate.

## 2024-06-14 ENCOUNTER — OFFICE VISIT (OUTPATIENT)
Dept: FAMILY MEDICINE CLINIC | Age: 79
End: 2024-06-14
Payer: MEDICARE

## 2024-06-14 VITALS
SYSTOLIC BLOOD PRESSURE: 110 MMHG | HEIGHT: 66 IN | HEART RATE: 85 BPM | DIASTOLIC BLOOD PRESSURE: 73 MMHG | TEMPERATURE: 97.9 F | BODY MASS INDEX: 27.51 KG/M2 | WEIGHT: 171.2 LBS

## 2024-06-14 DIAGNOSIS — N32.81 OVERACTIVE BLADDER: ICD-10-CM

## 2024-06-14 DIAGNOSIS — N30.00 ACUTE CYSTITIS WITHOUT HEMATURIA: Primary | ICD-10-CM

## 2024-06-14 LAB
BACTERIA UR QL AUTO: NORMAL /HPF
BILIRUB UR QL STRIP: NEGATIVE
CLARITY UR: CLEAR
COLOR UR: YELLOW
GLUCOSE UR STRIP-MCNC: NEGATIVE MG/DL
HGB UR QL STRIP.AUTO: ABNORMAL
KETONES UR QL STRIP: NEGATIVE
LEUKOCYTE ESTERASE UR QL STRIP.AUTO: NEGATIVE
NITRITE UR QL STRIP: NEGATIVE
PH UR STRIP.AUTO: 6 [PH] (ref 5–8)
PROT UR QL STRIP: NEGATIVE
RBC # UR STRIP: NORMAL /HPF
REF LAB TEST METHOD: NORMAL
SP GR UR STRIP: 1.01 (ref 1–1.03)
SQUAMOUS #/AREA URNS HPF: NORMAL /HPF
UROBILINOGEN UR QL STRIP: ABNORMAL
WBC # UR STRIP: NORMAL /HPF

## 2024-06-14 PROCEDURE — 1125F AMNT PAIN NOTED PAIN PRSNT: CPT | Performed by: FAMILY MEDICINE

## 2024-06-14 PROCEDURE — G2211 COMPLEX E/M VISIT ADD ON: HCPCS | Performed by: FAMILY MEDICINE

## 2024-06-14 PROCEDURE — 1159F MED LIST DOCD IN RCRD: CPT | Performed by: FAMILY MEDICINE

## 2024-06-14 PROCEDURE — 99213 OFFICE O/P EST LOW 20 MIN: CPT | Performed by: FAMILY MEDICINE

## 2024-06-14 PROCEDURE — 1160F RVW MEDS BY RX/DR IN RCRD: CPT | Performed by: FAMILY MEDICINE

## 2024-06-14 PROCEDURE — 81001 URINALYSIS AUTO W/SCOPE: CPT | Performed by: FAMILY MEDICINE

## 2024-06-14 RX ORDER — FLUCONAZOLE 200 MG/1
200 TABLET ORAL DAILY
Qty: 10 TABLET | Refills: 0 | Status: SHIPPED | OUTPATIENT
Start: 2024-06-14

## 2024-06-14 RX ORDER — PHENAZOPYRIDINE HYDROCHLORIDE 200 MG/1
200 TABLET, FILM COATED ORAL 3 TIMES DAILY PRN
Qty: 21 TABLET | Refills: 1 | Status: SHIPPED | OUTPATIENT
Start: 2024-06-14

## 2024-06-14 NOTE — PROGRESS NOTES
"Chief Complaint  Urinary Tract Infection (Worsening, having yeast infection symptoms also, previously took diflucan while in hospital)    Subjective          Valery Baez presents to [facility]  History of Present Illness  --HISTORY OF OVERACTIVE BLADDER.  SINCE BEING IN THE HOSPITAL RECENTLY AND HAVING A CATHETER SHE HAS HAD PERSISTENT URINARY FREQUENCY AND URGENCY WITH A BURING IN THE AREA OF THE URETHRA.  WAS ON ANTIBIOTICS AND GIVEN A SMALL DOSE OF DIFLUCAN BUT THE SYMTOMS HAVE PERSISTED.          Allergies   Allergen Reactions    Bee Venom Anaphylaxis    Latex Hives    Sulfa Antibiotics Hives, Nausea And Vomiting and Rash    Sulfate Hives, Nausea And Vomiting and Rash     Other reaction(s): Not available    Codeine Hives     And Derivatives    Cyclobenzaprine Other (See Comments) and Unknown - High Severity     Experienced side effects - blurred vision, dry mouth, constipation        Darvon [Propoxyphene] Nausea And Vomiting     CONFUSION.     Ditropan [Oxybutynin] Itching    Duricef [Cefadroxil] Itching and Nausea Only    Erythromycin Hives     Also allergic to Duricef    Hydrocodone Nausea And Vomiting and GI Intolerance    Iodine Hives     Iodine scrubbing solutions /IVP Contrast Dyes      Meperidine Nausea And Vomiting    Methylprednisolone Unknown (See Comments)     CAUSES ITCHING AND FACIAL FLUSHING BUT PT \"CAN TAKE LOWER DOSES\"    Minocycline Hives, Diarrhea and Nausea And Vomiting     Only with doses > 100mg BID          Morphine And Codeine Hives and GI Intolerance     Derivatives    Oxycodone-Acetaminophen Hives    Talwin [Pentazocine] Hives    Tramadol Hives and GI Intolerance     ULTRAM ER    Tramadol Hcl Hives and Nausea And Vomiting    Valium [Diazepam] Hives and Hallucinations     CONFUSION.     Methocarbamol Hallucinations    Adhesive Tape Rash    Bacitracin-Polymyxin B Rash    Betadine [Povidone Iodine] Rash    Contrast Dye (Echo Or Unknown Ct/Mr) " Hives and Palpitations    Daptomycin Itching and Hives    Eliquis [Apixaban] Rash    Miconazole Rash    Neomycin-Polymyxin-Gramicidin Rash    Neosporin Af [Miconazole Nitrate] Rash    Penicillins Hives, Swelling and Rash     As infant    Silver Rash        Health Maintenance Due   Topic Date Due    LIPID PANEL  08/01/2023    COVID-19 Vaccine (7 - 2023-24 season) 02/13/2024    ANNUAL WELLNESS VISIT  08/02/2024        Current Outpatient Medications on File Prior to Visit   Medication Sig    acetaminophen (TYLENOL) 325 MG tablet Take 2 tablets by mouth Every 4 (Four) Hours As Needed for Mild Pain . (Patient taking differently: Take 2 tablets by mouth Every 4 (Four) Hours As Needed for Mild Pain. 500mg)    Bradley Thyroid 30 MG tablet Take 1 tablet by mouth Daily.    atenolol (TENORMIN) 25 MG tablet TAKE 1 TABLET EVERY AFTERNOON    atorvastatin (LIPITOR) 20 MG tablet TAKE 1 TABLET DAILY    ciprofloxacin (CIPRO) 500 MG tablet Take 1 tablet by mouth.    Denosumab (PROLIA SC) Inject  under the skin into the appropriate area as directed See Admin Instructions. Every 6 months   last shot nov 30, 2023    diphenhydrAMINE (BENADRYL ALLERGY CHILDRENS) 12.5 MG chewable tablet Chew 2 tablets 4 (Four) Times a Day As Needed for Allergies. FOR BEE STINGS ONLY    diphenoxylate-atropine (LOMOTIL) 2.5-0.025 MG per tablet TAKE 1 TABLET FOUR TIMES A DAY AS NEEDED FOR DIARRHEA    EPINEPHrine (EPIPEN) 0.3 MG/0.3ML solution auto-injector injection Inject 0.3 mL into the appropriate muscle as directed by prescriber 1 (One) Time for 1 dose.    fexofenadine (ALLEGRA) 180 MG tablet Take 1 tablet by mouth Daily.    fluticasone (FLONASE) 50 MCG/ACT nasal spray 2 sprays into the nostril(s) as directed by provider Every Night.    guaiFENesin (MUCINEX PO) Take 400 mg by mouth 2 (Two) Times a Day.    isosorbide mononitrate (IMDUR) 30 MG 24 hr tablet Take 1 tablet by mouth Daily.    lansoprazole (PREVACID) 30 MG capsule Take 1 capsule by mouth 2 (Two)  Times a Day.    levalbuterol (XOPENEX HFA) 45 MCG/ACT inhaler Inhale 1-2 puffs Every 4 (Four) Hours As Needed.    Lifitegrast (Xiidra) 5 % ophthalmic solution Administer 1 drop to both eyes 2 (Two) Times a Day.    meclizine (ANTIVERT) 25 MG tablet TAKE 1 TABLET THREE TIMES A DAY AS NEEDED FOR DIZZINESS    Melatonin 10 MG tablet Take 1 tablet by mouth Every Night.    meloxicam (MOBIC) 15 MG tablet TAKE 1 TABLET DAILY    metroNIDAZOLE (METROGEL) 0.75 % gel Apply 1 application topically to the appropriate area as directed 2 (Two) Times a Day As Needed. ROASCEA    montelukast (SINGULAIR) 10 MG tablet Take 1 tablet by mouth Every Night.    Multiple Vitamins-Minerals (MULTIVITAMIN ADULT PO) Take 1 tablet by mouth Every Night. HOLD FOR SURGERY    Myrbetriq 25 MG tablet sustained-release 24 hour 24 hr tablet Take 1 tablet by mouth Daily.    ondansetron (ZOFRAN) 4 MG tablet Take 1 tablet by mouth.    pilocarpine (SALAGEN) 5 MG tablet TAKE 1 TABLET FOUR TIMES A DAY    POTASSIUM CHLORIDE PO Take 99 mg by mouth Every Night. OTC  HOLD FOR SURGERY    PROBIOTIC PRODUCT PO Take 1 capsule by mouth 2 (Two) Times a Day.     Current Facility-Administered Medications on File Prior to Visit   Medication    Chlorhexidine Gluconate Cloth 2 % pads       Immunization History   Administered Date(s) Administered    ABRYSVO (RSV, 60+ or pregnant women 32-36 wks) 11/08/2023    COVID-19 (MODERNA) 1st,2nd,3rd Dose Monovalent 01/13/2021, 02/09/2021, 10/26/2021    COVID-19 (MODERNA) BIVALENT 12+YRS 10/04/2022    COVID-19 (MODERNA) Monovalent Original Booster 05/09/2022    COVID-19 F23 (PFIZER) 12YRS+ (COMIRNATY) 10/13/2023    Fluad Quad 65+ 11/01/2021    Fluzone High Dose =>65 Years ([employer] ONLY) 10/02/2017, 11/01/2021    Fluzone High-Dose 65+yrs 10/31/2022, 10/13/2023    Fluzone Quad >6mos (Multi-dose) 09/30/2013    Influenza Quad Vaccine (Inpatient) 09/30/2013    Influenza TIV (IM) 01/09/2010, 10/25/2012    Pneumococcal Conjugate 13-Valent  "(PCV13) 10/27/2015    Pneumococcal Polysaccharide (PPSV23) 11/01/2009, 11/01/2013    Pneumococcal, Unspecified 09/23/2008    Shingrix 09/01/2021, 01/06/2022    Tdap 11/02/2012, 08/06/2015    Zostavax 09/30/2009, 10/09/2013       Review of Systems   Constitutional:  Positive for chills and fever. Negative for activity change, appetite change and fatigue.   HENT:  Negative for congestion, ear pain, rhinorrhea and sore throat.    Respiratory:  Negative for cough and shortness of breath.    Cardiovascular:  Negative for chest pain, palpitations and leg swelling.   Gastrointestinal:  Negative for abdominal pain, constipation, diarrhea, nausea and vomiting.   Musculoskeletal:  Positive for myalgias. Negative for arthralgias.   Neurological:  Negative for headache.        Objective     /73 (BP Location: Left arm, Patient Position: Sitting)   Pulse 85   Temp 97.9 °F (36.6 °C) (Oral)   Ht 167.6 cm (66\")   Wt 77.7 kg (171 lb 3.2 oz)   BMI 27.63 kg/m²       Physical Exam  Vitals and nursing note reviewed.   Constitutional:       General: She is not in acute distress.     Appearance: Normal appearance.   Cardiovascular:      Rate and Rhythm: Normal rate and regular rhythm.      Heart sounds: Normal heart sounds. No murmur heard.  Pulmonary:      Effort: Pulmonary effort is normal.      Breath sounds: Normal breath sounds.   Abdominal:      Palpations: Abdomen is soft.      Tenderness: There is no abdominal tenderness.   Musculoskeletal:      Cervical back: Neck supple.      Right lower leg: No edema.      Left lower leg: No edema.   Lymphadenopathy:      Cervical: No cervical adenopathy.   Neurological:      General: No focal deficit present.      Mental Status: She is alert.      Cranial Nerves: No cranial nerve deficit.      Coordination: Coordination normal.      Gait: Gait normal.   Psychiatric:         Mood and Affect: Mood normal.         Behavior: Behavior normal.       Results for orders placed or performed " in visit on 06/14/24   Urinalysis With Culture If Indicated - Urine, Clean Catch    Specimen: Urine, Clean Catch   Result Value Ref Range    Color, UA Yellow Yellow, Straw    Appearance, UA Clear Clear    pH, UA 6.0 5.0 - 8.0    Specific Gravity, UA 1.010 1.005 - 1.030    Glucose, UA Negative Negative    Ketones, UA Negative Negative    Bilirubin, UA Negative Negative    Blood, UA Trace (A) Negative    Protein, UA Negative Negative    Leuk Esterase, UA Negative Negative    Nitrite, UA Negative Negative    Urobilinogen, UA 0.2 E.U./dL 0.2 - 1.0 E.U./dL   Urinalysis, Microscopic Only - Urine, Clean Catch    Specimen: Urine, Clean Catch   Result Value Ref Range    RBC, UA 0-2 None Seen, 0-2 /HPF    WBC, UA None Seen None Seen, 0-2 /HPF    Bacteria, UA None Seen None Seen /HPF    Squamous Epithelial Cells, UA 0-2 None Seen, 0-2 /HPF    Methodology Manual Light Microscopy         Result Review :                             Assessment and Plan      Diagnoses and all orders for this visit:    1. Acute cystitis without hematuria (Primary)  Comments:  POSSIBLE FLARE OF THE OAB AND / OR PERSISTENT YEAST INFECTION.  WILL COVER AS NOTED AND SEND HER BACK TO UROLOGY.  ER IF WORSE IN THE MEANTIME  Orders:  -     Urinalysis With Culture If Indicated - Urine, Clean Catch  -     Urinalysis, Microscopic Only - Urine, Clean Catch  -     phenazopyridine (Pyridium) 200 MG tablet; Take 1 tablet by mouth 3 (Three) Times a Day As Needed for Bladder Spasms or Dysuria.  Dispense: 21 tablet; Refill: 1  -     fluconazole (Diflucan) 200 MG tablet; Take 1 tablet by mouth Daily.  Dispense: 10 tablet; Refill: 0    2. Overactive bladder  -     Ambulatory Referral to Urology            Follow Up     No follow-ups on file.    Patient was given instructions and counseling regarding her condition or for health maintenance advice. Please see specific information pulled into the AVS if appropriate.

## 2024-06-20 NOTE — TELEPHONE ENCOUNTER
[facility] pharmacy faxed request for adult Epinephrine dose instead of prediatric dose.   If ok what strength and directions?    Daily Call Note: Daily call was completed today. Patient states she found her scale but has not weighed herself today. Patient states she has not received her BP monitor via mail. Patient does not have a glucometer in the home. Will reach out to Pharmacy to assist with ordering BP cuffs and blood glucose monitor. Patient will need home care to teach how to use the glucose monitor.     Pt Education: importance of daily weights  Barriers: did not weigh   Topics for Daily Review: glucose monitoring   Pt demonstrates clear understanding: Yes    Daily Weight:  There were no vitals filed for this visit.   Last 3 Weights:  Wt Readings from Last 7 Encounters:   06/13/24 115 kg (252 lb 6.8 oz)   06/10/24 103 kg (227 lb 1.2 oz)   02/05/24 107 kg (235 lb)   10/30/23 110 kg (242 lb)   10/24/23 110 kg (242 lb 12.8 oz)   04/21/20 111 kg (245 lb)       Masimo Device: No   Masimo Clinical Impression: n/a     Virtual Visits--Scheduled (Most Recent Date at Top)  Follow up Appointments  Recent Visits  Date Type Provider Dept   06/18/24 Telemedicine Cheryl Izaguirre MD [facility]   Showing recent visits within past 30 days and meeting all other requirements  Future Appointments  Date Type Provider Dept   06/25/24 Appointment [facility]   Showing future appointments within next 90 days and meeting all other requirements       Frequency of RN Calls & Virtual Visits per Team Agreement: [facility] Frequency: Daily    Medication issues Addressed (what was done):      Follow up appointments scheduled by [facility] Staff:    Referrals made by [facility] staff: yes, for home care

## 2024-06-21 ENCOUNTER — OFFICE VISIT (OUTPATIENT)
Dept: FAMILY MEDICINE CLINIC | Age: 79
End: 2024-06-21
Payer: MEDICARE

## 2024-06-21 VITALS
WEIGHT: 169 LBS | HEART RATE: 72 BPM | BODY MASS INDEX: 27.16 KG/M2 | SYSTOLIC BLOOD PRESSURE: 102 MMHG | HEIGHT: 66 IN | DIASTOLIC BLOOD PRESSURE: 62 MMHG

## 2024-06-21 DIAGNOSIS — I82.432 ACUTE DEEP VEIN THROMBOSIS (DVT) OF POPLITEAL VEIN OF LEFT LOWER EXTREMITY: Primary | ICD-10-CM

## 2024-06-21 PROCEDURE — 1159F MED LIST DOCD IN RCRD: CPT | Performed by: NURSE PRACTITIONER

## 2024-06-21 PROCEDURE — 99213 OFFICE O/P EST LOW 20 MIN: CPT | Performed by: NURSE PRACTITIONER

## 2024-06-21 PROCEDURE — 1160F RVW MEDS BY RX/DR IN RCRD: CPT | Performed by: NURSE PRACTITIONER

## 2024-06-21 PROCEDURE — 1125F AMNT PAIN NOTED PAIN PRSNT: CPT | Performed by: NURSE PRACTITIONER

## 2024-06-21 RX ORDER — ENOXAPARIN SODIUM 100 MG/ML
80 INJECTION SUBCUTANEOUS
COMMUNITY
Start: 2024-06-20 | End: 2024-07-20

## 2024-06-21 NOTE — PROGRESS NOTES
"Chief Complaint  Follow-up (Was in [facility] ER on 06/21/2024 for blood clot.)    Subjective          Valery Baez presents to [facility]  History of Present Illness  She was seen at [facility] yesterday for an acute DVT in the popliteal vein.   She recently had left knee replacement by Dr. Lange on June 4.  She was unable to tolerate the Eliquis.  She reports that it made her have nausea, insomnia, and \"alert all the time.\" She was switched 325 mg ASA. She was started on Lovenox by the ED for 30 days.  I contacted the on-call orthopedic surgeon, who recommended that her DVT be managed by the PCP. She sees ortho on Tuesday.       Objective   Vital Signs:   /62 (BP Location: Left arm, Patient Position: Sitting, Cuff Size: Large Adult)   Pulse 72   Ht 167.6 cm (66\")   Wt 76.7 kg (169 lb)   BMI 27.28 kg/m²     Physical Exam  Constitutional:       General: She is not in acute distress.     Appearance: Normal appearance.   HENT:      Head: Normocephalic.   Eyes:      Pupils: Pupils are equal, round, and reactive to light.      Visual Fields: Right eye visual fields normal and left eye visual fields normal.   Neck:      Trachea: Trachea normal.   Cardiovascular:      Rate and Rhythm: Normal rate and regular rhythm.      Heart sounds: Normal heart sounds.   Pulmonary:      Effort: Pulmonary effort is normal.      Breath sounds: Normal breath sounds and air entry.   Musculoskeletal:      Right lower leg: No edema.      Left lower leg: No edema.   Skin:     General: Skin is warm and dry.   Neurological:      Mental Status: She is alert and oriented to person, place, and time.   Psychiatric:         Mood and Affect: Mood and affect normal.         Behavior: Behavior normal.         Thought Content: Thought content normal.        Result Review :   The following data was reviewed by: VIOLETTE Pena on 06/21/2024:                  Assessment and Plan    Diagnoses and all " orders for this visit:    1. Acute deep vein thrombosis (DVT) of popliteal vein of left lower extremity (Primary)  -     Ambulatory Referral to Hematology / Oncology    Will refer her to her hematologist due to her DVT.  She is keep her follow-up appointment with the ortho next week.      Follow Up   Return for Next scheduled follow up.  Patient was given instructions and counseling regarding her condition or for health maintenance advice. Please see specific information pulled into the AVS if appropriate.

## 2024-06-27 RX ORDER — CEPHALEXIN 500 MG/1
500 CAPSULE ORAL DAILY
Qty: 90 CAPSULE | Refills: 0 | Status: SHIPPED | OUTPATIENT
Start: 2024-06-27

## 2024-06-27 RX ORDER — CEPHALEXIN 500 MG/1
500 CAPSULE ORAL DAILY
Qty: 10 CAPSULE | Refills: 0 | Status: SHIPPED | OUTPATIENT
Start: 2024-06-27

## 2024-06-27 RX ORDER — CEPHALEXIN 500 MG/1
CAPSULE ORAL
Qty: 90 CAPSULE | Refills: 3 | OUTPATIENT
Start: 2024-06-27

## 2024-07-30 RX ORDER — MELOXICAM 15 MG/1
TABLET ORAL
Qty: 90 TABLET | Refills: 3 | Status: SHIPPED | OUTPATIENT
Start: 2024-07-30

## 2024-08-01 ENCOUNTER — OFFICE VISIT (OUTPATIENT)
Dept: FAMILY MEDICINE CLINIC | Age: 79
End: 2024-08-01
Payer: MEDICARE

## 2024-08-01 VITALS
SYSTOLIC BLOOD PRESSURE: 111 MMHG | BODY MASS INDEX: 27.93 KG/M2 | TEMPERATURE: 98.1 F | DIASTOLIC BLOOD PRESSURE: 67 MMHG | HEART RATE: 73 BPM | HEIGHT: 66 IN | WEIGHT: 173.8 LBS

## 2024-08-01 DIAGNOSIS — I82.432 ACUTE DEEP VEIN THROMBOSIS (DVT) OF POPLITEAL VEIN OF LEFT LOWER EXTREMITY: ICD-10-CM

## 2024-08-01 DIAGNOSIS — Z96.652 S/P TKR (TOTAL KNEE REPLACEMENT), LEFT: Primary | ICD-10-CM

## 2024-08-01 RX ORDER — SIMETHICONE 125 MG
125 TABLET,CHEWABLE ORAL
COMMUNITY

## 2024-08-01 NOTE — PROGRESS NOTES
"Valery Baez presents to [facility] with complaint of  Knee Injury (Follow up on Left knee manipulation.)    SUBJECTIVE  History of Present Illness    Patient is here for follow up after having left knee manipulation. She says she was told to follow up with PCP. She had left TKR with Dr. Lange 6/10/24. She was found to have DVT after she stopped taking eliquis due to SE after her sx. She developed RLE DVT. Her hematologist (VIOLETTE Swann) has seen her for DVT and recommend 3 months of Lovenox for 3 months minimum initially but has now been bridged to Xarelto. She has repeat US next month and follow up with heme scheduled. For pain she is on Mobic, hematologist okayed this with extreme caution given she is on anticoag.  On lansoprazole bid.      She had left knee manipulation 7/25/24. She had follow up for this with Dave Plata of ortho today.  Patient says manipulation has helped her tremendously.  She is continuing to do physical therapy.      OBJECTIVE  Vital Signs:   /67 (BP Location: Left arm, Patient Position: Sitting, Cuff Size: Large Adult)   Pulse 73   Temp 98.1 °F (36.7 °C) (Oral)   Ht 167.6 cm (66\")   Wt 78.8 kg (173 lb 12.8 oz)   BMI 28.05 kg/m²       Physical Exam  Vitals reviewed.   Constitutional:       General: She is not in acute distress.     Appearance: Normal appearance. She is not ill-appearing.   HENT:      Head: Normocephalic and atraumatic.      Nose: Nose normal.      Mouth/Throat:      Mouth: Mucous membranes are moist.      Pharynx: Oropharynx is clear.   Cardiovascular:      Rate and Rhythm: Normal rate and regular rhythm.      Pulses: Normal pulses.      Heart sounds: Normal heart sounds.   Pulmonary:      Effort: Pulmonary effort is normal.      Breath sounds: Normal breath sounds.   Musculoskeletal:      Cervical back: Neck supple.      Left knee: No swelling (wearing compression stocking), deformity, effusion or erythema. "   Skin:     General: Skin is warm and dry.   Neurological:      General: No focal deficit present.      Mental Status: She is alert and oriented to person, place, and time. Mental status is at baseline.   Psychiatric:         Mood and Affect: Mood normal.         Behavior: Behavior normal.         Judgment: Judgment normal.              ASSESSMENT AND PLAN:  Diagnoses and all orders for this visit:    1. S/P TKR (total knee replacement), left (Primary)    2. Acute deep vein thrombosis (DVT) of popliteal vein of left lower extremity      No changes made to patient's plan of care today.  Continue Pt, see ortho and heme as scheduled.  Vital signs are stable and she is eating and drinking as usual.  Discussed blood thinner safety precautions especially given that she is taking Mobic.  Also discussed importance of staying on her PPI.  She will follow-up as scheduled with Dr. Smith for chronic illness management.        Follow Up   Return for Next scheduled follow up. Patient to notify office with any acute concerns or issues.  Patient verbalizes understanding, agrees with plan of care and has no further questions upon discharge.     Patient was given instructions and counseling regarding her condition or for health maintenance advice. Please see specific information pulled into the AVS if appropriate.     Discussed the importance of following up with any needed screening tests/labs/specialist appointments and any requested follow-up recommended by me today. Importance of maintaining follow-up discussed and patient accepts that missed appointments can delay diagnosis and potentially lead to worsening of conditions.    Part of this note may be an electronic transcription/translation of spoken language to printed text using the Dragon Dictation System.

## 2024-08-14 RX ORDER — PILOCARPINE HYDROCHLORIDE 5 MG/1
TABLET, FILM COATED ORAL
Qty: 120 TABLET | Refills: 0 | Status: SHIPPED | OUTPATIENT
Start: 2024-08-14

## 2024-08-14 NOTE — TELEPHONE ENCOUNTER
Failed protocol    LOV    LF  6/3/24  #360        COMPREHENSIVE METABOLIC PANEL (06/20/2024 13:49)     On call Dr Parkinson

## 2024-08-26 RX ORDER — ATENOLOL 25 MG/1
TABLET ORAL
Qty: 90 TABLET | Refills: 3 | Status: SHIPPED | OUTPATIENT
Start: 2024-08-26

## 2024-08-28 ENCOUNTER — OFFICE VISIT (OUTPATIENT)
Dept: UROLOGY | Facility: CLINIC | Age: 79
End: 2024-08-28
Payer: MEDICARE

## 2024-08-28 VITALS
WEIGHT: 172 LBS | OXYGEN SATURATION: 100 % | BODY MASS INDEX: 27.64 KG/M2 | HEART RATE: 67 BPM | HEIGHT: 66 IN | DIASTOLIC BLOOD PRESSURE: 58 MMHG | SYSTOLIC BLOOD PRESSURE: 108 MMHG

## 2024-08-28 DIAGNOSIS — N32.81 OVERACTIVE BLADDER: Primary | ICD-10-CM

## 2024-08-28 PROBLEM — Z79.01 LONG TERM (CURRENT) USE OF ANTICOAGULANTS: Status: ACTIVE | Noted: 2024-06-05

## 2024-08-28 PROBLEM — M35.00 SJOGREN'S DISEASE: Status: RESOLVED | Noted: 2019-09-10 | Resolved: 2024-08-28

## 2024-08-28 PROBLEM — Z98.1 S/P FUSION OF SACROILIAC JOINT: Status: RESOLVED | Noted: 2023-08-11 | Resolved: 2024-08-28

## 2024-08-28 PROBLEM — M35.00 SJOGREN SYNDROME, UNSPECIFIED: Chronic | Status: ACTIVE | Noted: 2024-06-05

## 2024-08-28 PROBLEM — H81.10 BENIGN POSITIONAL VERTIGO: Status: RESOLVED | Noted: 2022-04-22 | Resolved: 2024-08-28

## 2024-08-28 LAB
BILIRUB BLD-MCNC: NEGATIVE MG/DL
CLARITY, POC: CLEAR
COLOR UR: YELLOW
EXPIRATION DATE: ABNORMAL
GLUCOSE UR STRIP-MCNC: NEGATIVE MG/DL
KETONES UR QL: NEGATIVE
LEUKOCYTE EST, POC: ABNORMAL
Lab: ABNORMAL
NITRITE UR-MCNC: NEGATIVE MG/ML
PH UR: 5.5 [PH] (ref 5–8)
PROT UR STRIP-MCNC: NEGATIVE MG/DL
RBC # UR STRIP: ABNORMAL /UL
SP GR UR: 1.01 (ref 1–1.03)
URINE VOLUME: 11
UROBILINOGEN UR QL: ABNORMAL

## 2024-08-28 RX ORDER — MIRABEGRON 50 MG/1
50 TABLET, EXTENDED RELEASE ORAL DAILY
Qty: 90 TABLET | Refills: 3 | Status: SHIPPED | OUTPATIENT
Start: 2024-08-28

## 2024-08-28 RX ORDER — ONDANSETRON 4 MG/1
4 TABLET, FILM COATED ORAL EVERY 8 HOURS PRN
COMMUNITY
Start: 2024-07-25

## 2024-08-28 NOTE — PROGRESS NOTES
Chief Complaint: Establish Care (Overactive Bladder )    Subjective         History of Present Illness  Valery Baez is a 78 y.o. female presents to [facility] to be seen for oab.    She is on myrbetriq 25 mg q day and has been on this for years.     She states she continues to have urgency and frequency.     Nocturia x 1-2  was 3-4 x a night.    Mostly drinks water and can drink powerade daily as well.     She states she can void then feel like she is empty but then have more urine come out a few seconds later.    She does wear incontinence underwear but not depends.     She can have urge incontinence at times.     Denies stress incontinence.    She had been placed on estrace before this did not help much.     She is on daily keflex to keep mssa suppressed.     She was seen in the ED at [facility] in 6/2024 for cystitis cx was subsequently negative was told she had a yeast infection.     Hysterectomy at age 25 was on HRT for 30 years.     Oophrecotmy in her 40s.    Has hx of breast cancer with mastectomy in 2015 this ws not estrogen receptor positive breast cancer.            Objective     Past Medical History:   Diagnosis Date    Allergic     Anemia     CHRONIC IRON DEFICIENT    Anesthesia complication     STATES SHE IS VERY SLOW TO WAKE UP    Anesthesia complication     pt has artifical jaw joints upper and lower on both sides--- this makes intubation difficult    Ankle sprain 1962    Arthritis of back 2018    Arthritis of neck 1988    Asthma     Basal cell carcinoma     FACE    Bursitis of hip 2022    Cancer     right breast cancer    Cervical disc disorder 2022    Chronic diarrhea     Chronic kidney disease     Clotting disorder     Bruise easily    Colon polyp     CTS (carpal tunnel syndrome) 1978    DDD (degenerative disc disease), lumbar     Disease of thyroid gland     Dislocation of finger 1984    Dry eye     Elevated cholesterol     Fracture of ankle 1998     Fracture of hip 2021    Right hip replacement; same symptoms in left    Fracture of wrist 1968    Fracture, finger 1993    Fracture, foot 1971    Frequency of urination     Frozen shoulder     GERD (gastroesophageal reflux disease)     Hard to intubate     REQUESTING TO SEE [facility] 8/22/19; PT HAS COMPLETE BILATERAL JAW PROSTESIS    Headache     or migraines    Heart palpitations     Hip arthrosis 2022    History of breast cancer     RIGHT    History of medical problems     History of transfusion     [place] (hard of hearing)     BILAT HEARING AID    Hx of renal calculi     Hypercholesteremia     Hypothyroidism     Irregular heart beat     mid afternoon   pt takes atenolol    Knee sprain 1965    Knee swelling 1966    Limited mobility     RIGHT HIP    Limited mobility     JAW    Low back pain     Low back strain 1957    Lumbosacral disc disease 2022    MRSA (methicillin resistant Staphylococcus aureus)     LEFT JAW @TMJ JOINT, MULT. ,LAST 2013, TREATED AT [facility], infection control notified 8/24/16. 1300    MSSA (methicillin susceptible Staphylococcus aureus)     Neck strain 1984    Osteoarthritis     Osteopenia     Prolia shot 11/4/22    Osteoporosis     Periarthritis of shoulder 1999    PONV (postoperative nausea and vomiting)     Right hip pain     Rosacea     Rotator cuff syndrome 1999    Short of breath on exertion     WITH STAIRS ONLY    Sleep apnea     C PAP    Spinal headache     Spinal stenosis     Subluxation of patella 1979    Tear of meniscus of knee 1966    Urgency of urination     Vertigo     WAKES UP WITH THIS SOMETIMES    Weakness     RIGHT HIP    Wrist sprain 1966       Past Surgical History:   Procedure Laterality Date    ABSCESS DRAINAGE      abscess removed from left jaw x2    ADENOIDECTOMY  1952    ANKLE OPEN REDUCTION INTERNAL FIXATION  1998    APPENDECTOMY  1965    AVULSION TOENAIL PLATE      6 REMOVED    BACK SURGERY  9/19/19    BILATERAL BREAST REDUCTION Left 08/29/2016    Procedure: LT  BREAST REDUCTION ;  Surgeon: Luis Zambrano MD;  Location: [facility] OR;  Service:     BREAST BIOPSY Bilateral     CARPAL TUNNEL RELEASE  1993    CHOLECYSTECTOMY  09/2000    COLONOSCOPY      COLONOSCOPY  2019    NORMAL    CYST REMOVAL  03/1984    eye cyst left lower lid    CYST REMOVAL  01/2007    right upper thigh    CYSTOSCOPY      x2  02/2013 & 04/2014    CYSTOSCOPY      NUMEROUS    D & C HYSTEROSCOPY      FOOT SURGERY      HAND SURGERY  1980    HYSTERECTOMY  1971    LATER BSO    JOINT REPLACEMENT  2021    right hip    KNEE ARTHROPLASTY Right 03/2005    LAPAROSCOPIC SALPINGOOPHERECTOMY      LUMBAR DISCECTOMY FUSION INSTRUMENTATION N/A 09/09/2019    Procedure: Lumbar 3 to sacral 1 laminectomy and fusion with instrumentation;  Surgeon: Vikas Couch MD;  Location: [facility] OR;  Service: Orthopedic Spine    LUMBAR FUSION N/A 07/27/2023    Procedure: Lumbar 2-3 lateral lumbar interbody fusion with neuro robot;  Surgeon: Aston Paredes IV, MD;  Location: [facility];  Service: Robotics - Neuro;  Laterality: N/A;    MANDIBLE SURGERY      UPPER AND LOWER JAW ADJUSTED WITH PLATES AND WIRES    MASTECTOMY Right 03/2015    MASTECTOMY PARTIAL / LUMPECTOMY Right 03/2009    NIPPLE RECONSTRUCTION Right 08/29/2016    Procedure: RT NIPPLE RECONSTRUCTION;  Surgeon: Luis Zambrano MD;  Location: [facility] OR;  Service:     OTHER SURGICAL HISTORY      laparoscopy adhesions right side x2    OTHER SURGICAL HISTORY      drain mammosite area    OTHER SURGICAL HISTORY  01/2010    pressure washed prosthesis area left jaw    OTHER SURGICAL HISTORY      drain mammosite area right breast 09/2011    RECONSTRUCTION BREAST W/ TRAM FLAP Right 02/2016    ROTATOR CUFF REPAIR Left 09/2014    SACROILIAC JOINT FUSION Bilateral 07/27/2023    Procedure: Bilateral sacroiliac joint fusion;  Surgeon: Aston Paredes IV, MD;  Location: [facility];  Service: Neurosurgery;  Laterality: Bilateral;    SHOULDER SURGERY      SPINAL FUSION  2019     TEMPOROMANDIBULAR JOINT ARTHROPLASTY Bilateral 1993    MULT. REPLACEMENTS LATER    TONSILLECTOMY  1952    TOTAL HIP ARTHROPLASTY Right 12/24/2021    Procedure: Right anterior total hip arthroplasty;  Surgeon: Dejan Lange MD;  Location: [facility] OR;  Service: Orthopedics;  Laterality: Right;    TOTAL KNEE ARTHROPLASTY Left     WRIST SURGERY           Current Outpatient Medications:     acetaminophen (TYLENOL) 325 MG tablet, Take 2 tablets by mouth Every 4 (Four) Hours As Needed for Mild Pain . (Patient taking differently: Take 2 tablets by mouth Every 4 (Four) Hours As Needed for Mild Pain. 500mg), Disp: , Rfl:     Cabery Thyroid 30 MG tablet, Take 1 tablet by mouth Daily., Disp: 30 tablet, Rfl: 0    atenolol (TENORMIN) 25 MG tablet, TAKE 1 TABLET EVERY AFTERNOON, Disp: 90 tablet, Rfl: 3    atorvastatin (LIPITOR) 20 MG tablet, TAKE 1 TABLET DAILY, Disp: 90 tablet, Rfl: 3    cephalexin (Keflex) 500 MG capsule, Take 1 capsule by mouth Daily., Disp: 90 capsule, Rfl: 0    Denosumab (PROLIA SC), Inject  under the skin into the appropriate area as directed See Admin Instructions. Every 6 months   last shot nov 30, 2023, Disp: , Rfl:     diphenhydrAMINE (BENADRYL ALLERGY CHILDRENS) 12.5 MG chewable tablet, Chew 2 tablets 4 (Four) Times a Day As Needed for Allergies. FOR BEE STINGS ONLY, Disp: , Rfl:     diphenoxylate-atropine (LOMOTIL) 2.5-0.025 MG per tablet, TAKE 1 TABLET FOUR TIMES A DAY AS NEEDED FOR DIARRHEA, Disp: 60 tablet, Rfl: 1    fexofenadine (ALLEGRA) 180 MG tablet, Take 1 tablet by mouth Daily., Disp: , Rfl:     fluconazole (Diflucan) 200 MG tablet, Take 1 tablet by mouth Daily., Disp: 10 tablet, Rfl: 0    fluticasone (FLONASE) 50 MCG/ACT nasal spray, 2 sprays into the nostril(s) as directed by provider Every Night., Disp: , Rfl:     guaiFENesin (MUCINEX PO), Take 400 mg by mouth 2 (Two) Times a Day., Disp: , Rfl:     isosorbide mononitrate (IMDUR) 30 MG 24 hr tablet, Take 1 tablet by mouth Daily., Disp:  , Rfl:     lansoprazole (PREVACID) 30 MG capsule, Take 1 capsule by mouth 2 (Two) Times a Day., Disp: 180 capsule, Rfl: 3    levalbuterol (XOPENEX HFA) 45 MCG/ACT inhaler, Inhale 1-2 puffs Every 4 (Four) Hours As Needed., Disp: , Rfl:     Lifitegrast (Xiidra) 5 % ophthalmic solution, Administer 1 drop to both eyes 2 (Two) Times a Day., Disp: , Rfl:     meclizine (ANTIVERT) 25 MG tablet, TAKE 1 TABLET THREE TIMES A DAY AS NEEDED FOR DIZZINESS, Disp: 90 tablet, Rfl: 2    Melatonin 10 MG tablet, Take 1 tablet by mouth Every Night., Disp: , Rfl:     meloxicam (MOBIC) 15 MG tablet, TAKE 1 TABLET DAILY, Disp: 90 tablet, Rfl: 3    metroNIDAZOLE (METROGEL) 0.75 % gel, Apply 1 application topically to the appropriate area as directed 2 (Two) Times a Day As Needed. ROASCEA, Disp: , Rfl:     mometasone (ASMANEX TWISTHALER) inhaler 110 mcg/inhalation, Inhale., Disp: , Rfl:     montelukast (SINGULAIR) 10 MG tablet, Take 1 tablet by mouth Every Night., Disp: , Rfl:     Multiple Vitamins-Minerals (MULTIVITAMIN ADULT PO), Take 1 tablet by mouth Every Night. HOLD FOR SURGERY, Disp: , Rfl:     ondansetron (ZOFRAN) 4 MG tablet, Take 1 tablet by mouth Every 8 (Eight) Hours As Needed., Disp: , Rfl:     pilocarpine (SALAGEN) 5 MG tablet, TAKE 1 TABLET FOUR TIMES A DAY, Disp: 120 tablet, Rfl: 0    POTASSIUM CHLORIDE PO, Take 99 mg by mouth Every Night. OTC HOLD FOR SURGERY, Disp: , Rfl:     pyridoxine (VITAMIN B-6) 100 MG tablet, Take 1 tablet by mouth., Disp: , Rfl:     rivaroxaban (XARELTO) 20 MG tablet, Take 1 tablet by mouth., Disp: , Rfl:     simethicone (MYLICON) 125 MG chewable tablet, Chew 1 tablet., Disp: , Rfl:     tapentadol (NUCYNTA) 50 MG tablet, Take 1 tablet by mouth., Disp: , Rfl:     EPINEPHrine (EPIPEN) 0.3 MG/0.3ML solution auto-injector injection, Inject 0.3 mL into the appropriate muscle as directed by prescriber 1 (One) Time for 1 dose., Disp: 1 each, Rfl: 1    Mirabegron ER (Myrbetriq) 50 MG tablet sustained-release  "24 hour 24 hr tablet, Take 50 mg by mouth Daily., Disp: 90 tablet, Rfl: 3  No current facility-administered medications for this visit.    Facility-Administered Medications Ordered in Other Visits:     Chlorhexidine Gluconate Cloth 2 % pads, , Apply externally, Take As Directed, Dejan Lange MD    Allergies   Allergen Reactions    Bee Venom Anaphylaxis    Latex Hives    Sulfa Antibiotics Hives, Nausea And Vomiting and Rash    Sulfate Hives, Nausea And Vomiting and Rash     Other reaction(s): Not available    Codeine Hives     And Derivatives    Cyclobenzaprine Other (See Comments) and Unknown - High Severity     Experienced side effects - blurred vision, dry mouth, constipation        Darvon [Propoxyphene] Nausea And Vomiting     CONFUSION.     Ditropan [Oxybutynin] Itching    Duricef [Cefadroxil] Itching and Nausea Only    Erythromycin Hives     Also allergic to Duricef    Hydrocodone Nausea And Vomiting and GI Intolerance    Iodine Hives     Iodine scrubbing solutions /IVP Contrast Dyes      Meperidine Nausea And Vomiting    Methylprednisolone Unknown (See Comments)     CAUSES ITCHING AND FACIAL FLUSHING BUT PT \"CAN TAKE LOWER DOSES\"    Minocycline Hives, Diarrhea and Nausea And Vomiting     Only with doses > 100mg BID          Morphine And Codeine Hives and GI Intolerance     Derivatives    Oxycodone-Acetaminophen Hives    Talwin [Pentazocine] Hives    Tramadol Hives and GI Intolerance     ULTRAM ER    Tramadol Hcl Hives and Nausea And Vomiting    Valium [Diazepam] Hives and Hallucinations     CONFUSION.     Methocarbamol Hallucinations    Adhesive Tape Rash    Bacitracin-Polymyxin B Rash    Betadine [Povidone Iodine] Rash    Contrast Dye (Echo Or Unknown Ct/Mr) Hives and Palpitations    Daptomycin Itching and Hives    Eliquis [Apixaban] Rash    Miconazole Rash    Neomycin-Polymyxin-Gramicidin Rash    Neosporin Af [Miconazole Nitrate] Rash    Penicillins Hives, Swelling and Rash     As infant    Silver Rash    " "    Family History   Problem Relation Age of Onset    Hypertension Other     Cancer Other         breast    Heart disease Mother     Arthritis Mother     Hearing loss Mother     Anesthesia problems Mother     Osteoporosis Mother     Heart disease Father     Asthma Father     Arthritis Father     Hearing loss Father     Heart disease Maternal Grandmother     Cancer Maternal Grandmother     Heart disease Maternal Grandfather     Heart disease Paternal Grandmother     Cancer Paternal Grandmother     Heart disease Paternal Grandfather     Rheumatologic disease Paternal Aunt     Early death Brother          at 53, Sudden Death Syndrome    Malig Hyperthermia Neg Hx        Social History     Socioeconomic History    Marital status:    Tobacco Use    Smoking status: Never     Passive exposure: Never    Smokeless tobacco: Never   Vaping Use    Vaping status: Never Used   Substance and Sexual Activity    Alcohol use: Yes     Alcohol/week: 4.0 standard drinks of alcohol     Types: 4 Shots of liquor per week     Comment: 4 drinks per week    Drug use: Never    Sexual activity: Not Currently     Partners: Male     Birth control/protection: Hysterectomy, Partner of same sex       Vital Signs:   /58 (BP Location: Right arm, Patient Position: Sitting, Cuff Size: Large Adult)   Pulse 67   Ht 167.6 cm (65.98\")   Wt 78 kg (172 lb)   SpO2 100%   BMI 27.77 kg/m²      Physical Exam     Result Review :   The following data was reviewed by: VIOLETTE Can on 2024:  Results for orders placed or performed in visit on 24   Bladder Scan   Result Value Ref Range    Urine Volume 11    POC Urinalysis Dipstick, Automated    Specimen: Urine   Result Value Ref Range    Color Yellow Yellow, Straw, Dark Yellow, Debi    Clarity, UA Clear Clear    Specific Gravity  1.015 1.005 - 1.030    pH, Urine 5.5 5.0 - 8.0    Leukocytes Trace (A) Negative    Nitrite, UA Negative Negative    Protein, POC Negative Negative " mg/dL    Glucose, UA Negative Negative mg/dL    Ketones, UA Negative Negative    Urobilinogen, UA 0.2 E.U./dL Normal, 0.2 E.U./dL    Bilirubin Negative Negative    Blood, UA Trace (A) Negative    Lot Number [id number]     Expiration Date 4,681,025       Bladder Scan interpretation 08/28/2024    Estimation of residual urine via BVI 3000 Verathon Bladder Scan  MA/nurse performing: yani bradley Ma   Residual Urine: 11 ml  Indication: Overactive bladder   Position: Supine  Examination: Incremental scanning of the suprapubic area using 2.0 MHz transducer using copious amounts of acoustic gel.   Findings: An anechoic area was demonstrated which represented the bladder, with measurement of residual urine as noted. I inspected this myself. In that the residual urine was stable or insignificant, refer to plan for treatment and plan necessary at this time.            Procedures        Assessment and Plan    Diagnoses and all orders for this visit:    1. Overactive bladder (Primary)  -     POC Urinalysis Dipstick, Automated  -     Bladder Scan  -     Mirabegron ER (Myrbetriq) 50 MG tablet sustained-release 24 hour 24 hr tablet; Take 50 mg by mouth Daily.  Dispense: 90 tablet; Refill: 3    Given she has done fairly well thus far on myrbetriq I believe increasing the dose would be beneficial.     We will try her on myrbetirq 50mg q day.    We did discuss behavioral modifications for oab.     Discussed foods and drinks that cause bladder irritation and cause worsening of urgency and frequency.        I spent 15 minutes caring for Valery on this date of service. This time includes time spent by me in the following activities:reviewing tests, obtaining and/or reviewing a separately obtained history, performing a medically appropriate examination and/or evaluation , counseling and educating the patient/family/caregiver, ordering medications, tests, or procedures, and documenting information in the medical record  Follow Up   Return in  about 3 months (around 11/28/2024) for f/u oab .  Patient was given instructions and counseling regarding her condition or for health maintenance advice. Please see specific information pulled into the AVS if appropriate.         This document has been electronically signed by VIOLETTE Can  August 28, 2024 10:00 EDT

## 2024-08-29 ENCOUNTER — OFFICE VISIT (OUTPATIENT)
Dept: FAMILY MEDICINE CLINIC | Age: 79
End: 2024-08-29
Payer: MEDICARE

## 2024-08-29 VITALS
BODY MASS INDEX: 27.61 KG/M2 | DIASTOLIC BLOOD PRESSURE: 68 MMHG | SYSTOLIC BLOOD PRESSURE: 120 MMHG | HEIGHT: 66 IN | HEART RATE: 76 BPM | TEMPERATURE: 97.6 F | WEIGHT: 171.8 LBS

## 2024-08-29 DIAGNOSIS — I82.432 ACUTE DEEP VEIN THROMBOSIS (DVT) OF POPLITEAL VEIN OF LEFT LOWER EXTREMITY: ICD-10-CM

## 2024-08-29 DIAGNOSIS — Z00.00 MEDICARE ANNUAL WELLNESS VISIT, SUBSEQUENT: Primary | ICD-10-CM

## 2024-08-29 DIAGNOSIS — K21.9 GERD WITHOUT ESOPHAGITIS: ICD-10-CM

## 2024-08-29 DIAGNOSIS — N32.81 OVERACTIVE BLADDER: ICD-10-CM

## 2024-08-29 NOTE — PROGRESS NOTES
Subjective   The ABCs of the Annual Wellness Visit  Medicare Wellness Visit      Valery Baez is a 78 y.o. patient who presents for a Medicare Wellness Visit.    The following portions of the patient's history were reviewed and   updated as appropriate: allergies, current medications, past family history, past medical history, past social history, past surgical history, and problem list.    Compared to one year ago, the patient's physical   health is worse.  Compared to one year ago, the patient's mental   health is the same.    Recent Hospitalizations:  She was admitted within the past 365 days at [facility].     Current Medical Providers:  Patient Care Team:  Tino Smith MD as PCP - General  Tino Smith MD as PCP - Family Medicine  Wilfrid Nash MD (Hematology and Oncology)    Outpatient Medications Prior to Visit   Medication Sig Dispense Refill    acetaminophen (TYLENOL) 325 MG tablet Take 2 tablets by mouth Every 4 (Four) Hours As Needed for Mild Pain . (Patient taking differently: Take 2 tablets by mouth Every 4 (Four) Hours As Needed for Mild Pain. 500mg)      Nicholls Thyroid 30 MG tablet Take 1 tablet by mouth Daily. 30 tablet 0    atenolol (TENORMIN) 25 MG tablet TAKE 1 TABLET EVERY AFTERNOON 90 tablet 3    atorvastatin (LIPITOR) 20 MG tablet TAKE 1 TABLET DAILY 90 tablet 3    Denosumab (PROLIA SC) Inject  under the skin into the appropriate area as directed See Admin Instructions. Every 6 months   last shot nov 30, 2023      diphenhydrAMINE (BENADRYL ALLERGY CHILDRENS) 12.5 MG chewable tablet Chew 2 tablets 4 (Four) Times a Day As Needed for Allergies. FOR BEE STINGS ONLY      diphenoxylate-atropine (LOMOTIL) 2.5-0.025 MG per tablet TAKE 1 TABLET FOUR TIMES A DAY AS NEEDED FOR DIARRHEA 60 tablet 1    EPINEPHrine (EPIPEN) 0.3 MG/0.3ML solution auto-injector injection Inject 0.3 mL into the appropriate muscle as directed by prescriber 1 (One) Time for  1 dose. 1 each 1    fexofenadine (ALLEGRA) 180 MG tablet Take 1 tablet by mouth Daily.      fluticasone (FLONASE) 50 MCG/ACT nasal spray 2 sprays into the nostril(s) as directed by provider Every Night.      guaiFENesin (MUCINEX PO) Take 400 mg by mouth 2 (Two) Times a Day.      isosorbide mononitrate (IMDUR) 30 MG 24 hr tablet Take 1 tablet by mouth Daily.      lansoprazole (PREVACID) 30 MG capsule Take 1 capsule by mouth 2 (Two) Times a Day. 180 capsule 3    levalbuterol (XOPENEX HFA) 45 MCG/ACT inhaler Inhale 1-2 puffs Every 4 (Four) Hours As Needed.      Lifitegrast (Xiidra) 5 % ophthalmic solution Administer 1 drop to both eyes 2 (Two) Times a Day.      meclizine (ANTIVERT) 25 MG tablet TAKE 1 TABLET THREE TIMES A DAY AS NEEDED FOR DIZZINESS 90 tablet 2    Melatonin 10 MG tablet Take 1 tablet by mouth Every Night.      meloxicam (MOBIC) 15 MG tablet TAKE 1 TABLET DAILY 90 tablet 3    metroNIDAZOLE (METROGEL) 0.75 % gel Apply 1 application topically to the appropriate area as directed 2 (Two) Times a Day As Needed. ROASCEA      Mirabegron ER (Myrbetriq) 50 MG tablet sustained-release 24 hour 24 hr tablet Take 50 mg by mouth Daily. 90 tablet 3    mometasone (ASMANEX TWISTHALER) inhaler 110 mcg/inhalation Inhale.      montelukast (SINGULAIR) 10 MG tablet Take 1 tablet by mouth Every Night.      Multiple Vitamins-Minerals (MULTIVITAMIN ADULT PO) Take 1 tablet by mouth Every Night. HOLD FOR SURGERY      ondansetron (ZOFRAN) 4 MG tablet Take 1 tablet by mouth Every 8 (Eight) Hours As Needed.      pilocarpine (SALAGEN) 5 MG tablet TAKE 1 TABLET FOUR TIMES A  tablet 0    POTASSIUM CHLORIDE PO Take 99 mg by mouth Every Night. OTC  HOLD FOR SURGERY      pyridoxine (VITAMIN B-6) 100 MG tablet Take 1 tablet by mouth.      rivaroxaban (XARELTO) 20 MG tablet Take 1 tablet by mouth.      simethicone (MYLICON) 125 MG chewable tablet Chew 1 tablet.      tapentadol (NUCYNTA) 50 MG tablet Take 1 tablet by mouth.       "cephalexin (Keflex) 500 MG capsule Take 1 capsule by mouth Daily. 90 capsule 0    fluconazole (Diflucan) 200 MG tablet Take 1 tablet by mouth Daily. 10 tablet 0     Facility-Administered Medications Prior to Visit   Medication Dose Route Frequency Provider Last Rate Last Admin    Chlorhexidine Gluconate Cloth 2 % pads   Apply externally Take As Directed Dejan Lange MD         Opioid medication/s are on active medication list.  and I have evaluated her active treatment plan and pain score trends (see table).  Vitals:    08/29/24 1430   PainSc:   3   PainLoc: Knee     I have reviewed the chart for potential of high risk medication and harmful drug interactions in the elderly.        Aspirin is not on active medication list.  Aspirin use is not indicated based on review of current medical condition/s. Risk of harm outweighs potential benefits.  .    Patient Active Problem List   Diagnosis    Obstructive sleep apnea (CPAP)     Moderate persistent asthma, uncomplicated    GERD without esophagitis    Seasonal allergies    Medicare annual wellness visit, subsequent    Hypothyroidism, acquired    Postmenopausal osteoporosis    High cholesterol    Sinus tachycardia    Overactive bladder    Rosacea    Irritable bowel syndrome with diarrhea    Acute deep vein thrombosis (DVT) of popliteal vein of left lower extremity    Long term (current) use of anticoagulants    Sjogren syndrome, unspecified     Advance Care Planning Advance Directive is not on file.  ACP discussion was held with the patient during this visit. Patient has an advance directive (not in EMR), copy requested.            Objective   Vitals:    08/29/24 1430   BP: 120/68   BP Location: Left arm   Patient Position: Sitting   Pulse: 76   Temp: 97.6 °F (36.4 °C)   TempSrc: Oral   Weight: 77.9 kg (171 lb 12.8 oz)   Height: 167.6 cm (66\")   PainSc:   3   PainLoc: Knee       Estimated body mass index is 27.73 kg/m² as calculated from the following:    Height as of " "this encounter: 167.6 cm (66\").    Weight as of this encounter: 77.9 kg (171 lb 12.8 oz).            Does the patient have evidence of cognitive impairment? No                                                                                                Health  Risk Assessment    Smoking Status:  Social History     Tobacco Use   Smoking Status Never    Passive exposure: Never   Smokeless Tobacco Never     Alcohol Consumption:  Social History     Substance and Sexual Activity   Alcohol Use Yes    Alcohol/week: 4.0 standard drinks of alcohol    Types: 4 Shots of liquor per week    Comment: 4 drinks per week       Fall Risk Screen  STEADI Fall Risk Assessment was completed, and patient is at MODERATE risk for falls. Assessment completed on:2024    Depression Screenin/29/2024     2:28 PM   PHQ-2/PHQ-9 Depression Screening   Little Interest or Pleasure in Doing Things 0-->not at all   Feeling Down, Depressed or Hopeless 0-->not at all   PHQ-9: Brief Depression Severity Measure Score 0     Health Habits and Functional and Cognitive Screenin/29/2024     2:28 PM   Functional & Cognitive Status   Do you have difficulty preparing food and eating? No   Do you have difficulty bathing yourself, getting dressed or grooming yourself? No   Do you have difficulty using the toilet? No   Do you have difficulty moving around from place to place? No   Do you have trouble with steps or getting out of a bed or a chair? No   Current Diet Well Balanced Diet   Dental Exam Up to date   Eye Exam Up to date   Exercise (times per week) 2 times per week   Current Exercises Include Other   Do you need help using the phone?  No   Are you deaf or do you have serious difficulty hearing?  Yes   Do you need help to go to places out of walking distance? No   Do you need help shopping? No   Do you need help preparing meals?  No   Do you need help with housework?  No   Do you need help with laundry? No   Do you need help taking " your medications? No   Do you need help managing money? No   Do you ever drive or ride in a car without wearing a seat belt? No   Have you felt unusual stress, anger or loneliness in the last month? No   Who do you live with? Spouse   If you need help, do you have trouble finding someone available to you? No   Have you been bothered in the last four weeks by sexual problems? No   Do you have difficulty concentrating, remembering or making decisions? No           Age-appropriate Screening Schedule:  Refer to the list below for future screening recommendations based on patient's age, sex and/or medical conditions. Orders for these recommended tests are listed in the plan section. The patient has been provided with a written plan.    Health Maintenance List  Health Maintenance   Topic Date Due    LIPID PANEL  08/01/2023    COVID-19 Vaccine (7 - 2023-24 season) 02/13/2024    INFLUENZA VACCINE  08/01/2024    DXA SCAN  11/14/2024    BMI FOLLOWUP  06/12/2025    TDAP/TD VACCINES (3 - Td or Tdap) 08/06/2025    ANNUAL WELLNESS VISIT  08/29/2025    MAMMOGRAM  04/12/2026    COLORECTAL CANCER SCREENING  10/16/2029    HEPATITIS C SCREENING  Completed    RSV Vaccine - Adults  Completed    Pneumococcal Vaccine 65+  Completed    ZOSTER VACCINE  Completed                                                                                                                                                CMS Preventative Services Quick Reference  Risk Factors Identified During Encounter  None Identified    The above risks/problems have been discussed with the patient.  Pertinent information has been shared with the patient in the After Visit Summary.  An After Visit Summary and PPPS were made available to the patient.    Follow Up:   Next Medicare Wellness visit to be scheduled in 1 year.         Additional E&M Note during same encounter follows:  Patient has additional, significant, and separately identifiable condition(s)/problem(s) that  "require work above and beyond the Medicare Wellness Visit     Chief Complaint  Medicare Wellness-subsequent    Subjective   --GERD IS WELL CONTROLLED WITH THE PPI  --SAW UROLOGY FOR OVERACTIVE BLADDER, IS NOW ON MYRBETRIQ BUT LITTLE CHANGE IN SYMPTOMS THUS FAR  --DEVELOPED A LEFT LOWER LEG DVT FOR WHICH SHE IS NOW ON XERELTO.  SAW HEMATOLOGY WHO WILL E REPEAT AN U/S IN A FEW WEEKS AND WORKING HER UP FOR A HYPERCOAGULABLE STATE       DOT is also being seen today for additional medical problem/s.    Review of Systems   Constitutional:  Negative for activity change, appetite change, chills, fatigue and fever.   HENT:  Negative for congestion, ear pain, hearing loss, rhinorrhea and sore throat.    Eyes:  Negative for discharge and visual disturbance.   Respiratory:  Negative for cough and shortness of breath.    Cardiovascular:  Negative for chest pain, palpitations and leg swelling.   Gastrointestinal:  Negative for abdominal pain, diarrhea, nausea and vomiting.   Genitourinary:  Negative for dysuria and hematuria.   Musculoskeletal:  Positive for arthralgias. Negative for myalgias.   Psychiatric/Behavioral:  Negative for dysphoric mood.               Objective   Vital Signs:  /68 (BP Location: Left arm, Patient Position: Sitting)   Pulse 76   Temp 97.6 °F (36.4 °C) (Oral)   Ht 167.6 cm (66\")   Wt 77.9 kg (171 lb 12.8 oz)   BMI 27.73 kg/m²   Physical Exam  Vitals and nursing note reviewed.   Constitutional:       General: She is not in acute distress.     Appearance: Normal appearance.   HENT:      Right Ear: Tympanic membrane normal.      Left Ear: Tympanic membrane normal.      Mouth/Throat:      Pharynx: Oropharynx is clear.   Eyes:      Conjunctiva/sclera: Conjunctivae normal.   Cardiovascular:      Rate and Rhythm: Normal rate and regular rhythm.      Heart sounds: Normal heart sounds. No murmur heard.  Pulmonary:      Effort: Pulmonary effort is normal.      Breath sounds: Normal breath sounds. "   Abdominal:      General: Bowel sounds are normal.      Palpations: Abdomen is soft.      Tenderness: There is no abdominal tenderness.   Musculoskeletal:      Cervical back: Neck supple.      Right lower leg: No edema.      Left lower leg: No edema.   Lymphadenopathy:      Cervical: No cervical adenopathy.   Neurological:      General: No focal deficit present.      Mental Status: She is alert.      Cranial Nerves: No cranial nerve deficit.      Coordination: Coordination normal.      Gait: Gait normal.   Psychiatric:         Mood and Affect: Mood normal.         Behavior: Behavior normal.                 Assessment and Plan               Acute deep vein thrombosis (DVT) of popliteal vein of left lower extremity  PLANS PER HEMATOLOGY, NOTES REVIEWED   GERD without esophagitis  IMPROVED WITH CURRENT TREATMENT, CONTINUE SAME, WILL REEVALUATE AT NEXT VISIT   Overactive bladder  NO IMPROVEMENT SO FAR, PLANS PER UROLOGY   Medicare annual wellness visit, subsequent  ADVICE GIVEN RE:  SEATBELT USE, ALCOHOL USE, HEALTHY DIET, ROUTINE EYE AND DENTAL EXAM, ROUTINE VACCINATIONS.    No orders of the defined types were placed in this encounter.            Follow Up   Return in about 6 months (around 2/28/2025).  Patient was given instructions and counseling regarding her condition or for health maintenance advice. Please see specific information pulled into the AVS if appropriate.

## 2024-09-09 NOTE — TELEPHONE ENCOUNTER
Rx Refill Note  Requested Prescriptions     Pending Prescriptions Disp Refills    atorvastatin (LIPITOR) 20 MG tablet [Pharmacy Med Name: ATORVASTATIN TABS 20MG] 90 tablet 3     Sig: TAKE 1 TABLET DAILY      Last office visit with prescribing clinician: 8/29/2024   Last telemedicine visit with prescribing clinician: Visit date not found   Next office visit with prescribing clinician: 4/3/2025  [facility]-CoA Reductase Inhibitors (Statins) Protocol Failed     Olivia Le LPN  09/09/24, 09:18 EDT

## 2024-09-10 RX ORDER — ATORVASTATIN CALCIUM 20 MG/1
TABLET, FILM COATED ORAL
Qty: 90 TABLET | Refills: 3 | Status: SHIPPED | OUTPATIENT
Start: 2024-09-10

## 2024-09-12 DIAGNOSIS — N32.81 OVERACTIVE BLADDER: ICD-10-CM

## 2024-09-12 RX ORDER — MIRABEGRON 50 MG/1
50 TABLET, EXTENDED RELEASE ORAL DAILY
Qty: 90 TABLET | Refills: 3 | Status: SHIPPED | OUTPATIENT
Start: 2024-09-12

## 2024-09-12 NOTE — TELEPHONE ENCOUNTER
"Provider: VIOLETTE DINERO    Caller: Valery Baez \"DOT\"    Relationship: Self    Best call back number: [phone]    Requested Prescriptions:   Requested Prescriptions     Pending Prescriptions Disp Refills    Mirabegron ER (Myrbetriq) 50 MG tablet sustained-release 24 hour 24 hr tablet 90 tablet 3     Sig: Take 50 mg by mouth Daily.        Pharmacy where request should be sent: [facility] [address] [phone] [facility] [phone]      Last office visit with prescribing clinician: 8/28/2024   Last telemedicine visit with prescribing clinician: Visit date not found   Next office visit with prescribing clinician: 12/4/2024     Additional details provided by patient: N/A    Does the patient have less than a 3 day supply:  [] Yes  [x] No    Would you like a call back once the refill request has been completed: [x] Yes [] No    If the office needs to give you a call back, can they leave a voicemail: [x] Yes [] No  "

## 2024-09-13 RX ORDER — PILOCARPINE HYDROCHLORIDE 5 MG/1
TABLET, FILM COATED ORAL
Qty: 120 TABLET | Refills: 11 | Status: SHIPPED | OUTPATIENT
Start: 2024-09-13

## 2024-09-25 DIAGNOSIS — K58.0 IRRITABLE BOWEL SYNDROME WITH DIARRHEA: ICD-10-CM

## 2024-09-26 DIAGNOSIS — K58.0 IRRITABLE BOWEL SYNDROME WITH DIARRHEA: ICD-10-CM

## 2024-09-26 RX ORDER — DIPHENOXYLATE HCL/ATROPINE 2.5-.025MG
1 TABLET ORAL 2 TIMES DAILY PRN
Qty: 60 TABLET | Refills: 0 | Status: SHIPPED | OUTPATIENT
Start: 2024-09-26

## 2024-09-26 RX ORDER — DIPHENOXYLATE HCL/ATROPINE 2.5-.025MG
1 TABLET ORAL 2 TIMES DAILY PRN
Qty: 60 TABLET | Refills: 3 | Status: SHIPPED | OUTPATIENT
Start: 2024-09-26 | End: 2024-09-26 | Stop reason: SDUPTHER

## 2024-09-26 RX ORDER — DIPHENOXYLATE HCL/ATROPINE 2.5-.025MG
1 TABLET ORAL 4 TIMES DAILY PRN
Qty: 60 TABLET | OUTPATIENT
Start: 2024-09-26

## 2024-10-07 ENCOUNTER — TELEPHONE (OUTPATIENT)
Dept: FAMILY MEDICINE CLINIC | Age: 79
End: 2024-10-07
Payer: MEDICARE

## 2024-10-07 RX ORDER — MUPIROCIN CALCIUM 20 MG/G
1 CREAM TOPICAL 3 TIMES DAILY
Qty: 15 G | Refills: 3 | Status: SHIPPED | OUTPATIENT
Start: 2024-10-07

## 2024-10-07 NOTE — TELEPHONE ENCOUNTER
"    Caller: Valery Baez \"DOT\"    Relationship: Self    Best call back number: [phone]    Requested Prescriptions:      MUPIROCIN CREAM (NOT IN LIST)    Pharmacy where request should be sent: [id number] [place] [place] - [address] AT [facility] OF [address]  - [phone]  - [phone] [facility]     Last office visit with prescribing clinician: 8/29/2024   Last telemedicine visit with prescribing clinician: Visit date not found   Next office visit with prescribing clinician: 4/3/2025     Additional details provided by patient: MEDICATION NOT IN LIST    Does the patient have less than a 3 day supply:  [] Yes  [] No    Would you like a call back once the refill request has been completed: [] Yes [] No    If the office needs to give you a call back, can they leave a voicemail: [] Yes [] No    Logan Esteban Rep   10/07/24 14:16 EDT         "

## 2024-10-10 ENCOUNTER — CLINICAL SUPPORT (OUTPATIENT)
Dept: FAMILY MEDICINE CLINIC | Age: 79
End: 2024-10-10
Payer: MEDICARE

## 2024-10-10 DIAGNOSIS — Z23 NEED FOR INFLUENZA VACCINATION: ICD-10-CM

## 2024-10-10 DIAGNOSIS — Z23 NEED FOR COVID-19 VACCINE: Primary | ICD-10-CM

## 2024-10-10 PROCEDURE — 90480 ADMN SARSCOV2 VAC 1/ONLY CMP: CPT | Performed by: FAMILY MEDICINE

## 2024-10-10 PROCEDURE — 90662 IIV NO PRSV INCREASED AG IM: CPT | Performed by: FAMILY MEDICINE

## 2024-10-10 PROCEDURE — 91320 SARSCV2 VAC 30MCG TRS-SUC IM: CPT | Performed by: FAMILY MEDICINE

## 2024-10-10 PROCEDURE — G0008 ADMIN INFLUENZA VIRUS VAC: HCPCS | Performed by: FAMILY MEDICINE

## 2024-10-11 ENCOUNTER — TRANSCRIBE ORDERS (OUTPATIENT)
Dept: ADMINISTRATIVE | Facility: HOSPITAL | Age: 79
End: 2024-10-11
Payer: MEDICARE

## 2024-10-11 ENCOUNTER — CLINICAL SUPPORT (OUTPATIENT)
Dept: FAMILY MEDICINE CLINIC | Age: 79
End: 2024-10-11
Payer: MEDICARE

## 2024-10-11 ENCOUNTER — TRANSCRIBE ORDERS (OUTPATIENT)
Dept: FAMILY MEDICINE CLINIC | Age: 79
End: 2024-10-11
Payer: MEDICARE

## 2024-10-11 ENCOUNTER — LAB (OUTPATIENT)
Dept: LAB | Facility: HOSPITAL | Age: 79
End: 2024-10-11
Payer: MEDICARE

## 2024-10-11 DIAGNOSIS — Z01.810 PRE-OPERATIVE CARDIOVASCULAR EXAMINATION: Primary | ICD-10-CM

## 2024-10-11 DIAGNOSIS — Z01.810 PRE-OPERATIVE CARDIOVASCULAR EXAMINATION: ICD-10-CM

## 2024-10-11 LAB
ANION GAP SERPL CALCULATED.3IONS-SCNC: 11 MMOL/L (ref 5–15)
BUN SERPL-MCNC: 17 MG/DL (ref 8–23)
BUN/CREAT SERPL: 17 (ref 7–25)
CALCIUM SPEC-SCNC: 8.8 MG/DL (ref 8.6–10.5)
CHLORIDE SERPL-SCNC: 110 MMOL/L (ref 98–107)
CO2 SERPL-SCNC: 20 MMOL/L (ref 22–29)
CREAT SERPL-MCNC: 1 MG/DL (ref 0.57–1)
EGFRCR SERPLBLD CKD-EPI 2021: 58.1 ML/MIN/1.73
GLUCOSE SERPL-MCNC: 96 MG/DL (ref 65–99)
POTASSIUM SERPL-SCNC: 4.7 MMOL/L (ref 3.5–5.2)
SODIUM SERPL-SCNC: 141 MMOL/L (ref 136–145)

## 2024-10-11 PROCEDURE — 36415 COLL VENOUS BLD VENIPUNCTURE: CPT

## 2024-10-11 PROCEDURE — 80048 BASIC METABOLIC PNL TOTAL CA: CPT

## 2024-10-14 PROCEDURE — 93000 ELECTROCARDIOGRAM COMPLETE: CPT | Performed by: FAMILY MEDICINE

## 2024-10-14 NOTE — PROGRESS NOTES
Procedure     ECG 12 Lead    Date/Time: 10/14/2024 12:33 PM  Performed by: Tino Smith MD    Authorized by: ARCELIA Diaz MD  Comparison: compared with previous ECG from 7/31/2023  Similar to previous ECG  Rhythm: sinus rhythm  Rate: normal  Conduction: conduction normal  ST Segments: ST segments normal  T Waves: T waves normal  QRS axis: normal    Clinical impression: normal ECG

## 2024-10-21 ENCOUNTER — TELEPHONE (OUTPATIENT)
Dept: FAMILY MEDICINE CLINIC | Age: 79
End: 2024-10-21
Payer: MEDICARE

## 2024-10-21 NOTE — TELEPHONE ENCOUNTER
"    Caller: Valery Baez \"DOT\"    Relationship to patient: Self    Best call back number: [phone]    Patient is needing: PATIENT CALLED IN AND SAID THAT SHE HAS HAD AN EKG FOR UPCOMING EYE SURGERY THAT WAS SIGNED OFF ON BY DR. CANDELARIO BUT SHE IS NEEDING IT TO BE SIGNED OFF BY A CARDIOLOGIST AND IS REQUESTING A CALL BACK WITH GUIDANCE.      "

## 2024-10-22 NOTE — TELEPHONE ENCOUNTER
Pt said Dr Diaz's office said she had to have a cardiologist sign off on her Ekg.  Explained we do not have a cardiologist in our office, Dr Smith signed the EKG.  I ask her if they meant she needed to get surgery clearance from a cardiologist and she said she wasn't sure she would call their office and find out.  She does not currently see a cardiologist.

## 2024-10-22 NOTE — TELEPHONE ENCOUNTER
Pt said she called Dr Diaz's office and they said to have Dr Smith's office fax them the EKG to [phone].  Done.

## 2024-11-27 ENCOUNTER — OFFICE VISIT (OUTPATIENT)
Dept: FAMILY MEDICINE CLINIC | Age: 79
End: 2024-11-27
Payer: MEDICARE

## 2024-11-27 VITALS
HEIGHT: 66 IN | TEMPERATURE: 97.4 F | SYSTOLIC BLOOD PRESSURE: 119 MMHG | WEIGHT: 179.8 LBS | BODY MASS INDEX: 28.9 KG/M2 | HEART RATE: 63 BPM | DIASTOLIC BLOOD PRESSURE: 74 MMHG

## 2024-11-27 DIAGNOSIS — B35.8: ICD-10-CM

## 2024-11-27 DIAGNOSIS — Z86.718 HISTORY OF DVT (DEEP VEIN THROMBOSIS): Primary | ICD-10-CM

## 2024-11-27 PROBLEM — Z00.00 MEDICARE ANNUAL WELLNESS VISIT, SUBSEQUENT: Status: RESOLVED | Noted: 2021-07-30 | Resolved: 2024-11-27

## 2024-11-27 PROBLEM — Z79.01 LONG TERM (CURRENT) USE OF ANTICOAGULANTS: Status: RESOLVED | Noted: 2024-06-05 | Resolved: 2024-11-27

## 2024-11-27 NOTE — PROGRESS NOTES
"Chief Complaint  Skin Problem (Itching, burning redness around nails on right hand, got a scratch from nail salon 8 weeks ago)    Subjective          Valery Baez presents to [facility]  History of Present Illness  --HAD A DVT A FEW MONTHS AGO FOLLOWING A TKR.  NEGATIVE HEMATOLOGY [place], IS NOW OFF THE XARELTO AND DOING WELL.  --WAS SCRATCHED UNDER THE RIGHT FOURTH FINGERNAIL EIGHT WEEKS AGO.  SHE ALSO PULLED SOME LOOSE SKIN OFF THE RADIAL ASPECT OF THE RIGHT THUMB.  SINCE THAT TIME SHE HAS NOTED PERSISTENT THICK AND SCALING SKIN IN BOTH AREAS.  SOME REDNESS AND BURNING HAS BEEN PRESENT AT TIMES.  HER DERMATOLOGIST SAW IT FOUR WEEKS AGO AND RECOMMENDED OTC HYDROCORTISONE CREAM.  NO NUMBNESS OR TINGLING.  NO CONSTITUTIONAL SYMPTOMS.          Allergies   Allergen Reactions    Bee Venom Anaphylaxis    Latex Hives    Sulfa Antibiotics Hives, Nausea And Vomiting and Rash    Sulfate Hives, Nausea And Vomiting and Rash     Other reaction(s): Not available    Codeine Hives     And Derivatives    Cyclobenzaprine Other (See Comments) and Unknown - High Severity     Experienced side effects - blurred vision, dry mouth, constipation        Darvon [Propoxyphene] Nausea And Vomiting     CONFUSION.     Ditropan [Oxybutynin] Itching    Duricef [Cefadroxil] Itching and Nausea Only    Erythromycin Hives     Also allergic to Duricef    Hydrocodone Nausea And Vomiting and GI Intolerance    Iodine Hives     Iodine scrubbing solutions /IVP Contrast Dyes      Meperidine Nausea And Vomiting    Methylprednisolone Unknown (See Comments)     CAUSES ITCHING AND FACIAL FLUSHING BUT PT \"CAN TAKE LOWER DOSES\"    Minocycline Hives, Diarrhea and Nausea And Vomiting     Only with doses > 100mg BID          Morphine And Codeine Hives and GI Intolerance     Derivatives    Oxycodone-Acetaminophen Hives    Talwin [Pentazocine] Hives    Tramadol Hives and GI Intolerance     ULTRAM ER    Tramadol Hcl Hives and " Nausea And Vomiting    Valium [Diazepam] Hives and Hallucinations     CONFUSION.     Methocarbamol Hallucinations    Adhesive Tape Rash    Bacitracin-Polymyxin B Rash    Betadine [Povidone Iodine] Rash    Contrast Dye (Echo Or Unknown Ct/Mr) Hives and Palpitations    Daptomycin Itching and Hives    Eliquis [Apixaban] Rash    Miconazole Rash    Neomycin-Polymyxin-Gramicidin Rash    Neosporin Af [Miconazole Nitrate] Rash    Penicillins Hives, Swelling and Rash     As infant    Silver Rash        Health Maintenance Due   Topic Date Due    LIPID PANEL  08/01/2023        Current Outpatient Medications on File Prior to Visit   Medication Sig    acetaminophen (TYLENOL) 325 MG tablet Take 2 tablets by mouth Every 4 (Four) Hours As Needed for Mild Pain . (Patient taking differently: Take 2 tablets by mouth Every 4 (Four) Hours As Needed for Mild Pain. 500mg)    [place] Thyroid 30 MG tablet Take 1 tablet by mouth Daily.    atenolol (TENORMIN) 25 MG tablet TAKE 1 TABLET EVERY AFTERNOON    atorvastatin (LIPITOR) 20 MG tablet TAKE 1 TABLET DAILY    Denosumab (PROLIA SC) Inject  under the skin into the appropriate area as directed See Admin Instructions. Every 6 months   last shot nov 30, 2023    diphenhydrAMINE (BENADRYL ALLERGY CHILDRENS) 12.5 MG chewable tablet Chew 2 tablets 4 (Four) Times a Day As Needed for Allergies. FOR BEE STINGS ONLY    diphenoxylate-atropine (LOMOTIL) 2.5-0.025 MG per tablet Take 1 tablet by mouth 2 (Two) Times a Day As Needed for Diarrhea.    EPINEPHrine (EPIPEN) 0.3 MG/0.3ML solution auto-injector injection Inject 0.3 mL into the appropriate muscle as directed by prescriber 1 (One) Time for 1 dose.    fexofenadine (ALLEGRA) 180 MG tablet Take 1 tablet by mouth Daily.    fluticasone (FLONASE) 50 MCG/ACT nasal spray Administer 2 sprays into the nostril(s) as directed by provider Every Night.    guaiFENesin (MUCINEX PO) Take 400 mg by mouth 2 (Two) Times a Day.    isosorbide mononitrate (IMDUR) 30 MG 24  hr tablet Take 1 tablet by mouth Daily.    lansoprazole (PREVACID) 30 MG capsule Take 1 capsule by mouth 2 (Two) Times a Day.    levalbuterol (XOPENEX HFA) 45 MCG/ACT inhaler Inhale 1-2 puffs Every 4 (Four) Hours As Needed.    Lifitegrast (Xiidra) 5 % ophthalmic solution Administer 1 drop to both eyes 2 (Two) Times a Day.    meclizine (ANTIVERT) 25 MG tablet TAKE 1 TABLET THREE TIMES A DAY AS NEEDED FOR DIZZINESS    Melatonin 10 MG tablet Take 1 tablet by mouth Every Night.    meloxicam (MOBIC) 15 MG tablet TAKE 1 TABLET DAILY    metroNIDAZOLE (METROGEL) 0.75 % gel Apply 1 application topically to the appropriate area as directed 2 (Two) Times a Day As Needed. ROASCEA    Mirabegron ER (Myrbetriq) 50 MG tablet sustained-release 24 hour 24 hr tablet Take 50 mg by mouth Daily.    mometasone (ASMANEX TWISTHALER) inhaler 110 mcg/inhalation Inhale.    montelukast (SINGULAIR) 10 MG tablet Take 1 tablet by mouth Every Night.    Multiple Vitamins-Minerals (MULTIVITAMIN ADULT PO) Take 1 tablet by mouth Every Night. HOLD FOR SURGERY    mupirocin (BACTROBAN) 2 % cream Apply 1 Application topically to the appropriate area as directed 3 (Three) Times a Day.    ondansetron (ZOFRAN) 4 MG tablet Take 1 tablet by mouth Every 8 (Eight) Hours As Needed.    pilocarpine (SALAGEN) 5 MG tablet TAKE 1 TABLET FOUR TIMES A DAY    POTASSIUM CHLORIDE PO Take 99 mg by mouth Every Night. OTC  HOLD FOR SURGERY    pyridoxine (VITAMIN B-6) 100 MG tablet Take 1 tablet by mouth.    simethicone (MYLICON) 125 MG chewable tablet Chew 1 tablet.    [DISCONTINUED] rivaroxaban (XARELTO) 20 MG tablet Take 1 tablet by mouth.    [DISCONTINUED] tapentadol (NUCYNTA) 50 MG tablet Take 1 tablet by mouth.     Current Facility-Administered Medications on File Prior to Visit   Medication    Chlorhexidine Gluconate Cloth 2 % pads       Immunization History   Administered Date(s) Administered    ABRYSVO (RSV, 60+ or pregnant women 32-36 wks) 11/08/2023    COVID-19  "(MODERNA) 1st,2nd,3rd Dose Monovalent 01/13/2021, 02/09/2021, 10/26/2021    COVID-19 (MODERNA) BIVALENT 12+YRS 10/04/2022    COVID-19 (MODERNA) Monovalent Original Booster 05/09/2022    COVID-19 (PFIZER) 12YRS+ (COMIRNATY) 10/13/2023, 10/10/2024    Fluad Quad 65+ 11/01/2021    Fluzone  >6mos 09/30/2013    Fluzone High-Dose 65+YRS 10/02/2017, 11/01/2021, 10/10/2024    Fluzone High-Dose 65+yrs 10/31/2022, 10/13/2023    Fluzone Quad >6mos (Multi-dose) 09/30/2013    Influenza TIV (IM) 01/09/2010, 10/25/2012    Pneumococcal Conjugate 13-Valent (PCV13) 10/27/2015    Pneumococcal Polysaccharide (PPSV23) 11/01/2009, 11/01/2013    Pneumococcal, Unspecified 09/23/2008    Shingrix 09/01/2021, 01/06/2022    Tdap 11/02/2012, 08/06/2015    Zostavax 09/30/2009, 10/09/2013       Review of Systems   Constitutional:  Negative for activity change, appetite change, chills, fatigue and fever.   HENT:  Negative for congestion, ear pain, rhinorrhea and sore throat.    Respiratory:  Negative for cough and shortness of breath.    Cardiovascular:  Negative for chest pain, palpitations and leg swelling.   Gastrointestinal:  Negative for abdominal pain, constipation, diarrhea, nausea and vomiting.   Musculoskeletal:  Negative for arthralgias and myalgias.   Neurological:  Negative for headache.        Objective     /74 (BP Location: Left arm, Patient Position: Sitting)   Pulse 63   Temp 97.4 °F (36.3 °C) (Oral)   Ht 167.6 cm (66\")   Wt 81.6 kg (179 lb 12.8 oz)   BMI 29.02 kg/m²       Physical Exam  Vitals and nursing note reviewed.   Constitutional:       General: She is not in acute distress.     Appearance: Normal appearance.   Pulmonary:      Effort: Pulmonary effort is normal.   Musculoskeletal:      Right lower leg: No edema.      Left lower leg: No edema.   Skin:     Comments: RIGHT HAND WITH FULL ROM, SENSATION AND CAP REFILL.  SOME THICKENED AND SCALING SKIN UNTER THR FOURTH NAIL AND ALONG THE RADIAL ASPECT OF THE THUMBNAIL. "     Neurological:      General: No focal deficit present.      Mental Status: She is alert.      Cranial Nerves: No cranial nerve deficit.      Coordination: Coordination normal.      Gait: Gait normal.   Psychiatric:         Mood and Affect: Mood normal.         Behavior: Behavior normal.         Result Review :                             Assessment and Plan      Diagnoses and all orders for this visit:    1. History of DVT (deep vein thrombosis) (Primary)  Assessment & Plan:  IMPROVED WITH CURRENT TREATMENT, CONTINUE SAME, WILL REEVALUATE AT NEXT VISIT       2. Granulomatous dermatophytosis  Comments:  NONSPECIFIC, PROBABLY RELATED TO THE RECENT TRAUMA.  AS SHE ALREADY HAS A DERMATOLOGIST, WILL ADVISE HER TO CONTACT THAT PROVIDER ABOUT THIS ISSUE                    Follow Up     No follow-ups on file.    Patient was given instructions and counseling regarding her condition or for health maintenance advice. Please see specific information pulled into the AVS if appropriate.

## 2024-12-04 ENCOUNTER — OFFICE VISIT (OUTPATIENT)
Dept: UROLOGY | Facility: CLINIC | Age: 79
End: 2024-12-04
Payer: MEDICARE

## 2024-12-04 VITALS
OXYGEN SATURATION: 100 % | WEIGHT: 179 LBS | HEIGHT: 66 IN | HEART RATE: 77 BPM | BODY MASS INDEX: 28.77 KG/M2 | RESPIRATION RATE: 18 BRPM

## 2024-12-04 DIAGNOSIS — N32.81 OVERACTIVE BLADDER: Primary | ICD-10-CM

## 2024-12-04 LAB
BILIRUB BLD-MCNC: NEGATIVE MG/DL
CLARITY, POC: CLEAR
COLOR UR: YELLOW
EXPIRATION DATE: ABNORMAL
GLUCOSE UR STRIP-MCNC: NEGATIVE MG/DL
KETONES UR QL: NEGATIVE
LEUKOCYTE EST, POC: ABNORMAL
Lab: ABNORMAL
NITRITE UR-MCNC: NEGATIVE MG/ML
PH UR: 5.5 [PH] (ref 5–8)
PROT UR STRIP-MCNC: NEGATIVE MG/DL
RBC # UR STRIP: NEGATIVE /UL
SP GR UR: 1.01 (ref 1–1.03)
URINE VOLUME: 0
UROBILINOGEN UR QL: ABNORMAL

## 2024-12-04 NOTE — PROGRESS NOTES
Chief Complaint: Follow-up (Patient presents today to follow up on her Overactive bladder, she states that she is still taking the Myrbetriq but she isn't noticing a difference. She states that she is not having any urinary symptoms or does not have any questions or concerns. /)    Subjective         History of Present Illness  Valery Baez is a 78 y.o. female presents to [facility] to be seen for f/u oab.    At last visit we started her on Myrbetriq 50 milligrams daily.    We did discuss at last visit that her Powerade consumption may have been contributing to some of her urgency and frequency given the amount of electrolytes and bladder irritating substances in that drink.    Urinary urgency is not as bothersome.     She states she does void every 3-4 hours.    Nocturia x 0-1            Previous:    She is on myrbetriq 25 mg q day and has been on this for years.     She states she continues to have urgency and frequency.     Nocturia x 1-2  was 3-4 x a night.    Mostly drinks water and can drink powerade daily as well.     She states she can void then feel like she is empty but then have more urine come out a few seconds later.    She does wear incontinence underwear but not depends.     She can have urge incontinence at times.     Denies stress incontinence.    She had been placed on estrace before this did not help much.     She is on daily keflex to keep mssa suppressed.     She was seen in the ED at [facility] in 6/2024 for cystitis cx was subsequently negative was told she had a yeast infection.     Hysterectomy at age 25 was on HRT for 30 years.     Oophrecotmy in her 40s.    Has hx of breast cancer with mastectomy in 2015 this ws not estrogen receptor positive breast cancer.            Objective     Past Medical History:   Diagnosis Date    Allergic     Anemia     CHRONIC IRON DEFICIENT    Anesthesia complication     STATES SHE IS VERY SLOW TO WAKE UP    Anesthesia  complication     pt has artifical jaw joints upper and lower on both sides--- this makes intubation difficult    Ankle sprain 1962    Arthritis of back 2018    Arthritis of neck 1988    Asthma     Basal cell carcinoma     FACE    Bursitis of hip 2022    Cancer     right breast cancer    Cervical disc disorder 2022    Chronic diarrhea     Chronic kidney disease     Clotting disorder     Bruise easily    Colon polyp     CTS (carpal tunnel syndrome) 1978    DDD (degenerative disc disease), lumbar     Deep vein thrombosis     Disease of thyroid gland     Dislocation of finger 1984    Dry eye     Elevated cholesterol     Fracture of ankle 1998    Fracture of hip 2021    Right hip replacement; same symptoms in left    Fracture of wrist 1968    Fracture, finger 1993    Fracture, foot 1971    Frequency of urination     Frozen shoulder     GERD (gastroesophageal reflux disease)     Hard to intubate     REQUESTING TO SEE [facility] 8/22/19; PT HAS COMPLETE BILATERAL JAW PROSTESIS    Headache     or migraines    Heart palpitations     Hip arthrosis 2022    History of breast cancer     RIGHT    History of medical problems     History of transfusion     [place] (hard of hearing)     BILAT HEARING AID    Hx of renal calculi     Hypercholesteremia     Hypothyroidism     Irregular heart beat     mid afternoon   pt takes atenolol    Knee sprain 1965    Knee swelling 1966    Limited mobility     RIGHT HIP    Limited mobility     JAW    Low back pain     Low back strain 1957    Lumbosacral disc disease 2022    MRSA (methicillin resistant Staphylococcus aureus)     LEFT JAW @TMJ JOINT, MULT. ,LAST 2013, TREATED AT [facility], infection control notified 8/24/16. 1300    MSSA (methicillin susceptible Staphylococcus aureus)     Neck strain 1984    Osteoarthritis     Osteopenia     Prolia shot 11/4/22    Osteoporosis     Periarthritis of shoulder 1999    PONV (postoperative nausea and vomiting)     Right hip pain     Rosacea     Rotator cuff  syndrome 1999    Short of breath on exertion     WITH STAIRS ONLY    Sleep apnea     C PAP    Spinal headache     Spinal stenosis     Subluxation of patella 1979    Tear of meniscus of knee 1966    Urgency of urination     Vertigo     WAKES UP WITH THIS SOMETIMES    Weakness     RIGHT HIP    Wrist sprain 1966       Past Surgical History:   Procedure Laterality Date    ABSCESS DRAINAGE      abscess removed from left jaw x2    ADENOIDECTOMY  1952    ANKLE OPEN REDUCTION INTERNAL FIXATION  1998    APPENDECTOMY  1965    AVULSION TOENAIL PLATE      6 REMOVED    BACK SURGERY  9/19/19    BILATERAL BREAST REDUCTION Left 08/29/2016    Procedure: LT BREAST REDUCTION ;  Surgeon: Luis Zambrano MD;  Location: [facility] OR;  Service:     BREAST BIOPSY Bilateral     CARPAL TUNNEL RELEASE  1993    CHOLECYSTECTOMY  09/2000    COLONOSCOPY      COLONOSCOPY  2019    NORMAL    CYST REMOVAL  03/1984    eye cyst left lower lid    CYST REMOVAL  01/2007    right upper thigh    CYSTOSCOPY      x2  02/2013 & 04/2014    CYSTOSCOPY      NUMEROUS    D & C HYSTEROSCOPY      FOOT SURGERY      HAND SURGERY  1980    HYSTERECTOMY  1971    LATER BSO    JOINT REPLACEMENT  2021    right hip    KNEE ARTHROPLASTY Right 03/2005    LAPAROSCOPIC SALPINGOOPHERECTOMY      LUMBAR DISCECTOMY FUSION INSTRUMENTATION N/A 09/09/2019    Procedure: Lumbar 3 to sacral 1 laminectomy and fusion with instrumentation;  Surgeon: Vikas Couch MD;  Location: [facility] OR;  Service: Orthopedic Spine    LUMBAR FUSION N/A 07/27/2023    Procedure: Lumbar 2-3 lateral lumbar interbody fusion with neuro robot;  Surgeon: Aston Paredes IV, MD;  Location: [facility] OR;  Service: Robotics - Neuro;  Laterality: N/A;    MANDIBLE SURGERY      UPPER AND LOWER JAW ADJUSTED WITH PLATES AND WIRES    MASTECTOMY Right 03/2015    MASTECTOMY PARTIAL / LUMPECTOMY Right 03/2009    NIPPLE RECONSTRUCTION Right 08/29/2016    Procedure: RT NIPPLE RECONSTRUCTION;  Surgeon: Luis Zambrano MD;   Location: [facility] MAIN OR;  Service:     OTHER SURGICAL HISTORY      laparoscopy adhesions right side x2    OTHER SURGICAL HISTORY      drain mammosite area    OTHER SURGICAL HISTORY  01/2010    pressure washed prosthesis area left jaw    OTHER SURGICAL HISTORY      drain mammosite area right breast 09/2011    RECONSTRUCTION BREAST W/ TRAM FLAP Right 02/2016    ROTATOR CUFF REPAIR Left 09/2014    SACROILIAC JOINT FUSION Bilateral 07/27/2023    Procedure: Bilateral sacroiliac joint fusion;  Surgeon: Aston Paredes IV, MD;  Location: [facility] MAIN OR;  Service: Neurosurgery;  Laterality: Bilateral;    SHOULDER SURGERY      SPINAL FUSION  2019    TEMPOROMANDIBULAR JOINT ARTHROPLASTY Bilateral 1993    MULT. REPLACEMENTS LATER    TONSILLECTOMY  1952    TOTAL HIP ARTHROPLASTY Right 12/24/2021    Procedure: Right anterior total hip arthroplasty;  Surgeon: Dejan Lange MD;  Location: [facility] OR;  Service: Orthopedics;  Laterality: Right;    TOTAL KNEE ARTHROPLASTY Left     WRIST SURGERY           Current Outpatient Medications:     acetaminophen (TYLENOL) 325 MG tablet, Take 2 tablets by mouth Every 4 (Four) Hours As Needed for Mild Pain . (Patient taking differently: Take 500 mg by mouth Every 4 (Four) Hours As Needed for Mild Pain. 500mg), Disp: , Rfl:     [place] Thyroid 30 MG tablet, Take 1 tablet by mouth Daily., Disp: 30 tablet, Rfl: 0    atenolol (TENORMIN) 25 MG tablet, TAKE 1 TABLET EVERY AFTERNOON, Disp: 90 tablet, Rfl: 3    atorvastatin (LIPITOR) 20 MG tablet, TAKE 1 TABLET DAILY, Disp: 90 tablet, Rfl: 3    Denosumab (PROLIA SC), Inject  under the skin into the appropriate area as directed See Admin Instructions. Every 6 months   last shot nov 30, 2023, Disp: , Rfl:     diphenhydrAMINE (BENADRYL ALLERGY CHILDRENS) 12.5 MG chewable tablet, Chew 2 tablets 4 (Four) Times a Day As Needed for Allergies. FOR BEE STINGS ONLY, Disp: , Rfl:     diphenoxylate-atropine (LOMOTIL) 2.5-0.025 MG per tablet, Take 1 tablet by  mouth 2 (Two) Times a Day As Needed for Diarrhea., Disp: 60 tablet, Rfl: 0    fexofenadine (ALLEGRA) 180 MG tablet, Take 1 tablet by mouth Daily., Disp: , Rfl:     fluticasone (FLONASE) 50 MCG/ACT nasal spray, Administer 2 sprays into the nostril(s) as directed by provider Every Night., Disp: , Rfl:     guaiFENesin (MUCINEX PO), Take 400 mg by mouth 2 (Two) Times a Day., Disp: , Rfl:     isosorbide mononitrate (IMDUR) 30 MG 24 hr tablet, Take 1 tablet by mouth Daily., Disp: , Rfl:     lansoprazole (PREVACID) 30 MG capsule, Take 1 capsule by mouth 2 (Two) Times a Day., Disp: 180 capsule, Rfl: 3    levalbuterol (XOPENEX HFA) 45 MCG/ACT inhaler, Inhale 1-2 puffs Every 4 (Four) Hours As Needed., Disp: , Rfl:     Lifitegrast (Xiidra) 5 % ophthalmic solution, Administer 1 drop to both eyes 2 (Two) Times a Day., Disp: , Rfl:     meclizine (ANTIVERT) 25 MG tablet, TAKE 1 TABLET THREE TIMES A DAY AS NEEDED FOR DIZZINESS, Disp: 90 tablet, Rfl: 2    Melatonin 10 MG tablet, Take 1 tablet by mouth Every Night., Disp: , Rfl:     meloxicam (MOBIC) 15 MG tablet, TAKE 1 TABLET DAILY, Disp: 90 tablet, Rfl: 3    metroNIDAZOLE (METROGEL) 0.75 % gel, Apply 1 application topically to the appropriate area as directed 2 (Two) Times a Day As Needed. ROASCEA, Disp: , Rfl:     Mirabegron ER (Myrbetriq) 50 MG tablet sustained-release 24 hour 24 hr tablet, Take 50 mg by mouth Daily., Disp: 90 tablet, Rfl: 3    mometasone (ASMANEX TWISTHALER) inhaler 110 mcg/inhalation, Inhale., Disp: , Rfl:     montelukast (SINGULAIR) 10 MG tablet, Take 1 tablet by mouth Every Night., Disp: , Rfl:     Multiple Vitamins-Minerals (MULTIVITAMIN ADULT PO), Take 1 tablet by mouth Every Night. HOLD FOR SURGERY, Disp: , Rfl:     mupirocin (BACTROBAN) 2 % cream, Apply 1 Application topically to the appropriate area as directed 3 (Three) Times a Day., Disp: 15 g, Rfl: 3    ondansetron (ZOFRAN) 4 MG tablet, Take 1 tablet by mouth Every 8 (Eight) Hours As Needed., Disp: ,  "Rfl:     pilocarpine (SALAGEN) 5 MG tablet, TAKE 1 TABLET FOUR TIMES A DAY, Disp: 120 tablet, Rfl: 11    POTASSIUM CHLORIDE PO, Take 99 mg by mouth Every Night. OTC HOLD FOR SURGERY, Disp: , Rfl:     pyridoxine (VITAMIN B-6) 100 MG tablet, Take 1 tablet by mouth., Disp: , Rfl:     simethicone (MYLICON) 125 MG chewable tablet, Chew 1 tablet., Disp: , Rfl:     EPINEPHrine (EPIPEN) 0.3 MG/0.3ML solution auto-injector injection, Inject 0.3 mL into the appropriate muscle as directed by prescriber 1 (One) Time for 1 dose., Disp: 1 each, Rfl: 1  No current facility-administered medications for this visit.    Facility-Administered Medications Ordered in Other Visits:     Chlorhexidine Gluconate Cloth 2 % pads, , Apply externally, Take As Directed, Dejan Lange MD    Allergies   Allergen Reactions    Bee Venom Anaphylaxis    Latex Hives    Sulfa Antibiotics Hives, Nausea And Vomiting and Rash    Sulfate Hives, Nausea And Vomiting and Rash     Other reaction(s): Not available    Codeine Hives     And Derivatives    Cyclobenzaprine Other (See Comments) and Unknown - High Severity     Experienced side effects - blurred vision, dry mouth, constipation        Darvon [Propoxyphene] Nausea And Vomiting     CONFUSION.     Ditropan [Oxybutynin] Itching    Duricef [Cefadroxil] Itching and Nausea Only    Erythromycin Hives     Also allergic to Duricef    Hydrocodone Nausea And Vomiting and GI Intolerance    Iodine Hives     Iodine scrubbing solutions /IVP Contrast Dyes      Meperidine Nausea And Vomiting    Methylprednisolone Unknown (See Comments)     CAUSES ITCHING AND FACIAL FLUSHING BUT PT \"CAN TAKE LOWER DOSES\"    Minocycline Hives, Diarrhea and Nausea And Vomiting     Only with doses > 100mg BID          Morphine And Codeine Hives and GI Intolerance     Derivatives    Oxycodone-Acetaminophen Hives    Talwin [Pentazocine] Hives    Tramadol Hives and GI Intolerance     ULTRAM ER    Tramadol Hcl Hives and Nausea And Vomiting    " "Valium [Diazepam] Hives and Hallucinations     CONFUSION.     Methocarbamol Hallucinations    Adhesive Tape Rash    Bacitracin-Polymyxin B Rash    Betadine [Povidone Iodine] Rash    Contrast Dye (Echo Or Unknown Ct/Mr) Hives and Palpitations    Daptomycin Itching and Hives    Eliquis [Apixaban] Rash    Miconazole Rash    Neomycin-Polymyxin-Gramicidin Rash    Neosporin Af [Miconazole Nitrate] Rash    Penicillins Hives, Swelling and Rash     As infant    Silver Rash        Family History   Problem Relation Age of Onset    Hypertension Other     Cancer Other         breast    Heart disease Mother     Arthritis Mother     Hearing loss Mother     Anesthesia problems Mother     Osteoporosis Mother     Heart disease Father     Asthma Father     Arthritis Father     Hearing loss Father     Heart disease Maternal Grandmother     Cancer Maternal Grandmother     Heart disease Maternal Grandfather     Heart disease Paternal Grandmother     Cancer Paternal Grandmother     Heart disease Paternal Grandfather     Rheumatologic disease Paternal Aunt     Early death Brother          at 53, Sudden Death Syndrome    Malig Hyperthermia Neg Hx        Social History     Socioeconomic History    Marital status:    Tobacco Use    Smoking status: Never     Passive exposure: Never    Smokeless tobacco: Never   Vaping Use    Vaping status: Never Used   Substance and Sexual Activity    Alcohol use: Yes     Alcohol/week: 4.0 standard drinks of alcohol     Types: 4 Shots of liquor per week     Comment: 4 drinks per week    Drug use: Never    Sexual activity: Not Currently     Partners: Male     Birth control/protection: Hysterectomy       Vital Signs:   Pulse 77   Resp 18   Ht 167.6 cm (66\")   Wt 81.2 kg (179 lb)   SpO2 100%   BMI 28.89 kg/m²      Physical Exam     Result Review :   The following data was reviewed by: VIOLETTE Can on 2024:  Results for orders placed or performed in visit on 24   Bladder Scan "    Collection Time: 12/04/24 10:24 AM   Result Value Ref Range    Urine Volume 0    POC Urinalysis Dipstick, Automated    Collection Time: 12/04/24 10:25 AM    Specimen: Urine   Result Value Ref Range    Color Yellow Yellow, Straw, Dark Yellow, Debi    Clarity, UA Clear Clear    Specific Gravity  1.010 1.005 - 1.030    pH, Urine 5.5 5.0 - 8.0    Leukocytes Small (1+) (A) Negative    Nitrite, UA Negative Negative    Protein, POC Negative Negative mg/dL    Glucose, UA Negative Negative mg/dL    Ketones, UA Negative Negative    Urobilinogen, UA 0.2 E.U./dL Normal, 0.2 E.U./dL    Bilirubin Negative Negative    Blood, UA Negative Negative    Lot Number [id number]     Expiration Date 9/2,025       Bladder Scan interpretation 12/04/2024    Estimation of residual urine via LiveDataI 3000 Verathon Bladder Scan  MA/nurse performing: yani bradley Ma   Residual Urine: 0 ml  Indication: Overactive bladder   Position: Supine  Examination: Incremental scanning of the suprapubic area using 2.0 MHz transducer using copious amounts of acoustic gel.   Findings: An anechoic area was demonstrated which represented the bladder, with measurement of residual urine as noted. I inspected this myself. In that the residual urine was stable or insignificant, refer to plan for treatment and plan necessary at this time.            Procedures        Assessment and Plan    Diagnoses and all orders for this visit:    1. Overactive bladder (Primary)  -     POC Urinalysis Dipstick, Automated  -     Bladder Scan        We will have her continue myrbetriq at this time as her symptoms are a little bit better.    She will call with any s/s of UTI      I spent 15 minutes caring for Valery on this date of service. This time includes time spent by me in the following activities:reviewing tests, obtaining and/or reviewing a separately obtained history, performing a medically appropriate examination and/or evaluation , counseling and educating the  patient/family/caregiver, ordering medications, tests, or procedures, and documenting information in the medical record  Follow Up   Return in about 6 months (around 6/4/2025) for f/u oab.  Patient was given instructions and counseling regarding her condition or for health maintenance advice. Please see specific information pulled into the AVS if appropriate.         This document has been electronically signed by VIOLETTE Can  December 4, 2024 10:52 EST

## 2024-12-09 RX ORDER — THYROID 30 MG/1
30 TABLET ORAL DAILY
Qty: 90 TABLET | Refills: 3 | Status: SHIPPED | OUTPATIENT
Start: 2024-12-09

## 2024-12-09 NOTE — TELEPHONE ENCOUNTER
Rx Refill Note  Requested Prescriptions     Pending Prescriptions Disp Refills    Nehemias Thyroid 30 MG tablet [Pharmacy Med Name: ARMOUR THYROID TABS 1/2GR 30MG] 90 tablet 3     Sig: TAKE 1 TABLET DAILY      Last office visit with prescribing clinician: 11/27/2024   Last telemedicine visit with prescribing clinician: Visit date not found   Next office visit with prescribing clinician: 4/3/2025  Thyroid Hormones Protocol Failed     TSH (08/01/2022 07:52)  T3, free (08/01/2022 07:52)  T4 (08/01/2022 07:52)    Last thyroid test 2022.     Olivia Le LPN  12/09/24, 12:28 EST

## 2025-03-06 RX ORDER — LANSOPRAZOLE 30 MG/1
30 CAPSULE, DELAYED RELEASE ORAL 2 TIMES DAILY
Qty: 180 CAPSULE | Refills: 3 | Status: SHIPPED | OUTPATIENT
Start: 2025-03-06

## 2025-03-11 NOTE — TELEPHONE ENCOUNTER
Rx Refill Note  Requested Prescriptions     Pending Prescriptions Disp Refills    EPINEPHrine (EPIPEN) 0.3 MG/0.3ML solution auto-injector injection [Pharmacy Med Name: EPINEPHRINE AUTO-INJECTOR 2PK 0.3ML 0.3MG] 2 each 11     Sig: INJECT 0.3 ML INTO THE APPROPRIATE MUSCLE ONE TIME FOR 1 DOSE AS DIRECTED BY PRESCRIBER      Last office visit with prescribing clinician: 11/27/2024     Next office visit with prescribing clinician: 4/3/2025    11/10/23  1 1RF      Araceli Abdul LPN  03/11/25, 15:09 EDT

## 2025-03-14 RX ORDER — EPINEPHRINE 0.3 MG/.3ML
INJECTION SUBCUTANEOUS
Qty: 2 EACH | Refills: 11 | Status: SHIPPED | OUTPATIENT
Start: 2025-03-14

## 2025-03-27 RX ORDER — MELOXICAM 15 MG/1
15 TABLET ORAL DAILY
Qty: 30 TABLET | Refills: 0 | Status: SHIPPED | OUTPATIENT
Start: 2025-03-27

## 2025-03-27 NOTE — TELEPHONE ENCOUNTER
"    Caller: Valery Baez \"DOT\"    Relationship: Self    Best call back number: [phone]     Requested Prescriptions:   Requested Prescriptions     Pending Prescriptions Disp Refills    meloxicam (MOBIC) 15 MG tablet 90 tablet 3     Sig: Take 1 tablet by mouth Daily.        Pharmacy where request should be sent: [id number] - [place] - [address]  AT [facility] [address] [facility] [phone] [facility] [phone] FX     Last office visit with prescribing clinician: 11/27/2024   Last telemedicine visit with prescribing clinician: Visit date not found   Next office visit with prescribing clinician: 4/3/2025     Additional details provided by patient: PATIENT CALLED IN STATING SHE IS COMPLETELY OUT OF THIS MEDICATION AND [facility] CANCELLED HER LAST REFILL.     PATIENT IS REQUESTING AN EMERGENCY REFILL TO THE PHARMACY ABOVE.     Does the patient have less than a 3 day supply:  [x] Yes  [] No      Logan Preciado Rep   03/27/25 10:32 EDT       "

## 2025-03-27 NOTE — TELEPHONE ENCOUNTER
RELAY GIVEN TO PATIENT. PATIENT STATES SHE HAD THE PRESCRIPTION FROM [facility] TO [facility] FOR A SINGLE FILL BECAUSE SHE NEEDED THE REFILL QUICKLY, BUT AFTER TRANSFERRING THE PRESCRIPTION [facility] CANCELLED THE REMAINING REFILLS THEY HAD ON FILE.

## 2025-03-27 NOTE — TELEPHONE ENCOUNTER
Last refill sent 07/30/24, #90, 3 refills which would last for one year.  Not sure why she would be out.  Left message for her to call back.     OK FOR HUB TO RELAY

## 2025-04-03 ENCOUNTER — OFFICE VISIT (OUTPATIENT)
Dept: FAMILY MEDICINE CLINIC | Age: 80
End: 2025-04-03
Payer: MEDICARE

## 2025-04-03 ENCOUNTER — TELEPHONE (OUTPATIENT)
Dept: FAMILY MEDICINE CLINIC | Age: 80
End: 2025-04-03

## 2025-04-03 VITALS
TEMPERATURE: 97.9 F | BODY MASS INDEX: 29.06 KG/M2 | DIASTOLIC BLOOD PRESSURE: 70 MMHG | HEIGHT: 66 IN | OXYGEN SATURATION: 95 % | WEIGHT: 180.8 LBS | HEART RATE: 71 BPM | SYSTOLIC BLOOD PRESSURE: 107 MMHG

## 2025-04-03 DIAGNOSIS — J45.40 MODERATE PERSISTENT ASTHMA, UNCOMPLICATED: ICD-10-CM

## 2025-04-03 DIAGNOSIS — E78.00 HIGH CHOLESTEROL: ICD-10-CM

## 2025-04-03 DIAGNOSIS — E03.9 HYPOTHYROIDISM, ACQUIRED: ICD-10-CM

## 2025-04-03 DIAGNOSIS — K21.9 GERD WITHOUT ESOPHAGITIS: Primary | ICD-10-CM

## 2025-04-03 NOTE — TELEPHONE ENCOUNTER
----- Message from Tino Smith sent at 4/3/2025  9:50 AM EDT -----  THIS PATIENT WILL BE HAVING LABS DRAWN AT THE [facility] IN A FEW DAYS.  PLEASE ASK THAT OFFICE IF THEY WILL ADD A TSH AND A LIPID PANEL.  THANKS

## 2025-04-03 NOTE — ASSESSMENT & PLAN NOTE
Lipid abnormalities are stable    Plan:  Continue same medication/s without change.      Discussed medication dosage, use, side effects, and goals of treatment in detail.    Counseled patient on lifestyle modifications to help control hyperlipidemia.     Patient Treatment Goals:   LDL goal is less than 70    Followup in 6 months  WE WILL ASK HER HEMATOLOGIST TO ADD A LIPID PANEL TO HER UPCOMING LABS .

## 2025-04-03 NOTE — PROGRESS NOTES
"Chief Complaint  Heartburn (Follow up)    Subjective          Valery Baez presents to [facility]  History of Present Illness  --GERD IS WELL CONTROLLED WITH THE PPI  --LAST TSH WAS OK ON CURRENT DOSE OF MEDS  --BREATHING IS STABLE WITH CURRENT INHALED MEDS  --LAST LIPIDS WERE OK, NO ISSUES WITH THE STATIN        Allergies   Allergen Reactions    Bee Venom Anaphylaxis    Latex Hives    Sulfa Antibiotics Hives, Nausea And Vomiting and Rash    Sulfate Hives, Nausea And Vomiting and Rash     Other reaction(s): Not available    Codeine Hives     And Derivatives    Cyclobenzaprine Other (See Comments) and Unknown - High Severity     Experienced side effects - blurred vision, dry mouth, constipation        Darvon [Propoxyphene] Nausea And Vomiting     CONFUSION.     Ditropan [Oxybutynin] Itching    Duricef [Cefadroxil] Itching and Nausea Only    Erythromycin Hives     Also allergic to Duricef    Hydrocodone Nausea And Vomiting and GI Intolerance    Iodine Hives     Iodine scrubbing solutions /IVP Contrast Dyes      Meperidine Nausea And Vomiting    Methylprednisolone Unknown (See Comments)     CAUSES ITCHING AND FACIAL FLUSHING BUT PT \"CAN TAKE LOWER DOSES\"    Minocycline Hives, Diarrhea and Nausea And Vomiting     Only with doses > 100mg BID          Morphine And Codeine Hives and GI Intolerance     Derivatives    Oxycodone-Acetaminophen Hives    Talwin [Pentazocine] Hives    Tramadol Hives and GI Intolerance     ULTRAM ER    Tramadol Hcl Hives and Nausea And Vomiting    Valium [Diazepam] Hives and Hallucinations     CONFUSION.     Methocarbamol Hallucinations    Adhesive Tape Rash    Bacitracin-Polymyxin B Rash    Betadine [Povidone Iodine] Rash    Contrast Dye (Echo Or Unknown Ct/Mr) Hives and Palpitations    Daptomycin Itching and Hives    Eliquis [Apixaban] Rash    Miconazole Rash    Neomycin-Polymyxin-Gramicidin Rash    Neosporin Af [Miconazole Nitrate] Rash    Penicillins " Hives, Swelling and Rash     As infant    Silver Rash        Health Maintenance Due   Topic Date Due    LIPID PANEL  08/01/2023    COVID-19 Vaccine (8 - 2024-25 season) 04/10/2025        Current Outpatient Medications on File Prior to Visit   Medication Sig    acetaminophen (TYLENOL) 325 MG tablet Take 2 tablets by mouth Every 4 (Four) Hours As Needed for Mild Pain . (Patient taking differently: Take 500 mg by mouth Every 4 (Four) Hours As Needed for Mild Pain. 500mg)    McGee Thyroid 30 MG tablet TAKE 1 TABLET DAILY    atenolol (TENORMIN) 25 MG tablet TAKE 1 TABLET EVERY AFTERNOON    atorvastatin (LIPITOR) 20 MG tablet TAKE 1 TABLET DAILY    Denosumab (PROLIA SC) Inject  under the skin into the appropriate area as directed See Admin Instructions. Every 6 months   last shot nov 1, 2024    diphenhydrAMINE (BENADRYL ALLERGY CHILDRENS) 12.5 MG chewable tablet Chew 2 tablets 4 (Four) Times a Day As Needed for Allergies. FOR BEE STINGS ONLY    diphenoxylate-atropine (LOMOTIL) 2.5-0.025 MG per tablet Take 1 tablet by mouth 2 (Two) Times a Day As Needed for Diarrhea.    EPINEPHrine (EPIPEN) 0.3 MG/0.3ML solution auto-injector injection INJECT 0.3 ML INTO THE APPROPRIATE MUSCLE ONE TIME FOR 1 DOSE AS DIRECTED BY PRESCRIBER    fexofenadine (ALLEGRA) 180 MG tablet Take 1 tablet by mouth Daily.    fluticasone (FLONASE) 50 MCG/ACT nasal spray Administer 2 sprays into the nostril(s) as directed by provider Every Night.    guaiFENesin (MUCINEX PO) Take 400 mg by mouth 2 (Two) Times a Day.    isosorbide mononitrate (IMDUR) 30 MG 24 hr tablet Take 1 tablet by mouth Daily.    lansoprazole (PREVACID) 30 MG capsule TAKE 1 CAPSULE TWICE A DAY    levalbuterol (XOPENEX HFA) 45 MCG/ACT inhaler Inhale 1-2 puffs Every 4 (Four) Hours As Needed.    Lifitegrast (Xiidra) 5 % ophthalmic solution Administer 1 drop to both eyes 2 (Two) Times a Day.    meclizine (ANTIVERT) 25 MG tablet TAKE 1 TABLET THREE TIMES A DAY AS NEEDED FOR DIZZINESS     Melatonin 10 MG tablet Take 1 tablet by mouth Every Night.    meloxicam (MOBIC) 15 MG tablet Take 1 tablet by mouth Daily. Take with food.    metroNIDAZOLE (METROGEL) 0.75 % gel Apply 1 application topically to the appropriate area as directed 2 (Two) Times a Day As Needed. ROASCEA    Mirabegron ER (Myrbetriq) 50 MG tablet sustained-release 24 hour 24 hr tablet Take 50 mg by mouth Daily.    mometasone (ASMANEX TWISTHALER) inhaler 110 mcg/inhalation Inhale.    montelukast (SINGULAIR) 10 MG tablet Take 1 tablet by mouth Every Night.    Multiple Vitamins-Minerals (MULTIVITAMIN ADULT PO) Take 1 tablet by mouth Every Night. HOLD FOR SURGERY    pilocarpine (SALAGEN) 5 MG tablet TAKE 1 TABLET FOUR TIMES A DAY    POTASSIUM CHLORIDE PO Take 99 mg by mouth Every Night. OTC  HOLD FOR SURGERY    pyridoxine (VITAMIN B-6) 100 MG tablet Take 1 tablet by mouth.    [DISCONTINUED] mupirocin (BACTROBAN) 2 % cream Apply 1 Application topically to the appropriate area as directed 3 (Three) Times a Day.    [DISCONTINUED] ondansetron (ZOFRAN) 4 MG tablet Take 1 tablet by mouth Every 8 (Eight) Hours As Needed.    [DISCONTINUED] simethicone (MYLICON) 125 MG chewable tablet Chew 1 tablet.     Current Facility-Administered Medications on File Prior to Visit   Medication    Chlorhexidine Gluconate Cloth 2 % pads       Immunization History   Administered Date(s) Administered    ABRYSVO (RSV, 60+ or pregnant women 32-36 wks) 11/08/2023    COVID-19 (MODERNA) 1st,2nd,3rd Dose Monovalent 01/13/2021, 02/09/2021, 10/26/2021    COVID-19 (MODERNA) BIVALENT 12+YRS 10/04/2022    COVID-19 (MODERNA) Monovalent Original Booster 05/09/2022    COVID-19 (PFIZER) 12YRS+ (COMIRNATY) 10/13/2023, 10/10/2024    Fluad Quad 65+ 11/01/2021    Fluzone  >6mos 09/30/2013    Fluzone High-Dose 65+YRS 10/02/2017, 11/01/2021, 10/10/2024    Fluzone High-Dose 65+yrs 10/31/2022, 10/13/2023    Fluzone Quad >6mos (Multi-dose) 09/30/2013    Influenza TIV (IM) 01/09/2010,  "10/25/2012    Pneumococcal Conjugate 13-Valent (PCV13) 10/27/2015    Pneumococcal Polysaccharide (PPSV23) 11/01/2009, 11/01/2013    Pneumococcal, Unspecified 09/23/2008    Shingrix 09/01/2021, 01/06/2022    Tdap 11/02/2012, 08/06/2015    Zostavax 09/30/2009, 10/09/2013       Review of Systems   Constitutional:  Negative for activity change, appetite change, chills, fatigue and fever.   HENT:  Negative for congestion, ear pain, rhinorrhea and sore throat.    Respiratory:  Negative for cough and shortness of breath.    Cardiovascular:  Negative for chest pain, palpitations and leg swelling.   Gastrointestinal:  Negative for abdominal pain, constipation, nausea and vomiting.   Musculoskeletal:  Negative for arthralgias and myalgias.   Neurological:  Negative for headache.        Objective     /70 (BP Location: Left arm, Patient Position: Sitting)   Pulse 71   Temp 97.9 °F (36.6 °C) (Oral)   Ht 167.6 cm (66\")   Wt 82 kg (180 lb 12.8 oz)   SpO2 95% Comment: on RA  BMI 29.18 kg/m²       Physical Exam  Vitals and nursing note reviewed.   Constitutional:       General: She is not in acute distress.     Appearance: Normal appearance.   Cardiovascular:      Rate and Rhythm: Normal rate and regular rhythm.      Heart sounds: Normal heart sounds. No murmur heard.  Pulmonary:      Effort: Pulmonary effort is normal.      Breath sounds: Normal breath sounds.   Abdominal:      Palpations: Abdomen is soft.      Tenderness: There is no abdominal tenderness.   Musculoskeletal:      Cervical back: Neck supple.      Right lower leg: No edema.      Left lower leg: No edema.   Lymphadenopathy:      Cervical: No cervical adenopathy.   Neurological:      General: No focal deficit present.      Mental Status: She is alert.      Cranial Nerves: No cranial nerve deficit.      Coordination: Coordination normal.      Gait: Gait normal.   Psychiatric:         Mood and Affect: Mood normal.         Behavior: Behavior normal. "         Result Review :                             Assessment and Plan      Diagnoses and all orders for this visit:    1. GERD without esophagitis (Primary)  Assessment & Plan:  TOLERATING BLOOD PRESSURE MEDICATION WITHOUT APPARENT SIDE EFFECTS      2. High cholesterol  Assessment & Plan:   Lipid abnormalities are stable    Plan:  Continue same medication/s without change.      Discussed medication dosage, use, side effects, and goals of treatment in detail.    Counseled patient on lifestyle modifications to help control hyperlipidemia.     Patient Treatment Goals:   LDL goal is less than 70    Followup in 6 months  WE WILL ASK HER HEMATOLOGIST TO ADD A LIPID PANEL TO HER UPCOMING LABS .      3. Hypothyroidism, acquired  Assessment & Plan:  WILL ASK HER HEMATOLOGIST TO ADD A TSH TO HER UPCOMING LABS       4. Moderate persistent asthma, uncomplicated  Assessment & Plan:  IMPROVED WITH CURRENT TREATMENT, WILL REEVALUATE AT NEXT VISIT                             Follow Up     Return in about 6 months (around 10/3/2025).    Patient was given instructions and counseling regarding her condition or for health maintenance advice. Please see specific information pulled into the AVS if appropriate.

## 2025-04-11 RX ORDER — MELOXICAM 15 MG/1
15 TABLET ORAL DAILY
Qty: 90 TABLET | Refills: 1 | Status: SHIPPED | OUTPATIENT
Start: 2025-04-11

## 2025-04-11 NOTE — TELEPHONE ENCOUNTER
Rx Refill Note  Requested Prescriptions     Pending Prescriptions Disp Refills    meloxicam (MOBIC) 15 MG tablet 30 tablet 0     Sig: Take 1 tablet by mouth Daily. Take with food.      Last office visit with prescribing clinician: 4/3/2025   Last telemedicine visit with prescribing clinician: Visit date not found   Next office visit with prescribing clinician: 10/3/2025      Olivia Le LPN  04/11/25, 11:54 EDT

## 2025-04-17 ENCOUNTER — HOSPITAL ENCOUNTER (OUTPATIENT)
Dept: GENERAL RADIOLOGY | Facility: HOSPITAL | Age: 80
Discharge: HOME | End: 2025-04-17
Admitting: NURSE PRACTITIONER
Payer: MEDICARE

## 2025-04-17 ENCOUNTER — OFFICE VISIT (OUTPATIENT)
Dept: FAMILY MEDICINE CLINIC | Age: 80
End: 2025-04-17
Payer: MEDICARE

## 2025-04-17 VITALS
SYSTOLIC BLOOD PRESSURE: 109 MMHG | HEIGHT: 66 IN | TEMPERATURE: 97.8 F | BODY MASS INDEX: 28.99 KG/M2 | DIASTOLIC BLOOD PRESSURE: 71 MMHG | HEART RATE: 77 BPM | OXYGEN SATURATION: 98 % | WEIGHT: 180.4 LBS

## 2025-04-17 DIAGNOSIS — M79.644 PAIN OF RIGHT THUMB: Primary | ICD-10-CM

## 2025-04-17 DIAGNOSIS — K58.0 IRRITABLE BOWEL SYNDROME WITH DIARRHEA: ICD-10-CM

## 2025-04-17 DIAGNOSIS — M79.641 PAIN OF RIGHT HAND: ICD-10-CM

## 2025-04-17 PROCEDURE — 73140 X-RAY EXAM OF FINGER(S): CPT

## 2025-04-17 PROCEDURE — 1125F AMNT PAIN NOTED PAIN PRSNT: CPT | Performed by: NURSE PRACTITIONER

## 2025-04-17 PROCEDURE — 1160F RVW MEDS BY RX/DR IN RCRD: CPT | Performed by: NURSE PRACTITIONER

## 2025-04-17 PROCEDURE — 1159F MED LIST DOCD IN RCRD: CPT | Performed by: NURSE PRACTITIONER

## 2025-04-17 PROCEDURE — 99213 OFFICE O/P EST LOW 20 MIN: CPT | Performed by: NURSE PRACTITIONER

## 2025-04-17 PROCEDURE — 73130 X-RAY EXAM OF HAND: CPT

## 2025-04-17 RX ORDER — DIPHENOXYLATE HYDROCHLORIDE AND ATROPINE SULFATE 2.5; .025 MG/1; MG/1
1 TABLET ORAL 2 TIMES DAILY PRN
Qty: 60 TABLET | Refills: 2 | Status: SHIPPED | OUTPATIENT
Start: 2025-04-17

## 2025-04-17 NOTE — PROGRESS NOTES
Chief Complaint  Hand Pain (Pain in right thumb- hit it on the door frame x1 week )    Subjective        Valery Baez presents to [facility] today for       History of Present Illness  The patient is a 79-year-old female who presents with pain in her right thumb after hitting it on the door frame about a week ago.    She reports an incident where she accidentally hit her right thumb on the door frame while ascending the stairs approximately one week ago. The initial impact resulted in swelling, which was managed by applying ice immediately. A previous injury to the same thumb had caused a noticeable bump. The thumb exhibited discoloration, appearing black and red, but has since improved. Despite wearing a glove for support during handcraft activities, the pain has not subsided as expected within a week. A sensation of numbness in the thumb and an inability to touch the pinky finger are described. She has a high threshold for pain but acknowledges the persistent discomfort.          Current Outpatient Medications:     acetaminophen (TYLENOL) 325 MG tablet, Take 2 tablets by mouth Every 4 (Four) Hours As Needed for Mild Pain . (Patient taking differently: Take 500 mg by mouth Every 4 (Four) Hours As Needed for Mild Pain. 500mg), Disp: , Rfl:     Ollie Thyroid 30 MG tablet, TAKE 1 TABLET DAILY, Disp: 90 tablet, Rfl: 3    atenolol (TENORMIN) 25 MG tablet, TAKE 1 TABLET EVERY AFTERNOON, Disp: 90 tablet, Rfl: 3    atorvastatin (LIPITOR) 20 MG tablet, TAKE 1 TABLET DAILY, Disp: 90 tablet, Rfl: 3    Denosumab (PROLIA SC), Inject  under the skin into the appropriate area as directed See Admin Instructions. Every 6 months   last shot nov 1, 2024, Disp: , Rfl:     diphenhydrAMINE (BENADRYL ALLERGY CHILDRENS) 12.5 MG chewable tablet, Chew 2 tablets 4 (Four) Times a Day As Needed for Allergies. FOR BEE STINGS ONLY, Disp: , Rfl:     diphenoxylate-atropine (LOMOTIL) 2.5-0.025 MG per  tablet, Take 1 tablet by mouth 2 (Two) Times a Day As Needed for Diarrhea., Disp: 60 tablet, Rfl: 0    EPINEPHrine (EPIPEN) 0.3 MG/0.3ML solution auto-injector injection, INJECT 0.3 ML INTO THE APPROPRIATE MUSCLE ONE TIME FOR 1 DOSE AS DIRECTED BY PRESCRIBER, Disp: 2 each, Rfl: 11    fexofenadine (ALLEGRA) 180 MG tablet, Take 1 tablet by mouth Daily., Disp: , Rfl:     fluticasone (FLONASE) 50 MCG/ACT nasal spray, Administer 2 sprays into the nostril(s) as directed by provider Every Night., Disp: , Rfl:     guaiFENesin (MUCINEX PO), Take 400 mg by mouth 2 (Two) Times a Day., Disp: , Rfl:     isosorbide mononitrate (IMDUR) 30 MG 24 hr tablet, Take 1 tablet by mouth Daily., Disp: , Rfl:     lansoprazole (PREVACID) 30 MG capsule, TAKE 1 CAPSULE TWICE A DAY, Disp: 180 capsule, Rfl: 3    levalbuterol (XOPENEX HFA) 45 MCG/ACT inhaler, Inhale 1-2 puffs Every 4 (Four) Hours As Needed., Disp: , Rfl:     Lifitegrast (Xiidra) 5 % ophthalmic solution, Administer 1 drop to both eyes 2 (Two) Times a Day., Disp: , Rfl:     meclizine (ANTIVERT) 25 MG tablet, TAKE 1 TABLET THREE TIMES A DAY AS NEEDED FOR DIZZINESS, Disp: 90 tablet, Rfl: 2    Melatonin 10 MG tablet, Take 1 tablet by mouth Every Night., Disp: , Rfl:     meloxicam (MOBIC) 15 MG tablet, Take 1 tablet by mouth Daily. Take with food., Disp: 90 tablet, Rfl: 1    metroNIDAZOLE (METROGEL) 0.75 % gel, Apply 1 application topically to the appropriate area as directed 2 (Two) Times a Day As Needed. ROASCEA, Disp: , Rfl:     Mirabegron ER (Myrbetriq) 50 MG tablet sustained-release 24 hour 24 hr tablet, Take 50 mg by mouth Daily., Disp: 90 tablet, Rfl: 3    mometasone (ASMANEX TWISTHALER) inhaler 110 mcg/inhalation, Inhale., Disp: , Rfl:     montelukast (SINGULAIR) 10 MG tablet, Take 1 tablet by mouth Every Night., Disp: , Rfl:     Multiple Vitamins-Minerals (MULTIVITAMIN ADULT PO), Take 1 tablet by mouth Every Night. HOLD FOR SURGERY, Disp: , Rfl:     pilocarpine (SALAGEN) 5 MG  "tablet, TAKE 1 TABLET FOUR TIMES A DAY, Disp: 120 tablet, Rfl: 11    POTASSIUM CHLORIDE PO, Take 99 mg by mouth Every Night. OTC HOLD FOR SURGERY, Disp: , Rfl:     pyridoxine (VITAMIN B-6) 100 MG tablet, Take 1 tablet by mouth., Disp: , Rfl:   No current facility-administered medications for this visit.    Facility-Administered Medications Ordered in Other Visits:     Chlorhexidine Gluconate Cloth 2 % pads, , Apply externally, Take As Directed, Dejan Lange MD  There are no discontinued medications.      Allergies:  Bee venom, Latex, Sulfa antibiotics, Sulfate, Codeine, Cyclobenzaprine, Darvon [propoxyphene], Ditropan [oxybutynin], Duricef [cefadroxil], Erythromycin, Hydrocodone, Iodine, Meperidine, Methylprednisolone, Minocycline, Morphine and codeine, Oxycodone-acetaminophen, Talwin [pentazocine], Tramadol, Tramadol hcl, Valium [diazepam], Methocarbamol, Adhesive tape, Bacitracin-polymyxin b, Betadine [povidone iodine], Contrast dye (echo or unknown ct/mr), Daptomycin, Eliquis [apixaban], Miconazole, Neomycin-polymyxin-gramicidin, Neosporin af [miconazole nitrate], Penicillins, and Silver      Objective   Vital Signs:   Vitals:    04/17/25 1008   BP: 109/71   BP Location: Left arm   Patient Position: Sitting   Cuff Size: Adult   Pulse: 77   Temp: 97.8 °F (36.6 °C)   TempSrc: Oral   SpO2: 98%   Weight: 81.8 kg (180 lb 6.4 oz)   Height: 167.6 cm (65.98\")     Body mass index is 29.13 kg/m².           Physical Exam  Constitutional:       Appearance: Normal appearance.   Neck:      Vascular: No carotid bruit.   Cardiovascular:      Rate and Rhythm: Normal rate and regular rhythm.      Heart sounds: Normal heart sounds.   Pulmonary:      Effort: Pulmonary effort is normal.      Breath sounds: Normal breath sounds.   Musculoskeletal:         General: Tenderness (tenderness and slight edema base right thumb,  cap refill wnl) present.   Skin:     General: Skin is warm and dry.   Neurological:      General: No focal " deficit present.      Mental Status: She is alert.   Psychiatric:         Mood and Affect: Mood normal.         Behavior: Behavior normal.             Lab Results   Component Value Date    GLUCOSE 96 10/11/2024    BUN 17 10/11/2024    CREATININE 1.00 10/11/2024    EGFRIFNONA 80 12/24/2021    EGFRIFAFRI 54 (L) 10/22/2021    BCR 17.0 10/11/2024    K 4.7 10/11/2024    CO2 20.0 (L) 10/11/2024    CALCIUM 8.8 10/11/2024    ALBUMIN 4.30 08/01/2022    AST 24 08/01/2022    ALT 22 08/01/2022       Lab Results   Component Value Date    CHOL 164 08/01/2022    TRIG 138 08/01/2022    HDL 64 (H) 08/01/2022    LDL 76 08/01/2022       Lab Results   Component Value Date    WBC 13.40 (H) 07/27/2023    HGB 9.7 (L) 07/27/2023    HCT 29.7 (L) 07/27/2023    MCV 94.3 07/27/2023     07/27/2023                     Procedures         Diagnoses and all orders for this visit:    1. Pain of right thumb (Primary)  -     XR Finger 2+ View Right    2. Pain of right hand  -     XR Hand 3+ View Right; Future          Assessment & Plan  1. Right thumb pain.  - Reports pain and numbness in the right thumb after hitting it on a door frame about a week ago.  - Swelling and discoloration observed; unable to reach pinky with the affected thumb.  - X-ray of the right hand ordered to rule out a fracture.  - Advised to continue icing the thumb and use a pressure glove for support; further treatment options to be discussed if fracture is confirmed.              Follow Up  Return if symptoms worsen or fail to improve, for will call with imaging results , next steps.  Patient was given instructions and counseling regarding her condition or for health maintenance advice. Please see specific information pulled into the AVS if appropriate.           Ga Woodson, APRN  04/17/2025    Please note that portions of this document were completed using a voice recognition program.     Patient or patient representative verbalized consent for the use of  Ambient Listening during the visit with  VIOLETTE Timmons for chart documentation. 4/17/2025  10:30 EDT

## 2025-04-17 NOTE — TELEPHONE ENCOUNTER
Rx Refill Note  Requested Prescriptions     Pending Prescriptions Disp Refills    diphenoxylate-atropine (LOMOTIL) 2.5-0.025 MG per tablet 60 tablet 0     Sig: Take 1 tablet by mouth 2 (Two) Times a Day As Needed for Diarrhea.      Last office visit with prescribing clinician: 4/3/2025   Last telemedicine visit with prescribing clinician: Visit date not found   Next office visit with prescribing clinician: 10/3/2025  Last refill sent: 09/26/24, #60  [facility] mail order requesting.       Olivia Le LPN  04/17/25, 12:27 EDT

## 2025-05-22 DIAGNOSIS — K58.0 IRRITABLE BOWEL SYNDROME WITH DIARRHEA: ICD-10-CM

## 2025-05-22 NOTE — TELEPHONE ENCOUNTER
"    Caller: Valery Baez \"DOT\"    Relationship: Self    Best call back number: [phone]    Requested Prescriptions:     diphenoxylate-atropine (LOMOTIL) 2.5-0.025 MG per tablet          Pharmacy where request should be sent:    [id number] - [place], [place] - [address] AT [facility] OF [address] [facility] - [phone]  - [phone]  [phone]   Last office visit with prescribing clinician: 4/3/2025   Last telemedicine visit with prescribing clinician: Visit date not found   Next office visit with prescribing clinician: 10/3/2025     Additional details provided by patient: Patient is [facility]10 to 14 day [facility]    Does the patient have less than a 3 day supply:  [x] Yes  [] No    Would you like a call back once the refill request has been completed: [] Yes [] No    If the office needs to give you a call back, can they leave a voicemail: [] Yes [] No    Logan Esteban Rep   05/22/25 11:03 EDT       "

## 2025-05-23 RX ORDER — DIPHENOXYLATE HYDROCHLORIDE AND ATROPINE SULFATE 2.5; .025 MG/1; MG/1
1 TABLET ORAL 2 TIMES DAILY PRN
Qty: 20 TABLET | Refills: 0 | Status: SHIPPED | OUTPATIENT
Start: 2025-05-23

## 2025-06-04 ENCOUNTER — OFFICE VISIT (OUTPATIENT)
Dept: UROLOGY | Facility: CLINIC | Age: 80
End: 2025-06-04
Payer: MEDICARE

## 2025-06-04 VITALS — WEIGHT: 181 LBS | BODY MASS INDEX: 29.09 KG/M2 | HEIGHT: 66 IN

## 2025-06-04 DIAGNOSIS — N32.81 OVERACTIVE BLADDER: Primary | ICD-10-CM

## 2025-06-04 PROBLEM — Z86.718 HISTORY OF DVT (DEEP VEIN THROMBOSIS): Status: RESOLVED | Noted: 2024-06-21 | Resolved: 2025-06-04

## 2025-06-04 PROBLEM — I82.409 DEEP VEIN THROMBOSIS (DVT): Status: ACTIVE | Noted: 2025-06-04

## 2025-06-04 PROBLEM — M20.32 HALLUX VARUS (ACQUIRED), LEFT FOOT: Status: ACTIVE | Noted: 2025-06-04

## 2025-06-04 PROBLEM — C50.919 MALIGNANT NEOPLASM OF BREAST: Status: ACTIVE | Noted: 2025-06-04

## 2025-06-04 NOTE — PROGRESS NOTES
Chief Complaint: Follow-up (Patient presents today for a follow up on OAB she denies any urinary issues at this time she states that the mybetriq is working well. )    Subjective         History of Present Illness  Valery Baez is a 79 y.o. female presents to [facility] to be seen for f/u oab.    At last visit we continued myrbetriq 50mg q day.     She states that this is working well for.     Nocturia x 1-2    She voids every 3-4 hours no significant urgency with this.       Previous:     At last visit we started her on Myrbetriq 50 milligrams daily.    We did discuss at last visit that her Powerade consumption may have been contributing to some of her urgency and frequency given the amount of electrolytes and bladder irritating substances in that drink.    Urinary urgency is not as bothersome.     She states she does void every 3-4 hours.    Nocturia x 0-1            Previous:    She is on myrbetriq 25 mg q day and has been on this for years.     She states she continues to have urgency and frequency.     Nocturia x 1-2  was 3-4 x a night.    Mostly drinks water and can drink powerade daily as well.     She states she can void then feel like she is empty but then have more urine come out a few seconds later.    She does wear incontinence underwear but not depends.     She can have urge incontinence at times.     Denies stress incontinence.    She had been placed on estrace before this did not help much.     She is on daily keflex to keep mssa suppressed.     She was seen in the ED at [facility] in 6/2024 for cystitis cx was subsequently negative was told she had a yeast infection.     Hysterectomy at age 25 was on HRT for 30 years.     Oophrecotmy in her 40s.    Has hx of breast cancer with mastectomy in 2015 this ws not estrogen receptor positive breast cancer.            Objective     Past Medical History:   Diagnosis Date    Abnormal ECG     Allergic     Anemia      CHRONIC IRON DEFICIENT    Anesthesia complication     STATES SHE IS VERY SLOW TO WAKE UP    Anesthesia complication     pt has artifical jaw joints upper and lower on both sides--- this makes intubation difficult    Ankle sprain 1962    Arthritis of back 2018    Arthritis of neck 1988    Asthma     Basal cell carcinoma     FACE    Bursitis of hip 2022    Cancer     right breast cancer    Cervical disc disorder 2022    Chronic diarrhea     Chronic kidney disease     Clotting disorder     Bruise easily    Colon polyp     CTS (carpal tunnel syndrome) 1978    DDD (degenerative disc disease), lumbar     Deep vein thrombosis     Disease of thyroid gland     Dislocation of finger 1984    Dry eye     Elevated cholesterol     Fracture of ankle 1998    Fracture of hip 2021    Right hip replacement; same symptoms in left    Fracture of wrist 1968    Fracture, finger 1993    Fracture, foot 1971    Frequency of urination     Frozen shoulder     GERD (gastroesophageal reflux disease)     Hard to intubate     REQUESTING TO SEE [facility] 8/22/19; PT HAS COMPLETE BILATERAL JAW PROSTESIS    Headache     or migraines    Heart palpitations     Hip arthrosis 2022    History of breast cancer     RIGHT    History of medical problems     History of transfusion     [place] (hard of hearing)     BILAT HEARING AID    Hx of renal calculi     Hypercholesteremia     Hypothyroidism     Irregular heart beat     mid afternoon   pt takes atenolol    Knee sprain 1965    Knee swelling 1966    Limited mobility     RIGHT HIP    Limited mobility     JAW    Low back pain     Low back strain 1957    Lumbosacral disc disease 2022    MRSA (methicillin resistant Staphylococcus aureus)     LEFT JAW @[facility]J JOINT, MULT. ,LAST 2013, TREATED AT [facility], infection control notified 8/24/16. 1300    MSSA (methicillin susceptible Staphylococcus aureus)     Neck strain 1984    Osteoarthritis     Osteopenia     Prolia shot 11/4/22    Osteoporosis     Periarthritis of  shoulder 1999    PONV (postoperative nausea and vomiting)     Right hip pain     Rosacea     Rotator cuff syndrome 1999    Shingles     Short of breath on exertion     WITH STAIRS ONLY    Sleep apnea     C PAP    Spinal headache     Spinal stenosis     Subluxation of patella 1979    Tear of meniscus of knee 1966    Urgency of urination     Vertigo     WAKES UP WITH THIS SOMETIMES    Vision loss     Visual impairment     Weakness     RIGHT HIP    Wrist sprain 1966       Past Surgical History:   Procedure Laterality Date    ABSCESS DRAINAGE      abscess removed from left jaw x2    ADENOIDECTOMY  1952    ANKLE OPEN REDUCTION INTERNAL FIXATION  1998    APPENDECTOMY  1965    AVULSION TOENAIL PLATE      6 REMOVED    BACK SURGERY  9/19/19    BILATERAL BREAST REDUCTION Left 08/29/2016    Procedure: LT BREAST REDUCTION ;  Surgeon: Luis Zambrano MD;  Location: [facility] MAIN OR;  Service:     BREAST BIOPSY Bilateral     CARPAL TUNNEL RELEASE  1993    CHOLECYSTECTOMY  09/2000    COLONOSCOPY      COLONOSCOPY  2019    NORMAL    CYST REMOVAL  03/1984    eye cyst left lower lid    CYST REMOVAL  01/2007    right upper thigh    CYSTOSCOPY      x2  02/2013 & 04/2014    CYSTOSCOPY      NUMEROUS    D & C HYSTEROSCOPY      FOOT SURGERY      HAND SURGERY  1980    HYSTERECTOMY  1971    LATER BSO    JOINT REPLACEMENT  2021    right hip    KNEE ARTHROPLASTY Right 03/2005    LAPAROSCOPIC SALPINGOOPHERECTOMY      LUMBAR DISCECTOMY FUSION INSTRUMENTATION N/A 09/09/2019    Procedure: Lumbar 3 to sacral 1 laminectomy and fusion with instrumentation;  Surgeon: Vikas Couch MD;  Location: [facility] MAIN OR;  Service: Orthopedic Spine    LUMBAR FUSION N/A 07/27/2023    Procedure: Lumbar 2-3 lateral lumbar interbody fusion with neuro robot;  Surgeon: Aston Paredes IV, MD;  Location: [facility] MAIN OR;  Service: Robotics - Neuro;  Laterality: N/A;    MANDIBLE SURGERY      UPPER AND LOWER JAW ADJUSTED WITH PLATES AND WIRES    MASTECTOMY Right 03/2015     MASTECTOMY PARTIAL / LUMPECTOMY Right 03/2009    NIPPLE RECONSTRUCTION Right 08/29/2016    Procedure: RT NIPPLE RECONSTRUCTION;  Surgeon: Luis Zambrano MD;  Location: [facility] OR;  Service:     OOPHORECTOMY      OTHER SURGICAL HISTORY      laparoscopy adhesions right side x2    OTHER SURGICAL HISTORY      drain mammosite area    OTHER SURGICAL HISTORY  01/2010    pressure washed prosthesis area left jaw    OTHER SURGICAL HISTORY      drain mammosite area right breast 09/2011    RECONSTRUCTION BREAST W/ TRAM FLAP Right 02/2016    ROTATOR CUFF REPAIR Left 09/2014    SACROILIAC JOINT FUSION Bilateral 07/27/2023    Procedure: Bilateral sacroiliac joint fusion;  Surgeon: Aston Paredes IV, MD;  Location: [facility] OR;  Service: Neurosurgery;  Laterality: Bilateral;    SHOULDER SURGERY      SPINAL FUSION  2019    TEMPOROMANDIBULAR JOINT ARTHROPLASTY Bilateral 1993    MULT. REPLACEMENTS LATER    TONSILLECTOMY  1952    TOTAL HIP ARTHROPLASTY Right 12/24/2021    Procedure: Right anterior total hip arthroplasty;  Surgeon: Dejan Lange MD;  Location: [facility] OR;  Service: Orthopedics;  Laterality: Right;    TOTAL KNEE ARTHROPLASTY Left     WRIST SURGERY           Current Outpatient Medications:     acetaminophen (TYLENOL) 325 MG tablet, Take 2 tablets by mouth Every 4 (Four) Hours As Needed for Mild Pain . (Patient taking differently: Take 500 mg by mouth Every 4 (Four) Hours As Needed for Mild Pain. 500mg), Disp: , Rfl:     [place] Thyroid 30 MG tablet, TAKE 1 TABLET DAILY, Disp: 90 tablet, Rfl: 3    atenolol (TENORMIN) 25 MG tablet, TAKE 1 TABLET EVERY AFTERNOON, Disp: 90 tablet, Rfl: 3    atorvastatin (LIPITOR) 20 MG tablet, TAKE 1 TABLET DAILY, Disp: 90 tablet, Rfl: 3    Denosumab (PROLIA SC), Inject  under the skin into the appropriate area as directed See Admin Instructions. Every 6 months   last shot nov 1, 2024, Disp: , Rfl:     diphenhydrAMINE (BENADRYL ALLERGY CHILDRENS) 12.5 MG chewable tablet, Chew 2  tablets 4 (Four) Times a Day As Needed for Allergies. FOR BEE STINGS ONLY, Disp: , Rfl:     diphenoxylate-atropine (LOMOTIL) 2.5-0.025 MG per tablet, Take 1 tablet by mouth 2 (Two) Times a Day As Needed for Diarrhea., Disp: 20 tablet, Rfl: 0    EPINEPHrine (EPIPEN) 0.3 MG/0.3ML solution auto-injector injection, INJECT 0.3 ML INTO THE APPROPRIATE MUSCLE ONE TIME FOR 1 DOSE AS DIRECTED BY PRESCRIBER, Disp: 2 each, Rfl: 11    fexofenadine (ALLEGRA) 180 MG tablet, Take 1 tablet by mouth Daily., Disp: , Rfl:     fluticasone (FLONASE) 50 MCG/ACT nasal spray, Administer 2 sprays into the nostril(s) as directed by provider Every Night., Disp: , Rfl:     guaiFENesin (MUCINEX PO), Take 400 mg by mouth 2 (Two) Times a Day., Disp: , Rfl:     isosorbide mononitrate (IMDUR) 30 MG 24 hr tablet, Take 1 tablet by mouth Daily., Disp: , Rfl:     lansoprazole (PREVACID) 30 MG capsule, TAKE 1 CAPSULE TWICE A DAY, Disp: 180 capsule, Rfl: 3    levalbuterol (XOPENEX HFA) 45 MCG/ACT inhaler, Inhale 1-2 puffs Every 4 (Four) Hours As Needed., Disp: , Rfl:     Lifitegrast (Xiidra) 5 % ophthalmic solution, Administer 1 drop to both eyes 2 (Two) Times a Day., Disp: , Rfl:     meclizine (ANTIVERT) 25 MG tablet, TAKE 1 TABLET THREE TIMES A DAY AS NEEDED FOR DIZZINESS, Disp: 90 tablet, Rfl: 2    Melatonin 10 MG tablet, Take 1 tablet by mouth Every Night., Disp: , Rfl:     meloxicam (MOBIC) 15 MG tablet, Take 1 tablet by mouth Daily. Take with food., Disp: 90 tablet, Rfl: 1    metroNIDAZOLE (METROGEL) 0.75 % gel, Apply 1 application topically to the appropriate area as directed 2 (Two) Times a Day As Needed. ROASCEA, Disp: , Rfl:     Mirabegron ER (Myrbetriq) 50 MG tablet sustained-release 24 hour 24 hr tablet, Take 50 mg by mouth Daily., Disp: 90 tablet, Rfl: 3    mometasone (ASMANEX TWISTHALER) inhaler 110 mcg/inhalation, Inhale., Disp: , Rfl:     montelukast (SINGULAIR) 10 MG tablet, Take 1 tablet by mouth Every Night., Disp: , Rfl:     Multiple  "Vitamins-Minerals (MULTIVITAMIN ADULT PO), Take 1 tablet by mouth Every Night. HOLD FOR SURGERY, Disp: , Rfl:     pilocarpine (SALAGEN) 5 MG tablet, TAKE 1 TABLET FOUR TIMES A DAY, Disp: 120 tablet, Rfl: 11    POTASSIUM CHLORIDE PO, Take 99 mg by mouth Every Night. OTC HOLD FOR SURGERY, Disp: , Rfl:     pyridoxine (VITAMIN B-6) 100 MG tablet, Take 1 tablet by mouth., Disp: , Rfl:   No current facility-administered medications for this visit.    Facility-Administered Medications Ordered in Other Visits:     Chlorhexidine Gluconate Cloth 2 % pads, , Apply externally, Take As Directed, Dejan Lange MD    Allergies   Allergen Reactions    Bee Venom Anaphylaxis    Latex Hives    Sulfa Antibiotics Hives, Nausea And Vomiting and Rash    Sulfate Hives, Nausea And Vomiting and Rash     Other reaction(s): Not available    Codeine Hives     And Derivatives    Cyclobenzaprine Other (See Comments) and Unknown - High Severity     Experienced side effects - blurred vision, dry mouth, constipation        Darvon [Propoxyphene] Nausea And Vomiting     CONFUSION.     Ditropan [Oxybutynin] Itching    Duricef [Cefadroxil] Itching and Nausea Only    Erythromycin Hives     Also allergic to Duricef    Hydrocodone Nausea And Vomiting and GI Intolerance    Iodine Hives     Iodine scrubbing solutions /IVP Contrast Dyes      Meperidine Nausea And Vomiting    Methylprednisolone Unknown (See Comments)     CAUSES ITCHING AND FACIAL FLUSHING BUT PT \"CAN TAKE LOWER DOSES\"    Minocycline Hives, Diarrhea and Nausea And Vomiting     Only with doses > 100mg BID          Morphine And Codeine Hives and GI Intolerance     Derivatives    Oxycodone-Acetaminophen Hives    Talwin [Pentazocine] Hives    Tramadol Hives and GI Intolerance     ULTRAM ER    Tramadol Hcl Hives and Nausea And Vomiting    Valium [Diazepam] Hives and Hallucinations     CONFUSION.     Methocarbamol Hallucinations    Adhesive Tape Rash    Bacitracin-Polymyxin B Rash    Betadine " "[Povidone Iodine] Rash    Contrast Dye (Echo Or Unknown Ct/Mr) Hives and Palpitations    Daptomycin Itching and Hives    Eliquis [Apixaban] Rash    Miconazole Rash    Neomycin-Polymyxin-Gramicidin Rash    Neosporin Af [Miconazole Nitrate] Rash    Penicillins Hives, Swelling and Rash     As infant    Silver Rash        Family History   Problem Relation Age of Onset    Hypertension Other     Cancer Other         breast    Heart disease Mother     Arthritis Mother     Hearing loss Mother     Anesthesia problems Mother     Osteoporosis Mother     Heart disease Father     Asthma Father     Arthritis Father     Hearing loss Father     Heart disease Maternal Grandmother     Cancer Maternal Grandmother     Heart disease Maternal Grandfather     Heart disease Paternal Grandmother     Cancer Paternal Grandmother     Heart disease Paternal Grandfather     Rheumatologic disease Paternal Aunt     Early death Brother          at 53, Sudden Death Syndrome    Rheumatologic disease Paternal Aunt     Malig Hyperthermia Neg Hx        Social History     Socioeconomic History    Marital status:    Tobacco Use    Smoking status: Never     Passive exposure: Never    Smokeless tobacco: Never   Vaping Use    Vaping status: Never Used   Substance and Sexual Activity    Alcohol use: Yes     Alcohol/week: 4.0 standard drinks of alcohol     Types: 4 Shots of liquor per week     Comment: 4 drinks per week    Drug use: Never    Sexual activity: Not Currently     Partners: Male     Birth control/protection: Hysterectomy       Vital Signs:   Ht 167.6 cm (66\")   Wt 82.1 kg (181 lb)   BMI 29.21 kg/m²      Physical Exam     Result Review :   The following data was reviewed by: VIOLETTE Can on 2025:  Results for orders placed or performed in visit on 25   Bladder Scan    Collection Time: 25 10:15 AM   Result Value Ref Range    Urine Volume 0    POC Urinalysis Dipstick, Automated    Collection Time: 25 " 10:21 AM    Specimen: Urine   Result Value Ref Range    Color Yellow Yellow, Straw, Dark Yellow, Debi    Clarity, UA Clear Clear    Specific Gravity  1.010 1.005 - 1.030    pH, Urine 5.5 5.0 - 8.0    Leukocytes Trace (A) Negative    Nitrite, UA Negative Negative    Protein, POC Negative Negative mg/dL    Glucose, UA Negative Negative mg/dL    Ketones, UA Negative Negative    Urobilinogen, UA 0.2 E.U./dL Normal, 0.2 E.U./dL    Bilirubin Negative Negative    Blood, UA Negative Negative    Lot Number [id number]     Expiration Date 4/2,026       Bladder Scan interpretation 06/04/2025    Estimation of residual urine via Deep DriverI 3000 Verathon Bladder Scan  MA/nurse performing: yani bradley Ma   Residual Urine: 0 ml  Indication: Overactive bladder   Position: Supine  Examination: Incremental scanning of the suprapubic area using 2.0 MHz transducer using copious amounts of acoustic gel.   Findings: An anechoic area was demonstrated which represented the bladder, with measurement of residual urine as noted. I inspected this myself. In that the residual urine was stable or insignificant, refer to plan for treatment and plan necessary at this time.            Procedures        Assessment and Plan    Diagnoses and all orders for this visit:    1. Overactive bladder (Primary)  -     POC Urinalysis Dipstick, Automated  -     Bladder Scan        We will have her continue myrbetriq at this time.    She will call with any s/s of UTI      If she remains stable at 3 more months will f/u annually after that.     I spent 10 minutes caring for Valery on this date of service. This time includes time spent by me in the following activities:reviewing tests, obtaining and/or reviewing a separately obtained history, performing a medically appropriate examination and/or evaluation , counseling and educating the patient/family/caregiver, ordering medications, tests, or procedures, and documenting information in the medical record  Follow Up   Return in  about 3 months (around 9/4/2025) for f/u oab with [facility] .  Patient was given instructions and counseling regarding her condition or for health maintenance advice. Please see specific information pulled into the AVS if appropriate.         This document has been electronically signed by VIOLETTE Can  June 4, 2025 10:37 EDT

## 2025-06-26 ENCOUNTER — OFFICE VISIT (OUTPATIENT)
Dept: FAMILY MEDICINE CLINIC | Age: 80
End: 2025-06-26
Payer: MEDICARE

## 2025-06-26 VITALS
TEMPERATURE: 97.7 F | DIASTOLIC BLOOD PRESSURE: 54 MMHG | BODY MASS INDEX: 28.9 KG/M2 | HEART RATE: 73 BPM | HEIGHT: 66 IN | WEIGHT: 179.8 LBS | RESPIRATION RATE: 16 BRPM | SYSTOLIC BLOOD PRESSURE: 114 MMHG | OXYGEN SATURATION: 97 %

## 2025-06-26 DIAGNOSIS — E03.9 HYPOTHYROIDISM, ACQUIRED: ICD-10-CM

## 2025-06-26 DIAGNOSIS — K21.9 GERD WITHOUT ESOPHAGITIS: ICD-10-CM

## 2025-06-26 DIAGNOSIS — K58.0 IRRITABLE BOWEL SYNDROME WITH DIARRHEA: Primary | ICD-10-CM

## 2025-06-26 PROBLEM — I82.409 DEEP VEIN THROMBOSIS (DVT): Status: RESOLVED | Noted: 2025-06-04 | Resolved: 2025-06-26

## 2025-06-26 PROBLEM — Z85.3 HISTORY OF BREAST CANCER: Status: ACTIVE | Noted: 2025-06-04

## 2025-06-26 RX ORDER — ISOSORBIDE MONONITRATE 30 MG/1
30 TABLET, EXTENDED RELEASE ORAL DAILY
Qty: 90 TABLET | Refills: 3 | Status: SHIPPED | OUTPATIENT
Start: 2025-06-26

## 2025-06-26 RX ORDER — CEPHALEXIN 500 MG/1
CAPSULE ORAL
COMMUNITY
Start: 2025-06-25

## 2025-06-26 NOTE — PROGRESS NOTES
"Chief Complaint  Med Refill    Subjective          Valery Baez presents to [facility]  History of Present Illness  --RECENT TSH WAS OK ON CURRENT DOSE OF MEDS  --GERD IS WELL CONTROLLED WITH THE PPI  --LONGSTANDING ISSUES WITH DIARRHEA AND CRAMPING.  WAS GIVEN IMBUR BY HER GASTROENTEROLOGIST WHICH HELPS QUITE A BIT, NEEDS A REFILL.          Allergies   Allergen Reactions    Bee Venom Anaphylaxis    Latex Hives    Sulfa Antibiotics Hives, Nausea And Vomiting and Rash    Sulfate Hives, Nausea And Vomiting and Rash     Other reaction(s): Not available    Codeine Hives     And Derivatives    Cyclobenzaprine Other (See Comments) and Unknown - High Severity     Experienced side effects - blurred vision, dry mouth, constipation        Darvon [Propoxyphene] Nausea And Vomiting     CONFUSION.     Ditropan [Oxybutynin] Itching    Duricef [Cefadroxil] Itching and Nausea Only    Erythromycin Hives     Also allergic to Duricef    Hydrocodone Nausea And Vomiting and GI Intolerance    Iodine Hives     Iodine scrubbing solutions /IVP Contrast Dyes      Meperidine Nausea And Vomiting    Methylprednisolone Unknown (See Comments)     CAUSES ITCHING AND FACIAL FLUSHING BUT PT \"CAN TAKE LOWER DOSES\"    Minocycline Hives, Diarrhea and Nausea And Vomiting     Only with doses > 100mg BID          Morphine And Codeine Hives and GI Intolerance     Derivatives    Oxycodone-Acetaminophen Hives    Talwin [Pentazocine] Hives    Tramadol Hives and GI Intolerance     ULTRAM ER    Tramadol Hcl Hives and Nausea And Vomiting    Valium [Diazepam] Hives and Hallucinations     CONFUSION.     Methocarbamol Hallucinations    Adhesive Tape Rash    Bacitracin-Polymyxin B Rash    Betadine [Povidone Iodine] Rash    Contrast Dye (Echo Or Unknown Ct/Mr) Hives and Palpitations    Daptomycin Itching and Hives    Eliquis [Apixaban] Rash    Miconazole Rash    Neomycin-Polymyxin-Gramicidin Rash    Neosporin Af [Miconazole " Nitrate] Rash    Penicillins Hives, Swelling and Rash     As infant    Silver Rash        Health Maintenance Due   Topic Date Due    COVID-19 Vaccine (8 - 2024-25 season) 04/10/2025    ANNUAL WELLNESS VISIT  08/29/2025        Current Outpatient Medications on File Prior to Visit   Medication Sig    acetaminophen (TYLENOL) 325 MG tablet Take 2 tablets by mouth Every 4 (Four) Hours As Needed for Mild Pain . (Patient taking differently: Take 500 mg by mouth Every 4 (Four) Hours As Needed for Mild Pain. 500mg)    [place] Thyroid 30 MG tablet TAKE 1 TABLET DAILY    atenolol (TENORMIN) 25 MG tablet TAKE 1 TABLET EVERY AFTERNOON    atorvastatin (LIPITOR) 20 MG tablet TAKE 1 TABLET DAILY    cephalexin (KEFLEX) 500 MG capsule     Denosumab (PROLIA SC) Inject  under the skin into the appropriate area as directed See Admin Instructions. Every 6 months   last shot nov 1, 2024    diphenhydrAMINE (BENADRYL ALLERGY CHILDRENS) 12.5 MG chewable tablet Chew 2 tablets 4 (Four) Times a Day As Needed for Allergies. FOR BEE STINGS ONLY    diphenoxylate-atropine (LOMOTIL) 2.5-0.025 MG per tablet Take 1 tablet by mouth 2 (Two) Times a Day As Needed for Diarrhea.    EPINEPHrine (EPIPEN) 0.3 MG/0.3ML solution auto-injector injection INJECT 0.3 ML INTO THE APPROPRIATE MUSCLE ONE TIME FOR 1 DOSE AS DIRECTED BY PRESCRIBER    fexofenadine (ALLEGRA) 180 MG tablet Take 1 tablet by mouth Daily.    fluticasone (FLONASE) 50 MCG/ACT nasal spray Administer 2 sprays into the nostril(s) as directed by provider Every Night.    guaiFENesin (MUCINEX PO) Take 400 mg by mouth 2 (Two) Times a Day.    lansoprazole (PREVACID) 30 MG capsule TAKE 1 CAPSULE TWICE A DAY    levalbuterol (XOPENEX HFA) 45 MCG/ACT inhaler Inhale 1-2 puffs Every 4 (Four) Hours As Needed.    Lifitegrast (Xiidra) 5 % ophthalmic solution Administer 1 drop to both eyes 2 (Two) Times a Day.    meclizine (ANTIVERT) 25 MG tablet TAKE 1 TABLET THREE TIMES A DAY AS NEEDED FOR DIZZINESS     Melatonin 10 MG tablet Take 1 tablet by mouth Every Night.    meloxicam (MOBIC) 15 MG tablet Take 1 tablet by mouth Daily. Take with food.    metroNIDAZOLE (METROGEL) 0.75 % gel Apply 1 application topically to the appropriate area as directed 2 (Two) Times a Day As Needed. ROASCEA    Mirabegron ER (Myrbetriq) 50 MG tablet sustained-release 24 hour 24 hr tablet Take 50 mg by mouth Daily.    mometasone (ASMANEX TWISTHALER) inhaler 110 mcg/inhalation Inhale.    montelukast (SINGULAIR) 10 MG tablet Take 1 tablet by mouth Every Night.    Multiple Vitamins-Minerals (MULTIVITAMIN ADULT PO) Take 1 tablet by mouth Every Night. HOLD FOR SURGERY    pilocarpine (SALAGEN) 5 MG tablet TAKE 1 TABLET FOUR TIMES A DAY    POTASSIUM CHLORIDE PO Take 99 mg by mouth Every Night. OTC  HOLD FOR SURGERY    pyridoxine (VITAMIN B-6) 100 MG tablet Take 1 tablet by mouth.    [DISCONTINUED] isosorbide mononitrate (IMDUR) 30 MG 24 hr tablet Take 1 tablet by mouth Daily.     Current Facility-Administered Medications on File Prior to Visit   Medication    Chlorhexidine Gluconate Cloth 2 % pads       Immunization History   Administered Date(s) Administered    ABRYSVO (RSV, 60+ or pregnant women 32-36 wks) 11/08/2023    COVID-19 (MODERNA) 1st,2nd,3rd Dose Monovalent 01/13/2021, 02/09/2021, 10/26/2021    COVID-19 (MODERNA) BIVALENT 12+YRS 10/04/2022    COVID-19 (MODERNA) Monovalent Original Booster 05/09/2022    COVID-19 (PFIZER) 12YRS+ (COMIRNATY) 10/13/2023, 10/10/2024    Fluad Quad 65+ 11/01/2021    Fluzone  >6mos 09/30/2013    Fluzone High-Dose 65+YRS 10/02/2017, 11/01/2021, 10/10/2024    Fluzone High-Dose 65+yrs 10/31/2022, 10/13/2023    Fluzone Quad >6mos (Multi-dose) 09/30/2013    Influenza TIV (IM) 01/09/2010, 10/25/2012    Pneumococcal Conjugate 13-Valent (PCV13) 10/27/2015    Pneumococcal Polysaccharide (PPSV23) 11/01/2009, 11/01/2013    Pneumococcal, Unspecified 09/23/2008    Shingrix 09/01/2021, 01/06/2022    Tdap 11/02/2012, 08/06/2015  "   Zostavax 09/30/2009, 10/09/2013       Review of Systems   Constitutional:  Positive for fatigue. Negative for activity change, appetite change, chills and fever.   HENT:  Negative for congestion, ear pain, rhinorrhea and sore throat.    Respiratory:  Negative for cough and shortness of breath.    Cardiovascular:  Negative for chest pain, palpitations and leg swelling.   Gastrointestinal:  Negative for abdominal pain, constipation, diarrhea, nausea and vomiting.   Musculoskeletal:  Negative for arthralgias and myalgias.   Neurological:  Negative for headache.        Objective     /54   Pulse 73   Temp 97.7 °F (36.5 °C) (Temporal)   Resp 16   Ht 167.6 cm (65.98\")   Wt 81.6 kg (179 lb 12.8 oz)   SpO2 97%   BMI 29.03 kg/m²       Physical Exam  Vitals and nursing note reviewed.   Constitutional:       General: She is not in acute distress.     Appearance: Normal appearance.   Cardiovascular:      Rate and Rhythm: Normal rate and regular rhythm.      Heart sounds: Normal heart sounds. No murmur heard.  Pulmonary:      Effort: Pulmonary effort is normal.      Breath sounds: Normal breath sounds.   Abdominal:      Palpations: Abdomen is soft.      Tenderness: There is no abdominal tenderness.   Musculoskeletal:      Cervical back: Neck supple.      Right lower leg: No edema.      Left lower leg: No edema.   Lymphadenopathy:      Cervical: No cervical adenopathy.   Neurological:      General: No focal deficit present.      Mental Status: She is alert.      Cranial Nerves: No cranial nerve deficit.      Coordination: Coordination normal.      Gait: Gait normal.   Psychiatric:         Mood and Affect: Mood normal.         Behavior: Behavior normal.         Result Review :                             Assessment and Plan      Diagnoses and all orders for this visit:    1. Irritable bowel syndrome with diarrhea (Primary)  Assessment & Plan:  IMPROVED WITH CURRENT TREATMENT, WILL REEVALUATE AT NEXT VISIT "     Orders:  -     isosorbide mononitrate (IMDUR) 30 MG 24 hr tablet; Take 1 tablet by mouth Daily.  Dispense: 90 tablet; Refill: 3    2. GERD without esophagitis  Assessment & Plan:  IMPROVED WITH CURRENT TREATMENT, WILL REEVALUATE AT NEXT VISIT       3. Hypothyroidism, acquired  Assessment & Plan:  IMPROVED WITH CURRENT TREATMENT, WILL REEVALUATE AT NEXT VISIT           BMI is >= 25 and <30. (Overweight) The following options were offered after discussion;: nutrition counseling/recommendations           Follow Up     Return in about 4 months (around 10/26/2025).    Patient was given instructions and counseling regarding her condition or for health maintenance advice. Please see specific information pulled into the AVS if appropriate.

## 2025-07-07 RX ORDER — PILOCARPINE HYDROCHLORIDE 5 MG/1
5 TABLET, FILM COATED ORAL 4 TIMES DAILY
Qty: 360 TABLET | Refills: 0 | Status: SHIPPED | OUTPATIENT
Start: 2025-07-07

## 2025-07-07 NOTE — TELEPHONE ENCOUNTER
Rx Refill Note  Requested Prescriptions     Pending Prescriptions Disp Refills    pilocarpine (SALAGEN) 5 MG tablet [Pharmacy Med Name: PILOCARPINE HCL TABS 5MG] 360 tablet 3     Sig: TAKE 1 TABLET FOUR TIMES A DAY      Last office visit with prescribing clinician: 6/26/2025   Last telemedicine visit with prescribing clinician: Visit date not found   Next office visit with prescribing clinician: 10/3/2025  Last 09/13/2024    Dr Smith is out of the office until 07/14/25, sent to on call provider Dr Matson.     Olivia Le LPN  07/07/25, 11:06 EDT

## 2025-08-07 DIAGNOSIS — K58.0 IRRITABLE BOWEL SYNDROME WITH DIARRHEA: ICD-10-CM

## 2025-08-07 RX ORDER — DIPHENOXYLATE HYDROCHLORIDE AND ATROPINE SULFATE 2.5; .025 MG/1; MG/1
1 TABLET ORAL 2 TIMES DAILY PRN
Qty: 60 TABLET | Refills: 2 | Status: SHIPPED | OUTPATIENT
Start: 2025-08-07

## 2025-08-21 RX ORDER — ATENOLOL 25 MG/1
TABLET ORAL
Qty: 90 TABLET | Refills: 0 | Status: SHIPPED | OUTPATIENT
Start: 2025-08-21

## (undated) DEVICE — 3M™ STERI-STRIP™ REINFORCED ADHESIVE SKIN CLOSURES, R1546, 1/4 IN X 4 IN (6 MM X 100 MM), 10 STRIPS/ENVELOPE: Brand: 3M™ STERI-STRIP™

## (undated) DEVICE — ANTIBACTERIAL UNDYED BRAIDED (POLYGLACTIN 910), SYNTHETIC ABSORBABLE SUTURE: Brand: COATED VICRYL

## (undated) DEVICE — GLV SURG BIOGEL LTX PF 7

## (undated) DEVICE — UNDERGLV SURG BIOGEL/PI PF SYNTH SURG SZ8.5 BLU 50/BX

## (undated) DEVICE — PK ATS CUST W CARDIOTOMY RESEVOIR

## (undated) DEVICE — SYS CLS SKIN PREMIERPRO EXOFINFUSION 22CM

## (undated) DEVICE — APPL DURAPREP IODOPHOR APL 26ML

## (undated) DEVICE — DS GRFT GRAFTGUN UNIV

## (undated) DEVICE — INTEGUSEAL MICROBIAL SEALANT: Brand: AVANOS

## (undated) DEVICE — PENCL E/S ULTRAVAC TELESCP NOSE HOLSTR 10FT

## (undated) DEVICE — TUBING, SUCTION, 1/4" X 12', STRAIGHT: Brand: MEDLINE

## (undated) DEVICE — PREP SOL POVIDONE/IODINE BT 4OZ

## (undated) DEVICE — UNDERGLV SURG BIOGEL INDICATOR LF PF 7.5

## (undated) DEVICE — DECANTER: Brand: UNBRANDED

## (undated) DEVICE — 3M™ IOBAN™ 2 ANTIMICROBIAL INCISE DRAPE 6650EZ: Brand: IOBAN™ 2

## (undated) DEVICE — CATH IV INSYTE AUTOGARD 14G 1 1/2IN ORNG

## (undated) DEVICE — SPHR MARKR STEALTH STATION

## (undated) DEVICE — OPTIFOAM GENTLE SA, POSTOP, 4X8: Brand: MEDLINE

## (undated) DEVICE — 1010 S-DRAPE TOWEL DRAPE 10/BX: Brand: STERI-DRAPE™

## (undated) DEVICE — TRAP FLD MINIVAC MEGADYNE 100ML

## (undated) DEVICE — SPONGE,LAP,12"X12",XR,ST,5/PK,40PK/CS: Brand: MEDLINE

## (undated) DEVICE — PK ANT HIP 40

## (undated) DEVICE — ENDOPATH 5MM ENDOSCOPIC BLUNT TIP DISSECTORS (12 POUCHES CONTAINING 3 DISSECTORS EACH): Brand: ENDOPATH

## (undated) DEVICE — ADHS SKIN SURG TISS VISC PREMIERPRO EXOFIN HI/VISC FAST/DRY

## (undated) DEVICE — ELECTRD BLD EZ CLN MOD 6.5IN

## (undated) DEVICE — ADHS SKIN DERMABOND TOP ADVANCED

## (undated) DEVICE — GLV SURG SIGNATURE ESSENTIAL PF LTX SZ7.5

## (undated) DEVICE — GLV SURG BIOGEL LTX PF 7 1/2

## (undated) DEVICE — DRAPE,UNDERBUTTOCKS,PCH,STERILE: Brand: MEDLINE

## (undated) DEVICE — OCCLUSIVE GAUZE STRIP OVERWRAP,3% BISMUTH TRIBROMOPHENATE IN PETROLATUM BLEND: Brand: XEROFORM

## (undated) DEVICE — SOL NACL 0.9PCT 1000ML

## (undated) DEVICE — SUT MNCRYL PLS ANTIB UD 4/0 PS2 18IN

## (undated) DEVICE — SYS ILLUM MARS3V 1P/U

## (undated) DEVICE — DRSNG WND BORDR/ADHS NONADHR/GZ LF 4X4IN STRL

## (undated) DEVICE — GLV SURG BIOGEL LTX PF 8

## (undated) DEVICE — PK BASIC SPINE 50

## (undated) DEVICE — DRSNG GZ PETROLTM XEROFORM CURAD 1X8IN STRL

## (undated) DEVICE — APPL CHLORAPREP HI/LITE 26ML ORNG

## (undated) DEVICE — GLV SURG PREMIERPRO ORTHO LTX PF SZ9 BRN

## (undated) DEVICE — SMOKE EVACUATION TUBING WITH 7/8 IN TO 1/4 IN REDUCER: Brand: BUFFALO FILTER

## (undated) DEVICE — MEDI-VAC YANKAUER SUCTION HANDLE W/BULBOUS TIP: Brand: CARDINAL HEALTH

## (undated) DEVICE — DRP MONTR EXCELSIUS/GPS DISP

## (undated) DEVICE — BONE MARROW ASPIRATION NEEDLES ASPIRATOR KIT 3-HOLE 4 INCHES NDLE

## (undated) DEVICE — SUT VIC 1 OS8 27IN J699H

## (undated) DEVICE — STERILE PACKAGE WITH CANNULA: Brand: LITE BIO DELIVERY SYSTEM

## (undated) DEVICE — PAD,ABDOMINAL,8"X10",ST,LF: Brand: MEDLINE

## (undated) DEVICE — DRP ARM EXCELSIUS/GPS DISP

## (undated) DEVICE — PICO 7 10CM X 30CM: Brand: PICO™ 7

## (undated) DEVICE — SPONGE,NEURO,1"X1",XR,STRL,LF,10/PK: Brand: MEDLINE

## (undated) DEVICE — PENCL HND ROCKRSWTCH HOLSTR EZ CLEAN TP CRD 10FT

## (undated) DEVICE — CODMAN® SURGICAL PATTIES 1/2" X 3" (1.27CM X 7.62CM): Brand: CODMAN®

## (undated) DEVICE — SHEET, DRAPE, SPLIT, STERILE: Brand: MEDLINE

## (undated) DEVICE — TOTAL TRAY, 16FR 10ML SIL FOLEY, URN: Brand: MEDLINE

## (undated) DEVICE — MAT FLR ABSORBENT LG 4FT 10 2.5FT

## (undated) DEVICE — TOWEL,OR,DSP,ST,BLUE,STD,4/PK,20PK/CS: Brand: MEDLINE

## (undated) DEVICE — SLV SCD CALF HEMOFORCE DVT THERP REPROC MD

## (undated) DEVICE — HANDPIECE SET WITH COAXIAL HIGH FLOW TIP AND SUCTION TUBE: Brand: INTERPULSE

## (undated) DEVICE — PK NEURO SPINE 40

## (undated) DEVICE — ZIP 24 SURGICAL SKIN CLOSURE DEVICE, PSA: Brand: ZIP 24 SURGICAL SKIN CLOSURE DEVICE

## (undated) DEVICE — GLV SURG SIGNATURE ESSENTIAL PF LTX SZ8

## (undated) DEVICE — KNIF BAYO ANNULOTOMY

## (undated) DEVICE — GLV SURG PREMIERPRO ORTHO LTX PF SZ8 BRN

## (undated) DEVICE — 6.0MM PRECISION ROUND

## (undated) DEVICE — 450 ML BOTTLE OF 0.05% CHLORHEXIDINE GLUCONATE IN 99.95% STERILE WATER FOR IRRIGATION, USP AND APPLICATOR.: Brand: IRRISEPT ANTIMICROBIAL WOUND LAVAGE

## (undated) DEVICE — Device

## (undated) DEVICE — GOWN,ECLIPSE,FABRIC-REINFORCED,X-LARGE: Brand: MEDLINE

## (undated) DEVICE — DISPOSABLE BIPOLAR CABLE 12FT. (3.6M): Brand: KIRWAN

## (undated) DEVICE — SPNG GZ WOVN 4X4IN 12PLY 10/BX STRL

## (undated) DEVICE — INTENDED FOR TISSUE SEPARATION, AND OTHER PROCEDURES THAT REQUIRE A SHARP SURGICAL BLADE TO PUNCTURE OR CUT.: Brand: BARD-PARKER ® CARBON RIB-BACK BLADES

## (undated) DEVICE — DRP C/ARMOR

## (undated) DEVICE — STPLR SKIN VISISTAT WD 35CT

## (undated) DEVICE — KT SURG TURNOVER 050

## (undated) DEVICE — PROB SCRW STIM PEDCL DISP

## (undated) DEVICE — SUT VIC 1 CT1 36IN J947H

## (undated) DEVICE — SOL IRRIG NACL 1000ML

## (undated) DEVICE — SOLUTION,WATER,IRRIGATION,1000ML,STERILE: Brand: MEDLINE